# Patient Record
Sex: MALE | Race: WHITE | Employment: OTHER | URBAN - METROPOLITAN AREA
[De-identification: names, ages, dates, MRNs, and addresses within clinical notes are randomized per-mention and may not be internally consistent; named-entity substitution may affect disease eponyms.]

---

## 2017-01-11 ENCOUNTER — TRANSFERRED RECORDS (OUTPATIENT)
Dept: HEALTH INFORMATION MANAGEMENT | Facility: CLINIC | Age: 67
End: 2017-01-11

## 2017-01-11 LAB — EJECTION FRACTION: 73

## 2017-01-20 DIAGNOSIS — J01.01 ACUTE RECURRENT MAXILLARY SINUSITIS: Primary | ICD-10-CM

## 2017-01-30 ENCOUNTER — TRANSFERRED RECORDS (OUTPATIENT)
Dept: HEALTH INFORMATION MANAGEMENT | Facility: CLINIC | Age: 67
End: 2017-01-30

## 2017-02-14 ENCOUNTER — TRANSFERRED RECORDS (OUTPATIENT)
Dept: HEALTH INFORMATION MANAGEMENT | Facility: CLINIC | Age: 67
End: 2017-02-14

## 2017-02-17 DIAGNOSIS — I25.10 CAD (CORONARY ARTERY DISEASE): ICD-10-CM

## 2017-02-20 RX ORDER — ATORVASTATIN CALCIUM 40 MG/1
TABLET, FILM COATED ORAL
Qty: 90 TABLET | Refills: 0 | OUTPATIENT
Start: 2017-02-20

## 2017-02-20 NOTE — TELEPHONE ENCOUNTER
Duplicate  atorvastatin (LIPITOR) 40 MG tablet 90 tablet 4 6/2/2016  No      Sig: Take 1 tablet (40 mg) by mouth daily     Class: E-Prescribe     Route: Oral     Order: [id number]     E-Prescribing Status: Receipt confirmed by pharmacy (6/2/2016  9:22 AM CDT)     Belle TAM CMA ([facility])

## 2017-06-06 ENCOUNTER — MYC MEDICAL ADVICE (OUTPATIENT)
Dept: FAMILY MEDICINE | Facility: CLINIC | Age: 67
End: 2017-06-06

## 2017-06-06 DIAGNOSIS — I10 ESSENTIAL HYPERTENSION, BENIGN: ICD-10-CM

## 2017-06-06 DIAGNOSIS — E78.5 HYPERLIPIDEMIA LDL GOAL <100: Primary | ICD-10-CM

## 2017-06-06 DIAGNOSIS — N18.30 CKD (CHRONIC KIDNEY DISEASE) STAGE 3, GFR 30-59 ML/MIN (H): ICD-10-CM

## 2017-06-06 DIAGNOSIS — E11.59 TYPE 2 DIABETES MELLITUS WITH OTHER CIRCULATORY COMPLICATIONS (H): ICD-10-CM

## 2017-06-06 DIAGNOSIS — Z11.59 NEED FOR HEPATITIS C SCREENING TEST: ICD-10-CM

## 2017-06-06 NOTE — TELEPHONE ENCOUNTER
Patient has lab only appointment on 6/13/17 and would like lab orders placed.  Orders teed up from [facility].   Nathaly Rose RN

## 2017-06-07 DIAGNOSIS — E78.5 HYPERLIPIDEMIA LDL GOAL <100: ICD-10-CM

## 2017-06-07 RX ORDER — CARVEDILOL 12.5 MG/1
12.5 TABLET ORAL
COMMUNITY
Start: 2017-03-31 | End: 2017-06-13

## 2017-06-07 RX ORDER — CLOPIDOGREL BISULFATE 75 MG/1
75 TABLET ORAL DAILY
COMMUNITY
Start: 2017-05-20 | End: 2021-04-29

## 2017-06-07 RX ORDER — METOPROLOL SUCCINATE 50 MG/1
50 TABLET, EXTENDED RELEASE ORAL DAILY
COMMUNITY
Start: 2017-06-05 | End: 2017-11-29

## 2017-06-07 NOTE — TELEPHONE ENCOUNTER
atorvastatin (LIPITOR) 40 MG tablet     Last Written Prescription Date: 6/2/16  Last Fill Quantity: 90, # refills: 4  Last Office Visit with G, P or [facility] prescribing provider: 11/29/16  Next 5 appointments (look out 90 days)     Jun 13, 2017  9:45 AM CDT   Prakash Samaniego with Isa Valverde MD   [facility] ([facility])    [address] [address]   [phone]                   Lab Results   Component Value Date    CHOL 105 06/01/2016     Lab Results   Component Value Date    HDL 44 06/01/2016     Lab Results   Component Value Date    LDL 35 06/01/2016     Lab Results   Component Value Date    TRIG 130 06/01/2016     Lab Results   Component Value Date    CHOLHDLRATIO 2.0 04/08/2015

## 2017-06-07 NOTE — PROGRESS NOTES
SUBJECTIVE:                                                    Edwardo Dumont is a 66 year old male who presents to clinic today for the following health issues:      Diabetes Follow-up      Patient is checking blood sugars: rarely in the mornings.  Results 135    Diabetic concerns: None     Symptoms of hypoglycemia (low blood sugar): none     Paresthesias (numbness or burning in feet) or sores: No, but feet hurt at night.     Date of last diabetic eye exam: a year ago     Hyperlipidemia Follow-Up      Rate your low fat/cholesterol diet?: not monitoring fat    Taking statin?  No    Other lipid medications/supplements?:  none     Hypertension Follow-up      Outpatient blood pressures are not being checked.    Low Salt Diet: not monitoring salt         Amount of exercise or physical activity: 4-6days/week for an average of 30-40 minutes    Problems taking medications regularly: No    Medication side effects: none    Diet: regular (no restrictions)           Problem list and histories reviewed & adjusted, as indicated.  Additional history: as documented    Patient Active Problem List   Diagnosis     Cough     Sleep apnea     Gout     Low back pain     Radiculopathy of leg     Hyperlipidemia LDL goal <100     OA (osteoarthritis) of knee     Degenerative arthritis of hip - right     Urinary retention     Advanced directives, counseling/discussion     S/P hip replacement     Closed dislocation of acromioclavicular joint     Impingement syndrome of right shoulder     Ischemic vascular disease   one stent coronary artery 5/12     Essential hypertension, benign     Visual disturbance, transient, right eye     Dermatochalasis     Insomnia     S/P coronary angiogram     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)     CKD (chronic kidney disease) stage 3, GFR 30-59 ml/min     Past Surgical History:   Procedure Laterality Date     C ABLATION SUPRAVENT ARRHYTHMOGENIC FOCUS  12/21/15    at [facility]     C TOTAL  HIP ARTHROPLASTY  1/11/11    Rt [facility]I     ENDOSCOPIC SINUS SURGERY  12/14/15     ENDOSCOPIC SINUS SURGERY Bilateral 12/14/2015    Procedure: ENDOSCOPIC SINUS SURGERY;  Surgeon: Kei Cevallos MD;  Location: [facility] OR     GASTRIC BYPASS  1/03    lap band     SINUS SURGERY Right 12-14-15    Dr. Cevallos     STENT  5/8/12    OM2 cornary artery stent     STENT  01/2017    3 stents placed in coronary arteries while in [place]       Social History   Substance Use Topics     Smoking status: Former Smoker     Quit date: 9/8/1989     Smokeless tobacco: Never Used      Comment: non-smoking household     Alcohol use Yes      Comment: couple drinks 3 x a week     Family History   Problem Relation Age of Onset     Allergies Son      Allergies Son      CANCER Mother      kidney     Neurologic Disorder Father      parkinsons     Glaucoma No family hx of      Macular Degeneration No family hx of          Current Outpatient Prescriptions   Medication Sig Dispense Refill     atorvastatin (LIPITOR) 40 MG tablet Take 1 tablet (40 mg) by mouth daily 90 tablet 4     zolpidem (AMBIEN) 10 MG tablet Take 1 tablet (10 mg) by mouth nightly as needed 30 tablet 0     losartan (COZAAR) 25 MG tablet Take 1 tablet (25 mg) by mouth daily 90 tablet 4     metoprolol (TOPROL-XL) 50 MG 24 hr tablet Take 50 mg by mouth daily       clopidogrel (PLAVIX) 75 MG tablet Take 75 mg by mouth       JARDIANCE 10 MG TABS tablet TAKE ONE TABLET BY MOUTH EVERY DAY 30 tablet 11     metFORMIN (GLUCOPHAGE) 500 MG tablet TAKE 2 TABLETS BY MOUTH TWICE DAILY WITH MEALS 360 tablet 4     blood glucose monitoring (NO BRAND SPECIFIED) test strip Test blood sugar once per day and as needed. 1 Box 4     IBUPROFEN PO Take by mouth every 6 hours as needed for moderate pain       aspirin 81 MG tablet Take 1 tablet (81 mg) by mouth daily       blood glucose monitoring (Mayvenn CONTOUR MONITOR) meter device kit Use to test blood sugar  times daily or as directed. 1 kit 0     nitroglycerin  (NITROSTAT) 0.4 MG SL tablet Place 1 tablet (0.4 mg) under the tongue every 5 minutes as needed for chest pain 90 tablet 4     ACE NOT PRESCRIBED, INTENTIONAL, ACE Inhibitor not prescribed due to Other: cough 0 each 0     ONE TOUCH/ONE TOUCH II STARTER KIT use as directed 1 0     [DISCONTINUED] atorvastatin (LIPITOR) 40 MG tablet Take 1 tablet (40 mg) by mouth daily 90 tablet 0     [DISCONTINUED] losartan (COZAAR) 25 MG tablet Take 1 tablet (25 mg) by mouth daily 90 tablet 4     Recent Labs   Lab Test  06/12/17   0846  11/28/16   0901  08/24/16   0847  06/01/16   0929   04/08/15   0831   01/15/14   0850   01/03/12   0903  05/25/11   0838   A1C  7.1*  7.3*  6.7*  8.5*   < >  6.9*   < >  6.8*   < >  7.2*  7.0*   LDL  36   --    --   35   --   31   < >   --    < >  89   --    HDL  44   --    --   44   --   50   < >   --    < >  43   --    TRIG  96   --    --   130   --   93   < >   --    < >  136   --    ALT  29   --    --    --    --    --    --    --    --   22  20   CR  1.20  1.24   --   1.03   < >  1.28*   < >  1.24   < >  1.34*   --    GFRESTIMATED  61  58*   --   72   < >  57*   < >  59*   < >  54*   --    GFRESTBLACK  73  71   --   88   < >  69   < >  71   < >  66   --    POTASSIUM  4.4  4.2   --   4.2   < >  4.1   < >  4.5   < >  4.6   --    TSH   --    --    --   1.34   --    --    --   0.99   --   1.16  1.16    < > = values in this interval not displayed.      BP Readings from Last 3 Encounters:   06/13/17 122/68   11/29/16 128/66   08/24/16 110/70    Wt Readings from Last 3 Encounters:   06/13/17 234 lb (106.1 kg)   12/08/16 237 lb 9.6 oz (107.8 kg)   11/29/16 238 lb (108 kg)                  Labs reviewed in EPIC    Reviewed and updated as needed this visit by clinical staff  Tobacco  Allergies  Meds       Reviewed and updated as needed this visit by Provider         ROS:  This 66 year old male is here today for diabetes check. He spent the winter with his wife in [place]. While he was there in January, he  "felt some weakness and chest discomfort. He had angiogram and 3 stents were placed. One was placed inside of an old stent that had become occluded. He still isn't feeling back to his normal energy level. He often feels that his is skipping beats. He has an appointment to see one of our cardiologists soon.   When he reclines in his recliner, he often feels a pressure into his chest that seems to take his breath away. He wonders what that could be. We discussed the possibility of a hiatal hernia.   His sugars are under good control  He and wife are selling their house with outside pool and large yard as it is getting too hard for him to take care of everything.   He still needs PRN Ambien for when he travels. Just can't sleep in strange beds.   C: NEGATIVE for fever, chills, change in weight  E/M: NEGATIVE for ear, mouth and throat problems  R: NEGATIVE for significant cough or SOB  CV: NEGATIVE for chest pain, palpitations or peripheral edema    OBJECTIVE:                                                    /68  Pulse (!) 40  Temp 98.1  F (36.7  C) (Oral)  Ht 6' 0.8\" (1.849 m)  Wt 234 lb (106.1 kg)  SpO2 97%  BMI 31.05 kg/m2  Body mass index is 31.05 kg/(m^2).  GENERAL: healthy, alert and no distress  NECK: no adenopathy, no asymmetry, masses, or scars and thyroid normal to palpation  RESP: lungs clear to auscultation - no rales, rhonchi or wheezes  CV: regular rate and rhythm, normal S1 S2, no S3 or S4, no murmur, click or rub, no peripheral edema and peripheral pulses strong, but his pulse is very slow and sounded briefly irregular to me.   ABDOMEN: soft, nontender, no hepatosplenomegaly, no masses and bowel sounds normal  MS: no gross musculoskeletal defects noted, no edema  Diabetic foot exam: normal DP and PT pulses, no trophic changes or ulcerative lesions, normal sensory exam and normal monofilament exam    Diagnostic Test Results:  Results for orders placed or performed in visit on 06/12/17   Lipid " Profile with reflex to direct LDL   Result Value Ref Range    Cholesterol 99 <200 mg/dL    Triglycerides 96 <150 mg/dL    HDL Cholesterol 44 >39 mg/dL    LDL Cholesterol Calculated 36 <100 mg/dL    Non HDL Cholesterol 55 <130 mg/dL   Hepatitis C Screen Reflex to HCV RNA Quant and Genotype   Result Value Ref Range    Hepatitis C Antibody (A) NR     Reactive   A reactive result indicates one of the following 1) current HCV infection 2)   past HCV infection that has resolved or 3) false positivity. The CDC recommends   that a reactive result should be followed by Nucleic acid testing for HCV RNA.  If HCV RNA is detected, that indicates current HCV infection. If HCV RNA is not   detected, that indicates either past, resolved HCV infection, or false HCV   antibody positivity.   Assay performance characteristics have not been established for newborns,   infants, and children     Hemoglobin A1c   Result Value Ref Range    Hemoglobin A1C 7.1 (H) 4.3 - 6.0 %   Comprehensive metabolic panel   Result Value Ref Range    Sodium 139 133 - 144 mmol/L    Potassium 4.4 3.4 - 5.3 mmol/L    Chloride 105 94 - 109 mmol/L    Carbon Dioxide 24 20 - 32 mmol/L    Anion Gap 10 3 - 14 mmol/L    Glucose 162 (H) 70 - 99 mg/dL    Urea Nitrogen 29 7 - 30 mg/dL    Creatinine 1.20 0.66 - 1.25 mg/dL    GFR Estimate 61 >60 mL/min/1.7m2    GFR Estimate If Black 73 >60 mL/min/1.7m2    Calcium 9.0 8.5 - 10.1 mg/dL    Bilirubin Total 1.2 0.2 - 1.3 mg/dL    Albumin 3.9 3.4 - 5.0 g/dL    Protein Total 7.2 6.8 - 8.8 g/dL    Alkaline Phosphatase 103 40 - 150 U/L    ALT 29 0 - 70 U/L    AST 13 0 - 45 U/L         ASSESSMENT/PLAN:                                                             1. 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)  Good control   - OPHTHALMOLOGY ADULT REFERRAL    2. Hyperlipidemia LDL goal <100  Good control   - atorvastatin (LIPITOR) 40 MG tablet; Take 1 tablet (40 mg) by mouth daily  Dispense: 90 tablet; Refill:  4    3. SOB (shortness of breath)  Chest xray does confirm a hiatal hernia  - XR Chest 2 Views    4. Hiatal hernia  As above, encouraged weight loss and more exercise   Eat very small meals in the evenings.       5. Irregular heartbeat  EKG is normal today with pulse of 60   - EKG 12-lead complete w/read - Clinics    6. Essential hypertension, benign  Good control   - losartan (COZAAR) 25 MG tablet; Take 1 tablet (25 mg) by mouth daily  Dispense: 90 tablet; Refill: 4    7. Need for prophylactic vaccination against Streptococcus pneumoniae (pneumococcus)  due  - PNEUMOCOCCAL VACCINE,ADULT,SQ OR IM    8. Primary insomnia  As above   - zolpidem (AMBIEN) 10 MG tablet; Take 1 tablet (10 mg) by mouth nightly as needed  Dispense: 30 tablet; Refill: 0    Return to clinic 3 months     AIDEN PASCUAL MD  [facility]

## 2017-06-08 ENCOUNTER — TELEPHONE (OUTPATIENT)
Dept: FAMILY MEDICINE | Facility: CLINIC | Age: 67
End: 2017-06-08

## 2017-06-08 RX ORDER — ATORVASTATIN CALCIUM 40 MG/1
40 TABLET, FILM COATED ORAL DAILY
Qty: 90 TABLET | Refills: 0 | Status: SHIPPED | OUTPATIENT
Start: 2017-06-08 | End: 2017-06-13

## 2017-06-08 NOTE — TELEPHONE ENCOUNTER
Prescription approved per [facility] Refill Protocol.  Patient due for labs for further refills.  Starr Urbina RN - BC

## 2017-06-08 NOTE — TELEPHONE ENCOUNTER
Reason for Call: Request for an order or referral:    Order or referral being requested: Patient would like to request an lab order ( diabetes), added to his chart.     Date needed: as soon as possible    Has the patient been seen by the PCP for this problem? YES    Additional comments: please contact patient if any questions    Phone number Patient can be reached at:  Home number on file [phone] (home)    Best Time:  today    Can we leave a detailed message on this number?  YES    Call taken on 6/8/2017 at 9:03 AM by Michael Azar

## 2017-06-12 DIAGNOSIS — E78.5 HYPERLIPIDEMIA LDL GOAL <100: ICD-10-CM

## 2017-06-12 DIAGNOSIS — Z11.59 NEED FOR HEPATITIS C SCREENING TEST: ICD-10-CM

## 2017-06-12 DIAGNOSIS — I10 ESSENTIAL HYPERTENSION, BENIGN: ICD-10-CM

## 2017-06-12 DIAGNOSIS — N18.30 CKD (CHRONIC KIDNEY DISEASE) STAGE 3, GFR 30-59 ML/MIN (H): ICD-10-CM

## 2017-06-12 DIAGNOSIS — E11.59 TYPE 2 DIABETES MELLITUS WITH OTHER CIRCULATORY COMPLICATIONS (H): ICD-10-CM

## 2017-06-12 LAB
ALBUMIN SERPL-MCNC: 3.9 G/DL (ref 3.4–5)
ALP SERPL-CCNC: 103 U/L (ref 40–150)
ALT SERPL W P-5'-P-CCNC: 29 U/L (ref 0–70)
ANION GAP SERPL CALCULATED.3IONS-SCNC: 10 MMOL/L (ref 3–14)
AST SERPL W P-5'-P-CCNC: 13 U/L (ref 0–45)
BILIRUB SERPL-MCNC: 1.2 MG/DL (ref 0.2–1.3)
BUN SERPL-MCNC: 29 MG/DL (ref 7–30)
CALCIUM SERPL-MCNC: 9 MG/DL (ref 8.5–10.1)
CHLORIDE SERPL-SCNC: 105 MMOL/L (ref 94–109)
CHOLEST SERPL-MCNC: 99 MG/DL
CO2 SERPL-SCNC: 24 MMOL/L (ref 20–32)
CREAT SERPL-MCNC: 1.2 MG/DL (ref 0.66–1.25)
GFR SERPL CREATININE-BSD FRML MDRD: 61 ML/MIN/1.7M2
GLUCOSE SERPL-MCNC: 162 MG/DL (ref 70–99)
HBA1C MFR BLD: 7.1 % (ref 4.3–6)
HCV AB SERPL QL IA: ABNORMAL
HDLC SERPL-MCNC: 44 MG/DL
LDLC SERPL CALC-MCNC: 36 MG/DL
NONHDLC SERPL-MCNC: 55 MG/DL
POTASSIUM SERPL-SCNC: 4.4 MMOL/L (ref 3.4–5.3)
PROT SERPL-MCNC: 7.2 G/DL (ref 6.8–8.8)
SODIUM SERPL-SCNC: 139 MMOL/L (ref 133–144)
TRIGL SERPL-MCNC: 96 MG/DL

## 2017-06-12 PROCEDURE — 86803 HEPATITIS C AB TEST: CPT | Performed by: FAMILY MEDICINE

## 2017-06-12 PROCEDURE — 80053 COMPREHEN METABOLIC PANEL: CPT | Performed by: FAMILY MEDICINE

## 2017-06-12 PROCEDURE — 36415 COLL VENOUS BLD VENIPUNCTURE: CPT | Performed by: FAMILY MEDICINE

## 2017-06-12 PROCEDURE — 87522 HEPATITIS C REVRS TRNSCRPJ: CPT | Performed by: FAMILY MEDICINE

## 2017-06-12 PROCEDURE — 80061 LIPID PANEL: CPT | Performed by: FAMILY MEDICINE

## 2017-06-12 PROCEDURE — 83036 HEMOGLOBIN GLYCOSYLATED A1C: CPT | Performed by: FAMILY MEDICINE

## 2017-06-12 NOTE — LETTER
[facility]  [address] [place] [address]. [place] [address]    Lor 15, 2017    Edwardo Dumont  [address] [place] [address]          Dear Edwardo,    Your final hepatitis C result is negative. You just had a false positive screen. Continue your healthy lifestyle    Enclosed is a copy of your results.     Results for orders placed or performed in visit on 06/12/17   Lipid Profile with reflex to direct LDL   Result Value Ref Range    Cholesterol 99 <200 mg/dL    Triglycerides 96 <150 mg/dL    HDL Cholesterol 44 >39 mg/dL    LDL Cholesterol Calculated 36 <100 mg/dL    Non HDL Cholesterol 55 <130 mg/dL   Hepatitis C Screen Reflex to HCV RNA Quant and Genotype   Result Value Ref Range    Hepatitis C Antibody (A) NR     Reactive   A reactive result indicates one of the following 1) current HCV infection 2)   past HCV infection that has resolved or 3) false positivity. The CDC recommends   that a reactive result should be followed by Nucleic acid testing for HCV RNA.  If HCV RNA is detected, that indicates current HCV infection. If HCV RNA is not   detected, that indicates either past, resolved HCV infection, or false HCV   antibody positivity.   Assay performance characteristics have not been established for newborns,   infants, and children     Hemoglobin A1c   Result Value Ref Range    Hemoglobin A1C 7.1 (H) 4.3 - 6.0 %   Comprehensive metabolic panel   Result Value Ref Range    Sodium 139 133 - 144 mmol/L    Potassium 4.4 3.4 - 5.3 mmol/L    Chloride 105 94 - 109 mmol/L    Carbon Dioxide 24 20 - 32 mmol/L    Anion Gap 10 3 - 14 mmol/L    Glucose 162 (H) 70 - 99 mg/dL    Urea Nitrogen 29 7 - 30 mg/dL    Creatinine 1.20 0.66 - 1.25 mg/dL    GFR Estimate 61 >60 mL/min/1.7m2    GFR Estimate If Black 73 >60 mL/min/1.7m2    Calcium 9.0 8.5 - 10.1 mg/dL    Bilirubin Total 1.2 0.2 - 1.3 mg/dL    Albumin 3.9 3.4 - 5.0 g/dL    Protein Total 7.2 6.8 - 8.8 g/dL     Alkaline Phosphatase 103 40 - 150 U/L    ALT 29 0 - 70 U/L    AST 13 0 - 45 U/L   Hepatitis C RNA Quantitative   Result Value Ref Range    HCV RNA Quant IU/ml  HCVND [IU]/mL     HCV RNA Not Detected   The CADEN AmpliPrep/CADEN TaqMan HCV Test is an FDA-approved in vitro nucleic   acid amplification test for the quantitation of HCV RNA in human plasma (ETDA   plasma) or serum using the [employer] AmpliPrep Instrument for automated viral   nucleic acid extraction and the [employer] TaqMan Analyzer or [employer] TaqMan for   automated Real Time PCR amplification and detection of the viral nucleic acid   target.   Titer results are reported in International Units/mL (IU/mL) using the 1st WHO   International standard for HCV for Nucleic Acid Amplification based assays.      Log of HCV RNA Qt Not Calculated <1.2 Log IU/mL       If you have any questions or concerns, please call myself or my nurse at [phone].      Sincerely,        Isa Valverde MD /[email or web address]

## 2017-06-12 NOTE — PROGRESS NOTES
Dear Edwardo,    Your recent test results are attached.      Pre-visit labs to be discussed in clinic.    If you have any questions please feel free to contact [phone] or myself via [employer].    Sincerely,  Delmis Segovia, CNP

## 2017-06-13 ENCOUNTER — OFFICE VISIT (OUTPATIENT)
Dept: FAMILY MEDICINE | Facility: CLINIC | Age: 67
End: 2017-06-13
Payer: COMMERCIAL

## 2017-06-13 ENCOUNTER — RADIANT APPOINTMENT (OUTPATIENT)
Dept: GENERAL RADIOLOGY | Facility: CLINIC | Age: 67
End: 2017-06-13
Attending: FAMILY MEDICINE
Payer: COMMERCIAL

## 2017-06-13 ENCOUNTER — PRE VISIT (OUTPATIENT)
Dept: CARDIOLOGY | Facility: CLINIC | Age: 67
End: 2017-06-13

## 2017-06-13 VITALS
TEMPERATURE: 98.1 F | WEIGHT: 234 LBS | HEART RATE: 40 BPM | HEIGHT: 73 IN | DIASTOLIC BLOOD PRESSURE: 68 MMHG | BODY MASS INDEX: 31.01 KG/M2 | SYSTOLIC BLOOD PRESSURE: 122 MMHG | OXYGEN SATURATION: 97 %

## 2017-06-13 DIAGNOSIS — F51.01 PRIMARY INSOMNIA: ICD-10-CM

## 2017-06-13 DIAGNOSIS — E78.5 HYPERLIPIDEMIA LDL GOAL <100: ICD-10-CM

## 2017-06-13 DIAGNOSIS — E11.59 TYPE 2 DIABETES MELLITUS WITH OTHER CIRCULATORY COMPLICATIONS (H): Primary | ICD-10-CM

## 2017-06-13 DIAGNOSIS — R06.02 SOB (SHORTNESS OF BREATH): ICD-10-CM

## 2017-06-13 DIAGNOSIS — Z23 NEED FOR PROPHYLACTIC VACCINATION AGAINST STREPTOCOCCUS PNEUMONIAE (PNEUMOCOCCUS): ICD-10-CM

## 2017-06-13 DIAGNOSIS — I10 ESSENTIAL HYPERTENSION, BENIGN: ICD-10-CM

## 2017-06-13 DIAGNOSIS — K44.9 HIATAL HERNIA: ICD-10-CM

## 2017-06-13 DIAGNOSIS — I49.9 IRREGULAR HEARTBEAT: ICD-10-CM

## 2017-06-13 PROCEDURE — G0009 ADMIN PNEUMOCOCCAL VACCINE: HCPCS | Performed by: FAMILY MEDICINE

## 2017-06-13 PROCEDURE — 93000 ELECTROCARDIOGRAM COMPLETE: CPT | Performed by: FAMILY MEDICINE

## 2017-06-13 PROCEDURE — 71020 XR CHEST 2 VW: CPT

## 2017-06-13 PROCEDURE — 99214 OFFICE O/P EST MOD 30 MIN: CPT | Mod: 25 | Performed by: FAMILY MEDICINE

## 2017-06-13 PROCEDURE — 90732 PPSV23 VACC 2 YRS+ SUBQ/IM: CPT | Performed by: FAMILY MEDICINE

## 2017-06-13 RX ORDER — ATORVASTATIN CALCIUM 40 MG/1
40 TABLET, FILM COATED ORAL DAILY
Qty: 90 TABLET | Refills: 4 | Status: SHIPPED | OUTPATIENT
Start: 2017-06-13 | End: 2018-06-28

## 2017-06-13 RX ORDER — ZOLPIDEM TARTRATE 10 MG/1
10 TABLET ORAL
Qty: 30 TABLET | Refills: 0 | Status: SHIPPED | OUTPATIENT
Start: 2017-06-13 | End: 2017-11-29

## 2017-06-13 RX ORDER — LOSARTAN POTASSIUM 25 MG/1
25 TABLET ORAL DAILY
Qty: 90 TABLET | Refills: 4 | Status: SHIPPED | OUTPATIENT
Start: 2017-06-13 | End: 2018-06-28

## 2017-06-13 ASSESSMENT — PAIN SCALES - GENERAL: PAINLEVEL: NO PAIN (0)

## 2017-06-13 NOTE — LETTER
[address] [place] [address]    June 13, 2017    Edwardo Dumont  [address] [place] [address]      Dear Edwardo,    Your chest xray was basically read as normal. Didn't even mention the hiatal hernia, but you now know it is there.     Enclosed is a copy of your results.   Results for orders placed or performed in visit on 06/13/17   XR Chest 2 Views    Narrative    CHEST TWO VIEWS  6/13/2017 11:02 AM     HISTORY: Shortness of breath.    COMPARISON: None.      Impression    IMPRESSION: The cardiac silhouette and pulmonary vasculature are  normal. Mild fibrosis or atelectasis at the left lung base. There are  no acute infiltrates.    WHITNEY ORNELAS MD       If you have any questions or concerns, please call myself or my nurse at [phone].    Sincerely,    Isa Valverde MD/lam

## 2017-06-13 NOTE — PATIENT INSTRUCTIONS
[facility]    If you have any questions regarding to your visit please contact your care team:       Team Purple:   Clinic Hours Telephone Number   Dr. Isa Verma     7am-7pm  Monday - Thursday   7am-5pm  Fridays  [phone]  (Appointment scheduling available 24/7)    Questions about your Visit?   Team Line:  [phone]   Urgent Care - [place] [facility] - 11am-9pm Monday-Friday Saturday-Sunday- 9am-5pm   [place] - 5pm-9pm Monday-Friday Saturday-Sunday- 9am-5pm  [phone] - [facility]  [phone] - [place]       What options do I have for visits at the clinic other than the traditional office visit?  To expand how we care for you, many of our providers are utilizing electronic visits (e-visits) and telephone visits, when medically appropriate, for interactions with their patients rather than a visit in the clinic.   We also offer nurse visits for many medical concerns. Just like any other service, we will bill your insurance company for this type of visit based on time spent on the phone with your provider. Not all insurance companies cover these visits. Please check with your medical insurance if this type of visit is covered. You will be responsible for any charges that are not paid by your insurance.      E-visits via [employer]:  generally incur a $35.00 fee.  Telephone visits:  Time spent on the phone: *charged based on time that is spent on the phone in increments of 10 minutes. Estimated cost:   5-10 mins $30.00   11-20 mins. $59.00   21-30 mins. $85.00     Use [employer] (secure email communication and access to your chart) to send your primary care provider a message or make an appointment. Ask someone on your Team how to sign up for [employer].  For a Price Quote for your services, please call our Consumer Price Line at [phone].  As always, Thank you for trusting us with your health care needs!

## 2017-06-13 NOTE — MR AVS SNAPSHOT
After Visit Summary   6/13/2017    Edwardo Dumont    MRN: [id number]           Patient Information     Date Of Birth          1950        Visit Information        Provider Department      6/13/2017 9:45 AM Isa Valverde MD [facility]        Today's Diagnoses     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)    -  1    Hyperlipidemia LDL goal <100        Essential hypertension, benign        SOB (shortness of breath)        Irregular heartbeat        Need for prophylactic vaccination against Streptococcus pneumoniae (pneumococcus)        Primary insomnia          Care Instructions    [facility]    If you have any questions regarding to your visit please contact your care team:       Team Purple:   Clinic Hours Telephone Number   Dr. Isa Verma     7am-7pm  Monday - Thursday   7am-5pm  Fridays  [phone]  (Appointment scheduling available 24/7)    Questions about your Visit?   Team Line:  [phone]   Urgent Care - [place] - 11am-9pm Monday-Friday Saturday-Sunday- 9am-5pm   [place] - 5pm-9pm Monday-Friday Saturday-Sunday- 9am-5pm  [phone] - [place]   [phone] - [place]       What options do I have for visits at the clinic other than the traditional office visit?  To expand how we care for you, many of our providers are utilizing electronic visits (e-visits) and telephone visits, when medically appropriate, for interactions with their patients rather than a visit in the clinic.   We also offer nurse visits for many medical concerns. Just like any other service, we will bill your insurance company for this type of visit based on time spent on the phone with your provider. Not all insurance companies cover these visits. Please check with your medical insurance if this type of visit is covered. You will be responsible for any charges that are not paid by  your insurance.      E-visits via Simplisthart:  generally incur a $35.00 fee.  Telephone visits:  Time spent on the phone: *charged based on time that is spent on the phone in increments of 10 minutes. Estimated cost:   5-10 mins $30.00   11-20 mins. $59.00   21-30 mins. $85.00     Use Simplisthart (secure email communication and access to your chart) to send your primary care provider a message or make an appointment. Ask someone on your Team how to sign up for [employer].  For a Price Quote for your services, please call our goOutMap Price Line at [phone].  As always, Thank you for trusting us with your health care needs!              Follow-ups after your visit        Additional Services     OPHTHALMOLOGY ADULT REFERRAL       Your provider has referred you to: [facility]: [facility] [place] [phone]   [email or web address]    Please be aware that coverage of these services is subject to the terms and limitations of your health insurance plan.  Call member services at your health plan with any benefit or coverage questions.      Please bring the following with you to your appointment:    (1) Any X-Rays, CTs or MRIs which have been performed.  Contact the facility where they were done to arrange for  prior to your scheduled appointment.    (2) List of current medications  (3) This referral request   (4) Any documents/labs given to you for this referral                  Your next 10 appointments already scheduled     Jun 14, 2017 11:00 AM CDT   Return Visit with ESTELA Zuñiga MD   [facility] PHYSICIANS HEART AT [facility] ([facility])    [address] [place] [address] 2nd Floor  [facility] [id number] [phone]              Who to contact     If you have questions or need follow up information about today's clinic visit or your schedule please contact [facility] directly at [phone].  Normal or non-critical lab and imaging results will be  "communicated to you by [email or web address], letter or phone within 4 business days after the clinic has received the results. If you do not hear from us within 7 days, please contact the clinic through Undertone or phone. If you have a critical or abnormal lab result, we will notify you by phone as soon as possible.  Submit refill requests through Undertone or call your pharmacy and they will forward the refill request to us. Please allow 3 business days for your refill to be completed.          Additional Information About Your Visit        Undertone Information     Undertone gives you secure access to your electronic health record. If you see a primary care provider, you can also send messages to your care team and make appointments. If you have questions, please call your primary care clinic.  If you do not have a primary care provider, please call [phone] and they will assist you.        Care EveryWhere ID     This is your Care EveryWhere ID. This could be used by other organizations to access your [place] medical records  [id number]        Your Vitals Were     Pulse Temperature Height Pulse Oximetry BMI (Body Mass Index)       40 98.1  F (36.7  C) (Oral) 6' 0.8\" (1.849 m) 97% 31.05 kg/m2        Blood Pressure from Last 3 Encounters:   06/13/17 122/68   11/29/16 128/66   08/24/16 110/70    Weight from Last 3 Encounters:   06/13/17 234 lb (106.1 kg)   12/08/16 237 lb 9.6 oz (107.8 kg)   11/29/16 238 lb (108 kg)              We Performed the Following     EKG 12-lead complete w/read - Clinics     OPHTHALMOLOGY ADULT REFERRAL     PNEUMOCOCCAL VACCINE,ADULT,SQ OR IM     XR Chest 2 Views          Where to get your medicines      These medications were sent to [facility] [id number]  [place], [place] - [address] AT [facility] [address] [place] [address]     Phone:  [phone]     atorvastatin 40 MG tablet    losartan 25 MG tablet         Some of these will need a paper prescription " and others can be bought over the counter.  Ask your nurse if you have questions.     Bring a paper prescription for each of these medications     zolpidem 10 MG tablet          Primary Care Provider Office Phone # Fax #    Isa Valverde [phone]       [address] [address]        Thank you!     Thank you for choosing [facility]  for your care. Our goal is always to provide you with excellent care. Hearing back from our patients is one way we can continue to improve our services. Please take a few minutes to complete the written survey that you may receive in the mail after your visit with us. Thank you!             Your Updated Medication List - Protect others around you: Learn how to safely use, store and throw away your medicines at www.disposemymeds.org.          This list is accurate as of: 6/13/17 11:09 AM.  Always use your most recent med list.                   Brand Name Dispense Instructions for use    ACE NOT PRESCRIBED (INTENTIONAL)     0 each    ACE Inhibitor not prescribed due to Other: cough       aspirin 81 MG tablet      Take 1 tablet (81 mg) by mouth daily       atorvastatin 40 MG tablet    LIPITOR    90 tablet    Take 1 tablet (40 mg) by mouth daily       blood glucose monitoring test strip    no brand specified    1 Box    Test blood sugar once per day and as needed.       clopidogrel 75 MG tablet    PLAVIX     Take 75 mg by mouth       IBUPROFEN PO      Take by mouth every 6 hours as needed for moderate pain       JARDIANCE 10 MG Tabs tablet   Generic drug:  empagliflozin     30 tablet    TAKE ONE TABLET BY MOUTH EVERY DAY       losartan 25 MG tablet    COZAAR    90 tablet    Take 1 tablet (25 mg) by mouth daily       metFORMIN 500 MG tablet    GLUCOPHAGE    360 tablet    TAKE 2 TABLETS BY MOUTH TWICE DAILY WITH MEALS       metoprolol 50 MG 24 hr tablet    TOPROL-XL     Take 50 mg by mouth daily        nitroglycerin 0.4 MG sublingual tablet    NITROSTAT    90 tablet    Place 1 tablet (0.4 mg) under the tongue every 5 minutes as needed for chest pain       * ONE TOUCH/ONE TOUCH II STARTER KIT     1    use as directed       * blood glucose monitoring meter device kit     1 kit    Use to test blood sugar  times daily or as directed.       zolpidem 10 MG tablet    AMBIEN    30 tablet    Take 1 tablet (10 mg) by mouth nightly as needed       * Notice:  This list has 2 medication(s) that are the same as other medications prescribed for you. Read the directions carefully, and ask your doctor or other care provider to review them with you.

## 2017-06-13 NOTE — TELEPHONE ENCOUNTER
Follow up for CAD. Former Dr. Cunningham pt. S/p angiogram 9/2015. Hx of Stent  in [place] this past winter. Heart skipping beats, chest pressure when reclining.    Chart prep complete. All requested testing/labs completed.  Yue Chairez CMA.

## 2017-06-14 ENCOUNTER — OFFICE VISIT (OUTPATIENT)
Dept: CARDIOLOGY | Facility: CLINIC | Age: 67
End: 2017-06-14
Payer: COMMERCIAL

## 2017-06-14 VITALS
WEIGHT: 234 LBS | OXYGEN SATURATION: 97 % | HEART RATE: 57 BPM | DIASTOLIC BLOOD PRESSURE: 84 MMHG | SYSTOLIC BLOOD PRESSURE: 135 MMHG | BODY MASS INDEX: 31.05 KG/M2

## 2017-06-14 DIAGNOSIS — Z98.61 POSTSURGICAL PERCUTANEOUS TRANSLUMINAL CORONARY ANGIOPLASTY STATUS: Primary | ICD-10-CM

## 2017-06-14 PROCEDURE — 99214 OFFICE O/P EST MOD 30 MIN: CPT | Performed by: INTERNAL MEDICINE

## 2017-06-14 ASSESSMENT — PAIN SCALES - GENERAL: PAINLEVEL: NO PAIN (0)

## 2017-06-14 NOTE — NURSING NOTE
Med Reconcile: Reviewed and verified all current medications with the patient. The updated medication list was printed and given to the patient.  Return Appointment: Patient given instructions regarding scheduling next clinic visit. Patient demonstrated understanding of this information and agreed to call with further questions or concerns.  6 mo fu   Patient stated he understood all health information given and agreed to call with further questions or concerns.  Anat Ny RN

## 2017-06-14 NOTE — LETTER
6/14/2017      RE: Edwardo Dumont  [address]  [place] [address]       Dear Colleague,    Thank you for the opportunity to participate in the care of your patient, Edwardo Dumont, at the [facility] HEART AT [facility]. Please see a copy of my visit note below.       SUBJECTIVE:  Edwardo Dumont is a 66 year old male who presents for follow up.  Had stent to OM2 in 2012. Had normal DSE and coronary angiogram in 2015 showing no flow limiting lesions. This [place] had an abnormal stress MPI at [place] and had stent to RCA and OM2(prior stent-ISR).  Doing well. No complaints.    Patient Active Problem List    Diagnosis Date Noted     CKD (chronic kidney disease) stage 3, GFR 30-59 ml/min      Priority: Medium     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) 10/06/2015     Priority: Medium     S/P coronary angiogram 09/23/2015     Priority: Medium     Insomnia 08/03/2015     Priority: Medium     Dermatochalasis 04/10/2015     Priority: Medium     Visual disturbance, transient, right eye 02/09/2014     Priority: Medium     Essential hypertension, benign 05/14/2013     Priority: Medium     Ischemic vascular disease   one stent coronary artery 5/12 12/19/2012     Priority: Medium     Closed dislocation of acromioclavicular joint 12/17/2012     Priority: Medium     Problem list name updated by automated process. Provider to review       Impingement syndrome of right shoulder 12/17/2012     Priority: Medium     S/P hip replacement 12/13/2012     Priority: Medium     Advanced directives, counseling/discussion 05/25/2011     Priority: Medium     Discussed Advance Directive planning with patient; information given to patient to review.         Urinary retention 01/21/2011     Priority: Medium     Degenerative arthritis of hip - right 12/27/2010     Priority: Medium     OA (osteoarthritis) of knee 10/25/2010     Priority: Medium      Hyperlipidemia LDL goal <100 10/14/2010     Priority: Medium     Low back pain 04/23/2010     Priority: Medium     Radiculopathy of leg 04/23/2010     Priority: Medium     Gout 11/27/2009     Priority: Medium     Sleep apnea      Priority: Medium     Cough 12/05/2007     Priority: Medium    .  Current Outpatient Prescriptions   Medication Sig     atorvastatin (LIPITOR) 40 MG tablet Take 1 tablet (40 mg) by mouth daily     zolpidem (AMBIEN) 10 MG tablet Take 1 tablet (10 mg) by mouth nightly as needed     losartan (COZAAR) 25 MG tablet Take 1 tablet (25 mg) by mouth daily     metoprolol (TOPROL-XL) 50 MG 24 hr tablet Take 50 mg by mouth daily     clopidogrel (PLAVIX) 75 MG tablet Take 75 mg by mouth     JARDIANCE 10 MG TABS tablet TAKE ONE TABLET BY MOUTH EVERY DAY     metFORMIN (GLUCOPHAGE) 500 MG tablet TAKE 2 TABLETS BY MOUTH TWICE DAILY WITH MEALS     blood glucose monitoring (NO BRAND SPECIFIED) test strip Test blood sugar once per day and as needed.     IBUPROFEN PO Take by mouth every 6 hours as needed for moderate pain     aspirin 81 MG tablet Take 1 tablet (81 mg) by mouth daily     blood glucose monitoring ([employer] CONTOUR MONITOR) meter device kit Use to test blood sugar  times daily or as directed.     ONE TOUCH/ONE TOUCH II STARTER KIT use as directed     nitroglycerin (NITROSTAT) 0.4 MG SL tablet Place 1 tablet (0.4 mg) under the tongue every 5 minutes as needed for chest pain (Patient not taking: Reported on 6/14/2017)     ACE NOT PRESCRIBED, INTENTIONAL, ACE Inhibitor not prescribed due to Other: cough     No current facility-administered medications for this visit.      Past Medical History:   Diagnosis Date     Acromioclavicular joint separation, type 1 9/12    right     Allergic rhinitis     causes chronic  cough     Arthritis      CKD (chronic kidney disease) stage 3, GFR 30-59 ml/min      Degenerative arthritis of hip - right 12/27/2010     Gout      Hip arthrosis - right 10/25/2010      Hyperlipidemia      Ischaemic vascular disease      Ischemic vascular disease 5/12    one stent     OA (osteoarthritis) of knee 10/25/2010     Renal insufficiency      Sleep apnea      Type II or unspecified type diabetes mellitus without mention of complication, not stated as uncontrolled      Unspecified essential hypertension      Past Surgical History:   Procedure Laterality Date     C ABLATION SUPRAVENT ARRHYTHMOGENIC FOCUS  12/21/15    at [facility]     C TOTAL HIP ARTHROPLASTY  1/11/11    Rt YOGI     ENDOSCOPIC SINUS SURGERY  12/14/15     ENDOSCOPIC SINUS SURGERY Bilateral 12/14/2015    Procedure: ENDOSCOPIC SINUS SURGERY;  Surgeon: Kei Cevallos MD;  Location: [facility] OR     GASTRIC BYPASS  1/03    lap band     SINUS SURGERY Right 12-14-15    Dr. Cevallos     STENT  5/8/12    OM2 cornary artery stent     STENT  01/2017    3 stents placed in coronary arteries while in [place]     STENT, CORONARY, HANG  01/2017    2 stents in [place].     Allergies   Allergen Reactions     Benzonatate Anaphylaxis     Lisinopril Cough     Zocor [Simvastatin - High Dose]      Poor memory     Social History     Social History     Marital status:      Spouse name: N/A     Number of children: N/A     Years of education: N/A     Occupational History     Not on file.     Social History Main Topics     Smoking status: Former Smoker     Quit date: 9/8/1989     Smokeless tobacco: Never Used      Comment: non-smoking household     Alcohol use Yes      Comment: couple drinks 3 x a week     Drug use: No     Sexual activity: Yes     Partners: Female     Other Topics Concern     Not on file     Social History Narrative     Family History   Problem Relation Age of Onset     Allergies Son      Allergies Son      CANCER Mother      kidney     Neurologic Disorder Father      parkinsons     Glaucoma No family hx of      Macular Degeneration No family hx of           REVIEW OF SYSTEMS:  General: negative, fever, chills, night sweats  Skin: negative, acne,  rash and scaling  Eyes: negative, double vision, eye pain and photophobia  Ears/Nose/Throat: negative, nasal congestion and purulent rhinorrhea  Respiratory: No dyspnea on exertion, No cough, No hemoptysis and negative  Cardiovascular: negative, palpitations, tachycardia, irregular heart beat, chest pain, exertional chest pain or pressure, paroxysmal nocturnal dyspnea, dyspnea on exertion and orthopnea       OBJECTIVE:  Blood pressure 135/84, pulse 57, weight 106.1 kg (234 lb), SpO2 97 %.  General Appearance: healthy, alert, active and no distress  Head: Normocephalic. No masses, lesions, tenderness or abnormalities  Eyes: conjuctiva clear, PERRL, EOM intact  Ears: External ears normal. Canals clear. TM's normal.  Nose: Nares normal  Mouth: normal  Neck: Supple, no cervical adenopathy, no thyromegaly  Lungs: clear to auscultation  Cardiac: regular rate and rhythm, normal S1 and S2, no murmur       ASSESSMENT/PLAN:  S/P PCI to OM2 in 2012 and now stent to RCA and OM2(ISR) in jan 2017.  Remain asymptomatic.  Will continue plavix for 1 yr.  Continue other meds.  Per orders.   Return to Clinic 6months.    Please do not hesitate to contact me if you have any questions/concerns.     Sincerely,     ESTELA Zuñiga MD

## 2017-06-14 NOTE — PROGRESS NOTES
SUBJECTIVE:  Edwarod Dumont is a 66 year old male who presents for follow up.  Had stent to OM2 in 2012. Had normal DSE and coronary angiogram in 2015 showing no flow limiting lesions. This [place] had an [place] stress [place] and had stent to RCA and OM2(prior stent-ISR).  Doing well. No complaints.    Patient Active Problem List    Diagnosis Date Noted     CKD (chronic kidney disease) stage 3, GFR 30-59 ml/min      Priority: Medium     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) 10/06/2015     Priority: Medium     S/P coronary angiogram 09/23/2015     Priority: Medium     Insomnia 08/03/2015     Priority: Medium     Dermatochalasis 04/10/2015     Priority: Medium     Visual disturbance, transient, right eye 02/09/2014     Priority: Medium     Essential hypertension, benign 05/14/2013     Priority: Medium     Ischemic vascular disease   one stent coronary artery 5/12 12/19/2012     Priority: Medium     Closed dislocation of acromioclavicular joint 12/17/2012     Priority: Medium     Problem list name updated by automated process. Provider to review       Impingement syndrome of right shoulder 12/17/2012     Priority: Medium     S/P hip replacement 12/13/2012     Priority: Medium     Advanced directives, counseling/discussion 05/25/2011     Priority: Medium     Discussed Advance Directive planning with patient; information given to patient to review.         Urinary retention 01/21/2011     Priority: Medium     Degenerative arthritis of hip - right 12/27/2010     Priority: Medium     OA (osteoarthritis) of knee 10/25/2010     Priority: Medium     Hyperlipidemia LDL goal <100 10/14/2010     Priority: Medium     Low back pain 04/23/2010     Priority: Medium     Radiculopathy of leg 04/23/2010     Priority: Medium     Gout 11/27/2009     Priority: Medium     Sleep apnea      Priority: Medium     Cough 12/05/2007     Priority: Medium    .  Current Outpatient Prescriptions   Medication Sig      atorvastatin (LIPITOR) 40 MG tablet Take 1 tablet (40 mg) by mouth daily     zolpidem (AMBIEN) 10 MG tablet Take 1 tablet (10 mg) by mouth nightly as needed     losartan (COZAAR) 25 MG tablet Take 1 tablet (25 mg) by mouth daily     metoprolol (TOPROL-XL) 50 MG 24 hr tablet Take 50 mg by mouth daily     clopidogrel (PLAVIX) 75 MG tablet Take 75 mg by mouth     JARDIANCE 10 MG TABS tablet TAKE ONE TABLET BY MOUTH EVERY DAY     metFORMIN (GLUCOPHAGE) 500 MG tablet TAKE 2 TABLETS BY MOUTH TWICE DAILY WITH MEALS     blood glucose monitoring (NO BRAND SPECIFIED) test strip Test blood sugar once per day and as needed.     IBUPROFEN PO Take by mouth every 6 hours as needed for moderate pain     aspirin 81 MG tablet Take 1 tablet (81 mg) by mouth daily     blood glucose monitoring ([employer] CONTOUR MONITOR) meter device kit Use to test blood sugar  times daily or as directed.     ONE TOUCH/ONE TOUCH II STARTER KIT use as directed     nitroglycerin (NITROSTAT) 0.4 MG SL tablet Place 1 tablet (0.4 mg) under the tongue every 5 minutes as needed for chest pain (Patient not taking: Reported on 6/14/2017)     ACE NOT PRESCRIBED, INTENTIONAL, ACE Inhibitor not prescribed due to Other: cough     No current facility-administered medications for this visit.      Past Medical History:   Diagnosis Date     Acromioclavicular joint separation, type 1 9/12    right     Allergic rhinitis     causes chronic  cough     Arthritis      CKD (chronic kidney disease) stage 3, GFR 30-59 ml/min      Degenerative arthritis of hip - right 12/27/2010     Gout      Hip arthrosis - right 10/25/2010     Hyperlipidemia      Ischaemic vascular disease      Ischemic vascular disease 5/12    one stent     OA (osteoarthritis) of knee 10/25/2010     Renal insufficiency      Sleep apnea      Type II or unspecified type diabetes mellitus without mention of complication, not stated as uncontrolled      Unspecified essential hypertension      Past Surgical History:    Procedure Laterality Date     C ABLATION SUPRAVENT ARRHYTHMOGENIC FOCUS  12/21/15    at [facility]     C TOTAL HIP ARTHROPLASTY  1/11/11    Rt YOGI     ENDOSCOPIC SINUS SURGERY  12/14/15     ENDOSCOPIC SINUS SURGERY Bilateral 12/14/2015    Procedure: ENDOSCOPIC SINUS SURGERY;  Surgeon: Kei Cevallos MD;  Location: [facility] OR     GASTRIC BYPASS  1/03    lap band     SINUS SURGERY Right 12-14-15    Dr. Cevallos     STENT  5/8/12    OM2 cornary artery stent     STENT  01/2017    3 stents placed in coronary arteries while in [place]     STENT, CORONARY, HANG  01/2017    2 stents in [place].     Allergies   Allergen Reactions     Benzonatate Anaphylaxis     Lisinopril Cough     Zocor [Simvastatin - High Dose]      Poor memory     Social History     Social History     Marital status:      Spouse name: N/A     Number of children: N/A     Years of education: N/A     Occupational History     Not on file.     Social History Main Topics     Smoking status: Former Smoker     Quit date: 9/8/1989     Smokeless tobacco: Never Used      Comment: non-smoking household     Alcohol use Yes      Comment: couple drinks 3 x a week     Drug use: No     Sexual activity: Yes     Partners: Female     Other Topics Concern     Not on file     Social History Narrative     Family History   Problem Relation Age of Onset     Allergies Son      Allergies Son      CANCER Mother      kidney     Neurologic Disorder Father      parkinsons     Glaucoma No family hx of      Macular Degeneration No family hx of           REVIEW OF SYSTEMS:  General: negative, fever, chills, night sweats  Skin: negative, acne, rash and scaling  Eyes: negative, double vision, eye pain and photophobia  Ears/Nose/Throat: negative, nasal congestion and purulent rhinorrhea  Respiratory: No dyspnea on exertion, No cough, No hemoptysis and negative  Cardiovascular: negative, palpitations, tachycardia, irregular heart beat, chest pain, exertional chest pain or pressure, paroxysmal  nocturnal dyspnea, dyspnea on exertion and orthopnea       OBJECTIVE:  Blood pressure 135/84, pulse 57, weight 106.1 kg (234 lb), SpO2 97 %.  General Appearance: healthy, alert, active and no distress  Head: Normocephalic. No masses, lesions, tenderness or abnormalities  Eyes: conjuctiva clear, PERRL, EOM intact  Ears: External ears normal. Canals clear. TM's normal.  Nose: Nares normal  Mouth: normal  Neck: Supple, no cervical adenopathy, no thyromegaly  Lungs: clear to auscultation  Cardiac: regular rate and rhythm, normal S1 and S2, no murmur       ASSESSMENT/PLAN:  S/P PCI to OM2 in 2012 and now stent to RCA and OM2(ISR) in jan 2017.  Remain asymptomatic.  Will continue plavix for 1 yr.  Continue other meds.  Per orders.   Return to Clinic 6months.

## 2017-06-14 NOTE — MR AVS SNAPSHOT
After Visit Summary   6/14/2017    Edwardo Dumont    MRN: [id number]           Patient Information     Date Of Birth          1950        Visit Information        Provider Department      6/14/2017 11:00 AM ESTELA Zuñiga MD [facility] PHYSICIANS HEART AT [facility]        Care Instructions    1.  Continue same medication regimen.    2.  Follow up in 6 months.  This is scheduled for Wednesday, December 20th at 10:30 am.  Please call [phone] if rescheduling is needed.        [facility] Cardiology [facility] Location    If you have any questions regarding to your visit please contact your care team:     Cardiology  Telephone Number   Anat Galo Ny  Cardiology RN's.    Melisa Chairez CMA [phone]    After hours: [phone].  [phone]   For scheduling appts:     [phone] or  [phone]    After hours: [phone]   For the Device Clinic (Pacemakers and ICD's)  RN's :  Kendy Cummings   During business hours: [phone]  After business hours:  [phone]- select option 4.      If you need a medication refill please contact your pharmacy.  Please allow 3 business days for your refill to be completed.    As always, Thank you for trusting us with your health care needs!  _____________________________________________________________________              Follow-ups after your visit        Your next 10 appointments already scheduled     Dec 20, 2017 10:30 AM CST   Return Visit with ESTELA Zuñiga MD   [facility] PHYSICIANS HEART AT [facility] ([facility])    [address] [place] [address] [phone]              Who to contact     If you have questions or need follow up information about today's clinic visit or your schedule please contact [facility] PHYSIC[facility] HEART AT [facility] directly at [phone].  Normal or non-critical lab and imaging results will be communicated  to you by [email or web address], letter or phone within 4 business days after the clinic has received the results. If you do not hear from us within 7 days, please contact the clinic through PopJax or phone. If you have a critical or abnormal lab result, we will notify you by phone as soon as possible.  Submit refill requests through PopJax or call your pharmacy and they will forward the refill request to us. Please allow 3 business days for your refill to be completed.          Additional Information About Your Visit        [email or web address] Information     PopJax gives you secure access to your electronic health record. If you see a primary care provider, you can also send messages to your care team and make appointments. If you have questions, please call your primary care clinic.  If you do not have a primary care provider, please call [phone] and they will assist you.        Care EveryWhere ID     This is your Care EveryWhere ID. This could be used by other organizations to access your [place] medical records  [id number]        Your Vitals Were     Pulse Pulse Oximetry BMI (Body Mass Index)             57 97% 31.05 kg/m2          Blood Pressure from Last 3 Encounters:   06/14/17 135/84   06/13/17 122/68   11/29/16 128/66    Weight from Last 3 Encounters:   06/14/17 106.1 kg (234 lb)   06/13/17 106.1 kg (234 lb)   12/08/16 107.8 kg (237 lb 9.6 oz)              Today, you had the following     No orders found for display       Primary Care Provider Office Phone # Fax #    Isa Valverde [phone]       [address] [address]        Thank you!     Thank you for choosing [facility] PHYSICIANS HEART AT [facility]  for your care. Our goal is always to provide you with excellent care. Hearing back from our patients is one way we can continue to improve our services. Please take a few minutes to complete the written survey that you may  receive in the mail after your visit with us. Thank you!             Your Updated Medication List - Protect others around you: Learn how to safely use, store and throw away your medicines at www.disposemymeds.org.          This list is accurate as of: 6/14/17 11:21 AM.  Always use your most recent med list.                   Brand Name Dispense Instructions for use    ACE NOT PRESCRIBED (INTENTIONAL)     0 each    ACE Inhibitor not prescribed due to Other: cough       aspirin 81 MG tablet      Take 1 tablet (81 mg) by mouth daily       atorvastatin 40 MG tablet    LIPITOR    90 tablet    Take 1 tablet (40 mg) by mouth daily       blood glucose monitoring test strip    no brand specified    1 Box    Test blood sugar once per day and as needed.       clopidogrel 75 MG tablet    PLAVIX     Take 75 mg by mouth       IBUPROFEN PO      Take by mouth every 6 hours as needed for moderate pain       JARDIANCE 10 MG Tabs tablet   Generic drug:  empagliflozin     30 tablet    TAKE ONE TABLET BY MOUTH EVERY DAY       losartan 25 MG tablet    COZAAR    90 tablet    Take 1 tablet (25 mg) by mouth daily       metFORMIN 500 MG tablet    GLUCOPHAGE    360 tablet    TAKE 2 TABLETS BY MOUTH TWICE DAILY WITH MEALS       metoprolol 50 MG 24 hr tablet    TOPROL-XL     Take 50 mg by mouth daily       nitroglycerin 0.4 MG sublingual tablet    NITROSTAT    90 tablet    Place 1 tablet (0.4 mg) under the tongue every 5 minutes as needed for chest pain       * ONE TOUCH/ONE TOUCH II STARTER KIT     1    use as directed       * blood glucose monitoring meter device kit     1 kit    Use to test blood sugar  times daily or as directed.       zolpidem 10 MG tablet    AMBIEN    30 tablet    Take 1 tablet (10 mg) by mouth nightly as needed       * Notice:  This list has 2 medication(s) that are the same as other medications prescribed for you. Read the directions carefully, and ask your doctor or other care provider to review them with you.

## 2017-06-14 NOTE — NURSING NOTE
"Chief Complaint   Patient presents with     RECHECK     Follow up for CAD. Former Dr. Cunningham pt. S/p angiogram 9/2015. Hx of Stent  in [place] this past winter. Heart skipping beats, chest pressure when reclining. Pt  denies any chest pain, but does get more tired with and without exertion. Does fell fluttering but no dizziness.  Overall feels better but still has some concerns.       Initial /84 (BP Location: Left arm, Patient Position: Chair, Cuff Size: Adult Regular)  Pulse 57  Wt 106.1 kg (234 lb)  SpO2 97%  BMI 31.05 kg/m2 Estimated body mass index is 31.05 kg/(m^2) as calculated from the following:    Height as of 6/13/17: 1.849 m (6' 0.8\").    Weight as of this encounter: 106.1 kg (234 lb)..  BP completed using cuff size: regular    Yue Chairez CMA    "

## 2017-06-15 LAB
HCV RNA SERPL NAA+PROBE-ACNC: NORMAL [IU]/ML
HCV RNA SERPL NAA+PROBE-LOG IU: NORMAL LOG IU/ML

## 2017-07-11 ENCOUNTER — DOCUMENTATION ONLY (OUTPATIENT)
Dept: CARDIOLOGY | Facility: CLINIC | Age: 67
End: 2017-07-11

## 2017-07-11 NOTE — LETTER
July 11, 2017      TO: Edwardo Dumont  [address] [place] [address]         Dear Edwardo,    This letter is in reference to your upcoming cardiology follow up appointment with Dr. KATHERIN Braun scheduled for Wednesday, 12/20/2017 at the [facility].    Dr. MILAGRO Braun will be changing clinic days from Wednesdays to Thursdays at [facility] effective September 2017.  We have rescheduled your appointment to Thursday, 12/21/2017 at 10:30 AM  at the [facility]  ([address] [place] [address]); please see the enclosed appointment information sheet for details.      If this appointment conflicts with your schedule, please feel free to call and reschedule at your convenience; you may reach us at [phone].      We are sorry for any inconvenience this may cause.  We look forward to seeing you soon.        Best Regards,    Your Cardiology Team  [facility] Heart Care @ [facility]

## 2017-07-11 NOTE — PROGRESS NOTES
Appointment letter and information sheet mailed to patient with appointment change.  GUILLERMO Dumont.

## 2017-09-11 ENCOUNTER — OFFICE VISIT (OUTPATIENT)
Dept: ORTHOPEDICS | Facility: CLINIC | Age: 67
End: 2017-09-11

## 2017-09-11 DIAGNOSIS — E11.59 TYPE 2 DIABETES MELLITUS WITH OTHER CIRCULATORY COMPLICATIONS (H): Primary | ICD-10-CM

## 2017-09-11 DIAGNOSIS — L60.0 INGROWING NAIL: ICD-10-CM

## 2017-09-11 DIAGNOSIS — N18.30 CKD (CHRONIC KIDNEY DISEASE) STAGE 3, GFR 30-59 ML/MIN (H): ICD-10-CM

## 2017-09-11 NOTE — MR AVS SNAPSHOT
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)    -  1    CKD (chronic kidney disease) stage 3, GFR 30-59 ml/min        Ingrowing nail           Follow-ups after your visit        Your next 10 appointments already scheduled     Dec 21, 2017 10:30 AM CST   Return Visit with ESTELA Zuñiga MD   [facility] PHYSICIANS HEART AT [facility] ([facility])    [address] [place] [address] [phone]              Who to contact     Please call your clinic at [phone] to:    Ask questions about your health    Make or cancel appointments    Discuss your medicines    Learn about your test results    Speak to your doctor   If you have compliments or concerns about an experience at your clinic, or if you wish to file a complaint, please contact [facility] Patient Relations at [phone] or email us at [email or web address]         Additional Information About Your Visit        MyChart Information     [employer] gives you secure access to your electronic health record. If you see a primary care provider, you can also send messages to your care team and make appointments. If you have questions, please call your primary care clinic.  If you do not have a primary care provider, please call [phone] and they will assist you.      Ne[employer] is an electronic gateway that provides easy, online access to your medical records. With [employer]ph[employer]ex, you can request a clinic appointment, read your test results, renew a prescription or communicate with your care team.     To access your existing account, please contact your [facility]  [place] [facility] or call [phone] for assistance.        Care EveryWhere ID     This is your Care EveryWhere ID. This could be used by other organizations to access your [place] medical records  [id number]         Blood Pressure from Last 3 Encounters:   06/14/17 135/84   06/13/17 122/68   11/29/16 128/66    Weight from Last 3 Encounters:   06/14/17 106.1 kg (234 lb)   06/13/17 106.1 kg (234 lb)   12/08/16 107.8 kg (237 lb 9.6 oz)              We Performed the Following     DEBRIDEMENT OF NAIL(S), 1-5        Primary Care Provider Office Phone # Fax #    Isa Valverde [phone]       [address] [address]        Equal Access to Services     JADYN GARCIA : Hadii anthony argueta hadasho Soomaali, waaxda luqadaha, qaybta kaalmada adeegyada, flor barrios haydorinda duran . So [facility] [phone].    ATENCIÓN: Si habla español, tiene a menendez disposición servicios gratuitos de asistencia lingüística. Farshad al [phone].    We comply with applicable federal civil rights laws and [place] laws. We do not discriminate on the basis of race, color, national origin, age, disability sex, sexual orientation or gender identity.            Thank you!     Thank you for choosing [facility] ORTHOPAEDIC [facility]  for your care. Our goal is always to provide you with excellent care. Hearing back from our patients is one way we can continue to improve our services. Please take a few minutes to complete the written survey that you may receive in the mail after your visit with us. Thank you!             Your Updated Medication List - Protect others around you: Learn how to safely use, store and throw away your medicines at www.disposemymeds.org.          This list is accurate as of: 9/11/17 11:59 PM.  Always use your most recent med list.                   Brand Name Dispense Instructions for use Diagnosis    ACE NOT PRESCRIBED (INTENTIONAL)     0  each    ACE Inhibitor not prescribed due to Other: cough    Hypertension goal BP (blood pressure) < 140/90       aspirin 81 MG tablet      Take 1 tablet (81 mg) by mouth daily    CAD (coronary artery disease)       atorvastatin 40 MG tablet    LIPITOR    90 tablet    Take 1 tablet (40 mg) by mouth daily    Hyperlipidemia LDL goal <100       blood glucose monitoring test strip    no brand specified    1 Box    Test blood sugar once per day and as needed.    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)       clopidogrel 75 MG tablet    PLAVIX     Take 75 mg by mouth        IBUPROFEN PO      Take by mouth every 6 hours as needed for moderate pain        JARDIANCE 10 MG Tabs tablet   Generic drug:  empagliflozin     30 tablet    TAKE ONE TABLET BY MOUTH EVERY DAY    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)       losartan 25 MG tablet    COZAAR    90 tablet    Take 1 tablet (25 mg) by mouth daily    Essential hypertension, benign       metFORMIN 500 MG tablet    GLUCOPHAGE    360 tablet    TAKE 2 TABLETS BY MOUTH TWICE DAILY WITH MEALS    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)       metoprolol 50 MG 24 hr tablet    TOPROL-XL     Take 50 mg by mouth daily        nitroGLYcerin 0.4 MG sublingual tablet    NITROSTAT    90 tablet    Place 1 tablet (0.4 mg) under the tongue every 5 minutes as needed for chest pain    CAD (coronary artery disease)       * ONE TOUCH/ONE TOUCH II STARTER KIT     1    use as directed    Type II or unspecified type diabetes mellitus without mention of complication, not stated as uncontrolled       * blood glucose monitoring meter device kit     1 kit    Use to test blood sugar  times daily or as directed.    Type 2 diabetes, HbA1C goal < 8% (H)       zolpidem 10 MG tablet    AMBIEN    30 tablet    Take 1 tablet (10 mg) by mouth nightly as needed    Primary insomnia       * Notice:  This list has 2 medication(s) that are the  same as other medications prescribed for you. Read the directions carefully, and ask your doctor or other care provider to review them with you.

## 2017-09-11 NOTE — PROGRESS NOTES
Past Medical History:   Diagnosis Date     Acromioclavicular joint separation, type 1 9/12    right     Allergic rhinitis     causes chronic  cough     Arthritis      CKD (chronic kidney disease) stage 3, GFR 30-59 ml/min      Degenerative arthritis of hip - right 12/27/2010     Gout      Hip arthrosis - right 10/25/2010     Hyperlipidemia      Ischaemic vascular disease      Ischemic vascular disease 5/12    one stent     OA (osteoarthritis) of knee 10/25/2010     Renal insufficiency      Sleep apnea      Type II or unspecified type diabetes mellitus without mention of complication, not stated as uncontrolled      Unspecified essential hypertension      Patient Active Problem List   Diagnosis     Cough     Sleep apnea     Gout     Low back pain     Radiculopathy of leg     Hyperlipidemia LDL goal <100     OA (osteoarthritis) of knee     Degenerative arthritis of hip - right     Urinary retention     Advanced directives, counseling/discussion     S/P hip replacement     Closed dislocation of acromioclavicular joint     Impingement syndrome of right shoulder     Ischemic vascular disease   one stent coronary artery 5/12     Essential hypertension, benign     Visual disturbance, transient, right eye     Dermatochalasis     Insomnia     S/P coronary angiogram     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)     CKD (chronic kidney disease) stage 3, GFR 30-59 ml/min     Past Surgical History:   Procedure Laterality Date     C ABLATION SUPRAVENT ARRHYTHMOGENIC FOCUS  12/21/15    at [facility] TOTAL HIP ARTHROPLASTY  1/11/11    Rt YOGI     ENDOSCOPIC SINUS SURGERY  12/14/15     ENDOSCOPIC SINUS SURGERY Bilateral 12/14/2015    Procedure: ENDOSCOPIC SINUS SURGERY;  Surgeon: Kei Cevallos MD;  Location: [facility] OR     GASTRIC BYPASS  1/03    lap band     SINUS SURGERY Right 12-14-15    Dr. Cevallos     STENT  5/8/12    OM2 cornary artery stent     STENT  01/2017    3 stents placed in coronary arteries  while in [place]     STENT, CORONARY, HANG  01/2017    2 stents in [place].     Social History     Social History     Marital status:      Spouse name: N/A     Number of children: N/A     Years of education: N/A     Occupational History     Not on file.     Social History Main Topics     Smoking status: Former Smoker     Quit date: 9/8/1989     Smokeless tobacco: Never Used      Comment: non-smoking household     Alcohol use Yes      Comment: couple drinks 3 x a week     Drug use: No     Sexual activity: Yes     Partners: Female     Other Topics Concern     Not on file     Social History Narrative     Family History   Problem Relation Age of Onset     Allergies Son      Allergies Son      CANCER Mother      kidney     Neurologic Disorder Father      parkinsons     Glaucoma No family hx of      Macular Degeneration No family hx of      SUBJECTIVE FINDINGS:  67-year-old male returns to clinic for painful left hallux nail borders.  He relates it hurts on the end of the toe.  The nail came off the last time we saw him.  He is diabetic.  He relates to no neuropathy.  No ulcers, sores or injuries since we have seen him last.      OBJECTIVE FINDINGS:  DP and PT are 2/4 bilaterally.   He has dorsally contracted digits bilaterally.  He has dorsal medial first MPJ prominence with functional hallux limitus bilaterally.  He has incurvated nails bilaterally with the left hallux nail incurvated distally with some local mild edema and pressure and erythema on the nail border where the nail is pushing in. There is some thickening and discoloration of the nail.  No odor, no calor.  No gross erythema.  There is pain on palpation of the left hallux nail border distally.  Sharp/dulls intact with 5.07 [place]-Julia filament.        ASSESSMENT/PLAN:  Early ingrown toenail left hallux.  Mild onychomycosis changes.  Diagnosis and treatment options discussed with patient.  Left hallux nail was debrided upon consent.  He is diabetic.  He will  return to clinic and see me as needed.  He relates his primary physician checks his feet regularly for diabetic foot check.

## 2017-09-11 NOTE — LETTER
9/11/2017       RE: Edwardo Dumont  [address] [place] [address]     Dear Colleague,    Thank you for referring your patient, Edwardo Dumont, to the [facility] ORTHOP[facility]INIC at [facility]. Please see a copy of my visit note below.    Past Medical History:   Diagnosis Date     Acromioclavicular joint separation, type 1 9/12    right     Allergic rhinitis     causes chronic  cough     Arthritis      CKD (chronic kidney disease) stage 3, GFR 30-59 ml/min      Degenerative arthritis of hip - right 12/27/2010     Gout      Hip arthrosis - right 10/25/2010     Hyperlipidemia      Ischaemic vascular disease      Ischemic vascular disease 5/12    one stent     OA (osteoarthritis) of knee 10/25/2010     Renal insufficiency      Sleep apnea      Type II or unspecified type diabetes mellitus without mention of complication, not stated as uncontrolled      Unspecified essential hypertension      Patient Active Problem List   Diagnosis     Cough     Sleep apnea     Gout     Low back pain     Radiculopathy of leg     Hyperlipidemia LDL goal <100     OA (osteoarthritis) of knee     Degenerative arthritis of hip - right     Urinary retention     Advanced directives, counseling/discussion     S/P hip replacement     Closed dislocation of acromioclavicular joint     Impingement syndrome of right shoulder     Ischemic vascular disease   one stent coronary artery 5/12     Essential hypertension, benign     Visual disturbance, transient, right eye     Dermatochalasis     Insomnia     S/P coronary angiogram     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)     CKD (chronic kidney disease) stage 3, GFR 30-59 ml/min     Past Surgical History:   Procedure Laterality Date     C ABLATION SUPRAVENT ARRHYTHMOGENIC FOCUS  12/21/15    at [facility]     C TOTAL HIP ARTHROPLASTY  1/11/11    Rt YOGI     ENDOSCOPIC SINUS SURGERY  12/14/15     ENDOSCOPIC SINUS SURGERY Bilateral 12/14/2015     Procedure: ENDOSCOPIC SINUS SURGERY;  Surgeon: Kei Cevallos MD;  Location: [facility] OR     GASTRIC BYPASS  1/03    lap band     SINUS SURGERY Right 12-14-15    Dr. Cevallos     STENT  5/8/12    OM2 cornary artery stent     STENT  01/2017    3 stents placed in coronary arteries while in [place]     STENT, CORONARY, HANG  01/2017    2 stents in [place].     Social History     Social History     Marital status:      Spouse name: N/A     Number of children: N/A     Years of education: N/A     Occupational History     Not on file.     Social History Main Topics     Smoking status: Former Smoker     Quit date: 9/8/1989     Smokeless tobacco: Never Used      Comment: non-smoking household     Alcohol use Yes      Comment: couple drinks 3 x a week     Drug use: No     Sexual activity: Yes     Partners: Female     Other Topics Concern     Not on file     Social History Narrative     Family History   Problem Relation Age of Onset     Allergies Son      Allergies Son      CANCER Mother      kidney     Neurologic Disorder Father      parkinsons     Glaucoma No family hx of      Macular Degeneration No family hx of      SUBJECTIVE FINDINGS:  67-year-old male returns to clinic for painful left hallux nail borders.  He relates it hurts on the end of the toe.  The nail came off the last time we saw him.  He is diabetic.  He relates to no neuropathy.  No ulcers, sores or injuries since we have seen him last.      OBJECTIVE FINDINGS:  DP and PT are 2/4 bilaterally.   He has dorsally contracted digits bilaterally.  He has dorsal medial first MPJ prominence with functional hallux limitus bilaterally.  He has incurvated nails bilaterally with the left hallux nail incurvated distally with some local mild edema and pressure and erythema on the nail border where the nail is pushing in. There is some thickening and discoloration of the nail.  No odor, no calor.  No gross erythema.  There is pain on palpation of the left hallux nail border  distally.  Sharp/dulls intact with 5.07 [place]-Julia filament.        ASSESSMENT/PLAN:  Early ingrown toenail left hallux.  Mild onychomycosis changes.  Diagnosis and treatment options discussed with patient.  Left hallux nail was debrided upon consent.  He is diabetic.  He will return to clinic and see me as needed.  He relates his primary physician checks his feet regularly for diabetic foot check.         Again, thank you for allowing me to participate in the care of your patient.      Sincerely,    Sina Mills DPM

## 2017-09-11 NOTE — NURSING NOTE
Reason For Visit:   Chief Complaint   Patient presents with     RECHECK     Pain, left great toe. Pt is type 2 diabetic. Pt stated that the doctor trimmed it back a year ago and it grew back in and is still sore.        Pain Assessment  Patient Currently in Pain: Denies (Pt stated that he has pain in his left great toe if pressure is applied. )            Current Outpatient Prescriptions   Medication Sig Dispense Refill     atorvastatin (LIPITOR) 40 MG tablet Take 1 tablet (40 mg) by mouth daily 90 tablet 4     zolpidem (AMBIEN) 10 MG tablet Take 1 tablet (10 mg) by mouth nightly as needed 30 tablet 0     losartan (COZAAR) 25 MG tablet Take 1 tablet (25 mg) by mouth daily 90 tablet 4     metoprolol (TOPROL-XL) 50 MG 24 hr tablet Take 50 mg by mouth daily       clopidogrel (PLAVIX) 75 MG tablet Take 75 mg by mouth       JARDIANCE 10 MG TABS tablet TAKE ONE TABLET BY MOUTH EVERY DAY 30 tablet 11     metFORMIN (GLUCOPHAGE) 500 MG tablet TAKE 2 TABLETS BY MOUTH TWICE DAILY WITH MEALS 360 tablet 4     blood glucose monitoring (NO BRAND SPECIFIED) test strip Test blood sugar once per day and as needed. 1 Box 4     IBUPROFEN PO Take by mouth every 6 hours as needed for moderate pain       aspirin 81 MG tablet Take 1 tablet (81 mg) by mouth daily       blood glucose monitoring (E[employer] CONTOUR MONITOR) meter device kit Use to test blood sugar  times daily or as directed. 1 kit 0     nitroglycerin (NITROSTAT) 0.4 MG SL tablet Place 1 tablet (0.4 mg) under the tongue every 5 minutes as needed for chest pain (Patient not taking: Reported on 6/14/2017) 90 tablet 4     ACE NOT PRESCRIBED, INTENTIONAL, ACE Inhibitor not prescribed due to Other: cough 0 each 0     ONE TOUCH/ONE TOUCH II STARTER KIT use as directed 1 0          Allergies   Allergen Reactions     Benzonatate Anaphylaxis and Swelling     Lisinopril Cough     Zocor [Simvastatin - High Dose]      Poor memory

## 2017-09-14 DIAGNOSIS — E78.5 HYPERLIPIDEMIA LDL GOAL <100: ICD-10-CM

## 2017-09-15 RX ORDER — ATORVASTATIN CALCIUM 40 MG/1
TABLET, FILM COATED ORAL
Qty: 90 TABLET | Refills: 0
Start: 2017-09-15

## 2017-09-15 NOTE — TELEPHONE ENCOUNTER
Spoke to pharmacy and confirmed duplicate request.  Belle TAM CMA ([facility])

## 2017-09-25 ENCOUNTER — TRANSFERRED RECORDS (OUTPATIENT)
Dept: HEALTH INFORMATION MANAGEMENT | Facility: CLINIC | Age: 67
End: 2017-09-25

## 2017-09-26 ENCOUNTER — OFFICE VISIT (OUTPATIENT)
Dept: FAMILY MEDICINE | Facility: CLINIC | Age: 67
End: 2017-09-26
Payer: COMMERCIAL

## 2017-09-26 VITALS
OXYGEN SATURATION: 96 % | BODY MASS INDEX: 31.25 KG/M2 | DIASTOLIC BLOOD PRESSURE: 70 MMHG | WEIGHT: 235.5 LBS | SYSTOLIC BLOOD PRESSURE: 108 MMHG | HEART RATE: 65 BPM | TEMPERATURE: 99 F

## 2017-09-26 DIAGNOSIS — Z23 NEED FOR PROPHYLACTIC VACCINATION AND INOCULATION AGAINST INFLUENZA: ICD-10-CM

## 2017-09-26 DIAGNOSIS — M54.16 LUMBAR BACK PAIN WITH RADICULOPATHY AFFECTING LEFT LOWER EXTREMITY: Primary | ICD-10-CM

## 2017-09-26 PROCEDURE — 99213 OFFICE O/P EST LOW 20 MIN: CPT | Mod: 25 | Performed by: FAMILY MEDICINE

## 2017-09-26 PROCEDURE — G0008 ADMIN INFLUENZA VIRUS VAC: HCPCS | Performed by: FAMILY MEDICINE

## 2017-09-26 PROCEDURE — 90662 IIV NO PRSV INCREASED AG IM: CPT | Performed by: FAMILY MEDICINE

## 2017-09-26 NOTE — MR AVS SNAPSHOT
After Visit Summary   9/26/2017    Edwardo Dumont    MRN: [id number]           Patient Information     Date Of Birth          1950        Visit Information        Provider Department      9/26/2017 2:00 PM Isa Valverde MD [facility]        Today's Diagnoses     Lumbar back pain with radiculopathy affecting left lower extremity    -  1      Care Instructions    [facility]    If you have any questions regarding to your visit please contact your care team:       Team Purple:   Clinic Hours Telephone Number   Dr. Isa Verma     7am-7pm  Monday - Thursday   7am-5pm  Fridays  [phone]  (Appointment scheduling available 24/7)    Questions about your Visit?   Team Line:  [phone]   Urgent Care - [place] [facility] - 11am-9pm Monday-Friday Saturday-Sunday- 9am-5pm   [place] - 5pm-9pm Monday-Friday Saturday-Sunday- 9am-5pm  [phone] - [facility]  [phone] - [place]       What options do I have for visits at the clinic other than the traditional office visit?  To expand how we care for you, many of our providers are utilizing electronic visits (e-visits) and telephone visits, when medically appropriate, for interactions with their patients rather than a visit in the clinic.   We also offer nurse visits for many medical concerns. Just like any other service, we will bill your insurance company for this type of visit based on time spent on the phone with your provider. Not all insurance companies cover these visits. Please check with your medical insurance if this type of visit is covered. You will be responsible for any charges that are not paid by your insurance.      E-visits via Donuts:  generally incur a $35.00 fee.  Telephone visits:  Time spent on the phone: *charged based on time that is spent on the phone in increments of 10 minutes. Estimated cost:   5-10 mins $30.00   11-20 mins.  $59.00   21-30 mins. $85.00     Use AudioCaseFilest (secure email communication and access to your chart) to send your primary care provider a message or make an appointment. Ask someone on your Team how to sign up for Kupoya.  For a Price Quote for your services, please call our Consumer Price Line at [phone].  As always, Thank you for trusting us with your health care needs!              Follow-ups after your visit        Additional Services     RADIOLOGY REFERRAL       Your provider has referred you to: [facility]: [facility] [phone]   [email or web address]  For MRI of lumbar spine     Please be aware that coverage of these services is subject to the terms and limitations of your health insurance plan.  Call member services at your health plan with any benefit or coverage questions.      Please bring the following to your appointment:    >>   Any x-rays, CTs or MRIs which have been performed.  Contact the facility where they were done to arrange for  prior to your scheduled appointment.    >>   List of current medications   >>   This referral request   >>   Any documents/labs given to you for this referral    Prior authorization is required for MRI/MRA, CT, Dexa Scans and Worker's Compensation cases.            RADIOLOGY REFERRAL       Your provider has referred you to: [facility]: [facility] [phone]   [email or web address]   Epidural steroid injection for left leg radiculopathy     Please be aware that coverage of these services is subject to the terms and limitations of your health insurance plan.  Call member services at your health plan with any benefit or coverage questions.      Please bring the following to your appointment:    >>   Any x-rays, CTs or MRIs which have been performed.  Contact the facility where they were done to arrange for  prior to your scheduled appointment.    >>   List of current medications   >>   This referral request   >>   Any  documents/labs given to you for this referral    Prior authorization is required for MRI/MRA, CT, Dexa Scans and Worker's Compensation cases.                  Your next 10 appointments already scheduled     Dec 21, 2017 10:30 AM CST   Return Visit with ESTELA Zuñiga MD   [facility] AT [facility] ([facility])    [address] [place] [address] 2nd Floor  [facility] [id number] [phone]              Who to contact     If you have questions or need follow up information about today's clinic visit or your schedule please contact [facility] directly at [phone].  Normal or non-critical lab and imaging results will be communicated to you by MyChart, letter or phone within 4 business days after the clinic has received the results. If you do not hear from us within 7 days, please contact the clinic through [email or web address] or phone. If you have a critical or abnormal lab result, we will notify you by phone as soon as possible.  Submit refill requests through [employer] or call your pharmacy and they will forward the refill request to us. Please allow 3 business days for your refill to be completed.          Additional Information About Your Visit        [email or web address] Clips Information     [employer] gives you secure access to your electronic health record. If you see a primary care provider, you can also send messages to your care team and make appointments. If you have questions, please call your primary care clinic.  If you do not have a primary care provider, please call [phone] and they will assist you.        Care EveryWhere ID     This is your Care EveryWhere ID. This could be used by other organizations to access your [facility] medical records  [id number]        Your Vitals Were     Pulse Temperature Pulse Oximetry BMI (Body Mass Index)          65 99  F (37.2  C) (Oral) 96% 31.25 kg/m2         Blood Pressure from Last 3 Encounters:   09/26/17 108/70   06/14/17 135/84    06/13/17 122/68    Weight from Last 3 Encounters:   09/26/17 235 lb 8 oz (106.8 kg)   06/14/17 234 lb (106.1 kg)   06/13/17 234 lb (106.1 kg)              We Performed the Following     RADIOLOGY REFERRAL     RADIOLOGY REFERRAL        Primary Care Provider Office Phone # Fax #    Isa Valverde [phone]       [address] [address]        Equal Access to Services     JADYN GARCIA : Hadii aad ku hadasho Soomaali, waaxda luqadaha, qaybta kaalmada adeegyada, waxay idiin hayaan adeeg dionte duran . So [facility] [phone].    ATENCIÓN: Si habla español, tiene a menendez disposición servicios gratuitos de asistencia lingüística. Llame al [phone].    We comply with applicable federal civil rights laws and [place] laws. We do not discriminate on the basis of race, color, national origin, age, disability sex, sexual orientation or gender identity.            Thank you!     Thank you for choosing [facility]  for your care. Our goal is always to provide you with excellent care. Hearing back from our patients is one way we can continue to improve our services. Please take a few minutes to complete the written survey that you may receive in the mail after your visit with us. Thank you!             Your Updated Medication List - Protect others around you: Learn how to safely use, store and throw away your medicines at www.disposemymeds.org.          This list is accurate as of: 9/26/17  2:41 PM.  Always use your most recent med list.                   Brand Name Dispense Instructions for use Diagnosis    ACE NOT PRESCRIBED (INTENTIONAL)     0 each    ACE Inhibitor not prescribed due to Other: cough    Hypertension goal BP (blood pressure) < 140/90       aspirin 81 MG tablet      Take 1 tablet (81 mg) by mouth daily    CAD (coronary artery disease)       atorvastatin 40 MG tablet    LIPITOR    90 tablet    Take 1 tablet (40 mg) by mouth  daily    Hyperlipidemia LDL goal <100       blood glucose monitoring test strip    no brand specified    1 Box    Test blood sugar once per day and as needed.    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)       clopidogrel 75 MG tablet    PLAVIX     Take 75 mg by mouth        IBUPROFEN PO      Take by mouth every 6 hours as needed for moderate pain        JARDIANCE 10 MG Tabs tablet   Generic drug:  empagliflozin     30 tablet    TAKE ONE TABLET BY MOUTH EVERY DAY    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)       losartan 25 MG tablet    COZAAR    90 tablet    Take 1 tablet (25 mg) by mouth daily    Essential hypertension, benign       metFORMIN 500 MG tablet    GLUCOPHAGE    360 tablet    TAKE 2 TABLETS BY MOUTH TWICE DAILY WITH MEALS    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)       metoprolol 50 MG 24 hr tablet    TOPROL-XL     Take 50 mg by mouth daily        nitroGLYcerin 0.4 MG sublingual tablet    NITROSTAT    90 tablet    Place 1 tablet (0.4 mg) under the tongue every 5 minutes as needed for chest pain    CAD (coronary artery disease)       * ONE TOUCH/ONE TOUCH II STARTER KIT     1    use as directed    Type II or unspecified type diabetes mellitus without mention of complication, not stated as uncontrolled       * blood glucose monitoring meter device kit     1 kit    Use to test blood sugar  times daily or as directed.    Type 2 diabetes, HbA1C goal < 8% (H)       zolpidem 10 MG tablet    AMBIEN    30 tablet    Take 1 tablet (10 mg) by mouth nightly as needed    Primary insomnia       * Notice:  This list has 2 medication(s) that are the same as other medications prescribed for you. Read the directions carefully, and ask your doctor or other care provider to review them with you.

## 2017-09-26 NOTE — NURSING NOTE
"Chief Complaint   Patient presents with     Back Pain       Initial /70  Pulse 65  Temp 99  F (37.2  C) (Oral)  Wt 235 lb 8 oz (106.8 kg)  SpO2 96%  BMI 31.25 kg/m2 Estimated body mass index is 31.25 kg/(m^2) as calculated from the following:    Height as of 6/13/17: 6' 0.8\" (1.849 m).    Weight as of this encounter: 235 lb 8 oz (106.8 kg).  Medication Reconciliation: complete     Tala Pretty MA    "

## 2017-09-26 NOTE — PROGRESS NOTES
SUBJECTIVE:   Edwardo Dumont is a 67 year old male who presents to clinic today for the following health issues:      Back Pain       Duration: 6 months        Specific cause: none    Description:   Location of pain: low back middle and left  Character of pain: sharp  Pain radiation:radiates into the left leg  New numbness or weakness in legs, not attributed to pain:  no     Intensity: Currently 1/10, At its worst 7/10    History:   Pain interferes with job: No  History of back problems: Bulging disc 10 years ago  Any previous MRI or X-rays: None  Sees a specialist for back pain:  No  Therapies tried without relief: none    Alleviating factors:   Improved by: none      Precipitating factors:  Worsened by: Walking and normal movement    Functional and Psychosocial Screen (Akua STarT Back):      Not performed today          Accompanying Signs & Symptoms:  Risk of Fracture:  None  Risk of Cauda Equina:  None  Risk of Infection:  None  Risk of Cancer:  None  Risk of Ankylosing Spondylitis:  Onset at age <35, male, AND morning back stiffness. no                        Problem list and histories reviewed & adjusted, as indicated.  Additional history: as documented    Patient Active Problem List   Diagnosis     Cough     Sleep apnea     Gout     Low back pain     Radiculopathy of leg     Hyperlipidemia LDL goal <100     OA (osteoarthritis) of knee     Degenerative arthritis of hip - right     Urinary retention     Advanced directives, counseling/discussion     S/P hip replacement     Closed dislocation of acromioclavicular joint     Impingement syndrome of right shoulder     Ischemic vascular disease   one stent coronary artery 5/12     Essential hypertension, benign     Visual disturbance, transient, right eye     Dermatochalasis     Insomnia     S/P coronary angiogram     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)     CKD (chronic kidney disease) stage 3, GFR 30-59 ml/min     Past Surgical  History:   Procedure Laterality Date     C ABLATION SUPRAVENT ARRHYTHMOGENIC FOCUS  12/21/15    at [facility]     C TOTAL HIP ARTHROPLASTY  1/11/11    Rt YOGI     ENDOSCOPIC SINUS SURGERY  12/14/15     ENDOSCOPIC SINUS SURGERY Bilateral 12/14/2015    Procedure: ENDOSCOPIC SINUS SURGERY;  Surgeon: Kei Cevallos MD;  Location: [facility] OR     GASTRIC BYPASS  1/03    lap band     SINUS SURGERY Right 12-14-15    Dr. Cevallos     STENT  5/8/12    OM2 cornary artery stent     STENT  01/2017    3 stents placed in coronary arteries while in [place]     STENT, CORONARY, HANG  01/2017    2 stents in [place].       Social History   Substance Use Topics     Smoking status: Former Smoker     Quit date: 9/8/1989     Smokeless tobacco: Never Used      Comment: non-smoking household     Alcohol use Yes      Comment: couple drinks 3 x a week     Family History   Problem Relation Age of Onset     Allergies Son      Allergies Son      CANCER Mother      kidney     Neurologic Disorder Father      parkinsons     Glaucoma No family hx of      Macular Degeneration No family hx of          Current Outpatient Prescriptions   Medication Sig Dispense Refill     atorvastatin (LIPITOR) 40 MG tablet Take 1 tablet (40 mg) by mouth daily 90 tablet 4     zolpidem (AMBIEN) 10 MG tablet Take 1 tablet (10 mg) by mouth nightly as needed 30 tablet 0     losartan (COZAAR) 25 MG tablet Take 1 tablet (25 mg) by mouth daily 90 tablet 4     metoprolol (TOPROL-XL) 50 MG 24 hr tablet Take 50 mg by mouth daily       clopidogrel (PLAVIX) 75 MG tablet Take 75 mg by mouth       JARDIANCE 10 MG TABS tablet TAKE ONE TABLET BY MOUTH EVERY DAY 30 tablet 11     metFORMIN (GLUCOPHAGE) 500 MG tablet TAKE 2 TABLETS BY MOUTH TWICE DAILY WITH MEALS 360 tablet 4     blood glucose monitoring (NO BRAND SPECIFIED) test strip Test blood sugar once per day and as needed. 1 Box 4     IBUPROFEN PO Take by mouth every 6 hours as needed for moderate pain       aspirin 81 MG tablet Take 1 tablet (81 mg)  by mouth daily       blood glucose monitoring (My[employer] CONTOUR MONITOR) meter device kit Use to test blood sugar  times daily or as directed. 1 kit 0     ACE NOT PRESCRIBED, INTENTIONAL, ACE Inhibitor not prescribed due to Other: cough 0 each 0     ONE TOUCH/ONE TOUCH II STARTER KIT use as directed 1 0     nitroglycerin (NITROSTAT) 0.4 MG SL tablet Place 1 tablet (0.4 mg) under the tongue every 5 minutes as needed for chest pain (Patient not taking: Reported on 6/14/2017) 90 tablet 4     Allergies   Allergen Reactions     Benzonatate Anaphylaxis and Swelling     Lisinopril Cough     Zocor [Simvastatin - High Dose]      Poor memory     Recent Labs   Lab Test  06/12/17   0846  11/28/16   0901  08/24/16   0847  06/01/16   0929   04/08/15   0831   01/15/14   0850   01/03/12   0903  05/25/11   0838   A1C  7.1*  7.3*  6.7*  8.5*   < >  6.9*   < >  6.8*   < >  7.2*  7.0*   LDL  36   --    --   35   --   31   < >   --    < >  89   --    HDL  44   --    --   44   --   50   < >   --    < >  43   --    TRIG  96   --    --   130   --   93   < >   --    < >  136   --    ALT  29   --    --    --    --    --    --    --    --   22  20   CR  1.20  1.24   --   1.03   < >  1.28*   < >  1.24   < >  1.34*   --    GFRESTIMATED  61  58*   --   72   < >  57*   < >  59*   < >  54*   --    GFRESTBLACK  73  71   --   88   < >  69   < >  71   < >  66   --    POTASSIUM  4.4  4.2   --   4.2   < >  4.1   < >  4.5   < >  4.6   --    TSH   --    --    --   1.34   --    --    --   0.99   --   1.16  1.16    < > = values in this interval not displayed.      BP Readings from Last 3 Encounters:   09/26/17 108/70   06/14/17 135/84   06/13/17 122/68    Wt Readings from Last 3 Encounters:   09/26/17 235 lb 8 oz (106.8 kg)   06/14/17 234 lb (106.1 kg)   06/13/17 234 lb (106.1 kg)                  Labs reviewed in EPIC          Reviewed and updated as needed this visit by clinical staffTobacco  Allergies  Meds       Reviewed and updated as needed this  visit by Provider         VAL:  This 67 year old male is here today because he has developed a sudden left leg radiculopathy that almost causes him to fall down as he had a weakness in his left leg at the same time. No known trauma. He is driving his corvette down to [place] for the winter. Will leave in about 3 weeks with his wife. He remembers having a cortisone injection in his low back many years ago for right leg radiculopathy and it worked very well. His last lumbar MRI scan was in 2010 which showed a L2-3 herniated disc with nerve impingement. He has claustrophobia and has to have open sided MRI. Would like to have the injection done there as well. Denies any bowel or bladder problems. All other review of systems are negative  Personal, family, and social history reviewed with patient and revised.         OBJECTIVE:     /70  Pulse 65  Temp 99  F (37.2  C) (Oral)  Wt 235 lb 8 oz (106.8 kg)  SpO2 96%  BMI 31.25 kg/m2  Body mass index is 31.25 kg/(m^2).  Patellar and achilles tendon reflexes are normal bilaterally  Normal straight leg raising bilaterally  He has normal flexion of his knees to his chest, but it did cause pain in his right back when he pulled hard on his right knee. This was a surprise to him.     Diagnostic Test Results:  none     ASSESSMENT/PLAN:              1. Lumbar back pain with radiculopathy affecting left lower extremity  As above, he needs a new MRI of lumbar spine and he has claustrophobia so he needs open sided MRI. He will get that and epidural steroid injection at [facility] since he is leaving in a few weeks to winter in [place]  - RADIOLOGY REFERRAL  - RADIOLOGY REFERRAL    2. Need for prophylactic vaccination and inoculation against influenza  due  - FLU VACCINE, INCREASED ANTIGEN, PRESV FREE, AGE 65+ [23595]  - ADMIN INFLUENZA (For MEDICARE Patients ONLY) []    Return to clinic if no improvement     AIDEN PASCUAL MD  [facility]

## 2017-10-02 ENCOUNTER — TRANSFERRED RECORDS (OUTPATIENT)
Dept: HEALTH INFORMATION MANAGEMENT | Facility: CLINIC | Age: 67
End: 2017-10-02

## 2017-10-05 DIAGNOSIS — E11.9 TYPE 2 DIABETES, HBA1C GOAL < 8% (H): Primary | ICD-10-CM

## 2017-10-05 NOTE — TELEPHONE ENCOUNTER
blood glucose monitoring (NO BRAND SPECIFIED) test strip      Last Written Prescription Date: 7/5/16  Last Fill Quantity: 1 box,  # refills: 4   Last Office Visit with G, P or [facility] prescribing provider: 9/26/17                                         Next 5 appointments (look out 90 days)     Dec 21, 2017 10:30 AM CST   Return Visit with ESTELA Zuñiga MD   [facility] AT [facility] ([facility])    [address] [place] [address] 2nd [facility] [id number] [phone]

## 2017-10-06 RX ORDER — BLOOD SUGAR DIAGNOSTIC
STRIP MISCELLANEOUS
Qty: 100 STRIP | Refills: 4 | Status: SHIPPED | OUTPATIENT
Start: 2017-10-06 | End: 2020-12-07

## 2017-10-06 NOTE — TELEPHONE ENCOUNTER
Prescription approved per [facility] [place] [facility].    Signed Prescriptions:                        Disp   Refills    ACCU-CHEK SMARTVIEW test strip             [address]4        Sig: ONCE DAILY TESTING AND AS NEEDED  Authorizing Provider: AIDEN PASCUAL  Ordering User: JANET DOW, RN

## 2017-11-21 NOTE — PROGRESS NOTES
SUBJECTIVE:   Edwardo Dumont is a 67 year old male who presents to clinic today for the following health issues:      Diabetes Follow-up      Patient is checking blood sugars: 2 times a week    AM-140 to 150    Diabetic concerns: None     Symptoms of hypoglycemia (low blood sugar): none     Paresthesias (numbness or burning in feet) or sores: Yes burning in feet      Date of last diabetic eye exam: 10/2017    Hyperlipidemia Follow-Up      Rate your low fat/cholesterol diet?: fair    Taking statin?  Yes, no muscle aches from statin    Other lipid medications/supplements?:  none    Hypertension Follow-up      Outpatient blood pressures are not being checked.    Low Salt Diet: not monitoring salt        Amount of exercise or physical activity: None    Problems taking medications regularly: No    Medication side effects: none    Diet: Low carbohydrate              Problem list and histories reviewed & adjusted, as indicated.  Additional history: as documented    Patient Active Problem List   Diagnosis     Cough     Sleep apnea     Gout     Low back pain     Radiculopathy of leg     Hyperlipidemia LDL goal <100     OA (osteoarthritis) of knee     Degenerative arthritis of hip - right     Urinary retention     Advanced directives, counseling/discussion     S/P hip replacement     Closed dislocation of acromioclavicular joint     Impingement syndrome of right shoulder     Ischemic vascular disease   one stent coronary artery 5/12     Essential hypertension, benign     Visual disturbance, transient, right eye     Dermatochalasis     Insomnia     S/P coronary angiogram     Type 2 diabetes mellitus with other circulatory complications     CKD (chronic kidney disease) stage 3, GFR 30-59 ml/min     Past Surgical History:   Procedure Laterality Date     C ABLATION SUPRAVENT ARRHYTHMOGENIC FOCUS  12/21/15    at [facility] TOTAL HIP ARTHROPLASTY  1/11/11    Rt YOGI     ENDOSCOPIC SINUS SURGERY  12/14/15     ENDOSCOPIC SINUS  SURGERY Bilateral 12/14/2015    Procedure: ENDOSCOPIC SINUS SURGERY;  Surgeon: Kei Cevallos MD;  Location: [facility] OR     GASTRIC BYPASS  1/03    lap band     SINUS SURGERY Right 12-14-15    Dr. Cevallos     STENT  5/8/12    OM2 cornary artery stent     STENT  01/2017    3 stents placed in coronary arteries while in [place]     STENT, CORONARY, HANG  01/2017    2 stents in [place].       Social History   Substance Use Topics     Smoking status: Former Smoker     Packs/day: 1.00     Years: 5.00     Types: Cigarettes     Start date: 1/1/1970     Quit date: 9/8/1989     Smokeless tobacco: Never Used      Comment: non-smoking household     Alcohol use Yes      Comment: couple drinks 3 x a week     Family History   Problem Relation Age of Onset     Allergies Son      Allergies Son      CANCER Mother      kidney     Neurologic Disorder Father      parkinsons     Glaucoma No family hx of      Macular Degeneration No family hx of          Current Outpatient Prescriptions   Medication Sig Dispense Refill     empagliflozin (JARDIANCE) 10 MG TABS tablet Take 1 tablet (10 mg) by mouth daily 90 tablet 4     metFORMIN (GLUCOPHAGE) 500 MG tablet TAKE 2 TABLETS BY MOUTH TWICE DAILY WITH MEALS 360 tablet 4     metoprolol (TOPROL-XL) 50 MG 24 hr tablet Take 1 tablet (50 mg) by mouth daily 90 tablet 4     ACCU-CHEK SMARTVIEW test strip ONCE DAILY TESTING AND AS NEEDED 100 strip 4     atorvastatin (LIPITOR) 40 MG tablet Take 1 tablet (40 mg) by mouth daily 90 tablet 4     zolpidem (AMBIEN) 10 MG tablet Take 1 tablet (10 mg) by mouth nightly as needed 30 tablet 0     losartan (COZAAR) 25 MG tablet Take 1 tablet (25 mg) by mouth daily 90 tablet 4     clopidogrel (PLAVIX) 75 MG tablet Take 75 mg by mouth       IBUPROFEN PO Take by mouth every 6 hours as needed for moderate pain       aspirin 81 MG tablet Take 1 tablet (81 mg) by mouth daily       nitroglycerin (NITROSTAT) 0.4 MG SL tablet Place 1 tablet (0.4 mg) under the tongue every 5 minutes as  needed for chest pain 90 tablet 4     ACE NOT PRESCRIBED, INTENTIONAL, ACE Inhibitor not prescribed due to Other: cough 0 each 0     [DISCONTINUED] metoprolol (TOPROL-XL) 50 MG 24 hr tablet Take 50 mg by mouth daily       [DISCONTINUED] metFORMIN (GLUCOPHAGE) 500 MG tablet TAKE 2 TABLETS BY MOUTH TWICE DAILY WITH MEALS 360 tablet 4     blood glucose monitoring ([employer] CONTOUR MONITOR) meter device kit Use to test blood sugar  times daily or as directed. (Patient not taking: Reported on 11/29/2017) 1 kit 0     ONE TOUCH/ONE TOUCH II STARTER KIT use as directed (Patient not taking: No sig reported) 1 0     Allergies   Allergen Reactions     Benzonatate Anaphylaxis and Swelling     Lisinopril Cough     Zocor [Simvastatin - High Dose]      Poor memory     Recent Labs   Lab Test  11/28/17   0848  06/12/17   0846  11/28/16   0901   06/01/16   0929   04/08/15   0831   01/15/14   0850   01/03/12   0903   A1C  7.8*  7.1*  7.3*   < >  8.5*   < >  6.9*   < >  6.8*   < >  7.2*   LDL   --   36   --    --   35   --   31   < >   --    < >  89   HDL   --   44   --    --   44   --   50   < >   --    < >  43   TRIG   --   96   --    --   130   --   93   < >   --    < >  136   ALT  36  29   --    --    --    --    --    --    --    --   22   CR  1.25  1.20  1.24   --   1.03   < >  1.28*   < >  1.24   < >  1.34*   GFRESTIMATED  58*  61  58*   --   72   < >  57*   < >  59*   < >  54*   GFRESTBLACK  70  73  71   --   88   < >  69   < >  71   < >  66   POTASSIUM  4.3  4.4  4.2   --   4.2   < >  4.1   < >  4.5   < >  4.6   TSH   --    --    --    --   1.34   --    --    --   0.99   --   1.16    < > = values in this interval not displayed.      BP Readings from Last 3 Encounters:   11/29/17 102/60   09/26/17 108/70   06/14/17 135/84    Wt Readings from Last 3 Encounters:   11/29/17 239 lb 12.8 oz (108.8 kg)   09/26/17 235 lb 8 oz (106.8 kg)   06/14/17 234 lb (106.1 kg)                  Labs reviewed in EPIC          Reviewed and updated as  "needed this visit by clinical staff       Reviewed and updated as needed this visit by Provider        Immunization History   Administered Date(s) Administered     HEPA 01/08/2009, 09/08/2009     HepB 03/06/2013, 01/15/2014     Influenza (High Dose) 3 valent vaccine 11/01/2016, 09/26/2017     Influenza (IIV3) PF 1950, 10/01/2010, 09/26/2012, 11/04/2013, 10/01/2014     Influenza Vaccine, 3 YRS +, IM (QUADRIVALENT W/PRESERVATIVES) 10/06/2015     Pneumococcal (PCV 13) 06/02/2016     Pneumococcal 23 valent 02/27/2009, 06/13/2017     TDAP Vaccine (Adacel) 01/08/2009   This 67 year old male is here today for diabetes check. He had a recent epidural steroid injection in his low lumbar spine for spinal stenosis. It helped his pain for about 3 days. Now it is back again. MRI showed subluxation of L4 on L5 with spinal stenosis. He wonders what else he can do for his back. He finds it hard to stand any length of time. Walking is also very painful and causes radiculopathy pain into his left hip and groin. This pain is different when compared to his right hip osteoarthritis that required a hip replacement. He is able to ride his bike and golf without much pain, for which he is grateful. No bowel or bladder problems. He will winter in [place] again starting end of December. All other review of systems are negative  Personal, family, and social history reviewed with patient and revised.    ROS:  C: NEGATIVE for fever, chills, change in weight  E/M: NEGATIVE for ear, mouth and throat problems  R: NEGATIVE for significant cough or SOB  CV: NEGATIVE for chest pain, palpitations or peripheral edema  He is dealing with shoulder impingement bilaterally and so far has been able to get by with injections once a year, but they cause his glucoses to go up very high. He is hoping to delay it if he can.     OBJECTIVE:     /60  Pulse 66  Temp 97.8  F (36.6  C) (Oral)  Ht 6' 0.8\" (1.849 m)  Wt 239 lb 12.8 oz (108.8 kg)  SpO2 96%  " BMI 31.81 kg/m2  Body mass index is 31.81 kg/(m^2).  GENERAL: healthy, alert and no distress  NECK: no adenopathy, no asymmetry, masses, or scars and thyroid normal to palpation  RESP: lungs clear to auscultation - no rales, rhonchi or wheezes  CV: regular rate and rhythm, normal S1 S2, no S3 or S4, no murmur, click or rub, no peripheral edema and peripheral pulses strong  ABDOMEN: soft, nontender, no hepatosplenomegaly, no masses and bowel sounds normal  MS: no gross musculoskeletal defects noted, no edema  Diabetic foot exam: normal DP and PT pulses, no trophic changes or ulcerative lesions, normal sensory exam and normal monofilament exam  Gait is brisk with no limp  He freely stands up from a sitting position without use of armrests   Well hydrated  Well nourished  Well groomed  Alert and oriented X 3  Good spirits      Diagnostic Test Results:  Results for orders placed or performed in visit on 11/28/17   Hemoglobin A1c   Result Value Ref Range    Hemoglobin A1C 7.8 (H) 4.3 - 6.0 %   Comprehensive metabolic panel   Result Value Ref Range    Sodium 137 133 - 144 mmol/L    Potassium 4.3 3.4 - 5.3 mmol/L    Chloride 104 94 - 109 mmol/L    Carbon Dioxide 26 20 - 32 mmol/L    Anion Gap 7 3 - 14 mmol/L    Glucose 176 (H) 70 - 99 mg/dL    Urea Nitrogen 24 7 - 30 mg/dL    Creatinine 1.25 0.66 - 1.25 mg/dL    GFR Estimate 58 (L) >60 mL/min/1.7m2    GFR Estimate If Black 70 >60 mL/min/1.7m2    Calcium 8.8 8.5 - 10.1 mg/dL    Bilirubin Total 0.8 0.2 - 1.3 mg/dL    Albumin 4.1 3.4 - 5.0 g/dL    Protein Total 7.4 6.8 - 8.8 g/dL    Alkaline Phosphatase 109 40 - 150 U/L    ALT 36 0 - 70 U/L    AST 20 0 - 45 U/L      No results found for this or any previous visit (from the past 24 hour(s)).    ASSESSMENT/PLAN:              1. 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)  Good control   - empagliflozin (JARDIANCE) 10 MG TABS tablet; Take 1 tablet (10 mg) by mouth daily  Dispense: 90 tablet; Refill:  4  - metFORMIN (GLUCOPHAGE) 500 MG tablet; TAKE 2 TABLETS BY MOUTH TWICE DAILY WITH MEALS  Dispense: 360 tablet; Refill: 4  - C FOOT EXAM  NO CHARGE    2. Primary insomnia  He tells me he has stopped his ambien as he was getting very restless while using it. He is now using melatonin     3. Spinal stenosis of lumbar region with neurogenic claudication  As above   - NEUROSURGERY REFERRAL    4. Lumbar back pain with radiculopathy affecting left lower extremity  As above   - NEUROSURGERY REFERRAL    5. CKD (chronic kidney disease) stage 3, GFR 30-59 ml/min  Good control   - metoprolol (TOPROL-XL) 50 MG 24 hr tablet; Take 1 tablet (50 mg) by mouth daily  Dispense: 90 tablet; Refill: 4    6. Essential hypertension, benign  Good control   - metoprolol (TOPROL-XL) 50 MG 24 hr tablet; Take 1 tablet (50 mg) by mouth daily  Dispense: 90 tablet; Refill: 4    Return to clinic 3 months     AIDEN PASCUAL MD  [facility]

## 2017-11-27 ENCOUNTER — MYC MEDICAL ADVICE (OUTPATIENT)
Dept: FAMILY MEDICINE | Facility: CLINIC | Age: 67
End: 2017-11-27

## 2017-11-27 ENCOUNTER — OFFICE VISIT (OUTPATIENT)
Dept: ORTHOPEDICS | Facility: CLINIC | Age: 67
End: 2017-11-27

## 2017-11-27 DIAGNOSIS — E11.59 TYPE 2 DIABETES MELLITUS WITH OTHER CIRCULATORY COMPLICATIONS (CODE): Primary | ICD-10-CM

## 2017-11-27 DIAGNOSIS — L60.0 INGROWING NAIL: Primary | ICD-10-CM

## 2017-11-27 DIAGNOSIS — N18.30 CKD (CHRONIC KIDNEY DISEASE) STAGE 3, GFR 30-59 ML/MIN (H): ICD-10-CM

## 2017-11-27 NOTE — MR AVS SNAPSHOT
After Visit Summary   11/27/2017    Edwardo Dumont    MRN: [id number]           Patient Information     Date Of Birth          1950        Visit Information        Provider Department      11/27/2017 2:40 PM Sina Mills DPM [facility]        Today's Diagnoses     Ingrowing nail    -  1       Follow-ups after your visit        Follow-up notes from your care team     Return if symptoms worsen or fail to improve.      Your next 10 appointments already scheduled     Nov 28, 2017  9:15 AM CST   LAB with AN LAB   [facility] ([facility])    [address] [facility] [id number] [phone]           Please do not eat 10-12 hours before your appointment if you are coming in fasting for labs on lipids, cholesterol, or glucose (sugar). This does not apply to pregnant women. Water, hot tea and black coffee (with nothing added) are okay. Do not drink other fluids, diet soda or chew gum.            Nov 29, 2017  9:00 AM CST   Shot Long with Isa Valverde MD   [facility] ([facility]    [address] [place] [id number] [phone]            Dec 21, 2017 10:30 AM CST   Return Visit with ESTELA Zuñiga MD   [facility] ([facility])    [address] [place] [address] [address] [facility] [id number] [phone]              Future tests that were ordered for you today     Open Future Orders        Priority Expected Expires Ordered    Comprehensive metabolic panel Routine  12/27/2017 11/27/2017    Hemoglobin A1c Routine  12/27/2017 11/27/2017            Who to contact     Please call your clinic at [phone] to:    Ask questions about your health    Make or cancel appointments    Discuss your medicines    Learn about your test results    Speak to your doctor   If you have compliments or concerns about an experience at your clinic, or if you wish to  file a complaint, please contact [facility] Patient Relations at [phone] or email us at [email or web address]         Additional Information About Your Visit        Openbravohart Information     Netlog gives you secure access to your electronic health record. If you see a primary care provider, you can also send messages to your care team and make appointments. If you have questions, please call your primary care clinic.  If you do not have a primary care provider, please call [phone] and they will assist you.      Netlog is an electronic gateway that provides easy, online access to your medical records. With Netlog, you can request a clinic appointment, read your test results, renew a prescription or communicate with your care team.     To access your existing account, please contact your [facility] or call [phone] for assistance.        Care EveryWhere ID     This is your Care EveryWhere ID. This could be used by other organizations to access your [facility] medical records  [id number]         Blood Pressure from Last 3 Encounters:   09/26/17 108/70   06/14/17 135/84   06/13/17 122/68    Weight from Last 3 Encounters:   09/26/17 106.8 kg (235 lb 8 oz)   06/14/17 106.1 kg (234 lb)   06/13/17 106.1 kg (234 lb)              Today, you had the following     No orders found for display       Primary Care Provider Office Phone # Fax #    Isa Valverde [phone]       [address] [address]        Equal Access to Services     [facility] : Hadii aad ku hadasho Soomaali, waaxda luqadaha, qaybta kaalmada adeegyaavelino, flor duran . So [facility] [phone].    ATENCIÓN: Si habla español, tiene a menendez disposición servicios gratuitos de asistencia lingüística. Llame al [phone].    We comply with applicable federal civil rights laws and [place]  laws. We do not discriminate on the basis of race, color, national origin, age, disability, sex, sexual orientation, or gender identity.            Thank you!     Thank you for choosing [facility]  for your care. Our goal is always to provide you with excellent care. Hearing back from our patients is one way we can continue to improve our services. Please take a few minutes to complete the written survey that you may receive in the mail after your visit with us. Thank you!             Your Updated Medication List - Protect others around you: Learn how to safely use, store and throw away your medicines at www.disposemymeds.org.          This list is accurate as of: 11/27/17 11:59 PM.  Always use your most recent med list.                   Brand Name Dispense Instructions for use Diagnosis    ACCU-CHEK SMARTVIEW test strip   Generic drug:  blood glucose monitoring     100 strip    ONCE DAILY TESTING AND AS NEEDED    Type 2 diabetes, HbA1C goal < 8% (H)       ACE NOT PRESCRIBED (INTENTIONAL)     0 each    ACE Inhibitor not prescribed due to Other: cough    Hypertension goal BP (blood pressure) < 140/90       aspirin 81 MG tablet      Take 1 tablet (81 mg) by mouth daily    CAD (coronary artery disease)       atorvastatin 40 MG tablet    LIPITOR    90 tablet    Take 1 tablet (40 mg) by mouth daily    Hyperlipidemia LDL goal <100       clopidogrel 75 MG tablet    PLAVIX     Take 75 mg by mouth        IBUPROFEN PO      Take by mouth every 6 hours as needed for moderate pain        JARDIANCE 10 MG Tabs tablet   Generic drug:  empagliflozin     30 tablet    TAKE ONE TABLET BY MOUTH EVERY DAY    Type 2 diabetes mellitus with other circulatory complications       losartan 25 MG tablet    COZAAR    90 tablet    Take 1 tablet (25 mg) by mouth daily    Essential hypertension, benign       metFORMIN 500 MG tablet    GLUCOPHAGE    360 tablet    TAKE 2 TABLETS BY MOUTH TWICE DAILY WITH MEALS    Type 2 diabetes  mellitus with other circulatory complications       metoprolol 50 MG 24 hr tablet    TOPROL-XL     Take 50 mg by mouth daily        nitroGLYcerin 0.4 MG sublingual tablet    NITROSTAT    90 tablet    Place 1 tablet (0.4 mg) under the tongue every 5 minutes as needed for chest pain    CAD (coronary artery disease)       * ONE TOUCH/ONE TOUCH II STARTER KIT     1    use as directed    Type II or unspecified type diabetes mellitus without mention of complication, not stated as uncontrolled       * blood glucose monitoring meter device kit     1 kit    Use to test blood sugar  times daily or as directed.    Type 2 diabetes, HbA1C goal < 8% (H)       zolpidem 10 MG tablet    AMBIEN    30 tablet    Take 1 tablet (10 mg) by mouth nightly as needed    Primary insomnia       * Notice:  This list has 2 medication(s) that are the same as other medications prescribed for you. Read the directions carefully, and ask your doctor or other care provider to review them with you.

## 2017-11-27 NOTE — NURSING NOTE
Reason For Visit:   Chief Complaint   Patient presents with     RECHECK     Follow up. Ingrown, Left great toenail. Pt stated that he is still having issues.        Pain Assessment  Patient Currently in Pain: Yes  Primary Pain Location:  (Great toe)  Pain Orientation: Left  Aggravating Factors:  (Touch, walk)               Current Outpatient Prescriptions   Medication Sig Dispense Refill     ACCU-CHEK SMARTVIEW test strip ONCE DAILY TESTING AND AS NEEDED 100 strip 4     atorvastatin (LIPITOR) 40 MG tablet Take 1 tablet (40 mg) by mouth daily 90 tablet 4     zolpidem (AMBIEN) 10 MG tablet Take 1 tablet (10 mg) by mouth nightly as needed 30 tablet 0     losartan (COZAAR) 25 MG tablet Take 1 tablet (25 mg) by mouth daily 90 tablet 4     metoprolol (TOPROL-XL) 50 MG 24 hr tablet Take 50 mg by mouth daily       clopidogrel (PLAVIX) 75 MG tablet Take 75 mg by mouth       JARDIANCE 10 MG TABS tablet TAKE ONE TABLET BY MOUTH EVERY DAY 30 tablet 11     metFORMIN (GLUCOPHAGE) 500 MG tablet TAKE 2 TABLETS BY MOUTH TWICE DAILY WITH MEALS 360 tablet 4     IBUPROFEN PO Take by mouth every 6 hours as needed for moderate pain       aspirin 81 MG tablet Take 1 tablet (81 mg) by mouth daily       blood glucose monitoring ([employer] CONTOUR MONITOR) meter device kit Use to test blood sugar  times daily or as directed. 1 kit 0     nitroglycerin (NITROSTAT) 0.4 MG SL tablet Place 1 tablet (0.4 mg) under the tongue every 5 minutes as needed for chest pain (Patient not taking: Reported on 6/14/2017) 90 tablet 4     ACE NOT PRESCRIBED, INTENTIONAL, ACE Inhibitor not prescribed due to Other: cough 0 each 0     ONE TOUCH/ONE TOUCH II STARTER KIT use as directed 1 0          Allergies   Allergen Reactions     Benzonatate Anaphylaxis and Swelling     Lisinopril Cough     Zocor [Simvastatin - High Dose]      Poor memory

## 2017-11-27 NOTE — LETTER
11/27/2017       RE: Edwardo Dumont  [address] [facility] [address]     Dear Colleague,    Thank you for referring your patient, Edwardo Dumont, to the [facility] at [facility]. Please see a copy of my visit note below.    Past Medical History:   Diagnosis Date     Acromioclavicular joint separation, type 1 9/12    right     Allergic rhinitis     causes chronic  cough     Arthritis      CKD (chronic kidney disease) stage 3, GFR 30-59 ml/min      Degenerative arthritis of hip - right 12/27/2010     Gout      Hip arthrosis - right 10/25/2010     Hyperlipidemia      Ischaemic vascular disease      Ischemic vascular disease 5/12    one stent     OA (osteoarthritis) of knee 10/25/2010     Renal insufficiency      Sleep apnea      Type II or unspecified type diabetes mellitus without mention of complication, not stated as uncontrolled      Unspecified essential hypertension      Patient Active Problem List   Diagnosis     Cough     Sleep apnea     Gout     Low back pain     Radiculopathy of leg     Hyperlipidemia LDL goal <100     OA (osteoarthritis) of knee     Degenerative arthritis of hip - right     Urinary retention     Advanced directives, counseling/discussion     S/P hip replacement     Closed dislocation of acromioclavicular joint     Impingement syndrome of right shoulder     Ischemic vascular disease   one stent coronary artery 5/12     Essential hypertension, benign     Visual disturbance, transient, right eye     Dermatochalasis     Insomnia     S/P coronary angiogram     Type 2 diabetes mellitus with other circulatory complications     CKD (chronic kidney disease) stage 3, GFR 30-59 ml/min     Past Surgical History:   Procedure Laterality Date     C ABLATION SUPRAVENT ARRHYTHMOGENIC FOCUS  12/21/15    at [facility]     C TOTAL HIP ARTHROPLASTY  1/11/11    Rt YOGI     ENDOSCOPIC SINUS SURGERY  12/14/15     ENDOSCOPIC SINUS SURGERY Bilateral 12/14/2015     Procedure: ENDOSCOPIC SINUS SURGERY;  Surgeon: Kei Cevallos MD;  Location: [facility] OR     GASTRIC BYPASS  1/03    lap band     SINUS SURGERY Right 12-14-15    Dr. Cevallos     STENT  5/8/12    OM2 cornary artery stent     STENT  01/2017    3 stents placed in coronary arteries while in [place]     STENT, CORONARY, HANG  01/2017    2 stents in [place].     Social History     Social History     Marital status:      Spouse name: N/A     Number of children: N/A     Years of education: N/A     Occupational History     Not on file.     Social History Main Topics     Smoking status: Former Smoker     Packs/day: 1.00     Years: 5.00     Types: Cigarettes     Start date: 1/1/1970     Quit date: 9/8/1989     Smokeless tobacco: Never Used      Comment: non-smoking household     Alcohol use Yes      Comment: couple drinks 3 x a week     Drug use: No     Sexual activity: Yes     Partners: Female     Other Topics Concern     Not on file     Social History Narrative     Family History   Problem Relation Age of Onset     Allergies Son      Allergies Son      CANCER Mother      kidney     Neurologic Disorder Father      parkinsons     Glaucoma No family hx of      Macular Degeneration No family hx of      Lab Results   Component Value Date    A1C 7.1 06/12/2017    SUBJECTIVE FINDINGS:  67-year-old male returns to clinic for left lateral hallux nail that is ingrown.  He relates it felt good after we trimmed it out last time and it has just recently gradually started getting sore again.      OBJECTIVE FINDINGS:  He has a distal left lateral hallux nail border that is incurvated with some local erythema and edema, some dried serosanguineous drainage, no odor, no calor.  No cellulitis.      ASSESSMENT/PLAN:  Paronychia left lateral hallux nail border distally.  Diagnosis and treatment options discussed with patient.  He is diabetic.  Left lateral hallux nail border is debrided.  Local wound care done upon consent today.  I am going to have  him do foot soaks and triple antibiotic and Band-Aids dispensed and use discussed with him.  He will return to clinic and see me in a week if this is not resolved, otherwise as needed.  He will call if the redness does not resolve in the next day or two.       Again, thank you for allowing me to participate in the care of your patient.      Sincerely,    Sina Mills DPM

## 2017-11-27 NOTE — PROGRESS NOTES
Past Medical History:   Diagnosis Date     Acromioclavicular joint separation, type 1 9/12    right     Allergic rhinitis     causes chronic  cough     Arthritis      CKD (chronic kidney disease) stage 3, GFR 30-59 ml/min      Degenerative arthritis of hip - right 12/27/2010     Gout      Hip arthrosis - right 10/25/2010     Hyperlipidemia      Ischaemic vascular disease      Ischemic vascular disease 5/12    one stent     OA (osteoarthritis) of knee 10/25/2010     Renal insufficiency      Sleep apnea      Type II or unspecified type diabetes mellitus without mention of complication, not stated as uncontrolled      Unspecified essential hypertension      Patient Active Problem List   Diagnosis     Cough     Sleep apnea     Gout     Low back pain     Radiculopathy of leg     Hyperlipidemia LDL goal <100     OA (osteoarthritis) of knee     Degenerative arthritis of hip - right     Urinary retention     Advanced directives, counseling/discussion     S/P hip replacement     Closed dislocation of acromioclavicular joint     Impingement syndrome of right shoulder     Ischemic vascular disease   one stent coronary artery 5/12     Essential hypertension, benign     Visual disturbance, transient, right eye     Dermatochalasis     Insomnia     S/P coronary angiogram     Type 2 diabetes mellitus with other circulatory complications     CKD (chronic kidney disease) stage 3, GFR 30-59 ml/min     Past Surgical History:   Procedure Laterality Date     C ABLATION SUPRAVENT ARRHYTHMOGENIC FOCUS  12/21/15    at [facility] TOTAL HIP ARTHROPLASTY  1/11/11    Rt YOGI     ENDOSCOPIC SINUS SURGERY  12/14/15     ENDOSCOPIC SINUS SURGERY Bilateral 12/14/2015    Procedure: ENDOSCOPIC SINUS SURGERY;  Surgeon: Kei Cevallos MD;  Location: [facility] OR     GASTRIC BYPASS  1/03    lap band     SINUS SURGERY Right 12-14-15    Dr. Cevallos     STENT  5/8/12    OM2 cornary artery stent     STENT  01/2017    3 stents placed in coronary arteries while  in [place]     STENT, CORONARY, HANG  01/2017    2 stents in [place].     Social History     Social History     Marital status:      Spouse name: N/A     Number of children: N/A     Years of education: N/A     Occupational History     Not on file.     Social History Main Topics     Smoking status: Former Smoker     Packs/day: 1.00     Years: 5.00     Types: Cigarettes     Start date: 1/1/1970     Quit date: 9/8/1989     Smokeless tobacco: Never Used      Comment: non-smoking household     Alcohol use Yes      Comment: couple drinks 3 x a week     Drug use: No     Sexual activity: Yes     Partners: Female     Other Topics Concern     Not on file     Social History Narrative     Family History   Problem Relation Age of Onset     Allergies Son      Allergies Son      CANCER Mother      kidney     Neurologic Disorder Father      parkinsons     Glaucoma No family hx of      Macular Degeneration No family hx of      Lab Results   Component Value Date    A1C 7.1 06/12/2017    SUBJECTIVE FINDINGS:  67-year-old male returns to clinic for left lateral hallux nail that is ingrown.  He relates it felt good after we trimmed it out last time and it has just recently gradually started getting sore again.      OBJECTIVE FINDINGS:  He has a distal left lateral hallux nail border that is incurvated with some local erythema and edema, some dried serosanguineous drainage, no odor, no calor.  No cellulitis.      ASSESSMENT/PLAN:  Paronychia left lateral hallux nail border distally.  Diagnosis and treatment options discussed with patient.  He is diabetic.  Left lateral hallux nail border is debrided.  Local wound care done upon consent today.  I am going to have him do foot soaks and triple antibiotic and Band-Aids dispensed and use discussed with him.  He will return to clinic and see me in a week if this is not resolved, otherwise as needed.  He will call if the redness does not resolve in the next day or two.

## 2017-11-28 DIAGNOSIS — E11.59 TYPE 2 DIABETES MELLITUS WITH OTHER CIRCULATORY COMPLICATIONS (CODE): ICD-10-CM

## 2017-11-28 DIAGNOSIS — N18.30 CKD (CHRONIC KIDNEY DISEASE) STAGE 3, GFR 30-59 ML/MIN (H): ICD-10-CM

## 2017-11-28 LAB
ALBUMIN SERPL-MCNC: 4.1 G/DL (ref 3.4–5)
ALP SERPL-CCNC: 109 U/L (ref 40–150)
ALT SERPL W P-5'-P-CCNC: 36 U/L (ref 0–70)
ANION GAP SERPL CALCULATED.3IONS-SCNC: 7 MMOL/L (ref 3–14)
AST SERPL W P-5'-P-CCNC: 20 U/L (ref 0–45)
BILIRUB SERPL-MCNC: 0.8 MG/DL (ref 0.2–1.3)
BUN SERPL-MCNC: 24 MG/DL (ref 7–30)
CALCIUM SERPL-MCNC: 8.8 MG/DL (ref 8.5–10.1)
CHLORIDE SERPL-SCNC: 104 MMOL/L (ref 94–109)
CO2 SERPL-SCNC: 26 MMOL/L (ref 20–32)
CREAT SERPL-MCNC: 1.25 MG/DL (ref 0.66–1.25)
GFR SERPL CREATININE-BSD FRML MDRD: 58 ML/MIN/1.7M2
GLUCOSE SERPL-MCNC: 176 MG/DL (ref 70–99)
HBA1C MFR BLD: 7.8 % (ref 4.3–6)
POTASSIUM SERPL-SCNC: 4.3 MMOL/L (ref 3.4–5.3)
PROT SERPL-MCNC: 7.4 G/DL (ref 6.8–8.8)
SODIUM SERPL-SCNC: 137 MMOL/L (ref 133–144)

## 2017-11-28 PROCEDURE — 83036 HEMOGLOBIN GLYCOSYLATED A1C: CPT | Performed by: FAMILY MEDICINE

## 2017-11-28 PROCEDURE — 36415 COLL VENOUS BLD VENIPUNCTURE: CPT | Performed by: FAMILY MEDICINE

## 2017-11-28 PROCEDURE — 80053 COMPREHEN METABOLIC PANEL: CPT | Performed by: FAMILY MEDICINE

## 2017-11-29 ENCOUNTER — OFFICE VISIT (OUTPATIENT)
Dept: FAMILY MEDICINE | Facility: CLINIC | Age: 67
End: 2017-11-29
Payer: COMMERCIAL

## 2017-11-29 VITALS
WEIGHT: 239.8 LBS | OXYGEN SATURATION: 96 % | BODY MASS INDEX: 31.78 KG/M2 | SYSTOLIC BLOOD PRESSURE: 102 MMHG | TEMPERATURE: 97.8 F | DIASTOLIC BLOOD PRESSURE: 60 MMHG | HEIGHT: 73 IN | HEART RATE: 66 BPM

## 2017-11-29 DIAGNOSIS — M48.062 SPINAL STENOSIS OF LUMBAR REGION WITH NEUROGENIC CLAUDICATION: ICD-10-CM

## 2017-11-29 DIAGNOSIS — N18.30 CKD (CHRONIC KIDNEY DISEASE) STAGE 3, GFR 30-59 ML/MIN (H): ICD-10-CM

## 2017-11-29 DIAGNOSIS — I10 ESSENTIAL HYPERTENSION, BENIGN: ICD-10-CM

## 2017-11-29 DIAGNOSIS — F51.01 PRIMARY INSOMNIA: ICD-10-CM

## 2017-11-29 DIAGNOSIS — M54.16 LUMBAR BACK PAIN WITH RADICULOPATHY AFFECTING LEFT LOWER EXTREMITY: ICD-10-CM

## 2017-11-29 DIAGNOSIS — E11.59 TYPE 2 DIABETES MELLITUS WITH OTHER CIRCULATORY COMPLICATIONS (CODE): Primary | ICD-10-CM

## 2017-11-29 PROCEDURE — 99214 OFFICE O/P EST MOD 30 MIN: CPT | Performed by: FAMILY MEDICINE

## 2017-11-29 PROCEDURE — 99207 C FOOT EXAM  NO CHARGE: CPT | Performed by: FAMILY MEDICINE

## 2017-11-29 RX ORDER — METOPROLOL SUCCINATE 50 MG/1
50 TABLET, EXTENDED RELEASE ORAL DAILY
Qty: 90 TABLET | Refills: 4 | Status: SHIPPED | OUTPATIENT
Start: 2017-11-29 | End: 2018-06-28

## 2017-11-29 RX ORDER — NITROGLYCERIN 0.4 MG/1
0.4 TABLET SUBLINGUAL EVERY 5 MIN PRN
Qty: 90 TABLET | Refills: 4 | Status: CANCELLED | OUTPATIENT
Start: 2017-11-29

## 2017-11-29 RX ORDER — ZOLPIDEM TARTRATE 10 MG/1
10 TABLET ORAL
Qty: 30 TABLET | Refills: 0 | Status: CANCELLED | OUTPATIENT
Start: 2017-11-29

## 2017-11-29 NOTE — PATIENT INSTRUCTIONS
[facility]    If you have any questions regarding to your visit please contact your care team:       Team Purple:   Clinic Hours Telephone Number   Dr. Isa Nichols   7am-7pm  Monday - Thursday   7am-5pm  Fridays  [phone]  (Appointment scheduling available 24/7)    Questions about your Visit?   Team Line:  [phone]   Urgent Care - [place] [facility] - 11am-9pm Monday-Friday Saturday-Sunday- 9am-5pm   [place] - 5pm-9pm Monday-Friday Saturday-Sunday- 9am-5pm  [phone] - [facility]  [phone] - [place]       What options do I have for visits at the clinic other than the traditional office visit?  To expand how we care for you, many of our providers are utilizing electronic visits (e-visits) and telephone visits, when medically appropriate, for interactions with their patients rather than a visit in the clinic.   We also offer nurse visits for many medical concerns. Just like any other service, we will bill your insurance company for this type of visit based on time spent on the phone with your provider. Not all insurance companies cover these visits. Please check with your medical insurance if this type of visit is covered. You will be responsible for any charges that are not paid by your insurance.      E-visits via FAD ? IO:  generally incur a $35.00 fee.  Telephone visits:  Time spent on the phone: *charged based on time that is spent on the phone in increments of 10 minutes. Estimated cost:   5-10 mins $30.00   11-20 mins. $59.00   21-30 mins. $85.00     Use [employer] (secure email communication and access to your chart) to send your primary care provider a message or make an appointment. Ask someone on your Team how to sign up for FAD ? IO.  For a Price Quote for your services, please call our Consumer Price Line at [phone].  As always, Thank you for trusting us with your health care needs!

## 2017-11-29 NOTE — MR AVS SNAPSHOT
After Visit Summary   11/29/2017    Edwardo Dumont    MRN: [id number]           Patient Information     Date Of Birth          1950        Visit Information        Provider Department      11/29/2017 9:00 AM Isa Valverde MD [facility]        Today's Diagnoses     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)    -  1    Primary insomnia        Spinal stenosis of lumbar region with neurogenic claudication        Lumbar back pain with radiculopathy affecting left lower extremity        CKD (chronic kidney disease) stage 3, GFR 30-59 ml/min        Essential hypertension, benign          Care Instructions    [facility]    If you have any questions regarding to your visit please contact your care team:       Team Purple:   Clinic Hours Telephone Number   Dr. Isa Nichols   7am-7pm  Monday - Thursday   7am-5pm  Fridays  [phone]  (Appointment scheduling available 24/7)    Questions about your Visit?   Team Line:  [phone]   Urgent Care - [place] and [place] - 11am-9pm Monday-Friday Saturday-Sunday- 9am-5pm   [place] - 5pm-9pm Monday-Friday Saturday-Sunday- 9am-5pm  [phone] - [place]   [phone] - [place]       What options do I have for visits at the clinic other than the traditional office visit?  To expand how we care for you, many of our providers are utilizing electronic visits (e-visits) and telephone visits, when medically appropriate, for interactions with their patients rather than a visit in the clinic.   We also offer nurse visits for many medical concerns. Just like any other service, we will bill your insurance company for this type of visit based on time spent on the phone with your provider. Not all insurance companies cover these visits. Please check with your medical insurance if this type of visit is covered. You will be  responsible for any charges that are not paid by your insurance.      E-visits via [employer]art:  generally incur a $35.00 fee.  Telephone visits:  Time spent on the phone: *charged based on time that is spent on the phone in increments of 10 minutes. Estimated cost:   5-10 mins $30.00   11-20 mins. $59.00   21-30 mins. $85.00     Use Etonkidshart (secure email communication and access to your chart) to send your primary care provider a message or make an appointment. Ask someone on your Team how to sign up for [employer].  For a Price Quote for your services, please call our [employer] Line at [phone].  As always, Thank you for trusting us with your health care needs!              Follow-ups after your visit        Additional Services     NEUROSURGERY REFERRAL       Your provider has referred you to: PATIENCE: [facility] [phone]   [email or web address]    Please be aware that coverage of these services is subject to the terms and limitations of your health insurance plan.  Call member services at your health plan with any benefit or coverage questions.      Please bring the following with you to your appointment:    (1) Any X-Rays, CTs or MRIs which have been performed.  Contact the facility where they were done to arrange for  prior to your scheduled appointment.   (2) List of current medications  (3) This referral request   (4) Any documents/labs given to you for this referral                  Your next 10 appointments already scheduled     Dec 21, 2017 10:30 AM CST   Return Visit with ESTELA Zuñiga MD   [facility] ([facility])    [address] [place] [address] 2nd Floor  [facility] [address] [phone]              Who to contact     If you have questions or need follow up information about today's clinic visit or your schedule please contact [facility] directly at  "[phone].  Normal or non-critical lab and imaging results will be communicated to you by MyChart, letter or phone within 4 business days after the clinic has received the results. If you do not hear from us within 7 days, please contact the clinic through [employer] In[employer] or phone. If you have a critical or abnormal lab result, we will notify you by phone as soon as possible.  Submit refill requests through [employer] or call your pharmacy and they will forward the refill request to us. Please allow 3 business days for your refill to be completed.          Additional Information About Your Visit        [employer] Inchart Information     [employer] gives you secure access to your electronic health record. If you see a primary care provider, you can also send messages to your care team and make appointments. If you have questions, please call your primary care clinic.  If you do not have a primary care provider, please call [phone] and they will assist you.        Care EveryWhere ID     This is your Care EveryWhere ID. This could be used by other organizations to access your [facility] medical records  [id number]        Your Vitals Were     Pulse Temperature Height Pulse Oximetry BMI (Body Mass Index)       66 97.8  F (36.6  C) (Oral) 6' 0.8\" (1.849 m) 96% 31.81 kg/m2        Blood Pressure from Last 3 Encounters:   11/29/17 102/60   09/26/17 108/70   06/14/17 135/84    Weight from Last 3 Encounters:   11/29/17 239 lb 12.8 oz (108.8 kg)   09/26/17 235 lb 8 oz (106.8 kg)   06/14/17 234 lb (106.1 kg)              We Performed the Following     C FOOT EXAM  NO CHARGE     NEUROSURGERY REFERRAL          Today's Medication Changes          These changes are accurate as of: 11/29/17  9:31 AM.  If you have any questions, ask your nurse or doctor.               These medicines have changed or have updated prescriptions.        Dose/Directions    empagliflozin 10 MG Tabs tablet   Commonly known as:  JARDIANCE   This may have changed:  See the " new instructions.   Used for:  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)   Changed by:  Isa Valverde MD        Dose:  10 mg   Take 1 tablet (10 mg) by mouth daily   Quantity:  90 tablet   Refills:  4            Where to get your medicines      These medications were sent to [id number] [place] - [address] AT [facility] [address], [place] [address]     Phone:  [phone]     empagliflozin 10 MG Tabs tablet    metFORMIN 500 MG tablet    metoprolol 50 MG 24 hr tablet                Primary Care Provider Office Phone # Fax #    Isa Valverde [phone]       [facility] [address] [facility] [address]        Equal Access to Services     [facility] : Hadii aad ku hadasho Soomaali, waaxda luqadaha, qaybta kaalmada adeegyada, waxay idiin hayaan whitney duran . So [facility] [phone].    ATENCIÓN: Si habla español, tiene a menendez disposición servicios gratuitos de asistencia lingüística. Llame al [phone].    We comply with applicable federal civil rights laws and [place] laws. We do not discriminate on the basis of race, color, national origin, age, disability, sex, sexual orientation, or gender identity.            Thank you!     Thank you for choosing [facility]  for your care. Our goal is always to provide you with excellent care. Hearing back from our patients is one way we can continue to improve our services. Please take a few minutes to complete the written survey that you may receive in the mail after your visit with us. Thank you!             Your Updated Medication List - Protect others around you: Learn how to safely use, store and throw away your medicines at www.disposemymeds.org.          This list is accurate as of: 11/29/17  9:31 AM.  Always use your most recent med list.                   Brand Name Dispense Instructions for  use Diagnosis    ACCU-CHEK SMARTVIEW test strip   Generic drug:  blood glucose monitoring     100 strip    ONCE DAILY TESTING AND AS NEEDED    Type 2 diabetes, HbA1C goal < 8% (H)       ACE NOT PRESCRIBED (INTENTIONAL)     0 each    ACE Inhibitor not prescribed due to Other: cough    Hypertension goal BP (blood pressure) < 140/90       aspirin 81 MG tablet      Take 1 tablet (81 mg) by mouth daily    CAD (coronary artery disease)       atorvastatin 40 MG tablet    LIPITOR    90 tablet    Take 1 tablet (40 mg) by mouth daily    Hyperlipidemia LDL goal <100       clopidogrel 75 MG tablet    PLAVIX     Take 75 mg by mouth        empagliflozin 10 MG Tabs tablet    JARDIANCE    90 tablet    Take 1 tablet (10 mg) by mouth daily    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)       IBUPROFEN PO      Take by mouth every 6 hours as needed for moderate pain        losartan 25 MG tablet    COZAAR    90 tablet    Take 1 tablet (25 mg) by mouth daily    Essential hypertension, benign       metFORMIN 500 MG tablet    GLUCOPHAGE    360 tablet    TAKE 2 TABLETS BY MOUTH TWICE DAILY WITH MEALS    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)       metoprolol 50 MG 24 hr tablet    TOPROL-XL    90 tablet    Take 1 tablet (50 mg) by mouth daily    CKD (chronic kidney disease) stage 3, GFR 30-59 ml/min, Essential hypertension, benign       nitroGLYcerin 0.4 MG sublingual tablet    NITROSTAT    90 tablet    Place 1 tablet (0.4 mg) under the tongue every 5 minutes as needed for chest pain    CAD (coronary artery disease)       * ONE TOUCH/ONE TOUCH II STARTER KIT     1    use as directed    Type II or unspecified type diabetes mellitus without mention of complication, not stated as uncontrolled       * blood glucose monitoring meter device kit     1 kit    Use to test blood sugar  times daily or as directed.    Type 2 diabetes, HbA1C goal < 8% (H)       zolpidem 10 MG tablet    AMBIEN     30 tablet    Take 1 tablet (10 mg) by mouth nightly as needed    Primary insomnia       * Notice:  This list has 2 medication(s) that are the same as other medications prescribed for you. Read the directions carefully, and ask your doctor or other care provider to review them with you.

## 2017-11-29 NOTE — NURSING NOTE
"Chief Complaint   Patient presents with     Diabetes     Lipids     Hypertension       Initial /60  Pulse 66  Temp 97.8  F (36.6  C) (Oral)  Ht 6' 0.8\" (1.849 m)  Wt 239 lb 12.8 oz (108.8 kg)  SpO2 96%  BMI 31.81 kg/m2 Estimated body mass index is 31.81 kg/(m^2) as calculated from the following:    Height as of this encounter: 6' 0.8\" (1.849 m).    Weight as of this encounter: 239 lb 12.8 oz (108.8 kg).  Medication Reconciliation: complete     An GEORGETTE Telles    "

## 2017-12-21 ENCOUNTER — OFFICE VISIT (OUTPATIENT)
Dept: ORTHOPEDICS | Facility: CLINIC | Age: 67
End: 2017-12-21
Payer: COMMERCIAL

## 2017-12-21 VITALS — HEIGHT: 73 IN | RESPIRATION RATE: 16 BRPM | WEIGHT: 240.2 LBS | BODY MASS INDEX: 31.83 KG/M2

## 2017-12-21 DIAGNOSIS — M25.511 CHRONIC PAIN OF BOTH SHOULDERS: ICD-10-CM

## 2017-12-21 DIAGNOSIS — M75.42 IMPINGEMENT SYNDROME OF BOTH SHOULDERS: Primary | ICD-10-CM

## 2017-12-21 DIAGNOSIS — M25.512 CHRONIC PAIN OF BOTH SHOULDERS: ICD-10-CM

## 2017-12-21 DIAGNOSIS — M75.41 IMPINGEMENT SYNDROME OF BOTH SHOULDERS: Primary | ICD-10-CM

## 2017-12-21 DIAGNOSIS — G89.29 CHRONIC PAIN OF BOTH SHOULDERS: ICD-10-CM

## 2017-12-21 PROCEDURE — 99212 OFFICE O/P EST SF 10 MIN: CPT | Mod: 25 | Performed by: ORTHOPAEDIC SURGERY

## 2017-12-21 PROCEDURE — 20610 DRAIN/INJ JOINT/BURSA W/O US: CPT | Mod: 50 | Performed by: ORTHOPAEDIC SURGERY

## 2017-12-21 RX ORDER — TRIAMCINOLONE ACETONIDE 40 MG/ML
40 INJECTION, SUSPENSION INTRA-ARTICULAR; INTRAMUSCULAR ONCE
Qty: 1 ML | Refills: 0 | OUTPATIENT
Start: 2017-12-21 | End: 2017-12-21

## 2017-12-21 ASSESSMENT — PAIN SCALES - GENERAL: PAINLEVEL: MILD PAIN (3)

## 2017-12-21 NOTE — PROGRESS NOTES
CHIEF COMPLAINT:   Chief Complaint   Patient presents with     RECHECK     Bilateral shoulder pain. Left is a little worse than right. Last cortisone injection: 12/8/16. Injecitons worked good. Pain started to come back gradually starting in May. He is going to be leaving for the winter so would like more injections today.        HISTORY:  Tanvir Dumont is a 67 year old male, right  -hand dominant, who is seen for follow up evaluation of bilateral shoulder pain. He sustained an injury 9/2012, fell onto right shoulder while playing soccer with grand daughter. Had MRI 2012 negative for rotator cuff tear but showing acromio-clavicular injury and rotator cuff tendinosis. Noted to have AC separation. Treated non-opertively, healed well. Returns today with continued bilateral pain. Pain is mild today, rated a 3/10. His pain is located mostly posterior. His last injections were on 12/6/2016 and provided great relief until a month ago. Pain is aggravated at night. He is a side sleeper, and does attribute most shoulder issues to this. Below shoulder movements do not cause him any pain. He has most trouble and pain with overhead movements such as following through with his golf swings.    He is heading down to [place] for the winter next week and plays golf daily.      Symptoms have been waxing and waning right shoulder, starting now left shoulder.  Aggrevated by: overhead activities.  Relieved by: rest, cortisone injections  Present symptoms: pain with overhead activities, pain reaching behind back  Pain location: lateral shoulder, deltoid and upper arm  Pain severity: 3/10.  Pain quality: dull and sharp  Frequency of symptoms: occasionally  Associated symptoms: none  Treatment up to this point: injection 12/2014, 6/2015, 12/2015, 12/6/2016 with good relief.    Significant Orthopedic past medical history: right total hip arthroplasty 1/2011  Usual level of recreational activity: golf, pickle ball.  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 He also has no past medical history of Amblyopia; Bleeding disorder (H); Cancer (H); Cataract; Cerebral infarction (H); Congestive heart failure (H); COPD (chronic obstructive pulmonary disease) (H); Depressive disorder; Diabetic retinopathy (H); Glaucoma (increased eye pressure); Heart disease; History of blood transfusion; Inflammatory arthritis; Retinal detachment; Senile macular degeneration; Strabismus; Stroke (H); Surgical complications; Thyroid disease; Uncomplicated asthma; Unspecified asthma(493.90); or Uveitis.  Patient Active Problem List    Diagnosis Date Noted     CKD (chronic kidney disease) stage 3, GFR 30-59 ml/min      Priority: Medium     Type 2 diabetes mellitus with other circulatory complications 10/06/2015     Priority: Medium     S/P coronary angiogram 09/23/2015     Priority: Medium     Insomnia 08/03/2015     Priority: Medium     Dermatochalasis 04/10/2015     Priority: Medium     Visual disturbance, transient, right eye 02/09/2014     Priority: Medium     Essential hypertension, benign 05/14/2013     Priority: Medium     Ischemic vascular disease   one stent coronary artery 5/12 12/19/2012     Priority: Medium     Closed dislocation of acromioclavicular joint 12/17/2012     Priority: Medium     Problem list name updated by automated process. Provider to review       Impingement  syndrome of right shoulder 12/17/2012     Priority: Medium     S/P hip replacement 12/13/2012     Priority: Medium     Advanced directives, counseling/discussion 05/25/2011     Priority: Medium     Discussed Advance Directive planning with patient; information given to patient to review.         Urinary retention 01/21/2011     Priority: Medium     Degenerative arthritis of hip - right 12/27/2010     Priority: Medium     OA (osteoarthritis) of knee 10/25/2010     Priority: Medium     Hyperlipidemia LDL goal <100 10/14/2010     Priority: Medium     Low back pain 04/23/2010     Priority: Medium     Radiculopathy of leg 04/23/2010     Priority: Medium     Gout 11/27/2009     Priority: Medium     Sleep apnea      Priority: Medium     Cough 12/05/2007     Priority: Medium       Surgical Hx:  has a past surgical history that includes gastric bypass (1/03); TOTAL HIP ARTHROPLASTY (1/11/11); Stent (5/8/12); Endoscopic sinus surgery (12/14/15); Endoscopic sinus surgery (Bilateral, 12/14/2015); sinus surgery (Right, 12-14-15); Stent (01/2017); ABLATION SUPRAVENT ARRHYTHMOGENIC FOCUS (12/21/15); and stent, coronary, shelia (01/2017).    Medications:   Current Outpatient Prescriptions:      empagliflozin (JARDIANCE) 10 MG TABS tablet, Take 1 tablet (10 mg) by mouth daily, Disp: 90 tablet, Rfl: 4     metFORMIN (GLUCOPHAGE) 500 MG tablet, TAKE 2 TABLETS BY MOUTH TWICE DAILY WITH MEALS, Disp: 360 tablet, Rfl: 4     metoprolol (TOPROL-XL) 50 MG 24 hr tablet, Take 1 tablet (50 mg) by mouth daily, Disp: 90 tablet, Rfl: 4     ACCU-CHEK SMARTVIEW test strip, ONCE DAILY TESTING AND AS NEEDED, Disp: 100 strip, Rfl: 4     atorvastatin (LIPITOR) 40 MG tablet, Take 1 tablet (40 mg) by mouth daily, Disp: 90 tablet, Rfl: 4     losartan (COZAAR) 25 MG tablet, Take 1 tablet (25 mg) by mouth daily, Disp: 90 tablet, Rfl: 4     clopidogrel (PLAVIX) 75 MG tablet, Take 75 mg by mouth, Disp: , Rfl:      IBUPROFEN PO, Take by mouth every 6 hours as  "needed for moderate pain, Disp: , Rfl:      aspirin 81 MG tablet, Take 1 tablet (81 mg) by mouth daily, Disp: , Rfl:      blood glucose monitoring (VIANNEY CONTOUR MONITOR) meter device kit, Use to test blood sugar  times daily or as directed., Disp: 1 kit, Rfl: 0     nitroglycerin (NITROSTAT) 0.4 MG SL tablet, Place 1 tablet (0.4 mg) under the tongue every 5 minutes as needed for chest pain, Disp: 90 tablet, Rfl: 4     ACE NOT PRESCRIBED, INTENTIONAL,, ACE Inhibitor not prescribed due to Other: cough, Disp: 0 each, Rfl: 0     ONE TOUCH/ONE TOUCH II STARTER KIT, use as directed, Disp: 1, Rfl: 0    Allergies:   Allergies   Allergen Reactions     Benzonatate Anaphylaxis and Swelling     Lisinopril Cough     Zocor [Simvastatin - High Dose]      Poor memory       REVIEW OF SYSTEMS:   CONSTITUTIONAL:NEGATIVE for fever, chills, change in weight  INTEGUMENTARY/SKIN: NEGATIVE for worrisome rashes, moles or lesions  MUSCULOSKELETAL:See HPI above  NEURO: NEGATIVE for weakness, dizziness or paresthesias    This document serves as a record of the services and decisions personally performed and made by Skip Reyes MD. It was created on his behalf by Dulce Pacheco, a trained medical scribe. The creation of this document is based the provider's statements to the medical scribe.    Scribrby Pacheco 1:39 PM 12/21/2017       PHYSICAL EXAM:  Resp 16  Ht 1.849 m (6' 0.8\")  Wt 109 kg (240 lb 3.2 oz)  BMI 31.86 kg/m2   GENERAL APPEARANCE: healthy, alert, no distress  SKIN: no suspicious lesions or rashes  NEURO: Normal strength and tone, mentation intact and speech normal  PSYCH:  mentation appears normal and affect normal, not anxious  RESPIRATORY: No increased work of breathing.      RIGHT UPPER EXTREMITY:  Sensation intact to light touch in median, radial, ulnar and axillary nerve distributions  Palpable 2+ radial pulse, brisk capillary refill to all fingers, wwp  Intact epl fpl fdp edc wrist flexion/extension biceps triceps " deltoid    RIGHT SHOULDER:  Shoulder Inspection: no swelling, bruising, discoloration, there is mild AC prominence deformity and asymmetry  mild muscle atrophy supraspinatus and infraspinatus compared to left.    Tender:greater tuberosity, upper trapezius,  nontender to palpation: AC joint, proximal bicep tendon, supraspinatus  Range of Motion:   Active:forward flexion 170 degrees, external rotation  60 degrees, internal rotation  T12   Passive: same  Strength: forward flexion 5-/5, External rotation 5-/5  Liftoff: Able  Impingement: all grade 2 positive  Special tests: Spurling's: Negative  Belly Press: Negative  Empty Can: Positive for pain.    LEFT UPPER EXTREMITY:  Sensation intact to light touch in median, radial, ulnar and axillary nerve distributions  Palpable 2+ radial pulse, brisk capillary refill to all fingers, wwp  Intact epl fpl fdp edc wrist flexion/extension biceps triceps deltoid    LEFT SHOULDER:  Shoulder Inspection: no swelling, bruising, discoloration, or obvious deformity or asymmetry  no atrophy  Tender: AC joint, greater tuberosity, anterior capsule, proximal biceps, deltoid  Non-tender: SC joint, proximal-mid clavicle, mid-distal clavicle, acromion, proximal humerus, supraspinatus , infraspinatus, upper trapezius muscle and rhomboids  Range of Motion:   Active:forward flexion 170 degrees, external rotation  60 degrees, internal rotation  T12   Passive: same  Strength: forward flexion 5/5, External rotation 5/5  Liftoff: Able  Impingement: all grade 2 positive      X-RAY:   No new shoulder xrays today.  3 views right  Shoulder, bilateral AC joints obtained 11/19/2012 were again reviewed personally in clinic today with the patient. On my review, there is elevation and widening of distal clavicle relative to acromion, 100%. Mild acromio-clavicular degenerative changes. Sclerosis at greater tuberosity. There is no increased in AC separation with weights compared to without weights. Moderate left  acromio-clavicular degenerative changes.    3 views left shoulder 3/17/2014 reviewed, No obvious fracture or dislocation. No obvious bony abnormality or lesion. Moderate acromio-clavicular and mild gleno-humeral degenerative changes.       MRI right shoulder CDI 6/2/2015: moderate sized area of poorly defined interstitial and deep fiber tearing of the distal supraspinatus tendon involves up to 50% of thickness. Moderate infraspinatus and subscapularis tendinosis. Chronic acromio-clavicular injury with CC sprain, mild narrowing of subacromial space with minimal bursitis. Long head biceps grossly intact. No significant gleno-humeral degenerative changes.    MRI left shoulder CDI 6/2/2015: mild supraspinatus tendinopathy with fraying involving bursal surface distally. Mild subscapularis tendinopathy. Moderate to marked acromio-clavicular degenerative changes with mild to  Moderate narrowing of the subacromial space. Mild bursitis. No significant gleno-humeral degenerative changes. Proximal biceps grossly intact.    MRI right shoulder 11/26/2012 reviewed:  IMPRESSION:  1. Extensive separation of the acromioclavicular joint including  disruption of the acromioclavicular joint ligaments. The  coracoclavicular ligaments are intact.  2. Mild tendinosis of the distal supraspinatous tendon. No actual  rotator cuff tear is seen.      ASSESSMENT: Edwardo Dumont is a 67 year old male, right  -hand dominant with chronic right shoulder AC separation, pain, rotator cuff tendinosis and impingement with right partial thickness tear; left shoulder impingement and tendinosis.      PLAN:   * again discussed nonsurgical and surgical options. He states he will continue with injections as along as they help.  * again, could consider arthroscopic versus open surgery (for example: subacromial decompression, distal clavicle excision, rotator cuff repair versus debridement, biceps tenodesis versus tenotomy, etc.). Perioperative course  discussed, length of recovery. Right side likely rotator cuff repair and decompression, left side likely decompression only. Risks and perceived benefits discussed.    * at this time, he'd like to proceed with bilateral cortisone injections. See procedure notes below.    In the meantime:    * Rest  * Activity modification - avoid activities that aggravate symptoms or started symptoms at onset.  * NSAIDS - regular use for inflammation, with food, as long as no contra-indications. Please discuss with pcp if needed.  * Ice twice daily to three times daily, 15-20 minutes at a time  * heat may be beneficial prior to exercising  * home exercise program for strengthening, stretching and range of motion exercises of rotator cuff and periscapular stabilization.  * Tylenol as needed for pain  * Injections: cortisone injections may be beneficial to help decrease swelling and inflammation within the shoulder or bursa, and decrease pain. With decreased pain, Physical Therapy and exercises will be more effective and efficient. Patient elects to proceed with bilateral cortisone injections.  * Return to clinic as needed.    PROCEDURE NOTE:  The risks, perceived benefits and potential complications (including but not limited to: bleeding, infection, pain, scar, damage to adjacent structures, atrophy or necrosis of soft tissue, skin blanching, failure to relieve symptoms, worsening of symptoms, allergic reaction) of injection were discussed with the patient. Questions were addressed and answered.The patient elected to proceed. Written informed consent was obtained. The correct procedural site was identified and confirmed. A right shoulder subacromial injection was performed using 2mL Kenalog-40 40mg per mL and 7mL (4mL 1% lidocaine, 3mL 0.25% marcaine)  of local anesthetic after sterile prep, to the correct procedural site. Sterile bandaid applied. This was tolerated well by the patient. No apparent complications. Did also discuss  that if diabetic, recommend close monitoring of blood sugars over the next week as cortisone injections can temporarily elevate blood sugars.    This document serves The risks, perceived benefits and potential complications (including but not limited to: bleeding, infection, pain, scar, damage to adjacent structures, atrophy or necrosis of soft tissue, skin blanching, failure to relieve symptoms, worsening of symptoms, allergic reaction) of injection were discussed with the patient. Questions were addressed and answered.The patient elected to proceed. Written informed consent was obtained. The correct procedural site was identified and confirmed. A left shoulder subacromial injection was performed using 2mL Kenalog-40 40mg per mL and 7mL (4mL 1% lidocaine, 3mL 0.25% marcaine)  of local anesthetic after sterile prep, to the correct procedural site. Sterile bandaid applied. This was tolerated well by the patient. No apparent complications. Did also discuss that if diabetic, recommend close monitoring of blood sugars over the next week as cortisone injections can temporarily elevate blood sugars.    The information in this document, created by a scribe for me, accurately reflects the services I personally performed and the decisions made by me. I have reviewed and approved this document for accuracy.      Skip Reyes M.D., M.S.  Dept. of Orthopaedic Surgery  [facility]

## 2017-12-21 NOTE — PATIENT INSTRUCTIONS
Please remember to call and schedule a follow up appointment if one was recommended at your earliest convenience.  Orthopedics CLINIC HOURS TELEPHONE NUMBER   Dr. Eric Shea  Certified Medical Assistant   Monday & Wednesday   8am - 5pm  Thursday 1pm - 5pm  Friday 8am -11:30am Specialty schedulers:   [phone] to schedule your surgery.  Main Clinic:   [phone] to make an appointment with any provider.    Urgent Care locations:    [facility] Monday-Friday Closed  Saturday-Sunday 9am-5pm      Monday-Friday 12pm - 8pm  Saturday-Sunday 9am-5pm [phone]     If SURGERY has been recommended, please call our Specialty Schedulers at [phone] to schedule your procedure.    If you need a medication refill, please contact your pharmacy. Please allow 3 business days for your refill to be completed.    If an MRI or CT scan has been recommended, please call [facility] Schedulers at [phone] to schedule your appointment.  Use [employer] (secure e-mail communication and access to your chart) to send a message or to make an appointment. Please ask how you can sign up for [employer].  Your care team's suggested websites for health information:   [email or web address] : Up to date and easily searchable information on multiple topics.   [email or web address] : [place] dept of heat, public health issues in [place], N1N1

## 2017-12-21 NOTE — PROGRESS NOTES
The patient's Bilateral shoulders were prepped with betadine solution after verification of allergies. Area approximately 10 cm x 10 cm prepped in a sterile fashion. After injection, betadine removed with soap and water and band-aids applied.    4cc Lidocaine 1% NDC 3496-9022-87, LOT -dk,  [id number]  3cc Bupivacaine 0.25% NDC 3458-5279-70, LOT -dk,  [id number]  2cc Kenalog 40 NDC 9190-7586-12, LOT [id number],  [id number] injected into patient's Bilateral shoulder subacromial spaces without resistance using posterolateral approach by:   Igor Rose PA-C, CAQ (Ortho)  Supervising Physician: Skip Reyes M.D., M.S.  Dept. of Orthopaedic Surgery  [facility]

## 2017-12-21 NOTE — NURSING NOTE
"Chief Complaint   Patient presents with     RECHECK     Bilateral shoulder pain. Left is a little worse than right. Last cortisone injection: 12/8/16. Injecitons worked good. Pain started to come back gradually starting in May. He is going to be leaving for the winter so would like more injections today.        Initial Resp 16  Ht 1.849 m (6' 0.8\")  Wt 109 kg (240 lb 3.2 oz)  BMI 31.86 kg/m2 Estimated body mass index is 31.86 kg/(m^2) as calculated from the following:    Height as of this encounter: 1.849 m (6' 0.8\").    Weight as of this encounter: 109 kg (240 lb 3.2 oz).  Medication Reconciliation: paramjit Shelton Certified Medical Assistant    "

## 2017-12-21 NOTE — MR AVS SNAPSHOT
After Visit Summary   12/21/2017    Edwardo Dumont    MRN: [id number]           Patient Information     Date Of Birth          1950        Visit Information        Provider Department      12/21/2017 1:15 PM Skip Reyes MD [facility]        Today's Diagnoses     Impingement syndrome of both shoulders    -  1    Chronic pain of both shoulders          Care Instructions    Please remember to call and schedule a follow up appointment if one was recommended at your earliest convenience.  Orthopedics CLINIC HOURS TELEPHONE NUMBER   Dr. Eric Shea  Certified Medical Assistant   Monday & Wednesday   8am - 5pm  Thursday 1pm - 5pm  Friday 8am -11:30am Specialty schedulers:   [phone] to schedule your surgery.  Main Clinic:   [phone] to make an appointment with any provider.    Urgent Care locations:    [facility] Monday-Friday Closed  Saturday-Sunday 9am-5pm      Monday-Friday 12pm - 8pm  Saturday-Sunday 9am-5pm [phone]     If SURGERY has been recommended, please call our Specialty Schedulers at [phone] to schedule your procedure.    If you need a medication refill, please contact your pharmacy. Please allow 3 business days for your refill to be completed.    If an MRI or CT scan has been recommended, please call [facility] Schedulers at [phone] to schedule your appointment.  Use [employer] (secure e-mail communication and access to your chart) to send a message or to make an appointment. Please ask how you can sign up for [employer].  Your care team's suggested websites for health information:   [email or web address] : Up to date and easily searchable information on multiple topics.   [email or web address] : [place] dept of heat, public health issues in [place], N1N1              Follow-ups after your visit        Follow-up notes from your care team     Return if symptoms worsen or fail  "to improve.      Who to contact     If you have questions or need follow up information about today's clinic visit or your schedule please contact [facility] directly at [phone].  Normal or non-critical lab and imaging results will be communicated to you by [employer], letter or phone within 4 business days after the clinic has received the results. If you do not hear from us within 7 days, please contact the clinic through [employer] or phone. If you have a critical or abnormal lab result, we will notify you by phone as soon as possible.  Submit refill requests through [employer] or call your pharmacy and they will forward the refill request to us. Please allow 3 business days for your refill to be completed.          Additional Information About Your Visit        [employer] Information     [employer] gives you secure access to your electronic health record. If you see a primary care provider, you can also send messages to your care team and make appointments. If you have questions, please call your primary care clinic.  If you do not have a primary care provider, please call [phone] and they will assist you.        Care EveryWhere ID     This is your Care EveryWhere ID. This could be used by other organizations to access your [facility] medical records  [id number]        Your Vitals Were     Respirations Height BMI (Body Mass Index)             16 6' 0.8\" (1.849 m) 31.86 kg/m2          Blood Pressure from Last 3 Encounters:   11/29/17 102/60   09/26/17 108/70   06/14/17 135/84    Weight from Last 3 Encounters:   12/21/17 240 lb 3.2 oz (109 kg)   11/29/17 239 lb 12.8 oz (108.8 kg)   09/26/17 235 lb 8 oz (106.8 kg)              We Performed the Following     DRAIN/INJECT LARGE JOINT/BURSA     TRIAMCINOLONE ACET INJ NOS          Today's Medication Changes          These changes are accurate as of: 12/21/17  2:00 PM.  If you have any questions, ask your nurse or doctor.               Start " taking these medicines.        Dose/Directions    triamcinolone acetonide 40 MG/ML injection   Commonly known as:  KENALOG-40   Used for:  Chronic pain of both shoulders, Impingement syndrome of both shoulders   Started by:  Skip Reyes MD        Dose:  40 mg   Inject 1 mL (40 mg) into the muscle once for 1 dose   Quantity:  1 mL   Refills:  0            Where to get your medicines      Some of these will need a paper prescription and others can be bought over the counter.  Ask your nurse if you have questions.     You don't need a prescription for these medications     triamcinolone acetonide 40 MG/ML injection                Primary Care Provider Office Phone # Fax #    Isa Valverde [phone]       [address] [address]        Equal Access to Services     [facility] RADHA : Hamida portilloo Sogemma, waaxda luqadaha, qaybta kaalmada meredith, flor duran . So [facility] [phone].    ATENCIÓN: Si habla español, tiene a menendez disposición servicios gratuitos de asistencia lingüística. [facility] [phone].    We comply with applicable federal civil rights laws and [place] laws. We do not discriminate on the basis of race, color, national origin, age, disability, sex, sexual orientation, or gender identity.            Thank you!     Thank you for choosing [facility]  for your care. Our goal is always to provide you with excellent care. Hearing back from our patients is one way we can continue to improve our services. Please take a few minutes to complete the written survey that you may receive in the mail after your visit with us. Thank you!             Your Updated Medication List - Protect others around you: Learn how to safely use, store and throw away your medicines at www.disposemymeds.org.          This list is accurate as of: 12/21/17  2:00 PM.  Always use your most  recent med list.                   Brand Name Dispense Instructions for use Diagnosis    ACCU-CHEK SMARTVIEW test strip   Generic drug:  blood glucose monitoring     100 strip    ONCE DAILY TESTING AND AS NEEDED    Type 2 diabetes, HbA1C goal < 8% (H)       ACE NOT PRESCRIBED (INTENTIONAL)     0 each    ACE Inhibitor not prescribed due to Other: cough    Hypertension goal BP (blood pressure) < 140/90       aspirin 81 MG tablet      Take 1 tablet (81 mg) by mouth daily    CAD (coronary artery disease)       atorvastatin 40 MG tablet    LIPITOR    90 tablet    Take 1 tablet (40 mg) by mouth daily    Hyperlipidemia LDL goal <100       clopidogrel 75 MG tablet    PLAVIX     Take 75 mg by mouth        empagliflozin 10 MG Tabs tablet    JARDIANCE    90 tablet    Take 1 tablet (10 mg) by mouth daily    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)       IBUPROFEN PO      Take by mouth every 6 hours as needed for moderate pain        losartan 25 MG tablet    COZAAR    90 tablet    Take 1 tablet (25 mg) by mouth daily    Essential hypertension, benign       metFORMIN 500 MG tablet    GLUCOPHAGE    360 tablet    TAKE 2 TABLETS BY MOUTH TWICE DAILY WITH MEALS    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)       metoprolol 50 MG 24 hr tablet    TOPROL-XL    90 tablet    Take 1 tablet (50 mg) by mouth daily    CKD (chronic kidney disease) stage 3, GFR 30-59 ml/min, Essential hypertension, benign       nitroGLYcerin 0.4 MG sublingual tablet    NITROSTAT    90 tablet    Place 1 tablet (0.4 mg) under the tongue every 5 minutes as needed for chest pain    CAD (coronary artery disease)       * ONE TOUCH/ONE TOUCH II STARTER KIT     1    use as directed    Type II or unspecified type diabetes mellitus without mention of complication, not stated as uncontrolled       * blood glucose monitoring meter device kit     1 kit    Use to test blood sugar  times daily or as directed.     Type 2 diabetes, HbA1C goal < 8% (H)       triamcinolone acetonide 40 MG/ML injection    KENALOG-40    1 mL    Inject 1 mL (40 mg) into the muscle once for 1 dose    Chronic pain of both shoulders, Impingement syndrome of both shoulders       * Notice:  This list has 2 medication(s) that are the same as other medications prescribed for you. Read the directions carefully, and ask your doctor or other care provider to review them with you.

## 2017-12-21 NOTE — LETTER
12/21/2017         RE: Edwardo Dumont  [address] [facility]  [place] [address]        Dear Colleague,    Thank you for referring your patient, Edwardo Dumont, to the [facility] [place]. Please see a copy of my visit note below.    CHIEF COMPLAINT:   Chief Complaint   Patient presents with     RECHECK     Bilateral shoulder pain. Left is a little worse than right. Last cortisone injection: 12/8/16. Injecitons worked good. Pain started to come back gradually starting in May. He is going to be leaving for the winter so would like more injections today.        HISTORY:  Tanvir Dumont is a 67 year old male, right  -hand dominant, who is seen for follow up evaluation of bilateral shoulder pain. He sustained an injury 9/2012, fell onto right shoulder while playing soccer with grand daughter. Had MRI 2012 negative for rotator cuff tear but showing acromio-clavicular injury and rotator cuff tendinosis. Noted to have AC separation. Treated non-opertively, healed well. Returns today with continued bilateral pain. Pain is mild today, rated a 3/10. His pain is located mostly posterior. His last injections were on 12/6/2016 and provided great relief until a month ago. Pain is aggravated at night. He is a side sleeper, and does attribute most shoulder issues to this. Below shoulder movements do not cause him any pain. He has most trouble and pain with overhead movements such as following through with his golf swings.    He is heading down to [place] for the winter next week and plays golf daily.      Symptoms have been waxing and waning right shoulder, starting now left shoulder.  Aggrevated by: overhead activities.  Relieved by: rest, cortisone injections  Present symptoms: pain with overhead activities, pain reaching behind back  Pain location: lateral shoulder, deltoid and upper arm  Pain severity: 3/10.  Pain quality: dull and sharp  Frequency of symptoms: occasionally  Associated symptoms: none  Treatment  up to this point: injection 12/2014, 6/2015, 12/2015, 12/6/2016 with good relief.    Significant Orthopedic past medical history: right total hip arthroplasty 1/2011  Usual level of recreational activity: golf, pickle ball.  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 He also has no past medical history of Amblyopia; Bleeding disorder (H); Cancer (H); Cataract; Cerebral infarction (H); Congestive heart failure (H); COPD (chronic obstructive pulmonary disease) (H); Depressive disorder; Diabetic retinopathy (H); Glaucoma (increased eye pressure); Heart disease; History of blood transfusion; Inflammatory arthritis; Retinal detachment; Senile macular degeneration; Strabismus; Stroke (H); Surgical complications; Thyroid disease; Uncomplicated asthma; Unspecified asthma(493.90); or Uveitis.  Patient Active Problem List    Diagnosis Date Noted     CKD (chronic kidney disease) stage 3, GFR 30-59 ml/min      Priority: Medium     Type 2 diabetes mellitus with other circulatory complications 10/06/2015     Priority: Medium     S/P coronary angiogram 09/23/2015     Priority: Medium     Insomnia 08/03/2015     Priority: Medium     Dermatochalasis 04/10/2015     Priority: Medium     Visual disturbance, transient, right eye 02/09/2014     Priority: Medium     Essential hypertension, benign 05/14/2013     Priority: Medium     Ischemic vascular  disease   one stent coronary artery 5/12 12/19/2012     Priority: Medium     Closed dislocation of acromioclavicular joint 12/17/2012     Priority: Medium     Problem list name updated by automated process. Provider to review       Impingement syndrome of right shoulder 12/17/2012     Priority: Medium     S/P hip replacement 12/13/2012     Priority: Medium     Advanced directives, counseling/discussion 05/25/2011     Priority: Medium     Discussed Advance Directive planning with patient; information given to patient to review.         Urinary retention 01/21/2011     Priority: Medium     Degenerative arthritis of hip - right 12/27/2010     Priority: Medium     OA (osteoarthritis) of knee 10/25/2010     Priority: Medium     Hyperlipidemia LDL goal <100 10/14/2010     Priority: Medium     Low back pain 04/23/2010     Priority: Medium     Radiculopathy of leg 04/23/2010     Priority: Medium     Gout 11/27/2009     Priority: Medium     Sleep apnea      Priority: Medium     Cough 12/05/2007     Priority: Medium       Surgical Hx:  has a past surgical history that includes gastric bypass (1/03); TOTAL HIP ARTHROPLASTY (1/11/11); Stent (5/8/12); Endoscopic sinus surgery (12/14/15); Endoscopic sinus surgery (Bilateral, 12/14/2015); sinus surgery (Right, 12-14-15); Stent (01/2017); ABLATION SUPRAVENT ARRHYTHMOGENIC FOCUS (12/21/15); and stent, coronary, shelia (01/2017).    Medications:   Current Outpatient Prescriptions:      empagliflozin (JARDIANCE) 10 MG TABS tablet, Take 1 tablet (10 mg) by mouth daily, Disp: 90 tablet, Rfl: 4     metFORMIN (GLUCOPHAGE) 500 MG tablet, TAKE 2 TABLETS BY MOUTH TWICE DAILY WITH MEALS, Disp: 360 tablet, Rfl: 4     metoprolol (TOPROL-XL) 50 MG 24 hr tablet, Take 1 tablet (50 mg) by mouth daily, Disp: 90 tablet, Rfl: 4     ACCU-CHEK SMARTVIEW test strip, ONCE DAILY TESTING AND AS NEEDED, Disp: 100 strip, Rfl: 4     atorvastatin (LIPITOR) 40 MG tablet, Take 1 tablet (40 mg) by mouth daily, Disp:  "90 tablet, Rfl: 4     losartan (COZAAR) 25 MG tablet, Take 1 tablet (25 mg) by mouth daily, Disp: 90 tablet, Rfl: 4     clopidogrel (PLAVIX) 75 MG tablet, Take 75 mg by mouth, Disp: , Rfl:      IBUPROFEN PO, Take by mouth every 6 hours as needed for moderate pain, Disp: , Rfl:      aspirin 81 MG tablet, Take 1 tablet (81 mg) by mouth daily, Disp: , Rfl:      blood glucose monitoring (VIANNEY CONTOUR MONITOR) meter device kit, Use to test blood sugar  times daily or as directed., Disp: 1 kit, Rfl: 0     nitroglycerin (NITROSTAT) 0.4 MG SL tablet, Place 1 tablet (0.4 mg) under the tongue every 5 minutes as needed for chest pain, Disp: 90 tablet, Rfl: 4     ACE NOT PRESCRIBED, INTENTIONAL,, ACE Inhibitor not prescribed due to Other: cough, Disp: 0 each, Rfl: 0     ONE TOUCH/ONE TOUCH II STARTER KIT, use as directed, Disp: 1, Rfl: 0    Allergies:   Allergies   Allergen Reactions     Benzonatate Anaphylaxis and Swelling     Lisinopril Cough     Zocor [Simvastatin - High Dose]      Poor memory       REVIEW OF SYSTEMS:   CONSTITUTIONAL:NEGATIVE for fever, chills, change in weight  INTEGUMENTARY/SKIN: NEGATIVE for worrisome rashes, moles or lesions  MUSCULOSKELETAL:See HPI above  NEURO: NEGATIVE for weakness, dizziness or paresthesias    This document serves as a record of the services and decisions personally performed and made by Skip Reyes MD. It was created on his behalf by Dulce Pacheco, a trained medical scribe. The creation of this document is based the provider's statements to the medical scribe.    Scribe Dulce Pacheco 1:39 PM 12/21/2017       PHYSICAL EXAM:  Resp 16  Ht 1.849 m (6' 0.8\")  Wt 109 kg (240 lb 3.2 oz)  BMI 31.86 kg/m2   GENERAL APPEARANCE: healthy, alert, no distress  SKIN: no suspicious lesions or rashes  NEURO: Normal strength and tone, mentation intact and speech normal  PSYCH:  mentation appears normal and affect normal, not anxious  RESPIRATORY: No increased work of breathing.      RIGHT UPPER " EXTREMITY:  Sensation intact to light touch in median, radial, ulnar and axillary nerve distributions  Palpable 2+ radial pulse, brisk capillary refill to all fingers, wwp  Intact epl fpl fdp edc wrist flexion/extension biceps triceps deltoid    RIGHT SHOULDER:  Shoulder Inspection: no swelling, bruising, discoloration, there is mild AC prominence deformity and asymmetry  mild muscle atrophy supraspinatus and infraspinatus compared to left.    Tender:greater tuberosity, upper trapezius,  nontender to palpation: AC joint, proximal bicep tendon, supraspinatus  Range of Motion:   Active:forward flexion 170 degrees, external rotation  60 degrees, internal rotation  T12   Passive: same  Strength: forward flexion 5-/5, External rotation 5-/5  Liftoff: Able  Impingement: all grade 2 positive  Special tests: Spurling's: Negative  Belly Press: Negative  Empty Can: Positive for pain.    LEFT UPPER EXTREMITY:  Sensation intact to light touch in median, radial, ulnar and axillary nerve distributions  Palpable 2+ radial pulse, brisk capillary refill to all fingers, wwp  Intact epl fpl fdp edc wrist flexion/extension biceps triceps deltoid    LEFT SHOULDER:  Shoulder Inspection: no swelling, bruising, discoloration, or obvious deformity or asymmetry  no atrophy  Tender: AC joint, greater tuberosity, anterior capsule, proximal biceps, deltoid  Non-tender: SC joint, proximal-mid clavicle, mid-distal clavicle, acromion, proximal humerus, supraspinatus , infraspinatus, upper trapezius muscle and rhomboids  Range of Motion:   Active:forward flexion 170 degrees, external rotation  60 degrees, internal rotation  T12   Passive: same  Strength: forward flexion 5/5, External rotation 5/5  Liftoff: Able  Impingement: all grade 2 positive      X-RAY:   No new shoulder xrays today.  3 views right  Shoulder, bilateral AC joints obtained 11/19/2012 were again reviewed personally in clinic today with the patient. On my review, there is elevation  and widening of distal clavicle relative to acromion, 100%. Mild acromio-clavicular degenerative changes. Sclerosis at greater tuberosity. There is no increased in AC separation with weights compared to without weights. Moderate left acromio-clavicular degenerative changes.    3 views left shoulder 3/17/2014 reviewed, No obvious fracture or dislocation. No obvious bony abnormality or lesion. Moderate acromio-clavicular and mild gleno-humeral degenerative changes.       MRI right shoulder CDI 6/2/2015: moderate sized area of poorly defined interstitial and deep fiber tearing of the distal supraspinatus tendon involves up to 50% of thickness. Moderate infraspinatus and subscapularis tendinosis. Chronic acromio-clavicular injury with CC sprain, mild narrowing of subacromial space with minimal bursitis. Long head biceps grossly intact. No significant gleno-humeral degenerative changes.    MRI left shoulder CDI 6/2/2015: mild supraspinatus tendinopathy with fraying involving bursal surface distally. Mild subscapularis tendinopathy. Moderate to marked acromio-clavicular degenerative changes with mild to  Moderate narrowing of the subacromial space. Mild bursitis. No significant gleno-humeral degenerative changes. Proximal biceps grossly intact.    MRI right shoulder 11/26/2012 reviewed:  IMPRESSION:  1. Extensive separation of the acromioclavicular joint including  disruption of the acromioclavicular joint ligaments. The  coracoclavicular ligaments are intact.  2. Mild tendinosis of the distal supraspinatous tendon. No actual  rotator cuff tear is seen.      ASSESSMENT: Edwardo Dumont is a 67 year old male, right  -hand dominant with chronic right shoulder AC separation, pain, rotator cuff tendinosis and impingement with right partial thickness tear; left shoulder impingement and tendinosis.      PLAN:   * again discussed nonsurgical and surgical options. He states he will continue with injections as along as they help.  *  again, could consider arthroscopic versus open surgery (for example: subacromial decompression, distal clavicle excision, rotator cuff repair versus debridement, biceps tenodesis versus tenotomy, etc.). Perioperative course discussed, length of recovery. Right side likely rotator cuff repair and decompression, left side likely decompression only. Risks and perceived benefits discussed.    * at this time, he'd like to proceed with bilateral cortisone injections. See procedure notes below.    In the meantime:    * Rest  * Activity modification - avoid activities that aggravate symptoms or started symptoms at onset.  * NSAIDS - regular use for inflammation, with food, as long as no contra-indications. Please discuss with pcp if needed.  * Ice twice daily to three times daily, 15-20 minutes at a time  * heat may be beneficial prior to exercising  * home exercise program for strengthening, stretching and range of motion exercises of rotator cuff and periscapular stabilization.  * Tylenol as needed for pain  * Injections: cortisone injections may be beneficial to help decrease swelling and inflammation within the shoulder or bursa, and decrease pain. With decreased pain, Physical Therapy and exercises will be more effective and efficient. Patient elects to proceed with bilateral cortisone injections.  * Return to clinic as needed.    PROCEDURE NOTE:  The risks, perceived benefits and potential complications (including but not limited to: bleeding, infection, pain, scar, damage to adjacent structures, atrophy or necrosis of soft tissue, skin blanching, failure to relieve symptoms, worsening of symptoms, allergic reaction) of injection were discussed with the patient. Questions were addressed and answered.The patient elected to proceed. Written informed consent was obtained. The correct procedural site was identified and confirmed. A right shoulder subacromial injection was performed using 2mL Kenalog-40 40mg per mL and 7mL  (4mL 1% lidocaine, 3mL 0.25% marcaine)  of local anesthetic after sterile prep, to the correct procedural site. Sterile bandaid applied. This was tolerated well by the patient. No apparent complications. Did also discuss that if diabetic, recommend close monitoring of blood sugars over the next week as cortisone injections can temporarily elevate blood sugars.    This document serves The risks, perceived benefits and potential complications (including but not limited to: bleeding, infection, pain, scar, damage to adjacent structures, atrophy or necrosis of soft tissue, skin blanching, failure to relieve symptoms, worsening of symptoms, allergic reaction) of injection were discussed with the patient. Questions were addressed and answered.The patient elected to proceed. Written informed consent was obtained. The correct procedural site was identified and confirmed. A left shoulder subacromial injection was performed using 2mL Kenalog-40 40mg per mL and 7mL (4mL 1% lidocaine, 3mL 0.25% marcaine)  of local anesthetic after sterile prep, to the correct procedural site. Sterile bandaid applied. This was tolerated well by the patient. No apparent complications. Did also discuss that if diabetic, recommend close monitoring of blood sugars over the next week as cortisone injections can temporarily elevate blood sugars.    The information in this document, created by a scribe for me, accurately reflects the services I personally performed and the decisions made by me. I have reviewed and approved this document for accuracy.      Skip Reyes M.D., M.S.  Dept. of Orthopaedic Surgery  [facility]    The patient's Bilateral shoulders were prepped with betadine solution after verification of allergies. Area approximately 10 cm x 10 cm prepped in a sterile fashion. After injection, betadine removed with soap and water and band-aids applied.    4cc Lidocaine 1% NDC 8343-4615-81, LOT -dk,  [id number]  3cc  Bupivacaine 0.25% NDC 8148-8137-60, LOT -dk,  [id number]  2cc Kenalog 40 NDC 6884-7692-20, LOT [id number],  [id number] injected into patient's Bilateral shoulder subacromial spaces without resistance using posterolateral approach by:   Igor Rose PA-C, CAQ (Ortho)  Supervising Physician: Skip Reyes M.D., M.S.  Dept. of Orthopaedic Surgery  [facility]        Again, thank you for allowing me to participate in the care of your patient.        Sincerely,        Skip Reyes MD

## 2018-01-05 DIAGNOSIS — E11.59 TYPE 2 DIABETES MELLITUS WITH OTHER CIRCULATORY COMPLICATIONS (CODE): ICD-10-CM

## 2018-01-10 RX ORDER — EMPAGLIFLOZIN 10 MG/1
TABLET, FILM COATED ORAL
Qty: 30 TABLET | Refills: 2 | Status: SHIPPED | OUTPATIENT
Start: 2018-01-10 | End: 2018-01-11

## 2018-01-10 NOTE — TELEPHONE ENCOUNTER
"Due for urine microalbumin.  Had labs completed on 11/28/17 but did not include urine microalbumin.  Had OV on 11/29/17 for diabetes  Please advise if ok to refill and/or will need to come in for labs at this time      Requested Prescriptions   Pending Prescriptions Disp Refills     JARDIANCE 10 MG TABS tablet [Pharmacy Med Name: JARDIANCE 10MG TABS] 30 tablet 2     Sig: TAKE ONE TABLET BY MOUTH EVERY DAY    Sodium Glucose Co-Transport Inhibitor Agents Failed    1/5/2018 12:38 PM       Failed - Patient has had a Microalbumin in the past 12 mos.    Recent Labs   Lab Test  11/28/16   1556   MICROL  8   UMALCR  8.02            Passed - Patient's BP is less than 140/90    BP Readings from Last 3 Encounters:   11/29/17 102/60   09/26/17 108/70   06/14/17 135/84                Passed - Patient has documented LDL within the past 12 mos.    Recent Labs   Lab Test  06/12/17   0846   LDL  36            Passed - Patient has documented A1c within the specified period of time.    Recent Labs   Lab Test  11/28/17   0848   A1C  7.8*            Passed - No creatinine >1.4 or GFR <45 within the past 12 mos    Recent Labs   Lab Test  11/28/17   0848   GFRESTIMATED  58*   GFRESTBLACK  70       Recent Labs   Lab Test  11/28/17   0848   CR  1.25            Passed - Patient is age 18 or older       Passed - Patient has documented normal Potassium within the last 12 mos.    Recent Labs   Lab Test  11/28/17   0848   POTASSIUM  4.3            Passed - Patient has had an appointment within the past 6 mos.or has a future appt within the next 30 days    Patient had office visit in the last 6 months or has a visit in the next 30 days with authorizing provider.  See \"Patient Info\" tab in inbasket, or \"Choose Columns\" in Meds & Orders section of the refill encounter.             "

## 2018-01-30 DIAGNOSIS — E11.51 TYPE II DIABETES MELLITUS WITH PERIPHERAL CIRCULATORY DISORDER (H): Primary | ICD-10-CM

## 2018-01-31 PROBLEM — E11.51 TYPE II DIABETES MELLITUS WITH PERIPHERAL CIRCULATORY DISORDER (H): Status: ACTIVE | Noted: 2018-01-31

## 2018-01-31 NOTE — TELEPHONE ENCOUNTER
Spoke to pharmacy and confirmed duplicate request.    metFORMIN (GLUCOPHAGE) 500 MG tablet 360 tablet 4 11/29/2017  No      Sig: TAKE 2 TABLETS BY MOUTH TWICE DAILY WITH MEALS     Class: E-Prescribe     Order: [id number]     E-Prescribing Status: Receipt confirmed by pharmacy (11/29/2017  9:31 AM CST)       Belle TAM CMA ([facility])

## 2018-01-31 NOTE — TELEPHONE ENCOUNTER
"Failed [facility] refill protocol, see below:  Routing refill request to provider for review/approval because:  Labs not current:  microalbumin     Requested Prescriptions   Pending Prescriptions Disp Refills     metFORMIN (GLUCOPHAGE) 500 MG tablet [Pharmacy Med Name: METFORMIN 500MG TABLETS] 360 tablet 0     Sig: TAKE 2 TABLETS BY MOUTH TWICE DAILY WITH MEALS    Biguanide Agents Failed    1/31/2018 12:14 PM       Failed - Patient has had a Microalbumin in the past 12 mos.    Recent Labs   Lab Test  11/28/16   1556   MICROL  8   UMALCR  8.02            Passed - Patient's BP is less than 140/90    BP Readings from Last 3 Encounters:   11/29/17 102/60   09/26/17 108/70   06/14/17 135/84                Passed - Patient has documented LDL within the past 12 mos.    Recent Labs   Lab Test  06/12/17   0846   LDL  36            Passed - Patient is age 10 or older       Passed - Patient has documented A1c within the specified period of time.    Recent Labs   Lab Test  11/28/17   0848   A1C  7.8*            Passed - Patient's CR is NOT>1.4 OR Patient's EGFR is NOT<45 within past 12 mos.    Recent Labs   Lab Test  11/28/17   0848   GFRESTIMATED  58*   GFRESTBLACK  70       Recent Labs   Lab Test  11/28/17   0848   CR  1.25            Passed - Patient does NOT have a diagnosis of CHF.       Passed - Recent (6 mos) or future visit with authorizing provider's specialty    Patient had office visit in the last 6 months or has a visit in the next 30 days with authorizing provider.  See \"Patient Info\" tab in inbasket, or \"Choose Columns\" in Meds & Orders section of the refill encounter.            Cammy Freeman RN            "

## 2018-06-12 ENCOUNTER — DOCUMENTATION ONLY (OUTPATIENT)
Dept: LAB | Facility: CLINIC | Age: 68
End: 2018-06-12

## 2018-06-12 DIAGNOSIS — N18.30 CKD (CHRONIC KIDNEY DISEASE) STAGE 3, GFR 30-59 ML/MIN (H): ICD-10-CM

## 2018-06-12 DIAGNOSIS — E11.51 TYPE II DIABETES MELLITUS WITH PERIPHERAL CIRCULATORY DISORDER (H): Primary | ICD-10-CM

## 2018-06-12 NOTE — PROGRESS NOTES
Patient has an upcoming previsit appointment on 06/26/2018. Please review pended orders and add additional orders if needed.     Thank you,   Babs Fulton

## 2018-06-26 DIAGNOSIS — E11.51 TYPE II DIABETES MELLITUS WITH PERIPHERAL CIRCULATORY DISORDER (H): ICD-10-CM

## 2018-06-26 DIAGNOSIS — N18.30 CKD (CHRONIC KIDNEY DISEASE) STAGE 3, GFR 30-59 ML/MIN (H): ICD-10-CM

## 2018-06-26 LAB
CHOLEST SERPL-MCNC: 108 MG/DL
CREAT UR-MCNC: 96 MG/DL
HBA1C MFR BLD: 8.3 % (ref 0–5.6)
HDLC SERPL-MCNC: 43 MG/DL
HGB BLD-MCNC: 16.1 G/DL (ref 13.3–17.7)
LDLC SERPL CALC-MCNC: 41 MG/DL
MICROALBUMIN UR-MCNC: 10 MG/L
MICROALBUMIN/CREAT UR: 10.88 MG/G CR (ref 0–17)
NONHDLC SERPL-MCNC: 65 MG/DL
TRIGL SERPL-MCNC: 120 MG/DL
TSH SERPL DL<=0.005 MIU/L-ACNC: 1.08 MU/L (ref 0.4–4)

## 2018-06-26 PROCEDURE — 83036 HEMOGLOBIN GLYCOSYLATED A1C: CPT | Performed by: FAMILY MEDICINE

## 2018-06-26 PROCEDURE — 82043 UR ALBUMIN QUANTITATIVE: CPT | Performed by: FAMILY MEDICINE

## 2018-06-26 PROCEDURE — 80061 LIPID PANEL: CPT | Performed by: FAMILY MEDICINE

## 2018-06-26 PROCEDURE — 36415 COLL VENOUS BLD VENIPUNCTURE: CPT | Performed by: FAMILY MEDICINE

## 2018-06-26 PROCEDURE — 85018 HEMOGLOBIN: CPT | Performed by: FAMILY MEDICINE

## 2018-06-26 PROCEDURE — 84443 ASSAY THYROID STIM HORMONE: CPT | Performed by: FAMILY MEDICINE

## 2018-06-28 ENCOUNTER — OFFICE VISIT (OUTPATIENT)
Dept: FAMILY MEDICINE | Facility: CLINIC | Age: 68
End: 2018-06-28
Payer: COMMERCIAL

## 2018-06-28 VITALS
WEIGHT: 237.2 LBS | TEMPERATURE: 96.6 F | OXYGEN SATURATION: 97 % | SYSTOLIC BLOOD PRESSURE: 92 MMHG | RESPIRATION RATE: 20 BRPM | HEIGHT: 72 IN | HEART RATE: 78 BPM | DIASTOLIC BLOOD PRESSURE: 60 MMHG | BODY MASS INDEX: 32.13 KG/M2

## 2018-06-28 DIAGNOSIS — M48.062 SPINAL STENOSIS OF LUMBAR REGION WITH NEUROGENIC CLAUDICATION: ICD-10-CM

## 2018-06-28 DIAGNOSIS — E11.59 TYPE 2 DIABETES MELLITUS WITH OTHER CIRCULATORY COMPLICATIONS (CODE): Primary | ICD-10-CM

## 2018-06-28 DIAGNOSIS — N18.30 CKD (CHRONIC KIDNEY DISEASE) STAGE 3, GFR 30-59 ML/MIN (H): ICD-10-CM

## 2018-06-28 DIAGNOSIS — I10 ESSENTIAL HYPERTENSION, BENIGN: ICD-10-CM

## 2018-06-28 DIAGNOSIS — E78.5 HYPERLIPIDEMIA LDL GOAL <100: ICD-10-CM

## 2018-06-28 PROCEDURE — 99214 OFFICE O/P EST MOD 30 MIN: CPT | Performed by: FAMILY MEDICINE

## 2018-06-28 RX ORDER — LOSARTAN POTASSIUM 25 MG/1
25 TABLET ORAL DAILY
Qty: 90 TABLET | Refills: 4 | Status: SHIPPED | OUTPATIENT
Start: 2018-06-28 | End: 2019-12-12

## 2018-06-28 RX ORDER — ATORVASTATIN CALCIUM 40 MG/1
40 TABLET, FILM COATED ORAL DAILY
Qty: 90 TABLET | Refills: 4 | Status: SHIPPED | OUTPATIENT
Start: 2018-06-28 | End: 2019-12-12

## 2018-06-28 NOTE — MR AVS SNAPSHOT
After Visit Summary   6/28/2018    Edwardo Dumont    MRN: [id number]           Patient Information     Date Of Birth          1950        Visit Information        Provider Department      6/28/2018 9:45 AM Isa Valverde MD [facility]        Today's Diagnoses     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)    -  1    Hyperlipidemia LDL goal <100        Essential hypertension, benign        CKD (chronic kidney disease) stage 3, GFR 30-59 ml/min        Spinal stenosis of lumbar region with neurogenic claudication          Care Instructions    [facility]    If you have any questions regarding to your visit please contact your care team:       Team Purple:   Clinic Hours Telephone Number   Dr. Isa Nichols   7am-7pm  Monday - Thursday   7am-5pm  Fridays  [phone]  (Appointment scheduling available 24/7)    Questions about your recent visit?   Team Line:  [phone]   Urgent Care - [place] and [facility] - 11am-9pm Monday-Friday Saturday-Sunday- 9am-5pm   [place] - 5pm-9pm Monday-Friday Saturday-Sunday- 9am-5pm  [phone] - [place]  [phone] [facility] [place]       What options do I have for a visit other than an office visit? We offer electronic visits (e-visits) and telephone visits, when medically appropriate.  Please check with your medical insurance to see if these types of visits are covered, as you will be responsible for any charges that are not paid by your insurance.      You can use MR Presta (secure electronic communication) to access to your chart, send your primary care provider a message, or make an appointment. Ask a team member how to get started.     For a price quote for your services, please call our Consumer Price Line at [phone] or our Imaging Cost estimation line at [phone] (for imaging tests).              Follow-ups after your  visit        Additional Services     NEUROSURGERY REFERRAL       Your provider has referred you to: [facility]: [facility] [phone]   [email or web address]    Please be aware that coverage of these services is subject to the terms and limitations of your health insurance plan.  Call member services at your health plan with any benefit or coverage questions.      Please bring the following with you to your appointment:    (1) Any X-Rays, CTs or MRIs which have been performed.  Contact the facility where they were done to arrange for  prior to your scheduled appointment.   (2) List of current medications  (3) This referral request   (4) Any documents/labs given to you for this referral                  Who to contact     If you have questions or need follow up information about today's clinic visit or your schedule please contact [facility] directly at [phone].  Normal or non-critical lab and imaging results will be communicated to you by MyChart, letter or phone within 4 business days after the clinic has received the results. If you do not hear from us within 7 days, please contact the clinic through [employer] or phone. If you have a critical or abnormal lab result, we will notify you by phone as soon as possible.  Submit refill requests through [employer] or call your pharmacy and they will forward the refill request to us. Please allow 3 business days for your refill to be completed.          Additional Information About Your Visit        MyChart Information     [employer] gives you secure access to your electronic health record. If you see a primary care provider, you can also send messages to your care team and make appointments. If you have questions, please call your primary care clinic.  If you do not have a primary care provider, please call [phone] and they will assist you.        Care EveryWhere ID     This is your Care EveryWhere  "ID. This could be used by other organizations to access your [facility] medical records  [id number]        Your Vitals Were     Pulse Temperature Respirations Height Pulse Oximetry BMI (Body Mass Index)    78 96.6  F (35.9  C) (Oral) 20 6' 0.28\" (1.836 m) 97% 31.92 kg/m2       Blood Pressure from Last 3 Encounters:   06/28/18 92/60   11/29/17 102/60   09/26/17 108/70    Weight from Last 3 Encounters:   06/28/18 237 lb 3.2 oz (107.6 kg)   12/21/17 240 lb 3.2 oz (109 kg)   11/29/17 239 lb 12.8 oz (108.8 kg)              We Performed the Following     NEUROSURGERY REFERRAL          Where to get your medicines      These medications were sent to [facility] [address] [place] [address] [facility] [address] [facility] [address], [facility] [address]     Phone:  [phone]     atorvastatin 40 MG tablet    empagliflozin 10 MG Tabs tablet    losartan 25 MG tablet    metFORMIN 500 MG tablet          Primary Care Provider Office Phone # Fax #    Isa Valverde [phone] [address] [facility] [address]        Equal Access to Services     JOHN GARCIA AH: Hadii anthony ku hadasho Soomaali, waaxda luqadaha, qaybta kaalmada adeegyada, flor pedro. So [facility] [phone].    ATENCIÓN: Si habla español, tiene a menendez disposición servicios gratuitos de asistencia lingüística. Thomasame al [phone].    We comply with applicable federal civil rights laws and [place] laws. We do not discriminate on the basis of race, color, national origin, age, disability, sex, sexual orientation, or gender identity.            Thank you!     Thank you for choosing [facility]  for your care. Our goal is always to provide you with excellent care. Hearing back from our patients is one way we can continue to improve our services. Please take a few minutes to complete the written survey that you may receive in the mail after your visit " with us. Thank you!             Your Updated Medication List - Protect others around you: Learn how to safely use, store and throw away your medicines at www.disposemymeds.org.          This list is accurate as of 6/28/18 10:21 AM.  Always use your most recent med list.                   Brand Name Dispense Instructions for use Diagnosis    ACCU-CHEK SMARTVIEW test strip   Generic drug:  blood glucose monitoring     100 strip    ONCE DAILY TESTING AND AS NEEDED    Type 2 diabetes, HbA1C goal < 8% (H)       ACE NOT PRESCRIBED (INTENTIONAL)     0 each    ACE Inhibitor not prescribed due to Other: cough    Hypertension goal BP (blood pressure) < 140/90       aspirin 81 MG tablet      Take 1 tablet (81 mg) by mouth daily    CAD (coronary artery disease)       atorvastatin 40 MG tablet    LIPITOR    90 tablet    Take 1 tablet (40 mg) by mouth daily    Hyperlipidemia LDL goal <100       blood glucose monitoring meter device kit     1 kit    Use to test blood sugar  times daily or as directed.    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)       IBUPROFEN PO      Take by mouth every 6 hours as needed for moderate pain        losartan 25 MG tablet    COZAAR    90 tablet    Take 1 tablet (25 mg) by mouth daily    Essential hypertension, benign       * metFORMIN 500 MG tablet    GLUCOPHAGE    360 tablet    TAKE 2 TABLETS BY MOUTH TWICE DAILY WITH MEALS    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)       * metFORMIN 500 MG tablet    GLUCOPHAGE    360 tablet    TAKE 2 TABLETS BY MOUTH TWICE DAILY WITH MEALS    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)       nitroGLYcerin 0.4 MG sublingual tablet    NITROSTAT    90 tablet    Place 1  tablet (0.4 mg) under the tongue every 5 minutes as needed for chest pain    CAD (coronary artery disease)       * Notice:  This list has 2 medication(s) that are the same as other medications prescribed for you. Read the directions carefully, and ask your doctor or other care provider to review them with you.

## 2018-06-28 NOTE — PATIENT INSTRUCTIONS
[facility]    If you have any questions regarding to your visit please contact your care team:       Team Purple:   Clinic Hours Telephone Number   Dr. Isa Nichols   7am-7pm  Monday - Thursday   7am-5pm  Fridays  [phone]  (Appointment scheduling available 24/7)    Questions about your recent visit?   Team Line:  [phone]   Urgent Care - [place] and [facility] - 11am-9pm Monday-Friday Saturday-Sunday- 9am-5pm   [place] - 5pm-9pm Monday-Friday Saturday-Sunday- 9am-5pm  [phone] - [place]  [phone] [facility]       What options do I have for a visit other than an office visit? We offer electronic visits (e-visits) and telephone visits, when medically appropriate.  Please check with your medical insurance to see if these types of visits are covered, as you will be responsible for any charges that are not paid by your insurance.      You can use [employer] (secure electronic communication) to access to your chart, send your primary care provider a message, or make an appointment. Ask a team member how to get started.     For a price quote for your services, please call our Consumer Price Line at [phone] or our Imaging Cost estimation line at [phone] (for imaging tests).

## 2018-06-28 NOTE — PROGRESS NOTES
SUBJECTIVE:   Edwardo Dumont is a 68 year old male who presents to clinic today for the following health issues:      Diabetes Follow-up      Patient is checking blood sugars: 1 times a week    Diabetic concerns: None     Symptoms of hypoglycemia (low blood sugar): none     Paresthesias (numbness or burning in feet) or sores: No     Date of last diabetic eye exam: 10/2017    Diabetes Management Resources    Hyperlipidemia Follow-Up      Rate your low fat/cholesterol diet?: poor but getting better     Taking statin?  Yes, no muscle aches from statin    Other lipid medications/supplements?:  none    Hypertension Follow-up      Outpatient blood pressures are not being checked.    Low Salt Diet: no added salt    BP Readings from Last 2 Encounters:   06/28/18 92/60   11/29/17 102/60     Hemoglobin A1C (%)   Date Value   06/26/2018 8.3 (H)   11/28/2017 7.8 (H)     LDL Cholesterol Calculated (mg/dL)   Date Value   06/26/2018 41   06/12/2017 36       Amount of exercise or physical activity: 4-5 days/week for an average of 30-45 minutes    Problems taking medications regularly: No    Medication side effects: none    Diet: regular (no restrictions)             Problem list and histories reviewed & adjusted, as indicated.  Additional history: as documented    Patient Active Problem List   Diagnosis     Cough     Sleep apnea     Gout     Low back pain     Radiculopathy of leg     Hyperlipidemia LDL goal <100     OA (osteoarthritis) of knee     Degenerative arthritis of hip - right     Urinary retention     Advanced directives, counseling/discussion     S/P hip replacement     Closed dislocation of acromioclavicular joint     Impingement syndrome of right shoulder     Ischemic vascular disease   one stent coronary artery 5/12     Essential hypertension, benign     Visual disturbance, transient, right eye     Dermatochalasis     Insomnia     S/P coronary angiogram     CKD (chronic kidney disease) stage 3, GFR 30-59 ml/min      Type II diabetes mellitus with peripheral circulatory disorder (H)     Past Surgical History:   Procedure Laterality Date     C ABLATION SUPRAVENT ARRHYTHMOGENIC FOCUS  12/21/15    at [facility] TOTAL HIP ARTHROPLASTY  1/11/11    Rt YOGI     ENDOSCOPIC SINUS SURGERY  12/14/15     ENDOSCOPIC SINUS SURGERY Bilateral 12/14/2015    Procedure: ENDOSCOPIC SINUS SURGERY;  Surgeon: Kei Cevallos MD;  Location: [facility] OR     GASTRIC BYPASS  1/03    lap band     SINUS SURGERY Right 12-14-15    Dr. Cevallos     STENT  5/8/12    OM2 cornary artery stent     STENT  01/2017    3 stents placed in coronary arteries while in [place]     STENT, CORONARY, HANG  01/2017    2 stents in [place].       Social History   Substance Use Topics     Smoking status: Former Smoker     Packs/day: 1.00     Years: 5.00     Types: Cigarettes     Start date: 1/1/1970     Quit date: 9/8/1989     Smokeless tobacco: Never Used      Comment: non-smoking household     Alcohol use Yes      Comment: couple drinks 3 x a week     Family History   Problem Relation Age of Onset     Allergies Son      Allergies Son      Cancer Mother      kidney     Neurologic Disorder Father      parkinsons     Glaucoma No family hx of      Macular Degeneration No family hx of          Current Outpatient Prescriptions   Medication Sig Dispense Refill     ACCU-CHEK SMARTVIEW test strip ONCE DAILY TESTING AND AS NEEDED 100 strip 4     ACE NOT PRESCRIBED, INTENTIONAL, ACE Inhibitor not prescribed due to Other: cough 0 each 0     aspirin 81 MG tablet Take 1 tablet (81 mg) by mouth daily       atorvastatin (LIPITOR) 40 MG tablet Take 1 tablet (40 mg) by mouth daily 90 tablet 4     blood glucose monitoring (Semantify CONTOUR MONITOR) meter device kit Use to test blood sugar  times daily or as directed. 1 kit 0     clopidogrel (PLAVIX) 75 MG tablet Take 75 mg by mouth       empagliflozin (JARDIANCE) 10 MG TABS tablet Take 1 tablet (10 mg) by mouth daily 90 tablet 4     IBUPROFEN PO Take by mouth  every 6 hours as needed for moderate pain       losartan (COZAAR) 25 MG tablet Take 1 tablet (25 mg) by mouth daily 90 tablet 4     metFORMIN (GLUCOPHAGE) 500 MG tablet TAKE 2 TABLETS BY MOUTH TWICE DAILY WITH MEALS 360 tablet 3     metFORMIN (GLUCOPHAGE) 500 MG tablet TAKE 2 TABLETS BY MOUTH TWICE DAILY WITH MEALS 360 tablet 4     nitroglycerin (NITROSTAT) 0.4 MG SL tablet Place 1 tablet (0.4 mg) under the tongue every 5 minutes as needed for chest pain 90 tablet 4     [DISCONTINUED] atorvastatin (LIPITOR) 40 MG tablet Take 1 tablet (40 mg) by mouth daily 90 tablet 4     [DISCONTINUED] losartan (COZAAR) 25 MG tablet Take 1 tablet (25 mg) by mouth daily 90 tablet 4     [DISCONTINUED] metFORMIN (GLUCOPHAGE) 500 MG tablet TAKE 2 TABLETS BY MOUTH TWICE DAILY WITH MEALS 360 tablet 0     Allergies   Allergen Reactions     Benzonatate Anaphylaxis and Swelling     Lisinopril Cough     Zocor [Simvastatin - High Dose]      Poor memory     Recent Labs   Lab Test  06/26/18   0842  11/28/17   0848  06/12/17   0846   06/01/16   0929   01/03/12   0903   A1C  8.3*  7.8*  7.1*   < >  8.5*   < >  7.2*   LDL  41   --   36   --   35   < >  89   HDL  43   --   44   --   44   < >  43   TRIG  120   --   96   --   130   < >  136   ALT   --   36  29   --    --    --   22   CR   --   1.25  1.20   < >  1.03   < >  1.34*   GFRESTIMATED   --   58*  61   < >  72   < >  54*   GFRESTBLACK   --   70  73   < >  88   < >  66   POTASSIUM   --   4.3  4.4   < >  4.2   < >  4.6   TSH  1.08   --    --    --   1.34   < >  1.16    < > = values in this interval not displayed.      BP Readings from Last 3 Encounters:   06/28/18 92/60   11/29/17 102/60   09/26/17 108/70    Wt Readings from Last 3 Encounters:   06/28/18 237 lb 3.2 oz (107.6 kg)   12/21/17 240 lb 3.2 oz (109 kg)   11/29/17 239 lb 12.8 oz (108.8 kg)                  Labs reviewed in EPIC    Reviewed and updated as needed this visit by clinical staff  Tobacco  Allergies  Meds  Med Hx  Surg Hx   Fam Hx  Soc Hx      Reviewed and updated as needed this visit by Provider       Immunization History   Administered Date(s) Administered     HEPA 01/08/2009, 09/08/2009     HepB 03/06/2013, 01/15/2014     Influenza (High Dose) 3 valent vaccine 11/01/2016, 09/26/2017     Influenza (IIV3) PF 1950, 10/01/2010, 09/26/2012, 11/04/2013, 10/01/2014     Influenza Vaccine, 3 YRS +, IM (QUADRIVALENT W/PRESERVATIVES) 10/06/2015     Pneumo Conj 13-V (2010&after) 06/02/2016     Pneumococcal 23 valent 02/27/2009, 06/13/2017     TDAP Vaccine (Adacel) 01/08/2009      ROS:  This 68 year old male is here today for diabetes check. He knows his diabetes is out of control as he admits to poor diet since coming back from wintering in [place] where he played golf a lot. Doesn't play golf much here as he has a lot of family activities going on all the time.   However, he has terrible back pain when he stands any length of time. He can sit without pain. He had MRI of his lumbar spine 10/2017 which revealed lumbar spine stenosis and L3-4 and L4-5 spondylolisthesis. He has tried steroid injection which didn't help. His pain is radiating into both buttocks and down the back of his legs. 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 Has no pain when sitting. Enjoys the job. Works only when he wants to. He has been on plavix for over a year since he had a coronary artery stent placed in [place] about 15 months ago. He hasn't been back to see that cardiologist but he is sure he was told that he probably only needed to be on plavix for a year.  All other review of systems are negative  Personal, family, and social history reviewed with patient and revised.    CONSTITUTIONAL: NEGATIVE for fever, chills, change in weight  ENT/MOUTH: NEGATIVE for ear, mouth and throat problems  RESP: NEGATIVE for significant cough or SOB  CV: NEGATIVE for chest pain, palpitations or peripheral edema    OBJECTIVE:     BP 92/60  Pulse 78   "Temp 96.6  F (35.9  C) (Oral)  Resp 20  Ht 6' 0.28\" (1.836 m)  Wt 237 lb 3.2 oz (107.6 kg)  SpO2 97%  BMI 31.92 kg/m2  Body mass index is 31.92 kg/(m^2).  GENERAL: healthy, alert and no distress  NECK: no adenopathy, no asymmetry, masses, or scars and thyroid normal to palpation  RESP: lungs clear to auscultation - no rales, rhonchi or wheezes  CV: regular rate and rhythm, normal S1 S2, no S3 or S4, no murmur, click or rub, no peripheral edema and peripheral pulses strong  ABDOMEN: soft, truncal obesity   MS: no gross musculoskeletal defects noted, no edema  Diabetic foot exam: normal DP and PT pulses, no trophic changes or ulcerative lesions, normal sensory exam and normal monofilament exam  Well hydrated  Well nourished  Well groomed  Alert and oriented X 3  Good spirits  Hard for patient to stand up straight. Tends to walk a little bent over.     Diagnostic Test Results:  Results for orders placed or performed in visit on 06/26/18   Albumin Random Urine Quantitative with Creat Ratio   Result Value Ref Range    Creatinine Urine 96 mg/dL    Albumin Urine mg/L 10 mg/L    Albumin Urine mg/g Cr 10.88 0 - 17 mg/g Cr   **A1C FUTURE anytime   Result Value Ref Range    Hemoglobin A1C 8.3 (H) 0 - 5.6 %   Lipid panel reflex to direct LDL Fasting   Result Value Ref Range    Cholesterol 108 <200 mg/dL    Triglycerides 120 <150 mg/dL    HDL Cholesterol 43 >39 mg/dL    LDL Cholesterol Calculated 41 <100 mg/dL    Non HDL Cholesterol 65 <130 mg/dL   **TSH with free T4 reflex FUTURE anytime   Result Value Ref Range    TSH 1.08 0.40 - 4.00 mU/L   Hemoglobin   Result Value Ref Range    Hemoglobin 16.1 13.3 - 17.7 g/dL       ASSESSMENT/PLAN:              1. 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)  Poor control. He is motivated to improve his diet   - empagliflozin (JARDIANCE) 10 MG TABS tablet; Take 1 tablet (10 mg) by mouth daily  Dispense: 90 tablet; Refill: 4  - metFORMIN (GLUCOPHAGE) 500 MG " tablet; TAKE 2 TABLETS BY MOUTH TWICE DAILY WITH MEALS  Dispense: 360 tablet; Refill: 3    2. Hyperlipidemia LDL goal <100  Good control   - atorvastatin (LIPITOR) 40 MG tablet; Take 1 tablet (40 mg) by mouth daily  Dispense: 90 tablet; Refill: 4    3. Essential hypertension, benign  Good control   - losartan (COZAAR) 25 MG tablet; Take 1 tablet (25 mg) by mouth daily  Dispense: 90 tablet; Refill: 4    4. CKD (chronic kidney disease) stage 3, GFR 30-59 ml/min  Good control     5. Spinal stenosis of lumbar region with neurogenic claudication  As above   - NEUROSURGERY REFERRAL    Return to clinic 3 months     AIDEN PASCUAL MD  [facility]

## 2018-07-09 ENCOUNTER — MYC MEDICAL ADVICE (OUTPATIENT)
Dept: FAMILY MEDICINE | Facility: CLINIC | Age: 68
End: 2018-07-09

## 2018-07-09 DIAGNOSIS — M48.062 SPINAL STENOSIS OF LUMBAR REGION WITH NEUROGENIC CLAUDICATION: Primary | ICD-10-CM

## 2018-07-09 NOTE — TELEPHONE ENCOUNTER
Patient requesting referral for 2nd opinion. Order teed up.    Please advise    Starr Urbina RN - [facility]

## 2018-07-10 PROBLEM — M48.062 SPINAL STENOSIS OF LUMBAR REGION WITH NEUROGENIC CLAUDICATION: Status: ACTIVE | Noted: 2018-07-10

## 2018-07-12 ENCOUNTER — OFFICE VISIT (OUTPATIENT)
Dept: NEUROSURGERY | Facility: CLINIC | Age: 68
End: 2018-07-12
Payer: COMMERCIAL

## 2018-07-12 ENCOUNTER — RADIANT APPOINTMENT (OUTPATIENT)
Dept: GENERAL RADIOLOGY | Facility: CLINIC | Age: 68
End: 2018-07-12
Attending: NURSE PRACTITIONER
Payer: COMMERCIAL

## 2018-07-12 VITALS
TEMPERATURE: 97.8 F | DIASTOLIC BLOOD PRESSURE: 75 MMHG | BODY MASS INDEX: 31.44 KG/M2 | WEIGHT: 237.2 LBS | SYSTOLIC BLOOD PRESSURE: 116 MMHG | HEART RATE: 68 BPM | OXYGEN SATURATION: 94 % | HEIGHT: 73 IN

## 2018-07-12 DIAGNOSIS — M51.369 LUMBAR DEGENERATIVE DISC DISEASE: ICD-10-CM

## 2018-07-12 DIAGNOSIS — M51.369 LUMBAR DEGENERATIVE DISC DISEASE: Primary | ICD-10-CM

## 2018-07-12 PROCEDURE — 72100 X-RAY EXAM L-S SPINE 2/3 VWS: CPT

## 2018-07-12 PROCEDURE — 99203 OFFICE O/P NEW LOW 30 MIN: CPT | Performed by: NURSE PRACTITIONER

## 2018-07-12 ASSESSMENT — PAIN SCALES - GENERAL: PAINLEVEL: SEVERE PAIN (7)

## 2018-07-12 NOTE — MR AVS SNAPSHOT
After Visit Summary   7/12/2018    Edwardo Dumont    MRN: [id number]           Patient Information     Date Of Birth          1950        Visit Information        Provider Department      7/12/2018 8:20 AM Mita Cardenas NP [facility]        Today's Diagnoses     Lumbar degenerative disc disease    -  1      Care Instructions    Schedule lumbar MRI  Xrays today  Schedule with Dr. Magaña to discuss surgery  Please contact the clinic if pain persists at [phone].            Follow-ups after your visit        Future tests that were ordered for you today     Open Future Orders        Priority Expected Expires Ordered    MR Lumbar Spine w/o Contrast Routine  7/12/2019 7/12/2018    XR Lumbar Spine 2/3 Views Routine 7/12/2018 7/12/2019 7/12/2018            Who to contact     If you have questions or need follow up information about today's clinic visit or your schedule please contact [facility] directly at [phone].  Normal or non-critical lab and imaging results will be communicated to you by VALOREMhart, letter or phone within 4 business days after the clinic has received the results. If you do not hear from us within 7 days, please contact the clinic through [email or web address] or phone. If you have a critical or abnormal lab result, we will notify you by phone as soon as possible.  Submit refill requests through [employer] or call your pharmacy and they will forward the refill request to us. Please allow 3 business days for your refill to be completed.          Additional Information About Your Visit        MyChart Information     [employer] gives you secure access to your electronic health record. If you see a primary care provider, you can also send messages to your care team and make appointments. If you have questions, please call your primary care clinic.  If you do not have a primary care provider, please call [phone] and they will assist you.        Care EveryWhere ID   "   This is your Care EveryWhere ID. This could be used by other organizations to access your [place] medical records  [id number]        Your Vitals Were     Pulse Temperature Height Pulse Oximetry BMI (Body Mass Index)       68 97.8  F (36.6  C) (Oral) 6' 1\" (1.854 m) 94% 31.29 kg/m2        Blood Pressure from Last 3 Encounters:   07/12/18 116/75   06/28/18 92/60   11/29/17 102/60    Weight from Last 3 Encounters:   07/12/18 237 lb 3.2 oz (107.6 kg)   06/28/18 237 lb 3.2 oz (107.6 kg)   12/21/17 240 lb 3.2 oz (109 kg)               Primary Care Provider Office Phone # Fax #    Isa Valverde [phone] [address] [facility] [phone]        Equal Access to Services     JOHN GARCIA : Hadii anthony portilloo Sogemma, waaxda luqadaha, qaybta kaalmada adeegyada, flor duran . So [facility] [phone].    ATENCIÓN: Si roxie garcia, tiene a menendez disposición servicios gratuitos de asistencia lingüística. Llame al [phone].    We comply with applicable federal civil rights laws and [place] laws. We do not discriminate on the basis of race, color, national origin, age, disability, sex, sexual orientation, or gender identity.            Thank you!     Thank you for choosing [facility]  for your care. Our goal is always to provide you with excellent care. Hearing back from our patients is one way we can continue to improve our services. Please take a few minutes to complete the written survey that you may receive in the mail after your visit with us. Thank you!             Your Updated Medication List - Protect others around you: Learn how to safely use, store and throw away your medicines at www.disposemymeds.org.          This list is accurate as of 7/12/18  8:52 AM.  Always use your most recent med list.                   Brand Name Dispense Instructions for use Diagnosis    ACCU-CHEK SMARTVIEW test strip   Generic drug:  blood " glucose monitoring     100 strip    ONCE DAILY TESTING AND AS NEEDED    Type 2 diabetes, HbA1C goal < 8% (H)       ACE NOT PRESCRIBED (INTENTIONAL)     0 each    ACE Inhibitor not prescribed due to Other: cough    Hypertension goal BP (blood pressure) < 140/90       aspirin 81 MG tablet      Take 1 tablet (81 mg) by mouth daily    CAD (coronary artery disease)       atorvastatin 40 MG tablet    LIPITOR    90 tablet    Take 1 tablet (40 mg) by mouth daily    Hyperlipidemia LDL goal <100       blood glucose monitoring meter device kit     1 kit    Use to test blood sugar  times daily or as directed.    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)       IBUPROFEN PO      Take by mouth every 6 hours as needed for moderate pain        losartan 25 MG tablet    COZAAR    90 tablet    Take 1 tablet (25 mg) by mouth daily    Essential hypertension, benign       * metFORMIN 500 MG tablet    GLUCOPHAGE    360 tablet    TAKE 2 TABLETS BY MOUTH TWICE DAILY WITH MEALS    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 Read the directions carefully, and ask your doctor or other care provider to review them with you.

## 2018-07-12 NOTE — PATIENT INSTRUCTIONS
Schedule lumbar MRI  Xrays today  Schedule with Dr. Magaña to discuss surgery  Please contact the clinic if pain persists at [phone].

## 2018-07-12 NOTE — NURSING NOTE
"Edwardo Dumont is a 68 year old male who presents for:  Chief Complaint   Patient presents with     Neurologic Problem     lumbar stenosis        Vitals:    Vitals:    07/12/18 0826   BP: 116/75   BP Location: Right arm   Patient Position: Chair   Cuff Size: Adult Large   Pulse: 68   Temp: 97.8  F (36.6  C)   TempSrc: Oral   SpO2: 94%   Weight: 237 lb 3.2 oz (107.6 kg)   Height: 6' 1\" (1.854 m)       BMI:  Estimated body mass index is 31.29 kg/(m^2) as calculated from the following:    Height as of this encounter: 6' 1\" (1.854 m).    Weight as of this encounter: 237 lb 3.2 oz (107.6 kg).    Pain Score:  Severe Pain (7)        Donya Benavides      "

## 2018-07-12 NOTE — PROGRESS NOTES
"Dr. Shoaib Magaña  [facility]  Neurosurgery Clinic Visit      CC: Low back pain    Primary care Provider: Isa Valverde      Reason For Visit:   I was asked by Dr. Valverde to consult on the patient for lumbar spinal stenosis.      HPI: Edwardo Dumont is a 68 year old male with a history of low back pain for more than a year, gradually worsening.  He states the pain is present with standing, \"I can't  one place, I have to sit down right away.\"  The pain is a stabbing, sharp, pressure pain when it occurs.  He describes pain that radiates into the buttocks and hips bilaterally and the left thigh.  He denies weakness to BLE or falls.  He denies foot drop.  He denies changes to bowel or bladder.  He takes OTC pain medications with some benefit.  He has had previous PT at [facility] and also lumbar ESIs at [facility].  He states that he had this around October of last year and no benefit with either.    Pain at its worst 9 Pain right now:  1    Past Medical History:   Diagnosis Date     Acromioclavicular joint separation, type 1 9/12    right     Allergic rhinitis     causes chronic  cough     Arthritis      CKD (chronic kidney disease) stage 3, GFR 30-59 ml/min      Degenerative arthritis of hip - right 12/27/2010     Gout      Hip arthrosis - right 10/25/2010     Hyperlipidemia      Ischaemic vascular disease      Ischemic vascular disease 5/12    one stent     OA (osteoarthritis) of knee 10/25/2010     Renal insufficiency      Sleep apnea      Type II or unspecified type diabetes mellitus without mention of complication, not stated as uncontrolled      Unspecified essential hypertension        Past Medical History reviewed with patient during visit.    Past Surgical History:   Procedure Laterality Date     C ABLATION SUPRAVENT ARRHYTHMOGENIC FOCUS  12/21/15    at [facility]     C TOTAL HIP ARTHROPLASTY  1/11/11    Rt YOGI     ENDOSCOPIC SINUS SURGERY  12/14/15     ENDOSCOPIC SINUS SURGERY Bilateral " 12/14/2015    Procedure: ENDOSCOPIC SINUS SURGERY;  Surgeon: Kei Cevallos MD;  Location: [facility] OR     GASTRIC BYPASS  1/03    lap band     SINUS SURGERY Right 12-14-15    Dr. Cevallos     STENT  5/8/12    OM2 cornary artery stent     STENT  01/2017    3 stents placed in coronary arteries while in [place]     STENT, CORONARY, HANG  01/2017    2 stents in [place].     Past Surgical History reviewed with patient during visit.    Current Outpatient Prescriptions   Medication     ACCU-CHEK SMARTVIEW test strip     ACE NOT PRESCRIBED, INTENTIONAL,     aspirin 81 MG tablet     atorvastatin (LIPITOR) 40 MG tablet     blood glucose monitoring (CONSTRVCT CONTOUR MONITOR) meter device kit     clopidogrel (PLAVIX) 75 MG tablet     empagliflozin (JARDIANCE) 10 MG TABS tablet     IBUPROFEN PO     losartan (COZAAR) 25 MG tablet     metFORMIN (GLUCOPHAGE) 500 MG tablet     metFORMIN (GLUCOPHAGE) 500 MG tablet     nitroglycerin (NITROSTAT) 0.4 MG SL tablet     No current facility-administered medications for this visit.        Allergies   Allergen Reactions     Benzonatate Anaphylaxis and Swelling     Lisinopril Cough     Zocor [Simvastatin - High Dose]      Poor memory       Social History     Social History     Marital status:      Spouse name: N/A     Number of children: N/A     Years of education: N/A     Social History Main Topics     Smoking status: Former Smoker     Packs/day: 1.00     Years: 5.00     Types: Cigarettes     Start date: 1/1/1970     Quit date: 9/8/1989     Smokeless tobacco: Never Used      Comment: non-smoking household     Alcohol use Yes      Comment: couple drinks 3 x a week     Drug use: No     Sexual activity: Yes     Partners: Female     Other Topics Concern     None     Social History Narrative       Family History   Problem Relation Age of Onset     Allergies Son      Allergies Son      Cancer Mother      kidney     Neurologic Disorder Father      parkinsons     Glaucoma No family hx of      Macular  "Degeneration No family hx of          ROS: 10 point ROS neg other than the symptoms noted above in the HPI.    Vital Signs: /75 (BP Location: Right arm, Patient Position: Chair, Cuff Size: Adult Large)  Pulse 68  Temp 97.8  F (36.6  C) (Oral)  Ht 6' 1\" (1.854 m)  Wt 237 lb 3.2 oz (107.6 kg)  SpO2 94%  BMI 31.29 kg/m2    Examination:  Constitutional:  Alert, well nourished, NAD.  HEENT: Normocephalic, atraumatic.   Pulm:  Without shortness of breath   CV:  No pitting edema of BLE.    Neurological:  Awake  Alert  Oriented x 3  Speech clear  Cranial nerves II - XII intact  PERRL    Motor exam   Shoulder Abduction:  Right:  5/5   Left:  5/5  Biceps:                      Right:  5/5   Left:  5/5  Triceps:                     Right:  5/5   Left:  5/5  Wrist Extensors:       Right:  5/5   Left:  5/5  Wrist Flexors:           Right:  5/5   Left:  5/5  Intrinsics:                   Right:  5/5   Left:  5/5   Hip Flexor:                Right: 5/5  Left:  5/5  Hip Adductor:             Right:  5/5  Left:  5/5  Hip Abductor:             Right:  5/5  Left:  5/5  Gastroc Soleus:        Right:  5/5  Left:  5/5  Tib/Ant:                      Right:  5/5  Left:  5/5  EHL:                          Right:  5/5  Left:  5/5   Sensation normal to bilateral upper and lower extremities  Clonus negative  DTRs 2+ symmetric  Gait: Able to stand from a seated position. Normal non-antalgic, non-myelopathic gait.  Able to heel/toe walk without loss of balance  Cervical examination reveals good range of motion.  No tenderness to palpation of the cervical spine or paraspinous muscles bilaterally.    Lumbar examination reveals no tenderness of the spine or paraspinous muscles.  Hip height is symmetrical. Negative SI joint, sciatic notch or greater trochanteric tenderness to palpation bilaterally.  Straight leg raise is negative bilaterally. FROM.      Imaging:   -Subluxation of L4 on L5  -Subluxaion of L3 on L4  -Severe canal stenosis " "at L4-5    Assessment/Plan:   Lumbar DDD  Lumbar Radiculopathy  Lumbar Canal Stenosis     Edwardo Dumont is a 68 year old male with a history of low back pain for more than a year, gradually worsening.  He states the pain is present with standing, \"I can't  one place, I have to sit down right away.\"  The pain is a stabbing, sharp, pressure pain when it occurs.  He describes pain that radiates into the buttocks and hips bilaterally and the left thigh.  He denies weakness to BLE or falls.  He denies foot drop.  He denies changes to bowel or bladder.  He takes OTC pain medications with some benefit.  He has had previous PT at [facility] and also lumbar ESIs at [facility].  He states that he had this around October of last year and no benefit with either.  We reviewed MRI results from imaging from October 2017, specifically findings at L3-4 and L4-5 levels.  The patient is agreeable to getting flexion and extension Xrays today, an updated MRI and returning to discuss surgery with Dr. Magaña.  If he experiences increased pain, weakness or other symptoms he will contact us sooner.    Patient Instructions   Schedule lumbar MRI  Xrays today  Schedule with Dr. Magaña to discuss surgery  Please contact the clinic if pain persists at [phone].      Mita Cardenas [facility]  [facility]  [address] [address]    Tel [phone]  Pager [phone]    "

## 2018-07-12 NOTE — LETTER
"    7/12/2018         RE: Edwardo Dumont  [id number] [address] [facility] [address]        Dear Colleague,    Thank you for referring your patient, Edwardo Dumont, to the [facility]. Please see a copy of my visit note below.    Dr. Shoaib Magaña  [facility]  Neurosurgery Clinic Visit      CC: Low back pain    Primary care Provider: Isa Valverde      Reason For Visit:   I was asked by Dr. Valverde to consult on the patient for lumbar spinal stenosis.      HPI: Edwardo Dumont is a 68 year old male with a history of low back pain for more than a year, gradually worsening.  He states the pain is present with standing, \"I can't  one place, I have to sit down right away.\"  The pain is a stabbing, sharp, pressure pain when it occurs.  He describes pain that radiates into the buttocks and hips bilaterally and the left thigh.  He denies weakness to BLE or falls.  He denies foot drop.  He denies changes to bowel or bladder.  He takes OTC pain medications with some benefit.  He has had previous PT at [facility] and also lumbar ESIs at [facility].  He states that he had this around October of last year and no benefit with either.    Pain at its worst 9 Pain right now:  1    Past Medical History:   Diagnosis Date     Acromioclavicular joint separation, type 1 9/12    right     Allergic rhinitis     causes chronic  cough     Arthritis      CKD (chronic kidney disease) stage 3, GFR 30-59 ml/min      Degenerative arthritis of hip - right 12/27/2010     Gout      Hip arthrosis - right 10/25/2010     Hyperlipidemia      Ischaemic vascular disease      Ischemic vascular disease 5/12    one stent     OA (osteoarthritis) of knee 10/25/2010     Renal insufficiency      Sleep apnea      Type II or unspecified type diabetes mellitus without mention of complication, not stated as uncontrolled      Unspecified essential hypertension        Past Medical History reviewed with patient during visit.    Past " Surgical History:   Procedure Laterality Date     C ABLATION SUPRAVENT ARRHYTHMOGENIC FOCUS  12/21/15    at [place]     C TOTAL HIP ARTHROPLASTY  1/11/11    Rt YOGI     ENDOSCOPIC SINUS SURGERY  12/14/15     ENDOSCOPIC SINUS SURGERY Bilateral 12/14/2015    Procedure: ENDOSCOPIC SINUS SURGERY;  Surgeon: Kei Cevallos MD;  Location: [facility] OR     GASTRIC BYPASS  1/03    lap band     SINUS SURGERY Right 12-14-15    Dr. Cevallos     STENT  5/8/12    OM2 cornary artery stent     STENT  01/2017    3 stents placed in coronary arteries while in [place]     STENT, CORONARY, HANG  01/2017    2 stents in [place].     Past Surgical History reviewed with patient during visit.    Current Outpatient Prescriptions   Medication     ACCU-CHEK SMARTVIEW test strip     ACE NOT PRESCRIBED, INTENTIONAL,     aspirin 81 MG tablet     atorvastatin (LIPITOR) 40 MG tablet     blood glucose monitoring ([employer] CONTOUR MONITOR) meter device kit     clopidogrel (PLAVIX) 75 MG tablet     empagliflozin (JARDIANCE) 10 MG TABS tablet     IBUPROFEN PO     losartan (COZAAR) 25 MG tablet     metFORMIN (GLUCOPHAGE) 500 MG tablet     metFORMIN (GLUCOPHAGE) 500 MG tablet     nitroglycerin (NITROSTAT) 0.4 MG SL tablet     No current facility-administered medications for this visit.        Allergies   Allergen Reactions     Benzonatate Anaphylaxis and Swelling     Lisinopril Cough     Zocor [Simvastatin - High Dose]      Poor memory       Social History     Social History     Marital status:      Spouse name: N/A     Number of children: N/A     Years of education: N/A     Social History Main Topics     Smoking status: Former Smoker     Packs/day: 1.00     Years: 5.00     Types: Cigarettes     Start date: 1/1/1970     Quit date: 9/8/1989     Smokeless tobacco: Never Used      Comment: non-smoking household     Alcohol use Yes      Comment: couple drinks 3 x a week     Drug use: No     Sexual activity: Yes     Partners: Female     Other Topics Concern     None  "    Social History Narrative       Family History   Problem Relation Age of Onset     Allergies Son      Allergies Son      Cancer Mother      kidney     Neurologic Disorder Father      parkinsons     Glaucoma No family hx of      Macular Degeneration No family hx of          ROS: 10 point ROS neg other than the symptoms noted above in the HPI.    Vital Signs: /75 (BP Location: Right arm, Patient Position: Chair, Cuff Size: Adult Large)  Pulse 68  Temp 97.8  F (36.6  C) (Oral)  Ht 6' 1\" (1.854 m)  Wt 237 lb 3.2 oz (107.6 kg)  SpO2 94%  BMI 31.29 kg/m2    Examination:  Constitutional:  Alert, well nourished, NAD.  HEENT: Normocephalic, atraumatic.   Pulm:  Without shortness of breath   CV:  No pitting edema of BLE.    Neurological:  Awake  Alert  Oriented x 3  Speech clear  Cranial nerves II - XII intact  PERRL    Motor exam   Shoulder Abduction:  Right:  5/5   Left:  5/5  Biceps:                      Right:  5/5   Left:  5/5  Triceps:                     Right:  5/5   Left:  5/5  Wrist Extensors:       Right:  5/5   Left:  5/5  Wrist Flexors:           Right:  5/5   Left:  5/5  Intrinsics:                   Right:  5/5   Left:  5/5   Hip Flexor:                Right: 5/5  Left:  5/5  Hip Adductor:             Right:  5/5  Left:  5/5  Hip Abductor:             Right:  5/5  Left:  5/5  Gastroc Soleus:        Right:  5/5  Left:  5/5  Tib/Ant:                      Right:  5/5  Left:  5/5  EHL:                          Right:  5/5  Left:  5/5   Sensation normal to bilateral upper and lower extremities  Clonus negative  DTRs 2+ symmetric  Gait: Able to stand from a seated position. Normal non-antalgic, non-myelopathic gait.  Able to heel/toe walk without loss of balance  Cervical examination reveals good range of motion.  No tenderness to palpation of the cervical spine or paraspinous muscles bilaterally.    Lumbar examination reveals no tenderness of the spine or paraspinous muscles.  Hip height is " "symmetrical. Negative SI joint, sciatic notch or greater trochanteric tenderness to palpation bilaterally.  Straight leg raise is negative bilaterally. FROM.      Imaging:   -Subluxation of L4 on L5  -Subluxaion of L3 on L4  -Severe canal stenosis at L4-5    Assessment/Plan:   Lumbar DDD  Lumbar Radiculopathy  Lumbar Canal Stenosis     Edwardo Dumont is a 68 year old male with a history of low back pain for more than a year, gradually worsening.  He states the pain is present with standing, \"I can't  one place, I have to sit down right away.\"  The pain is a stabbing, sharp, pressure pain when it occurs.  He describes pain that radiates into the buttocks and hips bilaterally and the left thigh.  He denies weakness to BLE or falls.  He denies foot drop.  He denies changes to bowel or bladder.  He takes OTC pain medications with some benefit.  He has had previous PT at [facility] and also lumbar ESIs at [facility]  He states that he had this around October of last year and no benefit with either.  We reviewed MRI results from imaging from October 2017, specifically findings at L3-4 and L4-5 levels.  The patient is agreeable to getting flexion and extension Xrays today, an updated MRI and returning to discuss surgery with Dr. Magaña.  If he experiences increased pain, weakness or other symptoms he will contact us sooner.    Patient Instructions   Schedule lumbar MRI  Xrays today  Schedule with Dr. Magaña to discuss surgery  Please contact the clinic if pain persists at [phone].      Mita Cardenas [facility]  [facility]  [address] [address]    Tel [phone]  Pager [phone]      Again, thank you for allowing me to participate in the care of your patient.        Sincerely,        Mita Cardenas, NP    "

## 2018-07-13 ENCOUNTER — HOSPITAL ENCOUNTER (OUTPATIENT)
Dept: MRI IMAGING | Facility: CLINIC | Age: 68
Discharge: HOME | End: 2018-07-13
Attending: NURSE PRACTITIONER | Admitting: NURSE PRACTITIONER
Payer: MEDICARE

## 2018-07-13 DIAGNOSIS — M51.369 LUMBAR DEGENERATIVE DISC DISEASE: ICD-10-CM

## 2018-07-13 PROCEDURE — 72148 MRI LUMBAR SPINE W/O DYE: CPT

## 2018-07-17 ENCOUNTER — OFFICE VISIT (OUTPATIENT)
Dept: NEUROSURGERY | Facility: CLINIC | Age: 68
End: 2018-07-17
Payer: COMMERCIAL

## 2018-07-17 ENCOUNTER — OFFICE VISIT (OUTPATIENT)
Dept: FAMILY MEDICINE | Facility: CLINIC | Age: 68
End: 2018-07-17
Payer: COMMERCIAL

## 2018-07-17 VITALS
HEIGHT: 73 IN | HEART RATE: 75 BPM | SYSTOLIC BLOOD PRESSURE: 119 MMHG | DIASTOLIC BLOOD PRESSURE: 71 MMHG | TEMPERATURE: 97 F | OXYGEN SATURATION: 96 % | BODY MASS INDEX: 31.17 KG/M2 | WEIGHT: 235.2 LBS

## 2018-07-17 VITALS
TEMPERATURE: 97.1 F | WEIGHT: 235 LBS | OXYGEN SATURATION: 95 % | BODY MASS INDEX: 31.83 KG/M2 | RESPIRATION RATE: 18 BRPM | HEART RATE: 91 BPM | HEIGHT: 72 IN

## 2018-07-17 DIAGNOSIS — Z01.818 PREOP GENERAL PHYSICAL EXAM: Primary | ICD-10-CM

## 2018-07-17 DIAGNOSIS — M48.061 SPINAL STENOSIS, LUMBAR REGION, WITHOUT NEUROGENIC CLAUDICATION: ICD-10-CM

## 2018-07-17 DIAGNOSIS — M43.10 ACQUIRED SPONDYLOLISTHESIS: ICD-10-CM

## 2018-07-17 DIAGNOSIS — M51.369 LUMBAR DEGENERATIVE DISC DISEASE: Primary | ICD-10-CM

## 2018-07-17 PROCEDURE — 93000 ELECTROCARDIOGRAM COMPLETE: CPT | Performed by: INTERNAL MEDICINE

## 2018-07-17 PROCEDURE — 80048 BASIC METABOLIC PNL TOTAL CA: CPT | Performed by: INTERNAL MEDICINE

## 2018-07-17 PROCEDURE — 99214 OFFICE O/P EST MOD 30 MIN: CPT | Performed by: NEUROLOGICAL SURGERY

## 2018-07-17 PROCEDURE — 36415 COLL VENOUS BLD VENIPUNCTURE: CPT | Performed by: INTERNAL MEDICINE

## 2018-07-17 PROCEDURE — 99215 OFFICE O/P EST HI 40 MIN: CPT | Performed by: INTERNAL MEDICINE

## 2018-07-17 ASSESSMENT — PAIN SCALES - GENERAL: PAINLEVEL: EXTREME PAIN (9)

## 2018-07-17 NOTE — MR AVS SNAPSHOT
After Visit Summary   7/17/2018    Edwardo Dumont    MRN: [id number]           Patient Information     Date Of Birth          1950        Visit Information        Provider Department      7/17/2018 3:10 PM Obi Altamirano MD [facility]        Today's Diagnoses     Preop general physical exam    -  1      Care Instructions      Before Your Surgery      Call your surgeon if there is any change in your health. This includes signs of a cold or flu (such as a sore throat, runny nose, cough, rash or fever).    Do not smoke, drink alcohol or take over the counter medicine (unless your surgeon or primary care doctor tells you to) for the 24 hours before and after surgery.    If you take prescribed drugs: Follow your doctor s orders about which medicines to take and which to stop until after surgery.    Eating and drinking prior to surgery: follow the instructions from your surgeon    Take a shower or bath the night before surgery. Use the soap your surgeon gave you to gently clean your skin. If you do not have soap from your surgeon, use your regular soap. Do not shave or scrub the surgery site.  Wear clean pajamas and have clean sheets on your bed.     For the planned procedure     1. Morning of the surgery skip Jardiance  (empagliflozin), losartan ( Cozaar ) and metformin ( Glucophage)   2. Skip the aspirin and Clopidogrel [ Plavix ] for 5-7 days prior to the planned procedure.        [facility] (formerly [facility])    If you have any questions regarding to your visit please contact your care team:     Team Pink:   Clinic Hours Telephone Number   Internal Medicine:  Dr. Ana Segovia NP       7am-7pm  Monday - Thursday   7am-5pm  Fridays  [phone]  (Appointment scheduling available 24/7)    Questions about your recent visit?  Team Line  [phone]   Urgent Care - [place] - 11am-9pm Monday-Friday Saturday-Sunday- 9am-5pm    [place] - 5pm-9pm Monday-Friday Saturday-Sunday- 9am-5pm  [phone] - [place]  [phone] - [place]       What options do I have for a visit other than an office visit? We offer electronic visits (e-visits) and telephone visits, when medically appropriate.  Please check with your medical insurance to see if these types of visits are covered, as you will be responsible for any charges that are not paid by your insurance.      You can use [facility] (secure electronic communication) to access to your chart, send your primary care provider a message, or make an appointment. Ask a team member how to get started.     For a price quote for your services, please call our Consumer Price Line at [phone] or our Imaging Cost estimation line at [phone] (for imaging tests).  Mayra SHETH CMA ([facility])            Follow-ups after your visit        Your next 10 appointments already scheduled     Jul 20, 2018   Procedure with Shoaib Magaña MD   [facility] (--)    [address] [place] [id number] [phone]              Who to contact     If you have questions or need follow up information about today's clinic visit or your schedule please contact [facility] directly at [phone].  Normal or non-critical lab and imaging results will be communicated to you by [employer], letter or phone within 4 business days after the clinic has received the results. If you do not hear from us within 7 days, please contact the clinic through [employer] or phone. If you have a critical or abnormal lab result, we will notify you by phone as soon as possible.  Submit refill requests through [facility] or call your pharmacy and they will forward the refill request to us. Please allow 3 business days for your refill to be completed.          Additional Information About Your Visit        [facility] Information     [facility] gives you secure access to your electronic health record. If you  "see a primary care provider, you can also send messages to your care team and make appointments. If you have questions, please call your primary care clinic.  If you do not have a primary care provider, please call [phone] and they will assist you.        Care EveryWhere ID     This is your Care EveryWhere ID. This could be used by other organizations to access your [place] medical records  [id number]        Your Vitals Were     Pulse Temperature Respirations Height Pulse Oximetry BMI (Body Mass Index)    91 97.1  F (36.2  C) (Oral) 18 6' 0.25\" (1.835 m) 95% 31.65 kg/m2       Blood Pressure from Last 3 Encounters:   07/17/18 119/71   07/12/18 116/75   06/28/18 92/60    Weight from Last 3 Encounters:   07/17/18 235 lb (106.6 kg)   07/17/18 235 lb 3.2 oz (106.7 kg)   07/12/18 237 lb 3.2 oz (107.6 kg)              We Performed the Following     Basic metabolic panel     EKG 12-lead complete w/read - Clinics          Today's Medication Changes          These changes are accurate as of 7/17/18  3:49 PM.  If you have any questions, ask your nurse or doctor.               These medicines have changed or have updated prescriptions.        Dose/Directions    metFORMIN 500 MG tablet   Commonly known as:  GLUCOPHAGE   This may have changed:  Another medication with the same name was removed. Continue taking this medication, and follow the directions you see here.   Used for:  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)   Changed by:  Obi Altamirano MD        TAKE 2 TABLETS BY MOUTH TWICE DAILY WITH MEALS   Quantity:  360 tablet   Refills:  3                Primary Care Provider Office Phone # Fax #    Isa Valverde [phone]       [address] [facility] [address]        Equal Access to Services     JADYN GARCIA AH: Hamida Dalton, waaxda luqadaha, qaybta kaaljeny harper, flor pedro. So wac " [phone].    ATENCIÓN: Si roxie garcia, tiene a menendez disposición servicios gratuitos de asistencia lingüística. Farshad azevedo [phone].    We comply with applicable federal civil rights laws and [place] laws. We do not discriminate on the basis of race, color, national origin, age, disability, sex, sexual orientation, or gender identity.            Thank you!     Thank you for choosing [facility]  for your care. Our goal is always to provide you with excellent care. Hearing back from our patients is one way we can continue to improve our services. Please take a few minutes to complete the written survey that you may receive in the mail after your visit with us. Thank you!             Your Updated Medication List - Protect others around you: Learn how to safely use, store and throw away your medicines at www.disposemymeds.org.          This list is accurate as of 7/17/18  3:49 PM.  Always use your most recent med list.                   Brand Name Dispense Instructions for use Diagnosis    ACCU-CHEK SMARTVIEW test strip   Generic drug:  blood glucose monitoring     100 strip    ONCE DAILY TESTING AND AS NEEDED    Type 2 diabetes, HbA1C goal < 8% (H)       ACE NOT PRESCRIBED (INTENTIONAL)     0 each    ACE Inhibitor not prescribed due to Other: cough    Hypertension goal BP (blood pressure) < 140/90       aspirin 81 MG tablet      Take 1 tablet (81 mg) by mouth daily    CAD (coronary artery disease)       atorvastatin 40 MG tablet    LIPITOR    90 tablet    Take 1 tablet (40 mg) by mouth daily    Hyperlipidemia LDL goal <100       blood glucose monitoring meter device kit     1 kit    Use to test blood sugar  times daily or as directed.    Type 2 diabetes, HbA1C goal < 8% (H)       clopidogrel 75 MG tablet    PLAVIX     Take 75 mg by mouth        empagliflozin 10 MG Tabs tablet    JARDIANCE    90 tablet    Take 1 tablet (10 mg) by mouth daily    Type 2 diabetes mellitus with other circulatory  complications (CODE) (H)       IBUPROFEN PO      Take by mouth every 6 hours as needed for moderate pain        losartan 25 MG tablet    COZAAR    90 tablet    Take 1 tablet (25 mg) by mouth daily    Essential hypertension, benign       metFORMIN 500 MG tablet    GLUCOPHAGE    360 tablet    TAKE 2 TABLETS BY MOUTH TWICE DAILY WITH MEALS    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

## 2018-07-17 NOTE — PROGRESS NOTES
Neurosurgery Follow Up Clinic Visit      Edwardo Dumont returns for follow up visit regarding his low back and bilateral lower extremity pain    Currently the patient describes having intractable low back pain and bilateral lower extremity pain radiating down both legs left greater than right.  He is tried physical therapy and epidural steroid injections without resolution.  The patient is also taking Plavix for cardiac stents.  He recently discontinued his Plavix on last Saturday in anticipation for surgical intervention.    Exam is nonfocal  No focal motor strength loss  Sensation decreased  Ambulation stable with kyphotic    We reviewed the MRI of his lumbar spine and he is found to have L3-4 stenosis with clumping of nerve roots also has L4-5 grade 1 spondylolisthesis with fairly severe stenosis and foraminal stenosis    Assessment: The patient is a 68-year-old male with chronic low back pain bilateral lower extremity pain left greater than right he is noted to have neurogenic claudication and radiculopathy secondary to L3-4 stenosis and L4-5 grade 1 spondylolisthesis      Plan:   I recommended a L3-5 decompressive laminectomies with instrumented fusion.  The patient will follow up with his primary care physician to make sure it is okay to be off Plavix.  He would need to be off Plavix total of 5-7 days pre-and postop.  I discussed with the patient the risk of surgery to include, but, not be limited to; nerve injury, pseudoarthrosis, failure of hardware, failure of improvement of symptoms, CSF leak,  infection, post op hematoma, the need for recurrent surgery, paralysis, coma and death

## 2018-07-17 NOTE — PROGRESS NOTES
[address] [address]  [phone]  Dept: [phone]    PRE-OP EVALUATION:  Today's date: 2018    Edwardo Dumont (: 1950) presents for pre-operative evaluation assessment as requested by Dr. Magaña.  He requires evaluation and anesthesia risk assessment prior to undergoing surgery/procedure for treatment of Lower back . Laminectomy     Fax number for surgical facility:   Primary Physician: Isa Valverde  Type of Anesthesia Anticipated: General    Patient has a Health Care Directive or Living Will:  YES on file     Preop Questions 2018   Who is doing your surgery? maurice   What are you having done? back surgery   Date of Surgery/Procedure: 2018   Facility or Hospital where procedure/surgery will be performed: [facility]   1.  Do you have a history of Heart attack, stroke, stent, coronary bypass surgery, or other heart surgery? YES  - history of intraluminal coronary stent placement 2017 and 3-4 years before . About 4 stents in total. No angina pectoralis and no use of nitroglycerin use ever since   2.  Do you ever have any pain or discomfort in your chest? YES - none since stent placement. Able to exercise without difficultly, remains free of anginal symptoms at this time    3.  Do you have a history of  Heart Failure? No   4.   Are you troubled by shortness of breath when:  walking on a level surface, or up a slight hill, or at night? No   5.  Do you currently have a cold, bronchitis or other respiratory infection? No   6.  Do you have a cough, shortness of breath, or wheezing? No   7.  Do you sometimes get pains in the calves of your legs when you walk? No   8. Do you or anyone in your family have previous history of blood clots? No   9.  Do you or does anyone in your family have a serious bleeding problem such as prolonged bleeding following surgeries or cuts? No   10. Have you ever had problems with anemia or  been told to take iron pills? No   11. Have you had any abnormal blood loss such as black, tarry or bloody stools? No   12. Have you ever had a blood transfusion? No   13. Have you or any of your relatives ever had problems with anesthesia? No   14. Do you have sleep apnea, excessive snoring or daytime drowsiness? No   15. Do you have any prosthetic heart valves? No   16. Do you have prosthetic joints? No         HPI:     HPI related to upcoming procedure: patient has had persistent low back pain and left lower extremity  Sciatica , no success with physical therapy or with medication or with steroid injections. Persistent symptoms of sciatica       See problem list for active medical problems.  Problems all longstanding and stable, except as noted/documented.  See ROS for pertinent symptoms related to these conditions.                                                                                                                                                          .    MEDICAL HISTORY:     Patient Active Problem List    Diagnosis Date Noted     Spinal stenosis of lumbar region with neurogenic claudication 07/10/2018     Priority: Medium     Type II diabetes mellitus with peripheral circulatory disorder (H) 01/31/2018     Priority: Medium     CKD (chronic kidney disease) stage 3, GFR 30-59 ml/min      Priority: Medium     S/P coronary angiogram 09/23/2015     Priority: Medium     Insomnia 08/03/2015     Priority: Medium     Dermatochalasis 04/10/2015     Priority: Medium     Visual disturbance, transient, right eye 02/09/2014     Priority: Medium     Essential hypertension, benign 05/14/2013     Priority: Medium     Ischemic vascular disease   one stent coronary artery 5/12 12/19/2012     Priority: Medium     Closed dislocation of acromioclavicular joint 12/17/2012     Priority: Medium     Problem list name updated by automated process. Provider to review       Impingement syndrome of right shoulder 12/17/2012      Priority: Medium     S/P hip replacement 12/13/2012     Priority: Medium     Advanced directives, counseling/discussion 05/25/2011     Priority: Medium     Discussed Advance Directive planning with patient; information given to patient to review.         Urinary retention 01/21/2011     Priority: Medium     Degenerative arthritis of hip - right 12/27/2010     Priority: Medium     OA (osteoarthritis) of knee 10/25/2010     Priority: Medium     Hyperlipidemia LDL goal <100 10/14/2010     Priority: Medium     Low back pain 04/23/2010     Priority: Medium     Radiculopathy of leg 04/23/2010     Priority: Medium     Gout 11/27/2009     Priority: Medium     Sleep apnea      Priority: Medium     Cough 12/05/2007     Priority: Medium      Past Medical History:   Diagnosis Date     Acromioclavicular joint separation, type 1 9/12    right     Allergic rhinitis     causes chronic  cough     Arthritis      CKD (chronic kidney disease) stage 3, GFR 30-59 ml/min      Degenerative arthritis of hip - right 12/27/2010     Gout      Hip arthrosis - right 10/25/2010     Hyperlipidemia      Ischaemic vascular disease      Ischemic vascular disease 5/12    one stent     OA (osteoarthritis) of knee 10/25/2010     Renal insufficiency      Sleep apnea      Type II or unspecified type diabetes mellitus without mention of complication, not stated as uncontrolled      Unspecified essential hypertension      Past Surgical History:   Procedure Laterality Date     C ABLATION SUPRAVENT ARRHYTHMOGENIC FOCUS  12/21/15    at [facility]     C TOTAL HIP ARTHROPLASTY  1/11/11    Rt YOGI     ENDOSCOPIC SINUS SURGERY  12/14/15     ENDOSCOPIC SINUS SURGERY Bilateral 12/14/2015    Procedure: ENDOSCOPIC SINUS SURGERY;  Surgeon: Kei Cevallos MD;  Location: [facility] OR     GASTRIC BYPASS  1/03    lap band     SINUS SURGERY Right 12-14-15    Dr. Cevallos     STENT  5/8/12    OM2 cornary artery stent     STENT  01/2017    3 stents placed in coronary arteries while  in [place]     STENT, CORONARY, HANG  01/2017    2 stents in [place].     Current Outpatient Prescriptions   Medication Sig Dispense Refill     ACCU-CHEK SMARTVIEW test strip ONCE DAILY TESTING AND AS NEEDED 100 strip 4     ACE NOT PRESCRIBED, INTENTIONAL, ACE Inhibitor not prescribed due to Other: cough 0 each 0     aspirin 81 MG tablet Take 1 tablet (81 mg) by mouth daily       atorvastatin (LIPITOR) 40 MG tablet Take 1 tablet (40 mg) by mouth daily 90 tablet 4     blood glucose monitoring ([employer] CONTOUR MONITOR) meter device kit Use to test blood sugar  times daily or as directed. 1 kit 0     clopidogrel (PLAVIX) 75 MG tablet Take 75 mg by mouth       empagliflozin (JARDIANCE) 10 MG TABS tablet Take 1 tablet (10 mg) by mouth daily 90 tablet 4     IBUPROFEN PO Take by mouth every 6 hours as needed for moderate pain       losartan (COZAAR) 25 MG tablet Take 1 tablet (25 mg) by mouth daily 90 tablet 4     metFORMIN (GLUCOPHAGE) 500 MG tablet TAKE 2 TABLETS BY MOUTH TWICE DAILY WITH MEALS 360 tablet 3     metFORMIN (GLUCOPHAGE) 500 MG tablet TAKE 2 TABLETS BY MOUTH TWICE DAILY WITH MEALS 360 tablet 4     nitroglycerin (NITROSTAT) 0.4 MG SL tablet Place 1 tablet (0.4 mg) under the tongue every 5 minutes as needed for chest pain 90 tablet 4     OTC products: None, except as noted above    Allergies   Allergen Reactions     Benzonatate Anaphylaxis and Swelling     Lisinopril Cough     Zocor [Simvastatin - High Dose]      Poor memory      Latex Allergy: NO    Social History   Substance Use Topics     Smoking status: Former Smoker     Packs/day: 1.00     Years: 5.00     Types: Cigarettes     Start date: 1/1/1970     Quit date: 9/8/1989     Smokeless tobacco: Never Used      Comment: non-smoking household     Alcohol use Yes      Comment: couple drinks 3 x a week     History   Drug Use No       REVIEW OF SYSTEMS:   CONSTITUTIONAL: NEGATIVE for fever, chills, change in weight  ENT/MOUTH: NEGATIVE for ear, mouth and throat  "problems  RESP: NEGATIVE for significant cough or SOB  CV: NEGATIVE for chest pain, palpitations or peripheral edema  NEURO: NEGATIVE for weakness, dizziness or paresthesias  ENDOCRINE: NEGATIVE for temperature intolerance, skin/hair changes  HEME/ALLERGY/IMMUNE: NEGATIVE for bleeding problems  PSYCHIATRIC: NEGATIVE for changes in mood or affect  ROS otherwise negative    EXAM:   Pulse 91  Temp 97.1  F (36.2  C) (Oral)  Resp 18  Ht 6' 0.25\" (1.835 m)  Wt 235 lb (106.6 kg)  SpO2 95%  BMI 31.65 kg/m2 119 / 60  GENERAL APPEARANCE: healthy, alert and no distress, talkative and appropriate, answers all questions appropriately , normal grooming and affect   HENT: ear canals and TM's normal and nose and mouth without ulcers or lesions  NECK: no adenopathy, no asymmetry, masses, or scars and thyroid normal to palpation  RESP: lungs clear to auscultation - no rales, rhonchi or wheezes  CV: regular rate and rhythm, normal S1 S2, no S3 or S4 and no murmur, click or rub   ABDOMEN: soft, nontender, no HSM or masses and bowel sounds normal, small palpable structure in abdomen, left upper quadrant , consistent with valve associated with an adjustable lap band   NEURO: Normal strength and tone, sensory exam grossly normal, mentation intact and speech normal    DIAGNOSTICS:       Recent Labs   Lab Test  06/26/18   0842  11/28/17   0848  06/12/17   0846   09/23/15   0730   05/09/12   0651  01/27/11 01/21/11   HGB  16.1   --    --    --   15.6   --   12.5*   < >   --    --    PLT   --    --    --    --   141*   --   139*   < >   --    --    INR   --    --    --    --    --    --    --    --   1.8  1.5   NA   --   137  139   < >  138   < >  138   < >   --    --    POTASSIUM   --   4.3  4.4   < >  3.9   < >  4.4   < >   --    --    CR   --   1.25  1.20   < >  1.13   < >  1.27*   < >   --    --    A1C  8.3*  7.8*  7.1*   < >   --    < >   --    < >   --    --     < > = values in this interval not displayed.        IMPRESSION: "   Reason for surgery/procedure: symptomatic sciatica   Diagnosis/reason for consult: assess and minimize preoperative risk per consulting surgeon     The proposed surgical procedure is considered INTERMEDIATE risk.    REVISED CARDIAC RISK INDEX  The patient has the following serious cardiovascular risks for perioperative complications such as (MI, PE, VFib and 3  AV Block):  Coronary Artery Disease (MI, positive stress test, angina, Qs on EKG)  INTERPRETATION: 1 risks: Class II (low risk - 0.9% complication rate)    The patient has the following additional risks for perioperative complications:  No identified additional risks      ICD-10-CM    1. Preop general physical exam Z01.818        RECOMMENDATIONS:       --Patient is to take all scheduled medications on the day of surgery EXCEPT for modifications listed below.    Diabetes Medication Use  -----Hold usual oral and non-insulin diabetic meds (e.g. Metformin, Actos, Glipizide) while NPO.       Anticoagulant or Antiplatelet Medication Use  ASPIRIN: Discontinue ASA 7-10 days prior to procedure to reduce bleeding risk.  It should be resumed post-operatively.  PLAVIX: No contraindication to stopping plavix.  Stop 7-10 days prior to surgery        ACE Inhibitor or Angiotensin Receptor Blocker (ARB) Use  Ace inhibitor or Angiotensin Receptor Blocker (ARB) and should HOLD this medication for the 24 hours prior to surgery.      APPROVAL GIVEN to proceed with proposed procedure, without further diagnostic evaluation       Signed Electronically by: Obi Altamirano MD    Copy of this evaluation report is provided to requesting physician.    [facility] Preop Guidelines    Revised Cardiac Risk Index

## 2018-07-17 NOTE — NURSING NOTE
Pre-operative Education    Education included but not limited to:  - Pre-operative physical with primary care physician within 30 days of surgical date.   - Pre-operative clearance (cardiology, hematology, etc).   - Discontinue Aspirin, NSAIDs, Diclofenac x 7 days prior to surgical date.   - Discontinue Plavix x 7-10 days prior to surgical date, stay off Plavix 3-4 days post operatively or (Plavix 5-7 days before surgery and 5-7 days after).   -May try tylenol for pain 1000 mg three times per day for pain  -Do not begin taking Non-Steroidal Anti-Inflammatory Drugs or NSAIDs (Advil,Motrin, Ibuprofen,Nuprin, Diclofenac,Meloxicam, Aleve, Celebrex, Aspirin, etc.) until 12 weeks after surgery if you had a fusion. May cause bleeding and interfere with bone healing.    -Patient is not a smoker  -Forms to be completed  -Surgical risks: blood clots in the leg or lung, problems urinating, nerve damage, drainage from the incision, infection,stiffness  -Preparation timeline  - When to start NPO           -Special bathing procedure.Patient received Hibiclens and showering instruction sheet.   Hospital stay:  Checking in  The surgery itself   Recovery room  - Transition to the hospital room where you will begin your recovery  - Managing pain   -  2-4 night hospitalization.   - Post operative incisional pain x 1-2 weeks which will require pain medications and muscle relaxants.   -Do NOT drive or drink alcohol while taking narcotic pain medication.  -Post operative incision care-Keep your incision clean and dry at all times. OK to remove dressing on postop day 2. OK to shower on postop day 3 and allow water to run over incision, pat dry after shower. No bathing, swimming or submerging in water. Call immediately or come to ED if any drainage occurs, or you develop new pain. Watch for signs of infection: redness, swelling, warmth, drainage, and fever of 101 degrees or higher. Notify clinic.   - Post operative activity limitations:  6-8 weeks, no lifting > 10 pounds, no bending, twisting, or overhead reaching.  -Orthotics will fit you for a brace in the hospital.Lumbar Fusion: wear for 6-8 weeks  - Post op follow up appointments: 2 weeks for suture/staple removal and 6 weeks and 3 months with  Nurse Practitionerwith X-ray prior. Please call to schedule follow up appointment at [phone].   - Spine Education book was also given to the patient for further review.      Patient verbalized understanding of above instructions. All questions were answered to the best of my ability and the patient's satisfaction. Patient advised to call with any additional questions or concerns.  Sarah Triana RN

## 2018-07-17 NOTE — MR AVS SNAPSHOT
After Visit Summary   7/17/2018    Edwardo Dumont    MRN: [id number]           Patient Information     Date Of Birth          1950        Visit Information        Provider Department      7/17/2018 2:10 PM Shoaib Magaña MD [facility] Instructions    Patient Instructions  Pre-Operative:  -Surgery scheduled at [facility] for L 3-5 decompression and fusion  -Surgical risks: blood clots in the leg or lung, problems urinating, nerve damage, drainage from the incision, infection,stiffness  - Pre-operative physical with primary care physician within 30 days of surgical date.   -2-3 night hospitalization.    -Shower procedure: please shower with antimicrobial soap the night before surgery and morning of surgery. Please refer to showering instruction sheet in folder.  -Stop all solid foods 8 hours before surgery.  -Keep drinking clear liquids until 4 hours before surgery. Clear liquids include water, clear juice, black coffee, or clear tea without milk, Gatorade, clear soda.   - Discontinue Aspirin, NSAIDs (Advil/Ibuprofen, Naproxen,Nuprin,Relafen/Nabumetone, Diclofenac,Meloxicam, Aleve, Celebrex) x 7 days prior to surgical date.    - May try tylenol for pain 1000 mg three times per day for pain    Post-Operative:  -Do not begin taking Non-Steroidal Anti-Inflammatory Drugs or NSAIDs (Advil/ibuprofen, Naproxen,Nuprin, Relafen/Nabumetone, Diclofenac,Meloxicam, Aleve, Celebrex, Aspirin, etc.) until 12 weeks after surgery. May cause bleeding and interfere with bone healing.   - Post operative incisional pain x 1-2 weeks which will require pain medications and muscle relaxants. You will receive medication upon discharge.  -Do NOT drive while taking narcotic pain medication.  -Do not drink alcohol while using any pain medication  -Post operative incision care- Watch for signs of infection: redness, swelling, warmth, drainage, and fever of 101 degrees or higher. Notify  clinic [phone].  -No submerging incision in water such as pools, hot tubs,baths for at least 8 weeks or until incision is healed. Showers are fine.   - Post operative activity limitations: 6-8 weeks, no lifting > 10 pounds, no bending, twisting, or overhead reaching.  -Orthotics will fit you for a brace in the hospital.Lumbar Fusion: wear for 6-8 weeks   - Post op follow up appointments: 2 weeks suture/staple removal and 6 week post op follow up visit with Nurse practitioner and x-ray prior to appointment.Please call to schedule follow up appointment at [phone].  -If you are currently employed, you will need to be off work for 4-6 weeks for post op recovery and healing.                                          Follow-ups after your visit        Who to contact     If you have questions or need follow up information about today's clinic visit or your schedule please contact [facility] directly at [phone].  Normal or non-critical lab and imaging results will be communicated to you by V[employer], letter or phone within 4 business days after the clinic has received the results. If you do not hear from us within 7 days, please contact the clinic through [employer] or phone. If you have a critical or abnormal lab result, we will notify you by phone as soon as possible.  Submit refill requests through [employer] or call your pharmacy and they will forward the refill request to us. Please allow 3 business days for your refill to be completed.          Additional Information About Your Visit        [employer] Information     [employer] gives you secure access to your electronic health record. If you see a primary care provider, you can also send messages to your care team and make appointments. If you have questions, please call your primary care clinic.  If you do not have a primary care provider, please call [phone] and they will assist you.        Care EveryWhere ID     This is your Care EveryWhere ID.  "This could be used by other organizations to access your [facility] medical records  [id number]        Your Vitals Were     Pulse Temperature Height Pulse Oximetry BMI (Body Mass Index)       75 97  F (36.1  C) (Oral) 6' 1\" (1.854 m) 96% 31.03 kg/m2        Blood Pressure from Last 3 Encounters:   07/17/18 119/71   07/12/18 116/75   06/28/18 92/60    Weight from Last 3 Encounters:   07/17/18 235 lb 3.2 oz (106.7 kg)   07/12/18 237 lb 3.2 oz (107.6 kg)   06/28/18 237 lb 3.2 oz (107.6 kg)              Today, you had the following     No orders found for display       Primary Care Provider Office Phone # Fax #    Isa Valverde [phone] [address] [facility] [address]        Equal Access to Services     [facility] : Hadii anthony ku hadasho Soomaali, waaxda luqadaha, qaybta kaalmada adeegyada, flor duran . So [facility] [phone].    ATENCIÓN: Si roxie garcia, tiene a menendez disposición servicios gratuitos de asistencia lingüística. Llame al [phone].    We comply with applicable federal civil rights laws and [place] laws. We do not discriminate on the basis of race, color, national origin, age, disability, sex, sexual orientation, or gender identity.            Thank you!     Thank you for choosing [facility]  for your care. Our goal is always to provide you with excellent care. Hearing back from our patients is one way we can continue to improve our services. Please take a few minutes to complete the written survey that you may receive in the mail after your visit with us. Thank you!             Your Updated Medication List - Protect others around you: Learn how to safely use, store and throw away your medicines at www.disposemymeds.org.          This list is accurate as of 7/17/18  2:41 PM.  Always use your most recent med list.                   Brand Name Dispense Instructions for use Diagnosis    ACCU-CHEK SMARTVIEW test " strip   Generic drug:  blood glucose monitoring     100 strip    ONCE DAILY TESTING AND AS NEEDED    Type 2 diabetes, HbA1C goal < 8% (H)       ACE NOT PRESCRIBED (INTENTIONAL)     0 each    ACE Inhibitor not prescribed due to Other: cough    Hypertension goal BP (blood pressure) < 140/90       aspirin 81 MG tablet      Take 1 tablet (81 mg) by mouth daily    CAD (coronary artery disease)       atorvastatin 40 MG tablet    LIPITOR    90 tablet    Take 1 tablet (40 mg) by mouth daily    Hyperlipidemia LDL goal <100       blood glucose monitoring meter device kit     1 kit    Use to test blood sugar  times daily or as directed.    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)       IBUPROFEN PO      Take by mouth every 6 hours as needed for moderate pain        losartan 25 MG tablet    COZAAR    90 tablet    Take 1 tablet (25 mg) by mouth daily    Essential hypertension, benign       * metFORMIN 500 MG tablet    GLUCOPHAGE    360 tablet    TAKE 2 TABLETS BY MOUTH TWICE DAILY WITH MEALS    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 Read the directions carefully, and ask your doctor or other care provider to review them with you.

## 2018-07-17 NOTE — PATIENT INSTRUCTIONS
Patient Instructions  Pre-Operative:  -Surgery scheduled at [facility] for L 3-5 decompression and fusion  -Surgical risks: blood clots in the leg or lung, problems urinating, nerve damage, drainage from the incision, infection,stiffness  - Pre-operative physical with primary care physician within 30 days of surgical date.   -2-3 night hospitalization.    -Shower procedure: please shower with antimicrobial soap the night before surgery and morning of surgery. Please refer to showering instruction sheet in folder.  -Stop all solid foods 8 hours before surgery.  -Keep drinking clear liquids until 4 hours before surgery. Clear liquids include water, clear juice, black coffee, or clear tea without milk, Gatorade, clear soda.   - Discontinue Aspirin, NSAIDs (Advil/Ibuprofen, Naproxen,Nuprin,Relafen/Nabumetone, Diclofenac,Meloxicam, Aleve, Celebrex) x 7 days prior to surgical date.    - May try tylenol for pain 1000 mg three times per day for pain    Post-Operative:  -Do not begin taking Non-Steroidal Anti-Inflammatory Drugs or NSAIDs (Advil/ibuprofen, Naproxen,Nuprin, Relafen/Nabumetone, Diclofenac,Meloxicam, Aleve, Celebrex, Aspirin, etc.) until 12 weeks after surgery. May cause bleeding and interfere with bone healing.   - Post operative incisional pain x 1-2 weeks which will require pain medications and muscle relaxants. You will receive medication upon discharge.  -Do NOT drive while taking narcotic pain medication.  -Do not drink alcohol while using any pain medication  -Post operative incision care- Watch for signs of infection: redness, swelling, warmth, drainage, and fever of 101 degrees or higher. Notify clinic [phone].  -No submerging incision in water such as pools, hot tubs,baths for at least 8 weeks or until incision is healed. Showers are fine.   - Post operative activity limitations: 6-8 weeks, no lifting > 10 pounds, no bending, twisting, or overhead reaching.  -Orthotics will fit you for a  brace in the hospital.Lumbar Fusion: wear for 6-8 weeks   - Post op follow up appointments: 2 weeks suture/staple removal and 6 week post op follow up visit with Nurse practitioner and x-ray prior to appointment.Please call to schedule follow up appointment at [phone].  -If you are currently employed, you will need to be off work for 4-6 weeks for post op recovery and healing.

## 2018-07-17 NOTE — LETTER
7/17/2018         RE: Edwardo Dumont  [address] [facility] [address] [address]        Dear Colleague,    Thank you for referring your patient, Edwardo Dumont, to the [facility]. Please see a copy of my visit note below.    Neurosurgery Follow Up Clinic Visit      Edwardo Dumont returns for follow up visit regarding his low back and bilateral lower extremity pain    Currently the patient describes having intractable low back pain and bilateral lower extremity pain radiating down both legs left greater than right.  He is tried physical therapy and epidural steroid injections without resolution.  The patient is also taking Plavix for cardiac stents.  He recently discontinued his Plavix on last Saturday in anticipation for surgical intervention.    Exam is nonfocal  No focal motor strength loss  Sensation decreased  Ambulation stable with kyphotic    We reviewed the MRI of his lumbar spine and he is found to have L3-4 stenosis with clumping of nerve roots also has L4-5 grade 1 spondylolisthesis with fairly severe stenosis and foraminal stenosis    Assessment: The patient is a 68-year-old male with chronic low back pain bilateral lower extremity pain left greater than right he is noted to have neurogenic claudication and radiculopathy secondary to L3-4 stenosis and L4-5 grade 1 spondylolisthesis      Plan:   I recommended a L3-5 decompressive laminectomies with instrumented fusion.  The patient will follow up with his primary care physician to make sure it is okay to be off Plavix.  He would need to be off Plavix total of 5-7 days pre-and postop.  I discussed with the patient the risk of surgery to include, but, not be limited to; nerve injury, pseudoarthrosis, failure of hardware, failure of improvement of symptoms, CSF leak,  infection, post op hematoma, the need for recurrent surgery, paralysis, coma and death               Again, thank you for allowing me to participate in the care of your patient.         Sincerely,        Shoaib Magaña MD

## 2018-07-17 NOTE — PATIENT INSTRUCTIONS
Before Your Surgery      Call your surgeon if there is any change in your health. This includes signs of a cold or flu (such as a sore throat, runny nose, cough, rash or fever).    Do not smoke, drink alcohol or take over the counter medicine (unless your surgeon or primary care doctor tells you to) for the 24 hours before and after surgery.    If you take prescribed drugs: Follow your doctor s orders about which medicines to take and which to stop until after surgery.    Eating and drinking prior to surgery: follow the instructions from your surgeon    Take a shower or bath the night before surgery. Use the soap your surgeon gave you to gently clean your skin. If you do not have soap from your surgeon, use your regular soap. Do not shave or scrub the surgery site.  Wear clean pajamas and have clean sheets on your bed.     For the planned procedure     1. Morning of the surgery skip Jardiance  (empagliflozin), losartan ( Cozaar ) and metformin ( Glucophage)   2. Skip the aspirin and Clopidogrel [ Plavix ] for 5-7 days prior to the planned procedure.        [facility]    If you have any questions regarding to your visit please contact your care team:     Team Pink:   Clinic Hours Telephone Number   Internal Medicine:  Dr. Ana Segovia, NP       7am-7pm  Monday - Thursday   7am-5pm  Fridays  [phone]  (Appointment scheduling available 24/7)    Questions about your recent visit?  Team Line  [phone]   Urgent Care - [place] - 11am-9pm Monday-Friday Saturday-Sunday- 9am-5pm   [place] - 5pm-9pm Monday-Friday Saturday-Sunday- 9am-5pm  [phone] - [place]  [phone] - [place]       What options do I have for a visit other than an office visit? We offer electronic visits (e-visits) and telephone visits, when medically appropriate.  Please check with your medical insurance to see if these types of visits are covered,  as you will be responsible for any charges that are not paid by your insurance.      You can use [employer] (secure electronic communication) to access to your chart, send your primary care provider a message, or make an appointment. Ask a team member how to get started.     For a price quote for your services, please call our Consumer Price Line at [phone] or our Imaging Cost estimation line at [phone] (for imaging tests).  Mayra SHETH CMA ([facility])

## 2018-07-18 ENCOUNTER — TELEPHONE (OUTPATIENT)
Dept: INTERNAL MEDICINE | Facility: CLINIC | Age: 68
End: 2018-07-18

## 2018-07-18 ENCOUNTER — TELEPHONE (OUTPATIENT)
Dept: NEUROSURGERY | Facility: CLINIC | Age: 68
End: 2018-07-18

## 2018-07-18 ENCOUNTER — ANESTHESIA EVENT (OUTPATIENT)
Dept: SURGERY | Facility: CLINIC | Age: 68
DRG: 455 | End: 2018-07-18
Payer: MEDICARE

## 2018-07-18 LAB
ANION GAP SERPL CALCULATED.3IONS-SCNC: 12 MMOL/L (ref 3–14)
BUN SERPL-MCNC: 27 MG/DL (ref 7–30)
CALCIUM SERPL-MCNC: 9 MG/DL (ref 8.5–10.1)
CHLORIDE SERPL-SCNC: 109 MMOL/L (ref 94–109)
CO2 SERPL-SCNC: 21 MMOL/L (ref 20–32)
CREAT SERPL-MCNC: 1.38 MG/DL (ref 0.66–1.25)
GFR SERPL CREATININE-BSD FRML MDRD: 51 ML/MIN/1.7M2
GLUCOSE SERPL-MCNC: 155 MG/DL (ref 70–99)
POTASSIUM SERPL-SCNC: 4.2 MMOL/L (ref 3.4–5.3)
SODIUM SERPL-SCNC: 142 MMOL/L (ref 133–144)

## 2018-07-18 NOTE — TELEPHONE ENCOUNTER
Type of surgery:  L3-5 Decompression and Fusion  Location of surgery: [facility]  Date and time of surgery: 07/20/2018, first case  Surgeon: Dr. Magaña  Pre-Op Appt Date: 7/17/2018  Post-Op Appt Date: 8/30/2018, Chris   Packet sent out: Yes  Pre-cert/Authorization completed:  Yes  Date: BRETT Gonzalez on 7/18/2018 at 9:43 AM

## 2018-07-18 NOTE — PHARMACY-ADMISSION MEDICATION HISTORY
Medication reconciliation interview completed by pre-admitting nurse Anne Helms, reviewed by pharmacy. No further clarifications needed.       Prior to Admission medications    Medication Sig Last Dose Taking? Auth Provider   aspirin 81 MG tablet Take 1 tablet (81 mg) by mouth daily  Yes Eder Yanes MD   atorvastatin (LIPITOR) 40 MG tablet Take 1 tablet (40 mg) by mouth daily  Yes Isa Valverde MD   clopidogrel (PLAVIX) 75 MG tablet Take 75 mg by mouth daily  7/13/2018 Yes Reported, Patient   empagliflozin (JARDIANCE) 10 MG TABS tablet Take 1 tablet (10 mg) by mouth daily  Yes Isa Valverde MD   IBUPROFEN PO Take 200 mg by mouth every 6 hours as needed for moderate pain   Yes Reported, Patient   losartan (COZAAR) 25 MG tablet Take 1 tablet (25 mg) by mouth daily  Yes Isa Valverde MD   metFORMIN (GLUCOPHAGE) 500 MG tablet TAKE 2 TABLETS BY MOUTH TWICE DAILY WITH MEALS  Yes Isa Valverde MD   nitroglycerin (NITROSTAT) 0.4 MG SL tablet Place 1 tablet (0.4 mg) under the tongue every 5 minutes as needed for chest pain  Yes Isa Valverde MD   ACCU-CHEK SMARTVIEW test strip ONCE DAILY TESTING AND AS NEEDED   Isa Valverde MD   ACE NOT PRESCRIBED, INTENTIONAL, ACE Inhibitor not prescribed due to Other: cough   Isa Valverde MD   blood glucose monitoring (Shuame CONTOUR MONITOR) meter device kit Use to test blood sugar  times daily or as directed.   Isa Valverde MD

## 2018-07-18 NOTE — TELEPHONE ENCOUNTER
Message left for patient to call the RN hotline # at [phone]      bOi Altamirano MD  [facility] Rn Triage Pool; [facility] [place] Team Pink                   This is ok for surgery . Your creatinine is slightly higher then before and it is recommended to     A] avoid all non-steroidal anti-inflammatory drugs like ADVIL (ibuprofen) and Naproxen [Aleve]   B] make an effort to push fluids     Obi Urbina RN - BC

## 2018-07-18 NOTE — H&P (VIEW-ONLY)
[address] [address]  [phone]  Dept: [phone]    PRE-OP EVALUATION:  Today's date: 2018    Edwardo Dumont (: 1950) presents for pre-operative evaluation assessment as requested by Dr. Magaña.  He requires evaluation and anesthesia risk assessment prior to undergoing surgery/procedure for treatment of Lower back . Laminectomy     Fax number for surgical facility:   Primary Physician: Isa Valverde  Type of Anesthesia Anticipated: General    Patient has a Health Care Directive or Living Will:  YES on file     Preop Questions 2018   Who is doing your surgery? maurice   What are you having done? back surgery   Date of Surgery/Procedure: 2018   Facility or Hospital where procedure/surgery will be performed: [facility]   1.  Do you have a history of Heart attack, stroke, stent, coronary bypass surgery, or other heart surgery? YES  - history of intraluminal coronary stent placement 2017 and 3-4 years before . About 4 stents in total. No angina pectoralis and no use of nitroglycerin use ever since   2.  Do you ever have any pain or discomfort in your chest? YES - none since stent placement. Able to exercise without difficultly, remains free of anginal symptoms at this time    3.  Do you have a history of  Heart Failure? No   4.   Are you troubled by shortness of breath when:  walking on a level surface, or up a slight hill, or at night? No   5.  Do you currently have a cold, bronchitis or other respiratory infection? No   6.  Do you have a cough, shortness of breath, or wheezing? No   7.  Do you sometimes get pains in the calves of your legs when you walk? No   8. Do you or anyone in your family have previous history of blood clots? No   9.  Do you or does anyone in your family have a serious bleeding problem such as prolonged bleeding following surgeries or cuts? No   10. Have you ever had problems with anemia or  been told to take iron pills? No   11. Have you had any abnormal blood loss such as black, tarry or bloody stools? No   12. Have you ever had a blood transfusion? No   13. Have you or any of your relatives ever had problems with anesthesia? No   14. Do you have sleep apnea, excessive snoring or daytime drowsiness? No   15. Do you have any prosthetic heart valves? No   16. Do you have prosthetic joints? No         HPI:     HPI related to upcoming procedure: patient has had persistent low back pain and left lower extremity  Sciatica , no success with physical therapy or with medication or with steroid injections. Persistent symptoms of sciatica       See problem list for active medical problems.  Problems all longstanding and stable, except as noted/documented.  See ROS for pertinent symptoms related to these conditions.                                                                                                                                                          .    MEDICAL HISTORY:     Patient Active Problem List    Diagnosis Date Noted     Spinal stenosis of lumbar region with neurogenic claudication 07/10/2018     Priority: Medium     Type II diabetes mellitus with peripheral circulatory disorder (H) 01/31/2018     Priority: Medium     CKD (chronic kidney disease) stage 3, GFR 30-59 ml/min      Priority: Medium     S/P coronary angiogram 09/23/2015     Priority: Medium     Insomnia 08/03/2015     Priority: Medium     Dermatochalasis 04/10/2015     Priority: Medium     Visual disturbance, transient, right eye 02/09/2014     Priority: Medium     Essential hypertension, benign 05/14/2013     Priority: Medium     Ischemic vascular disease   one stent coronary artery 5/12 12/19/2012     Priority: Medium     Closed dislocation of acromioclavicular joint 12/17/2012     Priority: Medium     Problem list name updated by automated process. Provider to review       Impingement syndrome of right shoulder 12/17/2012      Priority: Medium     S/P hip replacement 12/13/2012     Priority: Medium     Advanced directives, counseling/discussion 05/25/2011     Priority: Medium     Discussed Advance Directive planning with patient; information given to patient to review.         Urinary retention 01/21/2011     Priority: Medium     Degenerative arthritis of hip - right 12/27/2010     Priority: Medium     OA (osteoarthritis) of knee 10/25/2010     Priority: Medium     Hyperlipidemia LDL goal <100 10/14/2010     Priority: Medium     Low back pain 04/23/2010     Priority: Medium     Radiculopathy of leg 04/23/2010     Priority: Medium     Gout 11/27/2009     Priority: Medium     Sleep apnea      Priority: Medium     Cough 12/05/2007     Priority: Medium      Past Medical History:   Diagnosis Date     Acromioclavicular joint separation, type 1 9/12    right     Allergic rhinitis     causes chronic  cough     Arthritis      CKD (chronic kidney disease) stage 3, GFR 30-59 ml/min      Degenerative arthritis of hip - right 12/27/2010     Gout      Hip arthrosis - right 10/25/2010     Hyperlipidemia      Ischaemic vascular disease      Ischemic vascular disease 5/12    one stent     OA (osteoarthritis) of knee 10/25/2010     Renal insufficiency      Sleep apnea      Type II or unspecified type diabetes mellitus without mention of complication, not stated as uncontrolled      Unspecified essential hypertension      Past Surgical History:   Procedure Laterality Date     C ABLATION SUPRAVENT ARRHYTHMOGENIC FOCUS  12/21/15    at [facility]     C TOTAL HIP ARTHROPLASTY  1/11/11    Rt YOGI     ENDOSCOPIC SINUS SURGERY  12/14/15     ENDOSCOPIC SINUS SURGERY Bilateral 12/14/2015    Procedure: ENDOSCOPIC SINUS SURGERY;  Surgeon: Kei Cevallos MD;  Location: [facility] OR     GASTRIC BYPASS  1/03    lap band     SINUS SURGERY Right 12-14-15    Dr. Cevallos     STENT  5/8/12    OM2 cornary artery stent     STENT  01/2017    3 stents placed in coronary arteries while  in [place]     STENT, CORONARY, HANG  01/2017    2 stents in [place].     Current Outpatient Prescriptions   Medication Sig Dispense Refill     ACCU-CHEK SMARTVIEW test strip ONCE DAILY TESTING AND AS NEEDED 100 strip 4     ACE NOT PRESCRIBED, INTENTIONAL, ACE Inhibitor not prescribed due to Other: cough 0 each 0     aspirin 81 MG tablet Take 1 tablet (81 mg) by mouth daily       atorvastatin (LIPITOR) 40 MG tablet Take 1 tablet (40 mg) by mouth daily 90 tablet 4     blood glucose monitoring (WalkMe CONTOUR MONITOR) meter device kit Use to test blood sugar  times daily or as directed. 1 kit 0     clopidogrel (PLAVIX) 75 MG tablet Take 75 mg by mouth       empagliflozin (JARDIANCE) 10 MG TABS tablet Take 1 tablet (10 mg) by mouth daily 90 tablet 4     IBUPROFEN PO Take by mouth every 6 hours as needed for moderate pain       losartan (COZAAR) 25 MG tablet Take 1 tablet (25 mg) by mouth daily 90 tablet 4     metFORMIN (GLUCOPHAGE) 500 MG tablet TAKE 2 TABLETS BY MOUTH TWICE DAILY WITH MEALS 360 tablet 3     metFORMIN (GLUCOPHAGE) 500 MG tablet TAKE 2 TABLETS BY MOUTH TWICE DAILY WITH MEALS 360 tablet 4     nitroglycerin (NITROSTAT) 0.4 MG SL tablet Place 1 tablet (0.4 mg) under the tongue every 5 minutes as needed for chest pain 90 tablet 4     OTC products: None, except as noted above    Allergies   Allergen Reactions     Benzonatate Anaphylaxis and Swelling     Lisinopril Cough     Zocor [Simvastatin - High Dose]      Poor memory      Latex Allergy: NO    Social History   Substance Use Topics     Smoking status: Former Smoker     Packs/day: 1.00     Years: 5.00     Types: Cigarettes     Start date: 1/1/1970     Quit date: 9/8/1989     Smokeless tobacco: Never Used      Comment: non-smoking household     Alcohol use Yes      Comment: couple drinks 3 x a week     History   Drug Use No       REVIEW OF SYSTEMS:   CONSTITUTIONAL: NEGATIVE for fever, chills, change in weight  ENT/MOUTH: NEGATIVE for ear, mouth and throat  "problems  RESP: NEGATIVE for significant cough or SOB  CV: NEGATIVE for chest pain, palpitations or peripheral edema  NEURO: NEGATIVE for weakness, dizziness or paresthesias  ENDOCRINE: NEGATIVE for temperature intolerance, skin/hair changes  HEME/ALLERGY/IMMUNE: NEGATIVE for bleeding problems  PSYCHIATRIC: NEGATIVE for changes in mood or affect  ROS otherwise negative    EXAM:   Pulse 91  Temp 97.1  F (36.2  C) (Oral)  Resp 18  Ht 6' 0.25\" (1.835 m)  Wt 235 lb (106.6 kg)  SpO2 95%  BMI 31.65 kg/m2 119 / 60  GENERAL APPEARANCE: healthy, alert and no distress, talkative and appropriate, answers all questions appropriately , normal grooming and affect   HENT: ear canals and TM's normal and nose and mouth without ulcers or lesions  NECK: no adenopathy, no asymmetry, masses, or scars and thyroid normal to palpation  RESP: lungs clear to auscultation - no rales, rhonchi or wheezes  CV: regular rate and rhythm, normal S1 S2, no S3 or S4 and no murmur, click or rub   ABDOMEN: soft, nontender, no HSM or masses and bowel sounds normal, small palpable structure in abdomen, left upper quadrant , consistent with valve associated with an adjustable lap band   NEURO: Normal strength and tone, sensory exam grossly normal, mentation intact and speech normal    DIAGNOSTICS:       Recent Labs   Lab Test  06/26/18   0842  11/28/17   0848  06/12/17   0846   09/23/15   0730   05/09/12   0651  01/27/11 01/21/11   HGB  16.1   --    --    --   15.6   --   12.5*   < >   --    --    PLT   --    --    --    --   141*   --   139*   < >   --    --    INR   --    --    --    --    --    --    --    --   1.8  1.5   NA   --   137  139   < >  138   < >  138   < >   --    --    POTASSIUM   --   4.3  4.4   < >  3.9   < >  4.4   < >   --    --    CR   --   1.25  1.20   < >  1.13   < >  1.27*   < >   --    --    A1C  8.3*  7.8*  7.1*   < >   --    < >   --    < >   --    --     < > = values in this interval not displayed.        IMPRESSION: "   Reason for surgery/procedure: symptomatic sciatica   Diagnosis/reason for consult: assess and minimize preoperative risk per consulting surgeon     The proposed surgical procedure is considered INTERMEDIATE risk.    REVISED CARDIAC RISK INDEX  The patient has the following serious cardiovascular risks for perioperative complications such as (MI, PE, VFib and 3  AV Block):  Coronary Artery Disease (MI, positive stress test, angina, Qs on EKG)  INTERPRETATION: 1 risks: Class II (low risk - 0.9% complication rate)    The patient has the following additional risks for perioperative complications:  No identified additional risks      ICD-10-CM    1. Preop general physical exam Z01.818        RECOMMENDATIONS:       --Patient is to take all scheduled medications on the day of surgery EXCEPT for modifications listed below.    Diabetes Medication Use  -----Hold usual oral and non-insulin diabetic meds (e.g. Metformin, Actos, Glipizide) while NPO.       Anticoagulant or Antiplatelet Medication Use  ASPIRIN: Discontinue ASA 7-10 days prior to procedure to reduce bleeding risk.  It should be resumed post-operatively.  PLAVIX: No contraindication to stopping plavix.  Stop 7-10 days prior to surgery        ACE Inhibitor or Angiotensin Receptor Blocker (ARB) Use  Ace inhibitor or Angiotensin Receptor Blocker (ARB) and should HOLD this medication for the 24 hours prior to surgery.      APPROVAL GIVEN to proceed with proposed procedure, without further diagnostic evaluation       Signed Electronically by: Obi Altamirano MD    Copy of this evaluation report is provided to requesting physician.    [facility] Preop Guidelines    Revised Cardiac Risk Index

## 2018-07-20 ENCOUNTER — APPOINTMENT (OUTPATIENT)
Dept: GENERAL RADIOLOGY | Facility: CLINIC | Age: 68
DRG: 455 | End: 2018-07-20
Attending: NEUROLOGICAL SURGERY
Payer: MEDICARE

## 2018-07-20 ENCOUNTER — ANESTHESIA (OUTPATIENT)
Dept: SURGERY | Facility: CLINIC | Age: 68
DRG: 455 | End: 2018-07-20
Payer: MEDICARE

## 2018-07-20 ENCOUNTER — HOSPITAL ENCOUNTER (INPATIENT)
Facility: CLINIC | Age: 68
LOS: 3 days | Discharge: HOME | DRG: 455 | End: 2018-07-23
Attending: NEUROLOGICAL SURGERY | Admitting: NEUROLOGICAL SURGERY
Payer: MEDICARE

## 2018-07-20 DIAGNOSIS — Z98.1 S/P LUMBAR FUSION: Primary | ICD-10-CM

## 2018-07-20 LAB
ABO + RH BLD: NORMAL
ABO + RH BLD: NORMAL
BLD GP AB SCN SERPL QL: NORMAL
BLOOD BANK CMNT PATIENT-IMP: NORMAL
GLUCOSE BLDC GLUCOMTR-MCNC: 146 MG/DL (ref 70–99)
GLUCOSE BLDC GLUCOMTR-MCNC: 151 MG/DL (ref 70–99)
GLUCOSE BLDC GLUCOMTR-MCNC: 164 MG/DL (ref 70–99)
GLUCOSE BLDC GLUCOMTR-MCNC: 170 MG/DL (ref 70–99)
GLUCOSE SERPL-MCNC: 180 MG/DL (ref 70–99)
HGB BLD-MCNC: 15.6 G/DL (ref 13.3–17.7)
SPECIMEN EXP DATE BLD: NORMAL

## 2018-07-20 PROCEDURE — 40000985 XR LUMBAR SPINE PORT 1 VW: Mod: TC

## 2018-07-20 PROCEDURE — 40000277 XR SURGERY CARM FLUORO LESS THAN 5 MIN W STILLS: Mod: TC

## 2018-07-20 PROCEDURE — 25000128 H RX IP 250 OP 636

## 2018-07-20 PROCEDURE — 25800025 ZZH RX 258: Performed by: NEUROLOGICAL SURGERY

## 2018-07-20 PROCEDURE — 22842 INSERT SPINE FIXATION DEVICE: CPT | Performed by: NEUROLOGICAL SURGERY

## 2018-07-20 PROCEDURE — 0SG1071 FUSION OF 2 OR MORE LUMBAR VERTEBRAL JOINTS WITH AUTOLOGOUS TISSUE SUBSTITUTE, POSTERIOR APPROACH, POSTERIOR COLUMN, OPEN APPROACH: ICD-10-PCS | Performed by: NEUROLOGICAL SURGERY

## 2018-07-20 PROCEDURE — 00000146 ZZHCL STATISTIC GLUCOSE BY METER IP

## 2018-07-20 PROCEDURE — C1762 CONN TISS, HUMAN(INC FASCIA): HCPCS | Performed by: NEUROLOGICAL SURGERY

## 2018-07-20 PROCEDURE — 82947 ASSAY GLUCOSE BLOOD QUANT: CPT | Performed by: ANESTHESIOLOGY

## 2018-07-20 PROCEDURE — 40000306 ZZH STATISTIC PRE PROC ASSESS II: Performed by: NEUROLOGICAL SURGERY

## 2018-07-20 PROCEDURE — 25000566 ZZH SEVOFLURANE, EA 15 MIN: Performed by: NEUROLOGICAL SURGERY

## 2018-07-20 PROCEDURE — 36000069 ZZH SURGERY LEVEL 5 EA 15 ADDTL MIN: Performed by: NEUROLOGICAL SURGERY

## 2018-07-20 PROCEDURE — 0SG00AJ FUSION OF LUMBAR VERTEBRAL JOINT WITH INTERBODY FUSION DEVICE, POSTERIOR APPROACH, ANTERIOR COLUMN, OPEN APPROACH: ICD-10-PCS | Performed by: NEUROLOGICAL SURGERY

## 2018-07-20 PROCEDURE — 25000128 H RX IP 250 OP 636: Performed by: NEUROLOGICAL SURGERY

## 2018-07-20 PROCEDURE — 8E0WXBZ COMPUTER ASSISTED PROCEDURE OF TRUNK REGION: ICD-10-PCS | Performed by: NEUROLOGICAL SURGERY

## 2018-07-20 PROCEDURE — A9270 NON-COVERED ITEM OR SERVICE: HCPCS | Mod: GY | Performed by: INTERNAL MEDICINE

## 2018-07-20 PROCEDURE — 37000009 ZZH ANESTHESIA TECHNICAL FEE, EACH ADDTL 15 MIN: Performed by: NEUROLOGICAL SURGERY

## 2018-07-20 PROCEDURE — 36415 COLL VENOUS BLD VENIPUNCTURE: CPT | Performed by: ANESTHESIOLOGY

## 2018-07-20 PROCEDURE — 71000012 ZZH RECOVERY PHASE 1 LEVEL 1 FIRST HR: Performed by: NEUROLOGICAL SURGERY

## 2018-07-20 PROCEDURE — 22614 ARTHRD PST TQ 1NTRSPC EA ADD: CPT | Performed by: NEUROLOGICAL SURGERY

## 2018-07-20 PROCEDURE — 25000132 ZZH RX MED GY IP 250 OP 250 PS 637: Mod: GY | Performed by: INTERNAL MEDICINE

## 2018-07-20 PROCEDURE — 25000128 H RX IP 250 OP 636: Performed by: ANESTHESIOLOGY

## 2018-07-20 PROCEDURE — 12000007 ZZH R&B INTERMEDIATE

## 2018-07-20 PROCEDURE — 0SB20ZZ EXCISION OF LUMBAR VERTEBRAL DISC, OPEN APPROACH: ICD-10-PCS | Performed by: NEUROLOGICAL SURGERY

## 2018-07-20 PROCEDURE — 25000300 ZZH OR RX SURGIFLO HEMOSTATIC MATRIX 10ML 199102S OPNP: Performed by: NEUROLOGICAL SURGERY

## 2018-07-20 PROCEDURE — 63047 LAM FACETEC & FORAMOT LUMBAR: CPT | Mod: 51 | Performed by: NEUROLOGICAL SURGERY

## 2018-07-20 PROCEDURE — 71000013 ZZH RECOVERY PHASE 1 LEVEL 1 EA ADDTL HR: Performed by: NEUROLOGICAL SURGERY

## 2018-07-20 PROCEDURE — 85018 HEMOGLOBIN: CPT | Performed by: ANESTHESIOLOGY

## 2018-07-20 PROCEDURE — 22853 INSJ BIOMECHANICAL DEVICE: CPT | Performed by: NEUROLOGICAL SURGERY

## 2018-07-20 PROCEDURE — 25000125 ZZHC RX 250: Performed by: NEUROLOGICAL SURGERY

## 2018-07-20 PROCEDURE — A9270 NON-COVERED ITEM OR SERVICE: HCPCS | Mod: GY | Performed by: NEUROLOGICAL SURGERY

## 2018-07-20 PROCEDURE — C1713 ANCHOR/SCREW BN/BN,TIS/BN: HCPCS | Performed by: NEUROLOGICAL SURGERY

## 2018-07-20 PROCEDURE — 01NB0ZZ RELEASE LUMBAR NERVE, OPEN APPROACH: ICD-10-PCS | Performed by: NEUROLOGICAL SURGERY

## 2018-07-20 PROCEDURE — 63048 LAM FACETEC &FORAMOT EA ADDL: CPT | Performed by: NEUROLOGICAL SURGERY

## 2018-07-20 PROCEDURE — 86901 BLOOD TYPING SEROLOGIC RH(D): CPT | Performed by: ANESTHESIOLOGY

## 2018-07-20 PROCEDURE — 86850 RBC ANTIBODY SCREEN: CPT | Performed by: ANESTHESIOLOGY

## 2018-07-20 PROCEDURE — 27210794 ZZH OR GENERAL SUPPLY STERILE: Performed by: NEUROLOGICAL SURGERY

## 2018-07-20 PROCEDURE — 36000071 ZZH SURGERY LEVEL 5 W FLUORO 1ST 30 MIN: Performed by: NEUROLOGICAL SURGERY

## 2018-07-20 PROCEDURE — 25000132 ZZH RX MED GY IP 250 OP 250 PS 637: Mod: GY | Performed by: NEUROLOGICAL SURGERY

## 2018-07-20 PROCEDURE — 86900 BLOOD TYPING SEROLOGIC ABO: CPT | Performed by: ANESTHESIOLOGY

## 2018-07-20 PROCEDURE — 37000008 ZZH ANESTHESIA TECHNICAL FEE, 1ST 30 MIN: Performed by: NEUROLOGICAL SURGERY

## 2018-07-20 PROCEDURE — L0625 LO FLEX L1-BELOW L5 PRE OTS: HCPCS

## 2018-07-20 PROCEDURE — 22633 ARTHRD CMBN 1NTRSPC LUMBAR: CPT | Performed by: NEUROLOGICAL SURGERY

## 2018-07-20 PROCEDURE — 27211024 ZZHC OR SUPPLY OTHER OPNP: Performed by: NEUROLOGICAL SURGERY

## 2018-07-20 PROCEDURE — 27210995 ZZH RX 272: Performed by: NEUROLOGICAL SURGERY

## 2018-07-20 DEVICE — IMPLANTABLE DEVICE: Type: IMPLANTABLE DEVICE | Site: SPINE LUMBAR | Status: FUNCTIONAL

## 2018-07-20 DEVICE — GRAFT BONE MAGNIFUSE 1CMX5CM 7509215: Type: IMPLANTABLE DEVICE | Site: SPINE LUMBAR | Status: FUNCTIONAL

## 2018-07-20 RX ORDER — SODIUM CHLORIDE, SODIUM LACTATE, POTASSIUM CHLORIDE, CALCIUM CHLORIDE 600; 310; 30; 20 MG/100ML; MG/100ML; MG/100ML; MG/100ML
INJECTION, SOLUTION INTRAVENOUS CONTINUOUS
Status: DISCONTINUED | OUTPATIENT
Start: 2018-07-20 | End: 2018-07-20 | Stop reason: HOSPADM

## 2018-07-20 RX ORDER — METOCLOPRAMIDE 5 MG/1
5 TABLET ORAL EVERY 6 HOURS PRN
Status: DISCONTINUED | OUTPATIENT
Start: 2018-07-20 | End: 2018-07-23 | Stop reason: HOSPADM

## 2018-07-20 RX ORDER — HYDROMORPHONE HYDROCHLORIDE 2 MG/1
2-4 TABLET ORAL
Status: DISCONTINUED | OUTPATIENT
Start: 2018-07-20 | End: 2018-07-21

## 2018-07-20 RX ORDER — OXYCODONE HYDROCHLORIDE 5 MG/1
5-10 TABLET ORAL EVERY 4 HOURS PRN
Status: DISCONTINUED | OUTPATIENT
Start: 2018-07-20 | End: 2018-07-21

## 2018-07-20 RX ORDER — LIDOCAINE HYDROCHLORIDE 10 MG/ML
INJECTION, SOLUTION INFILTRATION; PERINEURAL PRN
Status: DISCONTINUED | OUTPATIENT
Start: 2018-07-20 | End: 2018-07-20

## 2018-07-20 RX ORDER — CEFAZOLIN SODIUM 2 G/100ML
2 INJECTION, SOLUTION INTRAVENOUS EVERY 8 HOURS
Status: DISCONTINUED | OUTPATIENT
Start: 2018-07-20 | End: 2018-07-20

## 2018-07-20 RX ORDER — NITROGLYCERIN 0.4 MG/1
0.4 TABLET SUBLINGUAL EVERY 5 MIN PRN
Status: DISCONTINUED | OUTPATIENT
Start: 2018-07-20 | End: 2018-07-23 | Stop reason: HOSPADM

## 2018-07-20 RX ORDER — NALOXONE HYDROCHLORIDE 0.4 MG/ML
.1-.4 INJECTION, SOLUTION INTRAMUSCULAR; INTRAVENOUS; SUBCUTANEOUS
Status: DISCONTINUED | OUTPATIENT
Start: 2018-07-20 | End: 2018-07-23 | Stop reason: HOSPADM

## 2018-07-20 RX ORDER — PROCHLORPERAZINE MALEATE 5 MG
5 TABLET ORAL EVERY 6 HOURS PRN
Status: DISCONTINUED | OUTPATIENT
Start: 2018-07-20 | End: 2018-07-23 | Stop reason: HOSPADM

## 2018-07-20 RX ORDER — ONDANSETRON 2 MG/ML
4 INJECTION INTRAMUSCULAR; INTRAVENOUS EVERY 6 HOURS PRN
Status: DISCONTINUED | OUTPATIENT
Start: 2018-07-20 | End: 2018-07-23 | Stop reason: HOSPADM

## 2018-07-20 RX ORDER — VANCOMYCIN HCL 900 MCG/MG
POWDER (GRAM) MISCELLANEOUS PRN
Status: DISCONTINUED | OUTPATIENT
Start: 2018-07-20 | End: 2018-07-20 | Stop reason: HOSPADM

## 2018-07-20 RX ORDER — FENTANYL CITRATE 50 UG/ML
25-50 INJECTION, SOLUTION INTRAMUSCULAR; INTRAVENOUS
Status: DISCONTINUED | OUTPATIENT
Start: 2018-07-20 | End: 2018-07-20 | Stop reason: HOSPADM

## 2018-07-20 RX ORDER — METOCLOPRAMIDE HYDROCHLORIDE 5 MG/ML
5 INJECTION INTRAMUSCULAR; INTRAVENOUS EVERY 6 HOURS PRN
Status: DISCONTINUED | OUTPATIENT
Start: 2018-07-20 | End: 2018-07-23 | Stop reason: HOSPADM

## 2018-07-20 RX ORDER — CEFAZOLIN SODIUM 1 G/3ML
1 INJECTION, POWDER, FOR SOLUTION INTRAMUSCULAR; INTRAVENOUS SEE ADMIN INSTRUCTIONS
Status: DISCONTINUED | OUTPATIENT
Start: 2018-07-20 | End: 2018-07-20 | Stop reason: HOSPADM

## 2018-07-20 RX ORDER — ONDANSETRON 4 MG/1
4 TABLET, ORALLY DISINTEGRATING ORAL EVERY 6 HOURS PRN
Status: DISCONTINUED | OUTPATIENT
Start: 2018-07-20 | End: 2018-07-23 | Stop reason: HOSPADM

## 2018-07-20 RX ORDER — PROPOFOL 10 MG/ML
INJECTION, EMULSION INTRAVENOUS PRN
Status: DISCONTINUED | OUTPATIENT
Start: 2018-07-20 | End: 2018-07-20

## 2018-07-20 RX ORDER — BUPIVACAINE HYDROCHLORIDE AND EPINEPHRINE 5; 5 MG/ML; UG/ML
INJECTION, SOLUTION EPIDURAL; INTRACAUDAL; PERINEURAL PRN
Status: DISCONTINUED | OUTPATIENT
Start: 2018-07-20 | End: 2018-07-20 | Stop reason: HOSPADM

## 2018-07-20 RX ORDER — SODIUM CHLORIDE AND POTASSIUM CHLORIDE 150; 900 MG/100ML; MG/100ML
INJECTION, SOLUTION INTRAVENOUS CONTINUOUS
Status: DISCONTINUED | OUTPATIENT
Start: 2018-07-20 | End: 2018-07-21 | Stop reason: CLARIF

## 2018-07-20 RX ORDER — DOCUSATE SODIUM 100 MG/1
100 CAPSULE, LIQUID FILLED ORAL 2 TIMES DAILY
Status: DISCONTINUED | OUTPATIENT
Start: 2018-07-20 | End: 2018-07-23 | Stop reason: HOSPADM

## 2018-07-20 RX ORDER — ATORVASTATIN CALCIUM 40 MG/1
40 TABLET, FILM COATED ORAL DAILY
Status: DISCONTINUED | OUTPATIENT
Start: 2018-07-20 | End: 2018-07-23 | Stop reason: HOSPADM

## 2018-07-20 RX ORDER — NALOXONE HYDROCHLORIDE 0.4 MG/ML
.1-.4 INJECTION, SOLUTION INTRAMUSCULAR; INTRAVENOUS; SUBCUTANEOUS
Status: ACTIVE | OUTPATIENT
Start: 2018-07-20 | End: 2018-07-21

## 2018-07-20 RX ORDER — LOSARTAN POTASSIUM 25 MG/1
25 TABLET ORAL DAILY
Status: DISCONTINUED | OUTPATIENT
Start: 2018-07-20 | End: 2018-07-23 | Stop reason: HOSPADM

## 2018-07-20 RX ORDER — ACETAMINOPHEN 325 MG/1
975 TABLET ORAL EVERY 8 HOURS
Status: DISCONTINUED | OUTPATIENT
Start: 2018-07-20 | End: 2018-07-20

## 2018-07-20 RX ORDER — HYDROMORPHONE HYDROCHLORIDE 1 MG/ML
.3-.5 INJECTION, SOLUTION INTRAMUSCULAR; INTRAVENOUS; SUBCUTANEOUS EVERY 5 MIN PRN
Status: DISCONTINUED | OUTPATIENT
Start: 2018-07-20 | End: 2018-07-20 | Stop reason: HOSPADM

## 2018-07-20 RX ORDER — ONDANSETRON 4 MG/1
4 TABLET, ORALLY DISINTEGRATING ORAL EVERY 30 MIN PRN
Status: DISCONTINUED | OUTPATIENT
Start: 2018-07-20 | End: 2018-07-20 | Stop reason: HOSPADM

## 2018-07-20 RX ORDER — DEXTROSE MONOHYDRATE 25 G/50ML
25-50 INJECTION, SOLUTION INTRAVENOUS
Status: DISCONTINUED | OUTPATIENT
Start: 2018-07-20 | End: 2018-07-23 | Stop reason: HOSPADM

## 2018-07-20 RX ORDER — ONDANSETRON 2 MG/ML
4 INJECTION INTRAMUSCULAR; INTRAVENOUS EVERY 30 MIN PRN
Status: DISCONTINUED | OUTPATIENT
Start: 2018-07-20 | End: 2018-07-20 | Stop reason: HOSPADM

## 2018-07-20 RX ORDER — DIPHENHYDRAMINE HCL 12.5MG/5ML
12.5 LIQUID (ML) ORAL EVERY 6 HOURS PRN
Status: DISCONTINUED | OUTPATIENT
Start: 2018-07-20 | End: 2018-07-23 | Stop reason: HOSPADM

## 2018-07-20 RX ORDER — ACETAMINOPHEN 325 MG/1
975 TABLET ORAL EVERY 8 HOURS
Status: DISCONTINUED | OUTPATIENT
Start: 2018-07-20 | End: 2018-07-23 | Stop reason: HOSPADM

## 2018-07-20 RX ORDER — FENTANYL CITRATE 50 UG/ML
INJECTION, SOLUTION INTRAMUSCULAR; INTRAVENOUS PRN
Status: DISCONTINUED | OUTPATIENT
Start: 2018-07-20 | End: 2018-07-20

## 2018-07-20 RX ORDER — HYDROMORPHONE HYDROCHLORIDE 1 MG/ML
.3-.5 INJECTION, SOLUTION INTRAMUSCULAR; INTRAVENOUS; SUBCUTANEOUS
Status: DISCONTINUED | OUTPATIENT
Start: 2018-07-20 | End: 2018-07-23 | Stop reason: HOSPADM

## 2018-07-20 RX ORDER — DIPHENHYDRAMINE HYDROCHLORIDE 50 MG/ML
12.5 INJECTION INTRAMUSCULAR; INTRAVENOUS EVERY 6 HOURS PRN
Status: DISCONTINUED | OUTPATIENT
Start: 2018-07-20 | End: 2018-07-23 | Stop reason: HOSPADM

## 2018-07-20 RX ORDER — ACETAMINOPHEN 325 MG/1
650 TABLET ORAL EVERY 4 HOURS PRN
Status: DISCONTINUED | OUTPATIENT
Start: 2018-07-23 | End: 2018-07-23 | Stop reason: HOSPADM

## 2018-07-20 RX ORDER — METOPROLOL TARTRATE 1 MG/ML
1-2 INJECTION, SOLUTION INTRAVENOUS EVERY 5 MIN PRN
Status: DISCONTINUED | OUTPATIENT
Start: 2018-07-20 | End: 2018-07-20 | Stop reason: HOSPADM

## 2018-07-20 RX ORDER — LIDOCAINE 40 MG/G
CREAM TOPICAL
Status: DISCONTINUED | OUTPATIENT
Start: 2018-07-20 | End: 2018-07-23 | Stop reason: HOSPADM

## 2018-07-20 RX ORDER — CEFAZOLIN SODIUM 2 G/100ML
2 INJECTION, SOLUTION INTRAVENOUS EVERY 8 HOURS
Status: DISCONTINUED | OUTPATIENT
Start: 2018-07-20 | End: 2018-07-23 | Stop reason: CLARIF

## 2018-07-20 RX ORDER — CEFAZOLIN SODIUM 2 G/100ML
2 INJECTION, SOLUTION INTRAVENOUS
Status: COMPLETED | OUTPATIENT
Start: 2018-07-20 | End: 2018-07-20

## 2018-07-20 RX ORDER — DIAZEPAM 5 MG
5 TABLET ORAL EVERY 6 HOURS PRN
Status: DISCONTINUED | OUTPATIENT
Start: 2018-07-20 | End: 2018-07-23 | Stop reason: HOSPADM

## 2018-07-20 RX ORDER — ACETAMINOPHEN 325 MG/1
650 TABLET ORAL EVERY 4 HOURS PRN
Status: DISCONTINUED | OUTPATIENT
Start: 2018-07-23 | End: 2018-07-20

## 2018-07-20 RX ORDER — LIDOCAINE 40 MG/G
CREAM TOPICAL
Status: DISCONTINUED | OUTPATIENT
Start: 2018-07-20 | End: 2018-07-20 | Stop reason: HOSPADM

## 2018-07-20 RX ORDER — EPHEDRINE SULFATE 50 MG/ML
INJECTION, SOLUTION INTRAMUSCULAR; INTRAVENOUS; SUBCUTANEOUS PRN
Status: DISCONTINUED | OUTPATIENT
Start: 2018-07-20 | End: 2018-07-20

## 2018-07-20 RX ORDER — NICOTINE POLACRILEX 4 MG
15-30 LOZENGE BUCCAL
Status: DISCONTINUED | OUTPATIENT
Start: 2018-07-20 | End: 2018-07-23 | Stop reason: HOSPADM

## 2018-07-20 RX ADMIN — EPHEDRINE SULFATE 5 MG: 50 INJECTION, SOLUTION INTRAMUSCULAR; INTRAVENOUS; SUBCUTANEOUS at 09:01

## 2018-07-20 RX ADMIN — Medication 10 MG: at 09:00

## 2018-07-20 RX ADMIN — Medication 1 MG: at 08:23

## 2018-07-20 RX ADMIN — PROPOFOL 200 MG: 10 INJECTION, EMULSION INTRAVENOUS at 07:39

## 2018-07-20 RX ADMIN — SODIUM CHLORIDE, POTASSIUM CHLORIDE, SODIUM LACTATE AND CALCIUM CHLORIDE: 600; 310; 30; 20 INJECTION, SOLUTION INTRAVENOUS at 06:45

## 2018-07-20 RX ADMIN — METFORMIN HYDROCHLORIDE 1000 MG: 500 TABLET ORAL at 17:29

## 2018-07-20 RX ADMIN — DOCUSATE SODIUM 100 MG: 100 CAPSULE, LIQUID FILLED ORAL at 21:32

## 2018-07-20 RX ADMIN — SODIUM CHLORIDE, POTASSIUM CHLORIDE, SODIUM LACTATE AND CALCIUM CHLORIDE: 600; 310; 30; 20 INJECTION, SOLUTION INTRAVENOUS at 12:12

## 2018-07-20 RX ADMIN — Medication 10 MG: at 08:12

## 2018-07-20 RX ADMIN — Medication 100 MCG: at 09:01

## 2018-07-20 RX ADMIN — Medication 40 MG: at 07:39

## 2018-07-20 RX ADMIN — Medication 10 MG: at 09:58

## 2018-07-20 RX ADMIN — Medication 0.5 MG: at 10:01

## 2018-07-20 RX ADMIN — EPHEDRINE SULFATE 5 MG: 50 INJECTION, SOLUTION INTRAMUSCULAR; INTRAVENOUS; SUBCUTANEOUS at 10:06

## 2018-07-20 RX ADMIN — POTASSIUM CHLORIDE AND SODIUM CHLORIDE: 900; 150 INJECTION, SOLUTION INTRAVENOUS at 14:23

## 2018-07-20 RX ADMIN — LOSARTAN POTASSIUM 25 MG: 25 TABLET, FILM COATED ORAL at 16:53

## 2018-07-20 RX ADMIN — CEFAZOLIN 1 G: 1 INJECTION, POWDER, FOR SOLUTION INTRAMUSCULAR; INTRAVENOUS at 10:23

## 2018-07-20 RX ADMIN — CEFAZOLIN SODIUM 2 G: 2 INJECTION, SOLUTION INTRAVENOUS at 07:48

## 2018-07-20 RX ADMIN — EPHEDRINE SULFATE 10 MG: 50 INJECTION, SOLUTION INTRAMUSCULAR; INTRAVENOUS; SUBCUTANEOUS at 08:36

## 2018-07-20 RX ADMIN — HYDROMORPHONE HYDROCHLORIDE: 10 INJECTION, SOLUTION INTRAMUSCULAR; INTRAVENOUS; SUBCUTANEOUS at 11:50

## 2018-07-20 RX ADMIN — Medication 10 MG: at 10:15

## 2018-07-20 RX ADMIN — SODIUM CHLORIDE, POTASSIUM CHLORIDE, SODIUM LACTATE AND CALCIUM CHLORIDE: 600; 310; 30; 20 INJECTION, SOLUTION INTRAVENOUS at 08:19

## 2018-07-20 RX ADMIN — Medication 0.4 MCG/KG/HR: at 08:00

## 2018-07-20 RX ADMIN — ACETAMINOPHEN 975 MG: 325 TABLET, FILM COATED ORAL at 21:32

## 2018-07-20 RX ADMIN — ACETAMINOPHEN 975 MG: 325 TABLET, FILM COATED ORAL at 16:53

## 2018-07-20 RX ADMIN — CEFAZOLIN SODIUM 2 G: 2 INJECTION, SOLUTION INTRAVENOUS at 17:31

## 2018-07-20 RX ADMIN — Medication 10 MG: at 09:35

## 2018-07-20 RX ADMIN — HYDROMORPHONE HYDROCHLORIDE 0.5 MG: 1 INJECTION, SOLUTION INTRAMUSCULAR; INTRAVENOUS; SUBCUTANEOUS at 12:56

## 2018-07-20 RX ADMIN — FENTANYL CITRATE 100 MCG: 50 INJECTION, SOLUTION INTRAMUSCULAR; INTRAVENOUS at 07:39

## 2018-07-20 RX ADMIN — Medication 100 MCG: at 09:16

## 2018-07-20 RX ADMIN — HYDROMORPHONE HYDROCHLORIDE 0.5 MG: 1 INJECTION, SOLUTION INTRAMUSCULAR; INTRAVENOUS; SUBCUTANEOUS at 12:22

## 2018-07-20 RX ADMIN — ATORVASTATIN CALCIUM 40 MG: 40 TABLET, FILM COATED ORAL at 16:53

## 2018-07-20 RX ADMIN — Medication 100 MCG: at 09:31

## 2018-07-20 RX ADMIN — SODIUM CHLORIDE, POTASSIUM CHLORIDE, SODIUM LACTATE AND CALCIUM CHLORIDE: 600; 310; 30; 20 INJECTION, SOLUTION INTRAVENOUS at 10:40

## 2018-07-20 RX ADMIN — FENTANYL CITRATE 50 MCG: 50 INJECTION, SOLUTION INTRAMUSCULAR; INTRAVENOUS at 12:40

## 2018-07-20 RX ADMIN — Medication 100 MCG: at 08:38

## 2018-07-20 RX ADMIN — FENTANYL CITRATE 50 MCG: 50 INJECTION, SOLUTION INTRAMUSCULAR; INTRAVENOUS at 12:07

## 2018-07-20 RX ADMIN — LIDOCAINE HYDROCHLORIDE 50 MG: 10 INJECTION, SOLUTION INFILTRATION; PERINEURAL at 07:39

## 2018-07-20 RX ADMIN — EPHEDRINE SULFATE 5 MG: 50 INJECTION, SOLUTION INTRAMUSCULAR; INTRAVENOUS; SUBCUTANEOUS at 09:31

## 2018-07-20 RX ADMIN — Medication 0.5 MG: at 10:39

## 2018-07-20 ASSESSMENT — ENCOUNTER SYMPTOMS: DYSRHYTHMIAS: 0

## 2018-07-20 ASSESSMENT — ACTIVITIES OF DAILY LIVING (ADL)
ADLS_ACUITY_SCORE: 9
ADLS_ACUITY_SCORE: 9

## 2018-07-20 ASSESSMENT — LIFESTYLE VARIABLES: TOBACCO_USE: 0

## 2018-07-20 NOTE — IP AVS SNAPSHOT
[facility]    [address]    [facility] [address]    Phone:  [phone]    Fax:  [phone]                                       After Visit Summary   7/20/2018    Edwardo Dumont    MRN: [id number]           After Visit Summary Signature Page     I have received my discharge instructions, and my questions have been answered. I have discussed any challenges I see with this plan with the nurse or doctor.    ..........................................................................................................................................  Patient/Patient Representative Signature      ..........................................................................................................................................  Patient Representative Print Name and Relationship to Patient    ..................................................               ................................................  Date                                            Time    ..........................................................................................................................................  Reviewed by Signature/Title    ...................................................              ..............................................  Date                                                            Time

## 2018-07-20 NOTE — PLAN OF CARE
Problem: Patient Care Overview  Goal: Plan of Care/Patient Progress Review  Outcome: Improving  Vital signs stable.  ;Lungs clear, encouraged inspirometer use when awake, on O2  With capnography.  Bowel sounds hypoactive, tolerated clear liquids,  Dressing dry intact.Log rolled to turn , reposition.Sat up and dangled at bedside wearing brace with assist of 2.  CMS intact.  Pain controlled with ice pack application, use of pca dilaudid.  Plan of care reviewed with pt and family.

## 2018-07-20 NOTE — ANESTHESIA PREPROCEDURE EVALUATION
Anesthesia Evaluation     .             ROS/MED HX    ENT/Pulmonary:     (+)sleep apnea, , . .   (-) tobacco use, asthma and Other pulmonary disease   Neurologic:      (-) CVA, Other neuro hx and Dementia   Cardiovascular:     (+) Dyslipidemia, hypertension--CAD, --stent,2013 and 2017  4 . Taking blood thinners : . . . :. . No previous cardiac testing      (-) angina, past MI, CHF, arrhythmias, pulmonary hypertension, angina, past MI and CABG   METS/Exercise Tolerance:     Hematologic:        (-) anemia   Musculoskeletal:   (+) arthritis, , , other musculoskeletal- Spinal Stenosis      GI/Hepatic:         Renal/Genitourinary:     (+) chronic renal disease, type: CRI, Pt does not require dialysis, Pt has no history of transplant,       Endo:     (+) type II DM Last HgA1c: 8.3 Not using insulin Obesity, .   (-) Type I DM, thyroid disease, chronic steroid usage and other endocrine disorder   Psychiatric:        (-) psychiatric history   Infectious Disease:  - neg infectious disease ROS       Malignancy:         Other:    (+) H/O Chronic Pain,                   Physical Exam      Airway   Mallampati: II  TM distance: >3 FB  Neck ROM: full    Dental     Cardiovascular   Rhythm and rate: regular and normal  (-) no murmur    Pulmonary    breath sounds clear to auscultation    Other findings: Lab Test        07/20/18     06/26/18     09/23/15     05/09/12      --          05/08/12      --          01/27/11 01/21/11 01/17/11                       0621          0842          0730          0651           --           1054           --                                                                 WBC           --           --          8.2          3.6*          --          5.3            < >         --           --           --           HGB          15.6         16.1         15.6         12.5*          < >        13.7           < >         --           --           --           MCV           --           --           94           90            --          93             < >         --           --           --           PLT           --           --          141*         139*          --          212            < >         --           --           --           INR           --           --           --           --           --           --           --          1.8          1.5          2.4            < > = values in this interval not displayed.                  Lab Test        07/17/18 11/28/17 06/12/17                       1559          0848          0846          NA           142          137          139           POTASSIUM    4.2          4.3          4.4           CHLORIDE     109          104          105           CO2          21           26           24            BUN          27           24           29            CR           1.38*        1.25         1.20          ANIONGAP     12           7            10            ANCA          9.0          8.8          9.0           GLC          155*         176*         162*                        Anesthesia Plan      History & Physical Review  History and physical reviewed and following examination; no interval change.    ASA Status:  3 .    NPO Status:  > 8 hours    Plan for General and ETT with Propofol induction. Maintenance will be Balanced.    PONV prophylaxis:  Ondansetron (or other 5HT-3)  Precedex gtt      Postoperative Care  Postoperative pain management:  IV analgesics and Oral pain medications.      Consents  Anesthetic plan, risks, benefits and alternatives discussed with:  Patient.  Use of blood products discussed: Yes.   Use of blood products discussed with Patient.  .                          .

## 2018-07-20 NOTE — PLAN OF CARE
"Problem: Patient Care Overview  Goal: Plan of Care/Patient Progress Review  Outcome: No Change  /78  Pulse 72  Temp 95.2  F (35.1  C) (Oral)  Resp 12  Ht 1.854 m (6' 1\")  Wt 106.1 kg (234 lb)  SpO2 93%  BMI 30.87 kg/m2  Lungs: clear, encouraged CDB and IS, on 3L O2 via NC, Capnography ISP 9-10  BS: faint and hypoactive, tolerating water and ice chips  Urine: Zapata patent  CMS: intact, dressing CDI, GLEN right and Left  Pain: controlled with PCA pump, Dilaudid.  Activity: bedrest until more awake    Pt arrived to floor at 2pm, air mat to bed, tolerated rolling from side to side.          "

## 2018-07-20 NOTE — PROGRESS NOTES
Hospitalist consult acknowledged.  Will restart the patient's metformin and place him on a medium tensing sliding scale.  Full consult to follow tomorrow.  Otherwise, agree with chronic medication that has already been ordered by neurosurgery.

## 2018-07-20 NOTE — ANESTHESIA POSTPROCEDURE EVALUATION
Patient: Edwardo Dumont    Procedure(s):  L3-5 decompression and fusion - Wound Class: I-Clean    Diagnosis:L3-5 severe stenosis, L4-5 grade I spondololithesis with instabily, severe radicular pain and claudication  Diagnosis Additional Information: PREOPERATIVE DIAGNOSIS:   1. L3-4 severe stenosis with bilateral synovial cyst compressing the dura and exiting and traversing roots  2. L4-5 grade 1 spondylolisthesis with severe stenosis and bilateral synovial cyst and compression of dura and exiti, ng and traversing roots  3. Low back pain  4. Lumbar radiculopathy  5. Neurogenic claudication      POSTOPERATIVE DIAGNOSIS: Same.      PROCEDURE:   1. L3-4 bilateral laminectomy with bilateral facetectomies and resection of bilateral synovial cyst  , 2. 4-5 Transforaminal lumbar interbody fusion (bilateral laminectomy with bilateral facetectomies with discectomy, arthrodesis and transforaminal interbody fusion with placement of a 8-15 x 30 mm Globus Rise expandable intrebody graft with autologous,  bone placed centrally and anteriorly)  3. Placement of Globus Creo pedicle screws from L3-5 with open reduction and internal fixation of the L4-5 spondylolisthesis   4. Posterior lateral fusion from C3-5 with L3-4 bilateral laminectomy with bilatera, l facetectomies and resection of bilateral synovial cyst  5. Use of microscope and C-arm  6. Use of Stealth and O-arm intraoperative neuronavigation    Anesthesia Type:  General, ETT    Note:  Anesthesia Post Evaluation    Patient location during evaluation: PACU  Patient participation: Able to fully participate in evaluation  Level of consciousness: awake  Pain management: adequate  Airway patency: patent  Cardiovascular status: acceptable  Respiratory status: acceptable  Hydration status: acceptable  PONV: controlled     Anesthetic complications: None          Last vitals:  Vitals:    07/20/18 1215 07/20/18 1220 07/20/18 1230   BP: 133/76 121/74    Pulse:      Resp: 9 12 10   Temp:       SpO2: 97% 96%          Electronically Signed By: Krunal Purcell MD  July 20, 2018  12:38 PM

## 2018-07-20 NOTE — IP AVS SNAPSHOT
MRN:[id number]                      After Visit Summary   7/20/2018    Edwardo Dumont    MRN: [id number]           Thank you!     Thank you for choosing [facility] for your care. Our goal is always to provide you with excellent care. Hearing back from our patients is one way we can continue to improve our services. Please take a few minutes to complete the written survey that you may receive in the mail after you visit. If you would like to speak to someone directly about your visit please contact Patient Relations at [phone]. Thank you!          Patient Information     Date Of Birth          1950        Designated Caregiver       Most Recent Value    Caregiver    Will someone help with your care after discharge? yes    Name of designated caregiver Kristie Dumont (spouse)    Phone number of caregiver [phone]    Caregiver address saame as pt      About your hospital stay     You were admitted on:  July 20, 2018 You last received care in the:  [facility]    You were discharged on:  July 23, 2018        Reason for your hospital stay       You were in the hospital for lumbar fusion surgery.                  Who to Call     For medical emergencies, please call 911.  For non-urgent questions about your medical care, please call your primary care provider or clinic, [phone]  For questions related to your surgery, please call your surgery clinic        Attending Provider     Provider Specialty    Shoaib Magaña MD Neurosurgery       Primary Care Provider Office Phone # Fax #    Isa Valverde [phone]      After Care Instructions     Activity       Your activity upon discharge: Discharge instructions:  No lifting of more than 10 pounds, no bending, no twisting until follow up visit.  Wear brace when out of bed.    Ok to shampoo hair, shower or bathe, but, do not scrub or submerge incision under water until evaluated  post-operatively in clinic.    Ok to walk as tolerated, avoid bed rest and prolonged sitting.    No contact sports until after follow up visit  No high impact activities such as; running/jogging, snowmobile or 4 arias riding or any other recreational vehicles.    Do not take NSAIDS (ibuprofen, advil, aleve, naproxen, etc) for pain control.    Do NOT drive while taking narcotic pain medication.    Call the [facility] at [phone] for increasing redness, swelling or pus draining from the incision, increased pain or any other questions and concerns.  \            Diet       Follow this diet upon discharge: Orders Placed This Encounter      Moderate Consistent CHO Diet            Wound care and dressings       Instructions to care for your wound at home: Keep your incision clean and dry at all times.  OK to remove dressing on postop day 2.  OK to shower on postop day 3 and allow water to run over incision, pat dry after shower.  No bathing swimming or submerging in water.  Call immediately or come to ED if any drainage occurs, or you develop new pain, redness, or swelling.                  Follow-up Appointments     Follow-up and recommended labs and tests        Please follow up at the [facility] in 10-12 days for staple removal.  Please call the clinic at [phone] to schedule your appointment with Fay Baker CNP or Mita Cardenas CNP.                  Your next 10 appointments already scheduled     Aug 30, 2018 10:00 AM CDT   [facility] Mita [facility]as NP   [facility]    [address] [place] [id number] [phone]              Further instructions from your care team       While on narcotic pain medication, to prevent constipation:  1. Drink plenty of water to keep well hydrated   2. May take over the counter Colace or Senna (follow instructions on label)    Call your physician if you experience:  1. Fever greater  "than 100 degrees with body chills or excessive sweating.  2. Increased redness, localized warmth, tenderness, drainage or swelling at incision site.  Opening or pulling apart of incision site.   3. Pain not controlled with oral pain medications, ice and rest.   4. No bowel movement in 3 days (may use Milk of Magnesia or other over the counter remedy).  5. Chest pain, shortness of breath, and/or calf pain with excessive swelling.  6. Generalized feeling of illness.  7. Any other questions or concerns related to your surgery/recovery.      Thank you for allowing [facility] to participate in your cares!!    Pending Results     No orders found from 7/18/2018 to 7/21/2018.            Statement of Approval     Ordered          07/23/18 0915  I have reviewed and agree with all the recommendations and orders detailed in this document.  EFFECTIVE NOW     Approved and electronically signed by:  Fay Baker APRN CNP             Admission Information     Date & Time Provider Department Dept. Phone    7/20/2018 Shoaib Magaña MD [facility]ine [phone]      Your Vitals Were     Blood Pressure Pulse Temperature Respirations Height Weight    120/63 (BP Location: Left arm) 72 98.6  F (37  C) (Oral) 16 1.854 m (6' 1\") 106.1 kg (234 lb)    Pulse Oximetry BMI (Body Mass Index)                96% 30.87 kg/m2          MyChart Information     [employer] gives you secure access to your electronic health record. If you see a primary care provider, you can also send messages to your care team and make appointments. If you have questions, please call your primary care clinic.  If you do not have a primary care provider, please call [phone] and they will assist you.        Care EveryWhere ID     This is your Care EveryWhere ID. This could be used by other organizations to access your [facility] medical records  [id number]        Equal Access to Services     JADYN REGALADO: Hamida argueta " maurisio Dalton, waaxda luqadaha, qaybta kaalmada meredith, flor belljoseph dhruv So [facility] [place] [phone].    ATENCIÓN: Si habla jose, tiene a menendez disposición servicios gratuitos de asistencia lingüística. Llame al [phone].    We comply with applicable federal civil rights laws and [place] laws. We do not discriminate on the basis of race, color, national origin, age, disability, sex, sexual orientation, or gender identity.               Review of your medicines      START taking        Dose / Directions    HYDROcodone-acetaminophen 5-325 MG per tablet   Commonly known as:  NORCO        Dose:  1-2 tablet   Take 1-2 tablets by mouth every 4 hours as needed for moderate to severe pain   Quantity:  45 tablet   Refills:  0         CONTINUE these medicines which have NOT CHANGED        Dose / Directions    ACCU-CHEK SMARTVIEW test strip   Used for:  Type 2 diabetes, HbA1C goal < 8% (H)   Generic drug:  blood glucose monitoring        ONCE DAILY TESTING AND AS NEEDED   Quantity:  100 strip   Refills:  4       ACE NOT PRESCRIBED (INTENTIONAL)   Used for:  Hypertension goal BP (blood pressure) < 140/90        ACE Inhibitor not prescribed due to Other: cough   Quantity:  0 each   Refills:  0       aspirin 81 MG tablet   Used for:  CAD (coronary artery disease)        Dose:  81 mg   Take 1 tablet (81 mg) by mouth daily   Refills:  0       atorvastatin 40 MG tablet   Commonly known as:  LIPITOR   Used for:  Hyperlipidemia LDL goal <100        Dose:  40 mg   Take 1 tablet (40 mg) by mouth daily   Quantity:  90 tablet   Refills:  4       blood glucose monitoring meter device kit   Used for:  Type 2 diabetes, HbA1C goal < 8% (H)        Use to test blood sugar  times daily or as directed.   Quantity:  1 kit   Refills:  0       clopidogrel 75 MG tablet   Commonly known as:  PLAVIX        Dose:  75 mg   Take 75 mg by mouth daily   Refills:  0       JARDIANCE 10 MG Tabs tablet   Used for:  Type 2 diabetes  mellitus with other circulatory complications (CODE) (H)   Generic drug:  empagliflozin        TAKE ONE TABLET BY MOUTH EVERY DAY   Quantity:  90 tablet   Refills:  0       losartan 25 MG tablet   Commonly known as:  COZAAR   Used for:  Essential hypertension, benign        Dose:  25 mg   Take 1 tablet (25 mg) by mouth daily   Quantity:  90 tablet   Refills:  4       metFORMIN 500 MG tablet   Commonly known as:  GLUCOPHAGE   Used for:  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)        TAKE 2 TABLETS BY MOUTH TWICE DAILY WITH MEALS   Quantity:  360 tablet   Refills:  3       nitroGLYcerin 0.4 MG sublingual tablet   Commonly known as:  NITROSTAT   Used for:  CAD (coronary artery disease)        Dose:  0.4 mg   Place 1 tablet (0.4 mg) under the tongue every 5 minutes as needed for chest pain   Quantity:  90 tablet   Refills:  4         STOP taking     IBUPROFEN PO                Where to get your medicines      Some of these will need a paper prescription and others can be bought over the counter. Ask your nurse if you have questions.     Bring a paper prescription for each of these medications     HYDROcodone-acetaminophen 5-325 MG per tablet                Protect others around you: Learn how to safely use, store and throw away your medicines at www.disposemymeds.org.        Information about OPIOIDS     PRESCRIPTION OPIOIDS: WHAT YOU NEED TO KNOW   We gave you an opioid (narcotic) pain medicine. It is important to manage your pain, but opioids are not always the best choice. You should first try all the other options your care team gave you. Take this medicine for as short a time (and as few doses) as possible.     These medicines have risks:    DO NOT drive when on new or higher doses of pain medicine. These medicines can affect your alertness and reaction times, and you could be arrested for driving under the influence (DUI). If you need to use opioids long-term, talk to your care  team about driving.    DO NOT operate heave machinery    DO NOT do any other dangerous activities while taking these medicines.     DO NOT drink any alcohol while taking these medicines.      If the opioid prescribed includes acetaminophen, DO NOT take with any other medicines that contain acetaminophen. Read all labels carefully. Look for the word  acetaminophen  or  Tylenol.  Ask your pharmacist if you have questions or are unsure.    You can get addicted to pain medicines, especially if you have a history of addiction (chemical, alcohol or substance dependence). Talk to your care team about ways to reduce this risk.    Store your pills in a secure place, locked if possible. We will not replace any lost or stolen medicine. If you don t finish your medicine, please throw away (dispose) as directed by your pharmacist. The [place] [employer]ution [employer] has more information about safe disposal: https://www.pca.[facility].[place].us/living-green/managing-unwanted-medications.     All opioids tend to cause constipation. Drink plenty of water and eat foods that have a lot of fiber, such as fruits, vegetables, prune juice, apple juice and high-fiber cereal. Take a laxative (Miralax, milk of magnesia, Colace, Senna) if you don t move your bowels at least every other day.              Medication List: This is a list of all your medications and when to take them. Check marks below indicate your daily home schedule. Keep this list as a reference.      Medications           Morning Afternoon Evening Bedtime As Needed    ACCU-CHEK SMARTVIEW test strip   ONCE DAILY TESTING AND AS NEEDED   Generic drug:  blood glucose monitoring                                ACE NOT PRESCRIBED (INTENTIONAL)   ACE Inhibitor not prescribed due to Other: cough                                aspirin 81 MG tablet   Take 1 tablet (81 mg) by mouth daily                                atorvastatin 40 MG tablet   Commonly known as:  LIPITOR   Take 1 tablet  (40 mg) by mouth daily   Last time this was given:  40 mg on 7/23/2018  8:05 AM                                   blood glucose monitoring meter device kit   Use to test blood sugar  times daily or as directed.                                clopidogrel 75 MG tablet   Commonly known as:  PLAVIX   Take 75 mg by mouth daily                                HYDROcodone-acetaminophen 5-325 MG per tablet   Commonly known as:  NORCO   Take 1-2 tablets by mouth every 4 hours as needed for moderate to severe pain   Last time this was given:  2 tablets on 7/22/2018  6:27 AM                                   JARDIANCE 10 MG Tabs tablet   TAKE ONE TABLET BY MOUTH EVERY DAY   Generic drug:  empagliflozin                                   losartan 25 MG tablet   Commonly known as:  COZAAR   Take 1 tablet (25 mg) by mouth daily   Last time this was given:  25 mg on 7/23/2018  8:06 AM                                   metFORMIN 500 MG tablet   Commonly known as:  GLUCOPHAGE   TAKE 2 TABLETS BY MOUTH TWICE DAILY WITH MEALS   Last time this was given:  1,000 mg on 7/23/2018  8:06 AM                                      nitroGLYcerin 0.4 MG sublingual tablet   Commonly known as:  NITROSTAT   Place 1 tablet (0.4 mg) under the tongue every 5 minutes as needed for chest pain                                             More Information        Using an Incentive Spirometer    An incentive spirometer is a device that helps you do deep breathing exercises. These exercises expand your lungs, aid in circulation, and help prevent pneumonia. Deep breathing exercises also help you breathe better and improve the function of your lungs by:    Keeping your lungs clear    Strengthening your breathing muscles    Helping prevent respiratory complications or problems  The incentive spirometer gives you a way to take an active part in your recovery. A nurse or therapist will teach you breathing exercises. To do these exercises, you will breathe in through  your mouth and not your nose. The incentive spirometer only works correctly if you breathe in through your mouth.  Steps to clear lungs  Step 1. Exhale normally. Then, inhale normally.    Relax and breathe out.  Step 2. Place your lips tightly around the mouthpiece.    Make sure the device is upright and not tilted.  Step 3. Inhale as much air as you can through the mouthpiece (don't breathe through your nose).    Inhale slowly and deeply.    Hold your breath long enough to keep the balls or disk raised for at least 3 to 5 seconds, or as instructed by your healthcare provider.    Some spirometers have an indicator to let you know that you are breathing in too fast. If the indicator goes off, breathe in more slowly.  Step 4. Repeat the exercise regularly.    Do this exercise every hour while you're awake, or as instructed by your healthcare provider.    If you were taught deep breathing and coughing exercises, do them regularly as instructed by your healthcare provider.   Follow-up care  Make a follow up appointment, or as directed. Also, follow up with your healthcare provider as advised or if your symptoms don't improve or continue to get worse.  When to call your healthcare provider  Call your healthcare provider right away if you have any of the following:    Fever 100.4  (38 C) or higher, or as directed by your healthcare provider    Brownish, bloody, or smelly sputum (phlegm that you cough up)  Call 911  Call 911 if any of these occur:     Shortness of breath that doesn't get better after taking your medicine    Cool, moist, pale or blue skin    Trouble breathing or swallowing, wheezing    Fainting or loss of consciousness    Feeling of dizziness or weakness, or a sudden drop in blood pressure    Feeling very ill    Lightheadedness    Chest pain or rapid heart rate  Date Last Reviewed: 1/1/2017 2000-2017 [employer]. [address] [facility], [place] [address]. All rights reserved. This  information is not intended as a substitute for professional medical care. Always follow your healthcare professional's instructions.                Treating Constipation    Constipation is a common and often uncomfortable problem. Constipation means you have bowel movements fewer than 3 times per week, or strain to pass hard, dry stool. It can last a short time. Or it can be a problem that never seems to go away. The good news is that it can often be treated and controlled.  Eat more fiber  One of the best ways to help treat constipation is to increase your fiber intake. You can do this either through diet or by using fiber supplements. Fiber (in whole grains, fruits, and vegetables) adds bulk and absorbs water to soften the stool. This helps the stool pass through the colon more easily. When you increase your fiber intake, do it slowly to avoid side effects such as bloating. Also increase the amount of water that you drink. Eating more of the following foods can add fiber to your diet.    High-fiber cereals    Whole grains, bran, and brown rice    Vegetables such as carrots, broccoli, and greens    Fresh fruits (especially apples, pears, and dried fruits like raisins and apricots)    Nuts and legumes (especially beans such as lentils, kidney beans, and lima beans)  Get physically active  Exercise helps improve the working of your colon which helps ease constipation. Try to get some physical activity every day. If you haven t been active for a while, talk to your healthcare provider before starting again.  Laxatives  Your healthcare provider may suggest an over-the-counter product to help ease your constipation. He or she may suggest the use of bulk-forming agents or laxatives. The use of laxatives, if used as directed, is common and safe. Follow directions carefully when using them. See your healthcare provider for new-onset constipation, or long-term constipation, to rule out other causes such as medicines or  thyroid disease.  Date Last Reviewed: 7/1/2016 2000-2017 [employer]. [address] [place] [address], [place] [address]. All rights reserved. This information is not intended as a substitute for professional medical care. Always follow your healthcare professional's instructions.                Discharge Instructions for Laminectomy  A surgeon removed a piece of bone from your spine called the lamina. This procedure is called laminectomy. Its purpose is to relieve the pressure caused by a bulging disk that painfully pushes on a nerve. Below are some care tips you can follow at home to help you feel better.  Activity    Don't push, pull, bend, or twist for 2 week(s) after your surgery.    Don t sit for more than 20 to 30 minutes at a time. And when you aren t sitting, lie down or walk.    Walk as much as you can. You can walk outside or inside. If you use a treadmill, walk at a slow speed, with no incline.    Going up and down stairs is also good for you, so do it as much as possible. Don t lift anything heavier than 10 pounds until your doctor says otherwise.    Don t drive for 2 to 3 weeks after your surgery. And never drive if you are taking opioid pain medication. Let others drive you instead. And limit car trips to 20 to 30 minutes at a time.    Have someone remove electrical cords, throw rugs, and anything else in your home that may cause you to fall.    Arrange your household to keep the items you need handy.  Home care    Take your medicine exactly as directed by your doctor.    Check your incision daily for redness, tenderness, or drainage.    Don t soak in a bathtub, hot tub, or pool until your doctor says it s OK.    Wait 3 day(s) after your surgery to start showering. Then shower as needed. Carefully wash your incision with soap and water. Gently pat the incision dry. Don t rub it, or apply creams or lotions.  Follow-up    Make a follow-up appointment as directed by your doctor.    Make an  appointment to have sutures or staples removed about 2 weeks after surgery.  Call 911  Call 911 right away if you have any of the following:    Chest pain    Shortness of breath    A severe headache    Trouble controlling your bowels or bladder  When to call your healthcare provider  Call your healthcare provider right away if you have any of the following:    Increased pain, redness, or drainage from the incision    Fever of 100.4 F (38 C) or higher, or as directed by your healthcare provider    Shaking chills    New pain, weakness, warmth, or numbness in your legs    Foot, ankle, or calf swelling that is not relieved by elevating your feet   Date Last Reviewed: 11/16/2015 2000-2017 [employer]. [address] [place] [address], [place] [address]. All rights reserved. This information is not intended as a substitute for professional medical care. Always follow your healthcare professional's instructions.

## 2018-07-20 NOTE — ANESTHESIA CARE TRANSFER NOTE
Patient: Edwardo Dumont    Procedure(s):  L3-5 decompression and fusion - Wound Class: I-Clean    Diagnosis: L3-5 severe stenosis, L4-5 grade I spondololithesis with instabily, severe radicular pain and claudication  Diagnosis Additional Information: No value filed.    Anesthesia Type:   General, ETT     Note:  Airway :Face Mask  Patient transferred to:PACU  Handoff Report: Identifed the Patient, Identified the Reponsible Provider, Reviewed the pertinent medical history, Discussed the surgical course, Reviewed Intra-OP anesthesia mangement and issues during anesthesia, Set expectations for post-procedure period and Allowed opportunity for questions and acknowledgement of understanding      Vitals: (Last set prior to Anesthesia Care Transfer)    CRNA VITALS  7/20/2018 1046 - 7/20/2018 1121      7/20/2018             Pulse: 85    SpO2: 100 %    Resp Rate (observed): 14                Electronically Signed By: TARUN Vazquez CRNA  July 20, 2018  11:21 AM

## 2018-07-20 NOTE — OP NOTE
OPERATIVE REPORT       PREOPERATIVE DIAGNOSIS:   1. L3-4 severe stenosis with bilateral synovial cyst compressing the dura and exiting and traversing roots  2. L4-5 grade 1 spondylolisthesis with severe stenosis and bilateral synovial cyst and compression of dura and exiting and traversing roots  3. Low back pain  4. Lumbar radiculopathy  5. Neurogenic claudication     POSTOPERATIVE DIAGNOSIS: Same.     PROCEDURE:   1. L3-4 bilateral laminectomy with bilateral facetectomies and resection of bilateral synovial cyst  2. 4-5 Transforaminal lumbar interbody fusion (bilateral laminectomy with bilateral facetectomies with discectomy, arthrodesis and transforaminal interbody fusion with placement of a 8-15 x 30 mm Globus Rise expandable intrebody graft with autologous bone placed centrally and anteriorly)  3. Placement of Globus Creo pedicle screws from L3-5 with open reduction and internal fixation of the L4-5 spondylolisthesis   4. Posterior lateral fusion from C3-5 with L3-4 bilateral laminectomy with bilateral facetectomies and resection of bilateral synovial cyst  5. Use of microscope and C-arm  6. Use of Stealth and O-arm intraoperative neuronavigation    Surgeon: Shoaib Magaña MD    ASSISTANT: Cole Crump    INDICATION FOR PROCEDURE: The patient is a 68 year old male that presented with above symptoms. After reviewing the examination and imaging studies, the decision was made to proceed with the above procedure. The patient understood the risks of surgery to be infection, CSF leak, nerve root injury, failure of hardware, the need for recurrent surgery, epidural fibrosis, weakness, coma and death. The patient voiced understanding and wanted to proceed.     DESCRIPTION OF PROCEDURE: The patient was seen in the pre op area and the procedure was discussed with the patient and family once again and all questions were answered. The consent was then signed and the lumbar spine was marked with a marker. The patient  was transported to the operating room on a stretcher and received general endotracheal anesthesia and a Zapata catheter was placed. The patient was placed on the operating table in the prone position on the William frame with all pressure points padded. The back was then prepped and draped in a normal sterile fashion. C-arm used to identify the appropriate level. Local anesthesia was injected along the planned incision with 10 blade scalpel used to make a midline incision with dissection down through the subcutaneous tissue to the fascia. The fascia was opened bilaterally with the Bovie cautery. Subperiosteal dissection was used to expose the lamina facet joint and transverse processes from L3-5. The Versa trac retractor was then placed to retract the paraspinous muscles. The operating microscope was then brought into the field and attention then turned to the decompression where L3-4 bilateral laminectomies with bilateral partial facetectomies and resection of bilateral synovial cyst was performed and L4-5 bilateral laminectomies with complete facetectomies with resection of synovial cysts was performed with the Midas Jan drill, the Kerrison rongeur and the pituitary rongeur which were used to remove the spinous process, the lamina and facet joints. This completely decompressed and exposed the exiting roots bilaterally and the traversing roots bilaterally in Kambin's triangle. Following that, the disk spaces were incised with the 11 blade knife after retracting the thecal sac and nerve root medially. The L4-5 disk was removed with the pituitary rongeur and the endplate dyana from size 7-10 mm. The nerves were thoroughly decompressed. Next, a 8-15 x 30 mm Globus Rise expandable interbody graft were placed in the L4-5 with autologous bone placed both anteriorly and centrally inside the graft. The expandable interbody graft was then expanded to the appropriate sizes. The Stealth and O-arm were brought in for  intraoperative pedicle screw placement and the pedicle screws were placed using the normal technique of a  hole with the Midas Jan drill, the gear shift probe to penetrate the pedicle and the 5.5 mm tap to tap the pedicle. The pedicle screws were then navigated down the pedicles from at L3, L4 and L5 bilaterally. The connecting rods were secured from L3-5 and the locking caps were secured in place with the counter torque system. Copious irrigation was performed with saline and 5 ml of Evicel was placed over the dura. The wound was irrigated once again and the retractors were removed after decortication of the lateral facet and transverse process was performed from L3-5.  Locally harvested autologous bone and Medtronic Magnifuse were then placed out laterally for the posterolateral fusion. Two William-Wall drains were left subfascially. The fascia was closed with 0 Vicryl, the subcutaneous tissue closed with 2 - 0 and 3-0 Vicryl, and the skin closed with staples. The patient was then transported back to the stretcher, extubated, and sent to recovery.     At the end of the case, all counts were correct    Complications: None    Estimated blood loss 200 ml     IV fluids: See Anesthesia    The patient received Ancef preoperatively and Vancomycin powder in the wound      Shoaib Magaña MD, MS, FAANS

## 2018-07-20 NOTE — PROVIDER NOTIFICATION
"Pt refused sliding scale insulin , stated he has\"a 's license and cannot take this med\". Dr Ronquillo was web paged with this information.  "

## 2018-07-20 NOTE — PROGRESS NOTES
S: Patient with wife were seen today at the [facility] with an order from Dr. Magaña to fit the patient with a lumbosacral corset. Patient recently had a lumbar fusion performed.     A: Patient was fit with a [employer] lumbosacral DP 36''-48'' 4 panel (#[id number]). Patient was log rolled in bed to don the corset. An extension panel was used. Patient will up later on to asses. Patient is comfortable in the corset, there are no significant areas of pressure visible, there is adequate motion at the shoulder, pain has been reduced, corset provides intra cavity pressure, device has been checked for defects per 's guidelines,  and controls the lumbar spine in the sagittal plane. Patient is satisfied with the fit and function of the corset.     Patient was given verbal/written instructions on donning/doffing, care instructions, warranty issues, fitting issues, and who to contact if there is an issue.    G: Lumbosacral corset will treat patient's current condition by restricting flexion/extensions of the lumbar spine and provide abdominal compression to offload the surgical site to facilitate healing.     P: Patient will follow Dr. Magaña' wearing schedule and will follow up with the [facility] in [place] as needed.    This note has been electronically signed by Delonte GAXIOLA , ABC #[id number], License #[id number]

## 2018-07-21 ENCOUNTER — APPOINTMENT (OUTPATIENT)
Dept: PHYSICAL THERAPY | Facility: CLINIC | Age: 68
DRG: 455 | End: 2018-07-21
Attending: NEUROLOGICAL SURGERY
Payer: MEDICARE

## 2018-07-21 LAB
ANION GAP SERPL CALCULATED.3IONS-SCNC: 10 MMOL/L (ref 3–14)
BUN SERPL-MCNC: 13 MG/DL (ref 7–30)
CALCIUM SERPL-MCNC: 8.2 MG/DL (ref 8.5–10.1)
CHLORIDE SERPL-SCNC: 105 MMOL/L (ref 94–109)
CO2 SERPL-SCNC: 23 MMOL/L (ref 20–32)
CREAT SERPL-MCNC: 1.04 MG/DL (ref 0.66–1.25)
ERYTHROCYTE [DISTWIDTH] IN BLOOD BY AUTOMATED COUNT: 13.9 % (ref 10–15)
GFR SERPL CREATININE-BSD FRML MDRD: 71 ML/MIN/1.7M2
GLUCOSE BLDC GLUCOMTR-MCNC: 144 MG/DL (ref 70–99)
GLUCOSE BLDC GLUCOMTR-MCNC: 190 MG/DL (ref 70–99)
GLUCOSE BLDC GLUCOMTR-MCNC: 202 MG/DL (ref 70–99)
GLUCOSE BLDC GLUCOMTR-MCNC: 221 MG/DL (ref 70–99)
GLUCOSE SERPL-MCNC: 142 MG/DL (ref 70–99)
HCT VFR BLD AUTO: 40.5 % (ref 40–53)
HGB BLD-MCNC: 13.3 G/DL (ref 13.3–17.7)
MCH RBC QN AUTO: 32.8 PG (ref 26.5–33)
MCHC RBC AUTO-ENTMCNC: 32.8 G/DL (ref 31.5–36.5)
MCV RBC AUTO: 100 FL (ref 78–100)
PLATELET # BLD AUTO: 105 10E9/L (ref 150–450)
POTASSIUM SERPL-SCNC: 4.1 MMOL/L (ref 3.4–5.3)
RBC # BLD AUTO: 4.06 10E12/L (ref 4.4–5.9)
SODIUM SERPL-SCNC: 138 MMOL/L (ref 133–144)
WBC # BLD AUTO: 8.9 10E9/L (ref 4–11)

## 2018-07-21 PROCEDURE — 25000132 ZZH RX MED GY IP 250 OP 250 PS 637: Mod: GY | Performed by: INTERNAL MEDICINE

## 2018-07-21 PROCEDURE — 25000125 ZZHC RX 250: Performed by: NEUROLOGICAL SURGERY

## 2018-07-21 PROCEDURE — 99207 ZZC CONSULT E&M CHANGED TO INITIAL LEVEL: CPT | Performed by: INTERNAL MEDICINE

## 2018-07-21 PROCEDURE — A9270 NON-COVERED ITEM OR SERVICE: HCPCS | Mod: GY | Performed by: NEUROLOGICAL SURGERY

## 2018-07-21 PROCEDURE — 40000193 ZZH STATISTIC PT WARD VISIT: Performed by: PHYSICAL THERAPIST

## 2018-07-21 PROCEDURE — 80048 BASIC METABOLIC PNL TOTAL CA: CPT | Performed by: NEUROLOGICAL SURGERY

## 2018-07-21 PROCEDURE — 12000000 ZZH R&B MED SURG/OB

## 2018-07-21 PROCEDURE — A9270 NON-COVERED ITEM OR SERVICE: HCPCS | Mod: GY | Performed by: NURSE PRACTITIONER

## 2018-07-21 PROCEDURE — A9270 NON-COVERED ITEM OR SERVICE: HCPCS | Mod: GY | Performed by: INTERNAL MEDICINE

## 2018-07-21 PROCEDURE — 25000132 ZZH RX MED GY IP 250 OP 250 PS 637: Mod: GY | Performed by: NEUROLOGICAL SURGERY

## 2018-07-21 PROCEDURE — 97116 GAIT TRAINING THERAPY: CPT | Mod: GP | Performed by: PHYSICAL THERAPIST

## 2018-07-21 PROCEDURE — 25000132 ZZH RX MED GY IP 250 OP 250 PS 637: Mod: GY | Performed by: NURSE PRACTITIONER

## 2018-07-21 PROCEDURE — 00000146 ZZHCL STATISTIC GLUCOSE BY METER IP

## 2018-07-21 PROCEDURE — 99222 1ST HOSP IP/OBS MODERATE 55: CPT | Performed by: INTERNAL MEDICINE

## 2018-07-21 PROCEDURE — 97161 PT EVAL LOW COMPLEX 20 MIN: CPT | Mod: GP | Performed by: PHYSICAL THERAPIST

## 2018-07-21 PROCEDURE — 97530 THERAPEUTIC ACTIVITIES: CPT | Mod: GP | Performed by: PHYSICAL THERAPIST

## 2018-07-21 PROCEDURE — 85027 COMPLETE CBC AUTOMATED: CPT | Performed by: NEUROLOGICAL SURGERY

## 2018-07-21 PROCEDURE — 36415 COLL VENOUS BLD VENIPUNCTURE: CPT | Performed by: NEUROLOGICAL SURGERY

## 2018-07-21 PROCEDURE — 25000128 H RX IP 250 OP 636

## 2018-07-21 RX ORDER — HYDROCODONE BITARTRATE AND ACETAMINOPHEN 5; 325 MG/1; MG/1
1-2 TABLET ORAL EVERY 4 HOURS PRN
Status: DISCONTINUED | OUTPATIENT
Start: 2018-07-21 | End: 2018-07-23 | Stop reason: HOSPADM

## 2018-07-21 RX ADMIN — LOSARTAN POTASSIUM 25 MG: 25 TABLET, FILM COATED ORAL at 08:48

## 2018-07-21 RX ADMIN — ACETAMINOPHEN 975 MG: 325 TABLET, FILM COATED ORAL at 05:58

## 2018-07-21 RX ADMIN — HYDROCODONE BITARTRATE AND ACETAMINOPHEN 2 TABLET: 5; 325 TABLET ORAL at 21:54

## 2018-07-21 RX ADMIN — METFORMIN HYDROCHLORIDE 1000 MG: 500 TABLET ORAL at 17:35

## 2018-07-21 RX ADMIN — HYDROCODONE BITARTRATE AND ACETAMINOPHEN 2 TABLET: 5; 325 TABLET ORAL at 18:20

## 2018-07-21 RX ADMIN — CEFAZOLIN SODIUM 2 G: 2 INJECTION, SOLUTION INTRAVENOUS at 10:36

## 2018-07-21 RX ADMIN — ONDANSETRON 4 MG: 4 TABLET, ORALLY DISINTEGRATING ORAL at 13:53

## 2018-07-21 RX ADMIN — ATORVASTATIN CALCIUM 40 MG: 40 TABLET, FILM COATED ORAL at 08:48

## 2018-07-21 RX ADMIN — HYDROCODONE BITARTRATE AND ACETAMINOPHEN 2 TABLET: 5; 325 TABLET ORAL at 13:36

## 2018-07-21 RX ADMIN — CEFAZOLIN SODIUM 2 G: 2 INJECTION, SOLUTION INTRAVENOUS at 17:38

## 2018-07-21 RX ADMIN — CEFAZOLIN SODIUM 2 G: 2 INJECTION, SOLUTION INTRAVENOUS at 01:11

## 2018-07-21 RX ADMIN — DOCUSATE SODIUM 100 MG: 100 CAPSULE, LIQUID FILLED ORAL at 08:48

## 2018-07-21 RX ADMIN — METFORMIN HYDROCHLORIDE 1000 MG: 500 TABLET ORAL at 08:48

## 2018-07-21 RX ADMIN — HYDROMORPHONE HYDROCHLORIDE: 10 INJECTION, SOLUTION INTRAMUSCULAR; INTRAVENOUS; SUBCUTANEOUS at 05:59

## 2018-07-21 ASSESSMENT — ACTIVITIES OF DAILY LIVING (ADL)
ADLS_ACUITY_SCORE: 12
ADLS_ACUITY_SCORE: 9
ADLS_ACUITY_SCORE: 11

## 2018-07-21 NOTE — PROGRESS NOTES
[facility]    Neurosurgery Progress Note    Date of Service (when I saw the patient): 07/21/2018     Assessment & Plan   Edwardo Dumont is a 68 year old male who was admitted on 7/20/2018 with low back pain and lumbar radiculopathy.  He underwent L3-4 bilateral laminectomy with bilateral facetectomies with resection of bilateral synovial cyst and L4-5 TLIF with Dr. Shoaib Magaña on 7/20/18.  This morning he is sitting up in the chair with minimal pain, rating his low back pain 2/10. Patient is asking to switch from Dilaudid to [place] as he finds this better for pain control. He states he is tolerating diet and denies N/V.  He worked with PT this am, walked hallways without difficulty.  We discussed plan for pain control and mobilize.  One GLEN still has high output so we discussed depending how he does today and if GLEN output slows down likely DC tomorrow or the next day.     Active Problems:    S/P lumbar fusion    Assessment: S/p L3-4 laminectomy and L4-5 TLIF  Plan: PT/OT and ambulation  Brace when out of bed   Wean off PCA and start oral analgesics   Encourage use of IS and deep breathing   Keep drains in until less than 30 cc's   Suture/Staple removal in 10-14 days      I have discussed the following assessment and plan with Dr. Magaña who is in agreement with initial plan and will follow up with further consultation recommendations.    Mita Cardenas [facility]  [facility]  [address] [address]    Tel [phone]  Pager [phone]      Interval History   Stable, day 1 post-op    Physical Exam   Temp: 98.7  F (37.1  C) Temp src: Oral BP: 143/75   Heart Rate: 77 Resp: 18 SpO2: 93 % O2 Device: None (Room air) Oxygen Delivery: 2 LPM  Vitals:    07/20/18 0551   Weight: 234 lb (106.1 kg)     Vital Signs with Ranges  Temp:  [95.2  F (35.1  C)-98.7  F (37.1  C)] 98.7  F (37.1  C)  Heart Rate:  [46-79] 77  Resp:  [7-18] 18  BP:  "(121-155)/(68-88) 143/75  FiO2 (%):  [100 %] 100 %  SpO2:  [91 %-100 %] 93 %  I/O last 3 completed shifts:  In: 5509 [P.O.:1855; I.V.:3654]  Out: 4355 [Urine:3775; Drains:380; Blood:200]    Heart Rate: 77, Blood pressure 143/75, pulse 72, temperature 98.7  F (37.1  C), temperature source Oral, resp. rate 18, height 6' 1\" (1.854 m), weight 234 lb (106.1 kg), SpO2 93 %.  234 lbs 0 oz  HEENT:  Normocephalic, atraumatic.  PERRLA.  EOM s intact.    Neck:  Supple, non-tender, without lymphadenopathy.  Heart:  No peripheral edema  Lungs:  No SOB  Skin:  Warm and dry, good capillary refill.  Dressing intact, dry. GLEN x2 intact,  Extremities:  Good radial and dorsalis pedis pulses bilaterally, no edema, cyanosis or clubbing.    NEUROLOGICAL EXAMINATION:     Mental status:  Alert and Oriented x 3, speech is fluent.  Cranial nerves:  II-XII intact.   Motor:  Strength is 5/5 throughout the upper and lower extremities  Shoulder Abduction:  Right:  5/5   Left:  5/5  Biceps:                      Right:  5/5   Left:  5/5  Triceps:                     Right:  5/5   Left:  5/5  Wrist Extensors:       Right:  5/5   Left:  5/5  Wrist Flexors:           Right:  5/5   Left:  5/5  interosseus :            Right:  5/5   Left:  5/5   Hip Flexor:                Right: 5/5  Left:  5/5  Hip Adductor:             Right:  5/5  Left:  5/5  Hip Abductor:             Right:  5/5  Left:  5/5  Gastroc Soleus:        Right:  5/5  Left:  5/5  Tib/Ant:                      Right:  5/5  Left:  5/5  EHL:                     Right:  5/5  Left:  5/5  Sensation:  Intact  Gait:  Deferred; Sitting up in chair.      Medications     0.9% sodium chloride + KCl 20 mEq/L 100 mL/hr at 07/20/18 1423     HYDROmorphone         acetaminophen  975 mg Oral Q8H     atorvastatin  40 mg Oral Daily     ceFAZolin  2 g Intravenous Q8H     docusate sodium  100 mg Oral BID     losartan  25 mg Oral Daily     metFORMIN  1,000 mg Oral BID w/meals     sodium chloride (PF)  3 mL " Intracatheter Q8H       Data     CBC RESULTS:   Recent Labs   Lab Test  07/21/18   0629   WBC  8.9   RBC  4.06*   HGB  13.3   HCT  40.5   MCV  100   MCH  32.8   MCHC  32.8   RDW  13.9   PLT  105*     Basic Metabolic Panel:  Lab Results   Component Value Date     07/21/2018      Lab Results   Component Value Date    POTASSIUM 4.1 07/21/2018     Lab Results   Component Value Date    CHLORIDE 105 07/21/2018     Lab Results   Component Value Date    ANCA 8.2 07/21/2018     Lab Results   Component Value Date    CO2 23 07/21/2018     Lab Results   Component Value Date    BUN 13 07/21/2018     Lab Results   Component Value Date    CR 1.04 07/21/2018     Lab Results   Component Value Date     07/21/2018     INR:  Lab Results   Component Value Date    INR 1.8 01/27/2011    INR 1.5 01/21/2011    INR 2.4 01/17/2011

## 2018-07-21 NOTE — PLAN OF CARE
Problem: Laminectomy/Foraminotomy/Discectomy (Adult)  Goal: Signs and Symptoms of Listed Potential Problems Will be Absent, Minimized or Managed (Laminectomy/Foraminotomy/Discectomy)  Signs and symptoms of listed potential problems will be absent, minimized or managed by discharge/transition of care (reference Laminectomy/Foraminotomy/Discectomy (Adult) CPG).   Outcome: No Change  A&Ox4, up Ax1 w/corset brace  LDA: PIV infusing, IV ancef  Vitals stable, RA  Pain: minimal, PCA 0.2/10 min, sched tylenol     CMS intact  Dressing CDI  GLEN x2 patent  Zapata DC'd, DTV  Clear liquid diet, tolerating  Followed by Neurosurg  Plan: PT & OT consults, DTV  Will continue to monitor

## 2018-07-21 NOTE — PLAN OF CARE
Problem: Patient Care Overview  Goal: Plan of Care/Patient Progress Review  PT: PT eval completed. Pt is status post lumbar surgery. Pt lives with wife in 2 story home with basement and able to stay on main floor.   Discharge Planner PT   Patient plan for discharge: home with wife  Current status: Pt able to perform ambulation in hallway without FWW. Pt was mildly dizzy but BP stable. Up in chair at end of session. Min A for bed mob, SBA for other mobility items  Barriers to return to prior living situation: none  Recommendations for discharge: Home with spouse, may benefit from OP PT as would like to return to golfing  Rationale for recommendations: Pt is close to meeting PT goals, likely able to meet them in 1 -2 more session. Tolerating mobility well and has support at home. Pt may benefit from continued PT in OP setting for improved recovery to return to sports.        Entered by: Dyan Cheek 07/21/2018 9:05 AM         PM session: Goals met, wife present for education.    Physical Therapy Discharge Summary     Reason for therapy discharge:    All goals and outcomes met, no further needs identified.     Progress towards therapy goal(s). See goals on Care Plan in [facility] electronic health record for goal details.  Goals met     Therapy recommendation(s):    Continue home exercise program.

## 2018-07-21 NOTE — CONSULTS
[facility]  Hospitalist consult note  July 21, 2018  Name: Edwardo uDmont    MRN: [id number]  YOB: 1950    Age: 68 year old  Date of admission: 7/20/2018  Primary care provider: Isa Valverde      Summary:  Patient is a 68-year-old gentleman with a history of coronary artery disease status post stenting 3-4 years ago, hypertension, hyperlipidemia, sleep apnea, gout, and type 2 diabetes only on metformin who presents here for lumbar spine surgery.    Problem list/Plan:  1. Lumbar spine surgery: Doing well from a pain perspective.  Actually was able to ambulate last evening.  Will defer pain control and DVT prophylaxis to neurosurgery.  2. Type 2 diabetes: Patient has a 's license and cannot be placed on insulin even if it short-term.  Will continue metformin alone for now.  Blood glucose is otherwise reasonable.  3. Hyperlipidemia: Continue Lipitor  4. Hypertension: Reasonably controlled and mildly exacerbated by pain.  Okay to continue patient's chronic Cozaar      All lab work and imaging data independently reviewed by myself    Prophylaxis;  DVT prophylaxis per neurosurgery/Ambulation.     Code status: Full code    Discharge: Per neurosurgery  Thank you for the consultation.  Will follow with you while inpatient.  Requesting physician: Dr. Shoaib Magaña   Reason for consultation: Postoperative medical management   HPI  Patient is a 68-year-old gentleman with a history of coronary artery disease status post stenting 3-4 years ago, hypertension, hyperlipidemia, sleep apnea, gout, and type 2 diabetes only on metformin who presents here for lumbar spine surgery.  We are asked by Dr. Magaña to consult for postoperative medical management.  Please see details of his lumbar spine surgery as outlined in epic.  He is seen postop day #1.  Only reports having a poor sleep but is only percussing melatonin.  Currently no chest pain or shortness of breath.   Feels hungry and just once diet advanced.  Otherwise, no chest pain, shortness of breath, nausea, or vomiting.     Past Medical History:     Past Medical History:   Diagnosis Date     Acromioclavicular joint separation, type 1 9/12    right     Allergic rhinitis     causes chronic  cough     Arthritis      CKD (chronic kidney disease) stage 3, GFR 30-59 ml/min      Degenerative arthritis of hip - right 12/27/2010     Gout      Hip arthrosis - right 10/25/2010     Hyperlipidemia      Ischaemic vascular disease      Ischemic vascular disease 5/12    one stent     OA (osteoarthritis) of knee 10/25/2010     Renal insufficiency      Sleep apnea     Does Not use a CPAP-  Lost weight     Type II or unspecified type diabetes mellitus without mention of complication, not stated as uncontrolled      Unspecified essential hypertension      Past Surgical History:     Past Surgical History:   Procedure Laterality Date     C ABLATION SUPRAVENT ARRHYTHMOGENIC FOCUS  12/21/15    at [facility]     C TOTAL HIP ARTHROPLASTY  1/11/11    Rt YOGI     ENDOSCOPIC SINUS SURGERY  12/14/15     ENDOSCOPIC SINUS SURGERY Bilateral 12/14/2015    Procedure: ENDOSCOPIC SINUS SURGERY;  Surgeon: Kei Cevallos MD;  Location: [facility] OR     GASTRIC BYPASS  1/03    lap band     SINUS SURGERY Right 12-14-15    Dr. Cevallos     STENT  5/8/12    OM2 cornary artery stent     STENT  01/2017    3 stents placed in coronary arteries while in [place]     STENT, CORONARY, HANG  01/2017    2 stents in [place].     Social History:     Social History   Substance Use Topics     Smoking status: Former Smoker     Packs/day: 1.00     Years: 5.00     Types: Cigarettes     Start date: 1/1/1970     Quit date: 9/8/1989     Smokeless tobacco: Never Used      Comment: non-smoking household     Alcohol use Yes      Comment: couple drinks 3 x a week     Family History:  Family history reviewed. NO pertinent family history     Allergies:     Allergies   Allergen Reactions     Benzonatate  "Anaphylaxis and Swelling     Lisinopril Cough     Zocor [Simvastatin - High Dose]      Poor memory     Medications:     Prescriptions Prior to Admission   Medication Sig Dispense Refill Last Dose     aspirin 81 MG tablet Take 1 tablet (81 mg) by mouth daily   7/17/2018     atorvastatin (LIPITOR) 40 MG tablet Take 1 tablet (40 mg) by mouth daily 90 tablet 4 7/19/2018 at Unknown time     clopidogrel (PLAVIX) 75 MG tablet Take 75 mg by mouth daily    7/14/2018     JARDIANCE 10 MG TABS tablet TAKE ONE TABLET BY MOUTH EVERY DAY 90 tablet 0 7/19/2018 at Unknown time     losartan (COZAAR) 25 MG tablet Take 1 tablet (25 mg) by mouth daily 90 tablet 4 7/19/2018 at Unknown time     metFORMIN (GLUCOPHAGE) 500 MG tablet TAKE 2 TABLETS BY MOUTH TWICE DAILY WITH MEALS 360 tablet 3 7/19/2018 at Unknown time     ACCU-CHEK SMARTVIEW test strip ONCE DAILY TESTING AND AS NEEDED 100 strip 4 Taking     ACE NOT PRESCRIBED, INTENTIONAL, ACE Inhibitor not prescribed due to Other: cough 0 each 0 Taking     blood glucose monitoring ([employer] CONTOUR MONITOR) meter device kit Use to test blood sugar  times daily or as directed. 1 kit 0 Taking     IBUPROFEN PO Take 200 mg by mouth every 6 hours as needed for moderate pain    7/6/2018 at Unknown time     nitroglycerin (NITROSTAT) 0.4 MG SL tablet Place 1 tablet (0.4 mg) under the tongue every 5 minutes as needed for chest pain 90 tablet 4 More than a month at Unknown time       Review of Systems:   A Comprehensive greater than 10 system review of systems was carried out.  Pertinent positives and negatives are noted above.  Otherwise negative for contributory information.        Physical Exam:  Blood pressure 143/75, pulse 72, temperature 98.7  F (37.1  C), temperature source Oral, resp. rate 18, height 1.854 m (6' 1\"), weight 106.1 kg (234 lb), SpO2 93 %.  Gen: Pleasant in no acute distress.  HEENT: NCAT. EOMI. PERRL.  Neck: Normal inspection. No bruit, JVD or thyromegaly.  Lungs: Normal " respiratory effort. Clear to auscultation bilaterally with no crackles or wheezes.  Card: N s1s2. RRR. No M/R/G.  Peripheral pulses present and symmetric.   Skin: No rash. Warm to the touch  Extr: No edema. CMS intact  Psychiatric: Patient alert oriented ×3.  Normal affect  Neurologic: Cranial nerves II-XII are intact.  Sensation normal.  Motor strength 5/5    Data:       Recent Labs  Lab 07/21/18  0629 07/20/18 0621   WBC 8.9  --    HGB 13.3 15.6   HCT 40.5  --      --    *  --        Recent Labs  Lab 07/21/18  0629 07/20/18  0621 07/17/18  1559     --  142   POTASSIUM 4.1  --  4.2   CHLORIDE 105  --  109   CO2 23  --  21   ANIONGAP 10  --  12   * 180* 155*   BUN 13  --  27   CR 1.04  --  1.38*   GFRESTIMATED 71  --  51*   GFRESTBLACK 86  --  62   ANCA 8.2*  --  9.0       Imaging:   Recent Results (from the past 24 hour(s))   XR Lumbar Spine Port 1 View    Narrative    This exam was marked as non-reportable because it will not be read by a   radiologist or a [place] non-radiologist provider.             XR Surgery STEFANIE Fluoro L/T 5 Min w Stills    Narrative    This exam was marked as non-reportable because it will not be read by a   radiologist or a [place] non-radiologist provider.             XR Lumbar Spine Port 1 View    Narrative    This exam was marked as non-reportable because it will not be read by a   radiologist or a [place] non-radiologist provider.                   Venancio Conley MD Pager [phone]      Venancio Conley [phone]

## 2018-07-21 NOTE — PROGRESS NOTES
07/21/18 0806   Quick Adds   Type of Visit Initial PT Evaluation   Living Environment   Lives With spouse   Living Arrangements house   Number of Stairs to Enter Home 2   Number of Stairs Within Home 12  (basement and upstairs but no need to acess)   Stair Railings at Home inside, present on right side   Transportation Available car;family or friend will provide   Living Environment Comment walk in shower   Self-Care   Regular Exercise yes   Activity/Exercise Type biking   Exercise Amount/Frequency 3-5 times/wk  (45 mins)   Equipment Currently Used at Home shower chair   Functional Level Prior   Ambulation 0-->independent   Transferring 0-->independent   Toileting 0-->independent   Bathing 0-->independent   Dressing 0-->independent   Eating 0-->independent   Communication 0-->understands/communicates without difficulty   Swallowing 0-->swallows foods/liquids without difficulty   Cognition 0 - no cognition issues reported   Fall history within last six months no   Prior Functional Level Comment Pt independent with all cares and activities   General Information   Onset of Illness/Injury or Date of Surgery - Date 07/20/18   Referring Physician Dr. Magaña   Patient/Family Goals Statement Pt is hoping to D/C home   Pertinent History of Current Problem (include personal factors and/or comorbidities that impact the POC) Patient is a 68-year-old gentleman with a history of coronary artery disease status post stenting 3-4 years ago, hypertension, hyperlipidemia, sleep apnea, gout, and type 2 diabetes only on metformin who presents here for lumbar spine surgery.   Precautions/Limitations spinal precautions   Cognitive Status Examination   Orientation orientation to person, place and time   Level of Consciousness alert   Follows Commands and Answers Questions 100% of the time;able to follow multistep instructions   Personal Safety and Judgment intact   Memory intact   Pain Assessment   Patient Currently in Pain Yes, see Vital  "Sign flowsheet   Integumentary/Edema   Integumentary/Edema Comments as expected following spine surgery   Posture    Posture Forward head position;Protracted shoulders   Range of Motion (ROM)   ROM Comment able to perform LEs WFL   Strength   Strength Comments able to perform SAQ, mild strength deficits with ambulation   Bed Mobility   Bed Mobility Comments min A   Transfer Skills   Transfer Comments SBA   Gait   Gait Comments SBA   Balance   Balance Comments No LOB, but mild dizziness   Modality Interventions   Planned Modality Interventions Cryotherapy   General Therapy Interventions   Planned Therapy Interventions balance training;bed mobility training;gait training;transfer training;strengthening;home program guidelines;risk factor education;progressive activity/exercise   Clinical Impression   Criteria for Skilled Therapeutic Intervention yes, treatment indicated   PT Diagnosis decreased functional mobility status post spinal surgery   Influenced by the following impairments decreased strength, spinal prec, need for brace   Functional limitations due to impairments decreased bed mob, transfers, ambulation   Clinical Presentation Stable/Uncomplicated   Clinical Presentation Rationale improving status post Lumbar fusion   Clinical Decision Making (Complexity) Low complexity   Therapy Frequency` 2 times/day   Predicted Duration of Therapy Intervention (days/wks) 2 days   Anticipated Discharge Disposition Home with Assist   Risk & Benefits of therapy have been explained Yes   Patient, Family & other staff in agreement with plan of care Yes   [facility] [employer] [employer] \"6 Clicks\"   2016, Trustees of [facility], under license to [employer].  All rights reserved.   6 Clicks Short Forms Basic Mobility Inpatient Short Form   [facility]  \"6 Clicks\" V.2 Basic Mobility Inpatient Short Form   1. Turning from your back to your side while in a flat bed without using bedrails? 3 - A Little   2. Moving " from lying on your back to sitting on the side of a flat bed without using bedrails? 3 - A Little   3. Moving to and from a bed to a chair (including a wheelchair)? 3 - A Little   4. Standing up from a chair using your arms (e.g., wheelchair, or bedside chair)? 3 - A Little   5. To walk in hospital room? 3 - A Little   6. Climbing 3-5 steps with a railing? 3 - A Little   Basic Mobility Raw Score (Score out of 24.Lower scores equate to lower levels of function) 18   Total Evaluation Time   Total Evaluation Time (Minutes) 10

## 2018-07-21 NOTE — PLAN OF CARE
Problem: Laminectomy/Foraminotomy/Discectomy (Adult)  Goal: Signs and Symptoms of Listed Potential Problems Will be Absent, Minimized or Managed (Laminectomy/Foraminotomy/Discectomy)  Signs and symptoms of listed potential problems will be absent, minimized or managed by discharge/transition of care (reference Laminectomy/Foraminotomy/Discectomy (Adult) CPG).   Outcome: Improving  Pt alert and oriented X 4. Apical pulse regular. Lung sounds clear throughout all lobes. Pt denies pain, PCA discontinued, hydrocodone available for pain management. Pt ambulating in hallways with standby assist. Pt tolerating Mod Cho diet and reports passing flatus, Zapata due to void at 1200. Pt able to void post Zapata removal. Pt ambulating hallways with SBA. Therapy following.   Pt blood sugar 202 post meal.  Pt reports pain 5/10 and nausea. 2 tabs Norco administered and 4mg Zofran. Pt sleeping.

## 2018-07-22 ENCOUNTER — APPOINTMENT (OUTPATIENT)
Dept: OCCUPATIONAL THERAPY | Facility: CLINIC | Age: 68
DRG: 455 | End: 2018-07-22
Attending: NEUROLOGICAL SURGERY
Payer: MEDICARE

## 2018-07-22 LAB
GLUCOSE BLDC GLUCOMTR-MCNC: 148 MG/DL (ref 70–99)
GLUCOSE BLDC GLUCOMTR-MCNC: 151 MG/DL (ref 70–99)
GLUCOSE BLDC GLUCOMTR-MCNC: 166 MG/DL (ref 70–99)

## 2018-07-22 PROCEDURE — A9270 NON-COVERED ITEM OR SERVICE: HCPCS | Mod: GY | Performed by: NEUROLOGICAL SURGERY

## 2018-07-22 PROCEDURE — 25000128 H RX IP 250 OP 636

## 2018-07-22 PROCEDURE — 00000146 ZZHCL STATISTIC GLUCOSE BY METER IP

## 2018-07-22 PROCEDURE — 25000132 ZZH RX MED GY IP 250 OP 250 PS 637: Mod: GY | Performed by: INTERNAL MEDICINE

## 2018-07-22 PROCEDURE — 25000132 ZZH RX MED GY IP 250 OP 250 PS 637: Mod: GY | Performed by: NEUROLOGICAL SURGERY

## 2018-07-22 PROCEDURE — A9270 NON-COVERED ITEM OR SERVICE: HCPCS | Mod: GY | Performed by: NURSE PRACTITIONER

## 2018-07-22 PROCEDURE — 97530 THERAPEUTIC ACTIVITIES: CPT | Mod: GO | Performed by: REHABILITATION PRACTITIONER

## 2018-07-22 PROCEDURE — 97165 OT EVAL LOW COMPLEX 30 MIN: CPT | Mod: GO | Performed by: REHABILITATION PRACTITIONER

## 2018-07-22 PROCEDURE — 25000132 ZZH RX MED GY IP 250 OP 250 PS 637: Mod: GY | Performed by: NURSE PRACTITIONER

## 2018-07-22 PROCEDURE — 40000133 ZZH STATISTIC OT WARD VISIT: Performed by: REHABILITATION PRACTITIONER

## 2018-07-22 PROCEDURE — A9270 NON-COVERED ITEM OR SERVICE: HCPCS | Mod: GY | Performed by: INTERNAL MEDICINE

## 2018-07-22 PROCEDURE — 12000000 ZZH R&B MED SURG/OB

## 2018-07-22 PROCEDURE — 99232 SBSQ HOSP IP/OBS MODERATE 35: CPT | Performed by: INTERNAL MEDICINE

## 2018-07-22 PROCEDURE — 97535 SELF CARE MNGMENT TRAINING: CPT | Mod: GO | Performed by: REHABILITATION PRACTITIONER

## 2018-07-22 RX ADMIN — CEFAZOLIN SODIUM 2 G: 2 INJECTION, SOLUTION INTRAVENOUS at 02:05

## 2018-07-22 RX ADMIN — ATORVASTATIN CALCIUM 40 MG: 40 TABLET, FILM COATED ORAL at 08:25

## 2018-07-22 RX ADMIN — DOCUSATE SODIUM 100 MG: 100 CAPSULE, LIQUID FILLED ORAL at 21:08

## 2018-07-22 RX ADMIN — LOSARTAN POTASSIUM 25 MG: 25 TABLET, FILM COATED ORAL at 08:25

## 2018-07-22 RX ADMIN — ACETAMINOPHEN 975 MG: 325 TABLET, FILM COATED ORAL at 21:08

## 2018-07-22 RX ADMIN — CEFAZOLIN SODIUM 2 G: 2 INJECTION, SOLUTION INTRAVENOUS at 09:41

## 2018-07-22 RX ADMIN — DOCUSATE SODIUM 100 MG: 100 CAPSULE, LIQUID FILLED ORAL at 08:25

## 2018-07-22 RX ADMIN — HYDROCODONE BITARTRATE AND ACETAMINOPHEN 2 TABLET: 5; 325 TABLET ORAL at 06:27

## 2018-07-22 RX ADMIN — CEFAZOLIN SODIUM 2 G: 2 INJECTION, SOLUTION INTRAVENOUS at 17:41

## 2018-07-22 RX ADMIN — ACETAMINOPHEN 975 MG: 325 TABLET, FILM COATED ORAL at 14:09

## 2018-07-22 RX ADMIN — METFORMIN HYDROCHLORIDE 1000 MG: 500 TABLET ORAL at 08:25

## 2018-07-22 RX ADMIN — METFORMIN HYDROCHLORIDE 1000 MG: 500 TABLET ORAL at 17:39

## 2018-07-22 ASSESSMENT — ACTIVITIES OF DAILY LIVING (ADL)
ADLS_ACUITY_SCORE: 11
PREVIOUS_RESPONSIBILITIES: MEAL PREP;HOUSEKEEPING;LAUNDRY;MEDICATION MANAGEMENT;FINANCES;DRIVING
ADLS_ACUITY_SCORE: 11
ADLS_ACUITY_SCORE: 11
ADLS_ACUITY_SCORE: 10
ADLS_ACUITY_SCORE: 10
ADLS_ACUITY_SCORE: 11

## 2018-07-22 NOTE — DISCHARGE INSTRUCTIONS
While on narcotic pain medication, to prevent constipation:  1. Drink plenty of water to keep well hydrated   2. May take over the counter Colace or Senna (follow instructions on label)    Call your physician if you experience:  1. Fever greater than 100 degrees with body chills or excessive sweating.  2. Increased redness, localized warmth, tenderness, drainage or swelling at incision site.  Opening or pulling apart of incision site.   3. Pain not controlled with oral pain medications, ice and rest.   4. No bowel movement in 3 days (may use Milk of Magnesia or other over the counter remedy).  5. Chest pain, shortness of breath, and/or calf pain with excessive swelling.  6. Generalized feeling of illness.  7. Any other questions or concerns related to your surgery/recovery.      Thank you for allowing [facility] to participate in your cares!!

## 2018-07-22 NOTE — PLAN OF CARE
Problem: Patient Care Overview  Goal: Plan of Care/Patient Progress Review  Outcome: Improving  Pt. A&O x4. CMS in tact. Scheduled Tylenol given for pain. Dressing had a small amount of dried drainage. New dressing applied this shift, clean, dry, and intact. Per neuro NP switched GLEN to gravity drainage. GLEN drained 65ml this shift. On IV Ancef. Saline locked. Voiding adequate amounts. Pt. Requesting less frequent blood sugar checks. Notified hospitalist. We will do am and pm blood sugars, no blood glucose check orders noted. Ambulated independently with brace in room. DC plans to home after drain is removed.

## 2018-07-22 NOTE — PLAN OF CARE
Problem: Patient Care Overview  Goal: Plan of Care/Patient Progress Review  Orders received for OT; evaluation and treatment initiated. Pt has a history of coronary artery disease status post stenting 3-4 years ago, hypertension, hyperlipidemia, sleep apnea, gout, and type 2 diabetes only on metformin who presents here for lumbar spine surgery on 7/20/2018. Pt reports living with spouse in a multi-story home where needs are met within first floor and reports having a walk-in shower and standard toilet seat.  Discharge Planner OT   Patient plan for discharge: Home with Assist   Current status:   Therapist provided education and handouts on energy conservation methods and spinal precautions. Pt demonstrated I donning/doffing socks; without using AE. Pt refused completing UB dressing and reported confidence donning/doffing brace; therapist provided UB dressing techniques.  Pt demonstrated SBA transferring sit<>stand and the same level of assist walking to the bathroom and transferring into the walk-in shower. Pt walked to the satellite room with SBA; ~190 . Pt completed standard toilet transfer with SBA. Pt completed tub/shower transfer with SBA; to simulate car transfer. Therapist provided education on environmental modifications to make within the shower and recommended DME/AE.Therapist provided education on toileting strategies available, safe car transfer techniques, and hygiene strategies to use at the sink while aligning with precautions.   Barriers to return to prior living situation: Reduced endurance for I/ADLs and spinal precautions   Recommendations for discharge: Home with assist as needed for I/ADLs and extended shower sponge   Rationale for recommendations: Pt demonstrating SBA through transfers and reports confidence returning home with spouse to support.        Entered by: Patricia Vale 07/22/2018 12:58 PM    Occupational Therapy Discharge Summary    Reason for therapy discharge:    All goals and  outcomes met, no further needs identified.    Progress towards therapy goal(s). See goals on Care Plan in [facility] electronic health record for goal details.  Goals met    Therapy recommendation(s):    No further therapy is recommended.

## 2018-07-22 NOTE — PLAN OF CARE
Problem: Patient Care Overview  Goal: Plan of Care/Patient Progress Review  A/O, VSS soft blood pressure.  Impulsive set off bed alarm.  Very agitated, uncooperative just want to be left alone so he can sleep.   Refused to wear brace refused to have alarm on. Plan is to  discharge home today.  Norco given for pain. GLEN drain on left was removed

## 2018-07-22 NOTE — PLAN OF CARE
Problem: Patient Care Overview  Goal: Plan of Care/Patient Progress Review  Outcome: Improving  Vital signs stable.  Lungs clear, encouraged inspirometer use,Pt ambulated wearing corset brace.  CMS intact.  Bowel sounds hypoactive, voiding.  Dressing intact, drains patent.  Hgb 13.3.  Pain controlled with ice pack application, norco tabs.  Post op plan of care reviewed with pt.

## 2018-07-22 NOTE — PROGRESS NOTES
"               [facility]  Hospitalist Progress Note  Name: Edwardo Dumont    MRN: [id number]  YOB: 1950    Age: 68 year old  Date of admission: 7/20/2018  Primary care provider: Isa Valverde      Reason for Stay (Diagnosis): L spine surgery         Assessment and Plan:      Summary of Stay:  Patient is a 68-year-old gentleman with a history of coronary artery disease status post stenting 3-4 years ago, hypertension, hyperlipidemia, sleep apnea, gout, and type 2 diabetes only on metformin who presents here for lumbar spine surgery.    Problem List/Plan:  1. Lumbar spine surgery: Pain control.  Defer pain management and DVT prophylaxis per neurosurgery.  2. Type 2 diabetes: Continue metformin.  Blood glucose a little bit elevated last evening but reasonable this morning.  May resume his chronic jardience at discharge is not available in supply.  3. Hyperlipidemia: Continue Lipitor  4. Hypertension: Controlled.  Continue Cozaar      DVT Prophylaxis: Defer to primary service  Code Status: Full Code  Discharge Dispo: Home  Estimated Disch Date / # of Days until Disch: Okay to discharge from medicine perspective        Interval History (Subjective):      Pain controlled.  Was hyperglycemic last evening but blood glucose this morning is reasonable.         Physical Exam:      Vital signs:  Temp: 95.8  F (35.4  C) Temp src: Oral BP: 122/75   Heart Rate: 82 Resp: 16 SpO2: 95 % O2 Device: None (Room air) Oxygen Delivery: 2 LPM Height: 185.4 cm (6' 1\") Weight: 106.1 kg (234 lb)  Estimated body mass index is 30.87 kg/(m^2) as calculated from the following:    Height as of this encounter: 1.854 m (6' 1\").    Weight as of this encounter: 106.1 kg (234 lb).    I/O last 3 completed shifts:  In: 1885 [P.O.:1885]  Out: 2655 [Urine:2350; Drains:305]  Vitals:    07/20/18 0551   Weight: 106.1 kg (234 lb)       Constitutional: Awake, alert, cooperative, no apparent distress               Skin: No " rashes, no cyanosis, dry to touch   Neuro: CN 2-12 intact, no localizing weakness   Extremities: No edema, normal range of motion   HEENT Normocephalic, atraumatic, normal nasal turbinates; oropharynx clear   Neck Supple; nl inspection; trachea midline; no thryomegaly   Psychiatric: A+O x3. Normal affect          Medications:      All current medications were reviewed with changes reflected in problem list.         Data:      All new lab and imaging data was reviewed.   Labs:    Recent Labs  Lab 07/21/18 0629 07/20/18 0621   WBC 8.9  --    HGB 13.3 15.6   HCT 40.5  --      --    *  --        Recent Labs  Lab 07/21/18 0629 07/20/18 0621 07/17/18  1559     --  142   POTASSIUM 4.1  --  4.2   CHLORIDE 105  --  109   CO2 23  --  21   ANIONGAP 10  --  12   * 180* 155*   BUN 13  --  27   CR 1.04  --  1.38*   GFRESTIMATED 71  --  51*   GFRESTBLACK 86  --  62   ANCA 8.2*  --  9.0      Imaging:   No results found for this or any previous visit (from the past 24 hour(s)).    Venancio Conley -[phone]

## 2018-07-22 NOTE — PROGRESS NOTES
"[facility]    Neurosurgery Progress Note    Date of Service (when I saw the patient): 07/22/2018     Assessment & Plan   Edwardo Dumont is a 68 year old male who was admitted on 7/20/2018 with low back pain and lumbar radiculopathy.  He underwent L3-4 bilateral laminectomy with bilateral facetectomies with resection of bilateral synovial cyst and L4-5 TLIF with Dr. Shoaib Magaña on 7/20/18.  This morning he is lying supine in bed, rating his low back pain 3-4/10.  He states he is ready to go home today.  We discussed that he would need to stay another night due to drain output, volume was 50cc in the past 3.5 hrs this am.  He expresses frustration with being \"woken up\" during the night.  He has been working with PT/OT, doing well.  Tolerating diet. Per Dr. Magaña ok for drain to gravity and neuro checks q 12 hours.    Active Problems:    S/P lumbar fusion    Assessment: Day 2 post fusion    Plan: Pain Control  Likely DC tomorrow if drain output <30cc per 8 hr shift      I have discussed the following assessment and plan with Dr. Magaña who is in agreement with initial plan and will follow up with further consultation recommendations.    Mita Cardenas [facility]  [facility]  [place] [address]    Tel [phone]  Pager [phone]      Interval History   Stable, day 2 postop    Physical Exam   Temp: 95.8  F (35.4  C) Temp src: Oral BP: 122/75   Heart Rate: 82 Resp: 16 SpO2: 95 % O2 Device: None (Room air)    Vitals:    07/20/18 0551   Weight: 234 lb (106.1 kg)     Vital Signs with Ranges  Temp:  [95.8  F (35.4  C)-99.2  F (37.3  C)] 95.8  F (35.4  C)  Heart Rate:  [76-88] 82  Resp:  [16-18] 16  BP: ()/(62-79) 122/75  SpO2:  [91 %-95 %] 95 %  I/O last 3 completed shifts:  In: 1885 [P.O.:1885]  Out: 2655 [Urine:2350; Drains:305]    Heart Rate: 82, Blood pressure 122/75, pulse 72, temperature 95.8  F (35.4  C), temperature " "source Oral, resp. rate 16, height 6' 1\" (1.854 m), weight 234 lb (106.1 kg), SpO2 95 %.  234 lbs 0 oz  HEENT:  Normocephalic, atraumatic.  PERRLA.  EOM s intact. Heart:  No peripheral edema  Lungs:  No SOB  Skin:  Warm and dry, good capillary refill.  GLEN x1 intact.  Dressing over incision, clean and dry.   Extremities:  Good radial and dorsalis pedis pulses bilaterally, no edema, cyanosis or clubbing.    NEUROLOGICAL EXAMINATION:     Mental status:  Alert and Oriented x 3, speech is fluent.  Cranial nerves:  II-XII intact.   Motor:  Strength is 5/5 throughout the upper and lower extremities  Shoulder Abduction:  Right:  5/5   Left:  5/5  Biceps:                      Right:  5/5   Left:  5/5  Triceps:                     Right:  5/5   Left:  5/5  Wrist Extensors:       Right:  5/5   Left:  5/5  Wrist Flexors:           Right:  5/5   Left:  5/5  interosseus :            Right:  5/5   Left:  5/5   Hip Flexor:                Right: 5/5  Left:  5/5  Hip Adductor:             Right:  5/5  Left:  5/5  Hip Abductor:             Right:  5/5  Left:  5/5  Gastroc Soleus:        Right:  5/5  Left:  5/5  Tib/Ant:                      Right:  5/5  Left:  5/5  EHL:                     Right:  5/5  Left:  5/5  Sensation:  Intact  Gait:  Deferred; lying supine.    Medications       acetaminophen  975 mg Oral Q8H     atorvastatin  40 mg Oral Daily     ceFAZolin  2 g Intravenous Q8H     docusate sodium  100 mg Oral BID     losartan  25 mg Oral Daily     metFORMIN  1,000 mg Oral BID w/meals     sodium chloride (PF)  3 mL Intracatheter Q8H       Data     CBC RESULTS:   Recent Labs   Lab Test  07/21/18   0629   WBC  8.9   RBC  4.06*   HGB  13.3   HCT  40.5   MCV  100   MCH  32.8   MCHC  32.8   RDW  13.9   PLT  105*     Basic Metabolic Panel:  Lab Results   Component Value Date     07/21/2018      Lab Results   Component Value Date    POTASSIUM 4.1 07/21/2018     Lab Results   Component Value Date    CHLORIDE 105 07/21/2018     Lab " Results   Component Value Date    ANCA 8.2 07/21/2018     Lab Results   Component Value Date    CO2 23 07/21/2018     Lab Results   Component Value Date    BUN 13 07/21/2018     Lab Results   Component Value Date    CR 1.04 07/21/2018     Lab Results   Component Value Date     07/21/2018     INR:  Lab Results   Component Value Date    INR 1.8 01/27/2011    INR 1.5 01/21/2011    INR 2.4 01/17/2011

## 2018-07-23 VITALS
DIASTOLIC BLOOD PRESSURE: 63 MMHG | RESPIRATION RATE: 16 BRPM | SYSTOLIC BLOOD PRESSURE: 120 MMHG | WEIGHT: 234 LBS | TEMPERATURE: 98.6 F | HEIGHT: 73 IN | BODY MASS INDEX: 31.01 KG/M2 | OXYGEN SATURATION: 96 % | HEART RATE: 72 BPM

## 2018-07-23 LAB — GLUCOSE BLDC GLUCOMTR-MCNC: 144 MG/DL (ref 70–99)

## 2018-07-23 PROCEDURE — 25000132 ZZH RX MED GY IP 250 OP 250 PS 637: Mod: GY | Performed by: NEUROLOGICAL SURGERY

## 2018-07-23 PROCEDURE — 25000132 ZZH RX MED GY IP 250 OP 250 PS 637: Mod: GY | Performed by: INTERNAL MEDICINE

## 2018-07-23 PROCEDURE — A9270 NON-COVERED ITEM OR SERVICE: HCPCS | Mod: GY | Performed by: NEUROLOGICAL SURGERY

## 2018-07-23 PROCEDURE — 00000146 ZZHCL STATISTIC GLUCOSE BY METER IP

## 2018-07-23 PROCEDURE — 99232 SBSQ HOSP IP/OBS MODERATE 35: CPT | Performed by: INTERNAL MEDICINE

## 2018-07-23 PROCEDURE — 25000128 H RX IP 250 OP 636

## 2018-07-23 PROCEDURE — A9270 NON-COVERED ITEM OR SERVICE: HCPCS | Mod: GY | Performed by: INTERNAL MEDICINE

## 2018-07-23 RX ORDER — HYDROCODONE BITARTRATE AND ACETAMINOPHEN 5; 325 MG/1; MG/1
1-2 TABLET ORAL EVERY 4 HOURS PRN
Qty: 45 TABLET | Refills: 0 | Status: SHIPPED | OUTPATIENT
Start: 2018-07-23 | End: 2018-10-02

## 2018-07-23 RX ADMIN — METFORMIN HYDROCHLORIDE 1000 MG: 500 TABLET ORAL at 08:06

## 2018-07-23 RX ADMIN — ACETAMINOPHEN 650 MG: 325 TABLET, FILM COATED ORAL at 03:26

## 2018-07-23 RX ADMIN — ACETAMINOPHEN 975 MG: 325 TABLET, FILM COATED ORAL at 06:39

## 2018-07-23 RX ADMIN — LOSARTAN POTASSIUM 25 MG: 25 TABLET, FILM COATED ORAL at 08:06

## 2018-07-23 RX ADMIN — CEFAZOLIN SODIUM 2 G: 2 INJECTION, SOLUTION INTRAVENOUS at 02:12

## 2018-07-23 RX ADMIN — DOCUSATE SODIUM 100 MG: 100 CAPSULE, LIQUID FILLED ORAL at 08:06

## 2018-07-23 RX ADMIN — ATORVASTATIN CALCIUM 40 MG: 40 TABLET, FILM COATED ORAL at 08:05

## 2018-07-23 ASSESSMENT — ACTIVITIES OF DAILY LIVING (ADL)
ADLS_ACUITY_SCORE: 11

## 2018-07-23 NOTE — PROGRESS NOTES
"               [facility]  Hospitalist Progress Note  Name: Edwardo Dumont    MRN: [id number]  YOB: 1950    Age: 68 year old  Date of admission: 7/20/2018  Primary care provider: Isa Valverde      Reason for Stay (Diagnosis): L spine surgery         Assessment and Plan:      Summary of Stay:  Patient is a 68-year-old gentleman with a history of coronary artery disease status post stenting 3-4 years ago, hypertension, hyperlipidemia, sleep apnea, gout, and type 2 diabetes only on metformin who presents here for lumbar spine surgery.    Problem List/Plan:  1. Lumbar spine surgery: Pain control.  Defer pain management and DVT prophylaxis per neurosurgery.  2. Type 2 diabetes: Continue metformin.  Blood glucose improved.  May resume his chronic jardience at discharge is not available in supply.  3. Hyperlipidemia: Continue Lipitor  4. Hypertension: Controlled.  Continue Cozaar      DVT Prophylaxis: Defer to primary service  Code Status: Full Code  Discharge Dispo: Home  Estimated Disch Date / # of Days until Disch: Okay to discharge from medicine perspective        Interval History (Subjective):      Pain controlled.  Drains now pulled         Physical Exam:      Vital signs:  Temp: 98.6  F (37  C) Temp src: Oral BP: 120/63   Heart Rate: 85 Resp: 16 SpO2: 96 % O2 Device: None (Room air) Oxygen Delivery: 2 LPM Height: 185.4 cm (6' 1\") Weight: 106.1 kg (234 lb)  Estimated body mass index is 30.87 kg/(m^2) as calculated from the following:    Height as of this encounter: 1.854 m (6' 1\").    Weight as of this encounter: 106.1 kg (234 lb).    I/O last 3 completed shifts:  In: 1465 [P.O.:1465]  Out: 125 [Drains:125]  Vitals:    07/20/18 0551   Weight: 106.1 kg (234 lb)       Constitutional: Awake, alert, cooperative, no apparent distress               Skin: No rashes, no cyanosis, dry to touch   Neuro: CN 2-12 intact, no localizing weakness   Extremities: No edema, normal range of motion "   HEENT Normocephalic, atraumatic, normal nasal turbinates; oropharynx clear   Neck Supple; nl inspection; trachea midline; no thryomegaly   Psychiatric: A+O x3. Normal affect          Medications:      All current medications were reviewed with changes reflected in problem list.         Data:      All new lab and imaging data was reviewed.   Labs:    Recent Labs  Lab 07/21/18 0629 07/20/18 0621   WBC 8.9  --    HGB 13.3 15.6   HCT 40.5  --      --    *  --        Recent Labs  Lab 07/21/18 0629 07/20/18 0621 07/17/18  1559     --  142   POTASSIUM 4.1  --  4.2   CHLORIDE 105  --  109   CO2 23  --  21   ANIONGAP 10  --  12   * 180* 155*   BUN 13  --  27   CR 1.04  --  1.38*   GFRESTIMATED 71  --  51*   GFRESTBLACK 86  --  62   ANCA 8.2*  --  9.0      Imaging:   No results found for this or any previous visit (from the past 24 hour(s)).    Venancio Conley -[phone]

## 2018-07-23 NOTE — DISCHARGE SUMMARY
[facility]    Discharge Summary  Neurosurgery    Date of Admission:  7/20/2018  Date of Discharge:  7/23/2018  Discharging Provider: Fay Baker  Date of Service (when I saw the patient): 07/23/18    Discharge Diagnoses   Active Problems:    S/P lumbar fusion      History of Present Illness    Edwardo Dumont is a 68 year old male who was admitted on 7/20/2018 with low back pain and lumbar radiculopathy.  He underwent L3-4 bilateral laminectomy with bilateral facetectomies with resection of bilateral synovial cyst and L4-5 TLIF with Dr. Shoaib Magaña on 7/20/18.  Pt is sitting up in chair today ready to DC home.  He is up independently and pain is controlled with oral Tylenol.       Hospital Course   Edwardo Dumont was admitted on 7/20/2018.  The following problems were addressed during his hospitalization:    Active Problems:    S/P lumbar fusion    Assessment: stable    Plan: DC home        I have discussed the following assessment and plan with Dr. Shoaib Magaña  who is in agreement with initial plan and will follow up with further consultation recommendations.    Fay Baker [facility]  [facility]  [place] [address]    Tel [phone]  Pager [phone]        Significant Results and Procedures   Post lumbar fusion     Pending Results     Unresulted Labs Ordered in the Past 30 Days of this Admission     No orders found from 5/21/2018 to 7/21/2018.          Code Status   Full Code    Primary Care Physician   AIDEN PASCUAL    Physical Exam   Temp: 98.6  F (37  C) Temp src: Oral BP: 120/63   Heart Rate: 85 Resp: 16 SpO2: 96 % O2 Device: None (Room air)    Vitals:    07/20/18 0551   Weight: 234 lb (106.1 kg)     Vital Signs with Ranges  Temp:  [97.4  F (36.3  C)-98.6  F (37  C)] 98.6  F (37  C)  Heart Rate:  [74-85] 85  Resp:  [16-17] 16  BP: (120-153)/(63-85) 120/63  SpO2:  [94 %-96 %] 96 %  I/O last 3 completed  shifts:  In: 1465 [P.O.:1465]  Out: 125 [Drains:125]    Constitutional: Awake, alert, cooperative, no apparent distress, and appears stated age.  Eyes: Lids and lashes normal, pupils equal, round and reactive to light, extra ocular muscles intact  ENT: Normocephalic, without obvious abnormality, atraumatic  Respiratory: No increased work of breathing  Skin: No bruising or bleeding, normal skin color, texture, turgor, no redness, warmth, or swelling.  Incision with staples intact, minimal drainage, open to air.  Musculoskeletal: There is no redness, warmth, or swelling of the joints.  Full range of motion noted.  Motor strength is 5 out of 5 all extremities bilaterally.  Tone is normal.  Neurologic: Awake, alert, oriented to name, place and time.  Cranial nerves II-XII are grossly intact.  Motor is 5 out of 5 bilaterally.     Neuropsychiatric: Calm, normal eye contact, alert, normal affect, oriented to self, place, time and situation, memory for past and recent events intact and thought process normal.       Time Spent on this Encounter   I, Fay Baker, personally saw the patient today and spent greater than 30 minutes discharging this patient.    Discharge Disposition   Discharged to home  Condition at discharge: Stable    Consultations This Hospital Stay   OCCUPATIONAL THERAPY ADULT IP CONSULT  PHYSICAL THERAPY ADULT IP CONSULT  ORTHOSIS SPINAL IP CONSULT  HOSPITALIST IP CONSULT    Discharge Orders     Reason for your hospital stay   You were in the hospital for lumbar fusion surgery.     Follow-up and recommended labs and tests    Please follow up at the [facility] in 10-12 days for staple removal.  Please call the clinic at [phone] to schedule your appointment with Fay Baker CNP or Mita Cardenas CNP.     Activity   Your activity upon discharge: Discharge instructions:  No lifting of more than 10 pounds, no bending, no twisting until follow up visit.  Wear brace when out of  bed.    Ok to shampoo hair, shower or bathe, but, do not scrub or submerge incision under water until evaluated post-operatively in clinic.    Ok to walk as tolerated, avoid bed rest and prolonged sitting.    No contact sports until after follow up visit  No high impact activities such as; running/jogging, snowmobile or 4 arias riding or any other recreational vehicles.    Do not take NSAIDS (ibuprofen, advil, aleve, naproxen, etc) for pain control.    Do NOT drive while taking narcotic pain medication.    Call the [facility] at [phone] for increasing redness, swelling or pus draining from the incision, increased pain or any other questions and concerns.  \     Wound care and dressings   Instructions to care for your wound at home: Keep your incision clean and dry at all times.  OK to remove dressing on postop day 2.  OK to shower on postop day 3 and allow water to run over incision, pat dry after shower.  No bathing swimming or submerging in water.  Call immediately or come to ED if any drainage occurs, or you develop new pain, redness, or swelling.     Full Code     Diet   Follow this diet upon discharge: Orders Placed This Encounter     Moderate Consistent CHO Diet       Discharge Medications   Current Discharge Medication List      START taking these medications    Details   HYDROcodone-acetaminophen (NORCO) 5-325 MG per tablet Take 1-2 tablets by mouth every 4 hours as needed for moderate to severe pain  Qty: 45 tablet, Refills: 0    Associated Diagnoses: S/P lumbar fusion         CONTINUE these medications which have NOT CHANGED    Details   aspirin 81 MG tablet Take 1 tablet (81 mg) by mouth daily    Associated Diagnoses: CAD (coronary artery disease)      atorvastatin (LIPITOR) 40 MG tablet Take 1 tablet (40 mg) by mouth daily  Qty: 90 tablet, Refills: 4    Associated Diagnoses: Hyperlipidemia LDL goal <100      clopidogrel (PLAVIX) 75 MG tablet Take 75 mg by mouth daily       JARDIANCE 10  MG TABS tablet TAKE ONE TABLET BY MOUTH EVERY DAY  Qty: 90 tablet, Refills: 0    Associated Diagnoses: 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)      losartan (COZAAR) 25 MG tablet Take 1 tablet (25 mg) by mouth daily  Qty: 90 tablet, Refills: 4    Associated Diagnoses: Essential hypertension, benign      metFORMIN (GLUCOPHAGE) 500 MG tablet TAKE 2 TABLETS BY MOUTH TWICE DAILY WITH MEALS  Qty: 360 tablet, Refills: 3    Associated Diagnoses: 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)      ACCU-CHEK SMARTVIEW test strip ONCE DAILY TESTING AND AS NEEDED  Qty: 100 strip, Refills: 4    Associated Diagnoses: Type 2 diabetes, HbA1C goal < 8% (H)      ACE NOT PRESCRIBED, INTENTIONAL, ACE Inhibitor not prescribed due to Other: cough  Qty: 0 each, Refills: 0    Associated Diagnoses: Hypertension goal BP (blood pressure) < 140/90      blood glucose monitoring ([employer] CONTOUR MONITOR) meter device kit Use to test blood sugar  times daily or as directed.  Qty: 1 kit, Refills: 0    Associated Diagnoses: Type 2 diabetes, HbA1C goal < 8% (H)      nitroglycerin (NITROSTAT) 0.4 MG SL tablet Place 1 tablet (0.4 mg) under the tongue every 5 minutes as needed for chest pain  Qty: 90 tablet, Refills: 4    Associated Diagnoses: CAD (coronary artery disease)         STOP taking these medications       IBUPROFEN PO Comments:   Reason for Stopping:             Allergies   Allergies   Allergen Reactions     Benzonatate Anaphylaxis and Swelling     Lisinopril Cough     Zocor [Simvastatin - High Dose]      Poor memory

## 2018-07-23 NOTE — PLAN OF CARE
Problem: Patient Care Overview  Goal: Plan of Care/Patient Progress Review  A/Ox4,VSS: stable, up with indepedently ambulating to bathroom. CMS intact. Dressing: cdi. PO pain meds managing pain, voiding in good amts, plans to dc today

## 2018-07-23 NOTE — PROGRESS NOTES
Pt. Discharged to home with wife and [place] script at 1015. Reviewed discharge instructions, medications, and post surgical precautions with patient and wife. Answered patient questions.

## 2018-07-23 NOTE — PLAN OF CARE
Problem: Patient Care Overview  Goal: Plan of Care/Patient Progress Review  Outcome: Improving  Vital signs stable.  Lungs clear, using inspirometer.Bowel sounds hypoactive,passing flatus, voiding.  CMS intact, wears corset brace when up ambulating.  Dressing to lumbar spine intact, ice pack applied.Pain controlled with tylenol, ice pack application.GLEN drain intact.On iv abx. Post op plan of care reviewed with pt.  Plan to dc to home.

## 2018-07-24 ENCOUNTER — PATIENT OUTREACH (OUTPATIENT)
Dept: CARE COORDINATION | Facility: CLINIC | Age: 68
End: 2018-07-24

## 2018-07-24 NOTE — PROGRESS NOTES
Clinic Care Coordination Contact    Clinic Care Coordination Contact  OUTREACH    Referral Information:  Referral Source: IP Report    Primary Diagnosis: Neurological Disorders - lumbar fusion L3-5    Chief Complaint   Patient presents with     Clinic Care Coordination - Post Hospital     RN        [place] Utilization:   Clinic Utilization  Difficulty keeping appointments:: No  Utilization    Last refreshed: 7/23/2018 12:47 PM:  No Show Count (past year) 0       Last refreshed: 7/23/2018 12:47 PM:  ED visits 0       Last refreshed: 7/23/2018 12:47 PM:  Hospital admissions 1          Current as of: 7/23/2018 12:47 PM         Clinical Concerns:    Current Medical Concerns:  Inpatient at [facility] from 7/20/18 to 7/23/18. Underwent L3-4 bilateral laminectomy with bilateral facetectomies with resection of bilateral synovial cyst and L4-5 TLIF with Dr. Germán Magaña.     Patient states he is feeling good. His back is sore, with constant aching pain. He states he is using Tylenol only for pain. He prefers to stay away from the narcotics.     Patient states he slept well last night. No pain in legs. Bowel and bladder function is normal.     Patient states his blood sugars are good.     Current Behavioral Concerns: Denies concerns      Education Provided to patient: discussed pain management, ice, and using narcotic possibly at bedtime or if he is going to be more active.      Pain  Chronic pain (GOAL):: No    Health Maintenance Reviewed: Up to date    Clinical Pathway: None    Medication Management:  Independent with medication management     Functional Status:  Dependent ADLs:: Ambulation-no assistive device, Independent  Bed or wheelchair confined:: No  Mobility Status: Independent  Fallen 2 or more times in the past year?: No  Any fall with injury in the past year?: No    Patient enjoys biking, usually rides for 45 mins to an hour 3-5 times per week.  He will not be biking currently    Living  Situation:  Current living arrangement:: I live in a private home with spouse  Type of residence:: Private home - stairs - patient states he is having no difficulty going up and down the stairs    Diet/Exercise/Sleep:  Diet:: Diabetic diet  Inadequate nutrition (GOAL):: No  Food Insecurity: No  Tube Feeding: No  Exercise:: Currently not exercising  Inadequate activity/exercise (GOAL):: No  Significant changes in sleep pattern (GOAL): No    Transportation:  Transportation concerns (GOAL):: No  Transportation means:: Accessible car, Regular car     Psychosocial:  Baptism or spiritual beliefs that impact treatment:: No  Mental health DX:: No  Mental health management concern (GOAL):: No  Informal Support system:: Spouse     Financial/Insurance:   Financial/Insurance concerns (GOAL):: No       Resources and Interventions:  Current Resources: None    Community Resources: None  Supplies used at home:: None  Equipment Currently Used at Home: shower chair (sock aid)    Advance Care Plan/Directive  Advanced Care Plans/Directives on file:: No    Referrals Placed: None    Patient/Caregiver understanding: Patient has clear understanding       Future Appointments              In 2 weeks Mita Cardenas NP [facility]    In 1 month Mita Cardenas NP [facility]          Plan: Patient will attend follow up appointment with neurosurgery as scheduled.   Patient declines the need for further follow up with clinic care coordination.   Patient has contact information if needed.     Will not open to active clinic care coordination at this time.    Tammy Alegre RN, CCM - Care Coordinator     7/24/2018    9:51 AM  [phone]

## 2018-08-02 ENCOUNTER — OFFICE VISIT (OUTPATIENT)
Dept: NEUROSURGERY | Facility: CLINIC | Age: 68
End: 2018-08-02
Attending: NEUROLOGICAL SURGERY
Payer: COMMERCIAL

## 2018-08-02 VITALS
DIASTOLIC BLOOD PRESSURE: 65 MMHG | OXYGEN SATURATION: 97 % | TEMPERATURE: 97.4 F | HEART RATE: 78 BPM | SYSTOLIC BLOOD PRESSURE: 94 MMHG | WEIGHT: 228 LBS | BODY MASS INDEX: 30.08 KG/M2

## 2018-08-02 ASSESSMENT — PAIN SCALES - GENERAL: PAINLEVEL: MODERATE PAIN (5)

## 2018-08-02 NOTE — NURSING NOTE
"Edwardo Dumont is a 68 year old male who presents for:  Chief Complaint   Patient presents with     Neurologic Problem     s/p lumbar fusion 7-20-18, staple removal        Vitals:    There were no vitals filed for this visit.    BMI:  Estimated body mass index is 30.87 kg/(m^2) as calculated from the following:    Height as of 7/20/18: 6' 1\" (1.854 m).    Weight as of 7/20/18: 234 lb (106.1 kg).    Pain Score:  Data Unavailable        Kasandra Tellez, BRANDON, AAS      "

## 2018-08-08 NOTE — PROGRESS NOTES
SUBJECTIVE:   Edwardo Dumont is a 68 year old male who presents to clinic today for the following health issues:      Chief Complaint   Patient presents with     UTI     Patient states possible UTI infection with achiness, burning and pain in gron area  in the last week              Problem list and histories reviewed & adjusted, as indicated.  Additional history: as documented    Patient Active Problem List   Diagnosis     Cough     Sleep apnea     Gout     Low back pain     Radiculopathy of leg     Hyperlipidemia LDL goal <100     OA (osteoarthritis) of knee     Degenerative arthritis of hip - right     Urinary retention     Advanced directives, counseling/discussion     S/P hip replacement     Closed dislocation of acromioclavicular joint     Impingement syndrome of right shoulder     Ischemic vascular disease   one stent coronary artery 5/12     Essential hypertension, benign     Visual disturbance, transient, right eye     Dermatochalasis     Insomnia     S/P coronary angiogram     CKD (chronic kidney disease) stage 3, GFR 30-59 ml/min     Type II diabetes mellitus with peripheral circulatory disorder (H)     Spinal stenosis of lumbar region with neurogenic claudication     S/P lumbar fusion     Past Surgical History:   Procedure Laterality Date     C ABLATION SUPRAVENT ARRHYTHMOGENIC FOCUS  12/21/15    at [facility] TOTAL HIP ARTHROPLASTY  1/11/11    Rt YOGI     ENDOSCOPIC SINUS SURGERY  12/14/15     ENDOSCOPIC SINUS SURGERY Bilateral 12/14/2015    Procedure: ENDOSCOPIC SINUS SURGERY;  Surgeon: Kei Cevallos MD;  Location: [facility] OR     GASTRIC BYPASS  1/03    lap band     OPTICAL TRACKING SYSTEM FUSION SPINE POSTERIOR LUMBAR TWO LEVELS N/A 7/20/2018    Procedure: OPTICAL TRACKING SYSTEM FUSION SPINE POSTERIOR LUMBAR TWO LEVELS;  L3-4 bilateral laminectomy with bilateral facetectomies and resection of bilateral synovial cyst  4-5 Transforaminal lumbar interbody fusion  Placement of Globus Creo pedicle  screws from L3-5 with open reduction and internal fixation of the L4-5 spondylolisthesis  Posterior lateral fusion from C3-5;  Surgeon: Rio Magaña     SINUS SURGERY Right 12-14-15    Dr. Cevallos     STENT  5/8/12    OM2 cornary artery stent     STENT  01/2017    3 stents placed in coronary arteries while in [place]     STENT, CORONARY, HANG  01/2017    2 stents in [place].       Social History   Substance Use Topics     Smoking status: Former Smoker     Packs/day: 1.00     Years: 5.00     Types: Cigarettes     Start date: 1/1/1970     Quit date: 9/8/1989     Smokeless tobacco: Never Used      Comment: former smoker     Alcohol use Yes      Comment: couple drinks 3 x a week     Family History   Problem Relation Age of Onset     Allergies Son      Allergies Son      Cancer Mother      kidney     Neurologic Disorder Father      parkinsons     Glaucoma No family hx of      Macular Degeneration No family hx of          Current Outpatient Prescriptions   Medication Sig Dispense Refill     ACCU-CHEK SMARTVIEW test strip ONCE DAILY TESTING AND AS NEEDED 100 strip 4     ACE NOT PRESCRIBED, INTENTIONAL, ACE Inhibitor not prescribed due to Other: cough 0 each 0     aspirin 81 MG tablet Take 1 tablet (81 mg) by mouth daily       atorvastatin (LIPITOR) 40 MG tablet Take 1 tablet (40 mg) by mouth daily 90 tablet 4     blood glucose monitoring ([employer] CONTOUR MONITOR) meter device kit Use to test blood sugar  times daily or as directed. 1 kit 0     ciprofloxacin (CIPRO) 500 MG tablet Take 1 tablet (500 mg) by mouth 2 times daily 14 tablet 0     clopidogrel (PLAVIX) 75 MG tablet Take 75 mg by mouth daily        JARDIANCE 10 MG TABS tablet TAKE ONE TABLET BY MOUTH EVERY DAY 90 tablet 0     losartan (COZAAR) 25 MG tablet Take 1 tablet (25 mg) by mouth daily 90 tablet 4     metFORMIN (GLUCOPHAGE) 500 MG tablet TAKE 2 TABLETS BY MOUTH TWICE DAILY WITH MEALS 360 tablet 3     nitroglycerin (NITROSTAT) 0.4 MG SL tablet Place 1 tablet (0.4 mg) under  the tongue every 5 minutes as needed for chest pain 90 tablet 4     HYDROcodone-acetaminophen (NORCO) 5-325 MG per tablet Take 1-2 tablets by mouth every 4 hours as needed for moderate to severe pain (Patient not taking: Reported on 8/2/2018) 45 tablet 0     Allergies   Allergen Reactions     Benzonatate Anaphylaxis and Swelling     Lisinopril Cough     Zocor [Simvastatin - High Dose]      Poor memory     Recent Labs   Lab Test  07/21/18   0629  07/17/18   1559  06/26/18   0842  11/28/17   0848  06/12/17   0846   06/01/16   0929   01/03/12   0903   A1C   --    --   8.3*  7.8*  7.1*   < >  8.5*   < >  7.2*   LDL   --    --   41   --   36   --   35   < >  89   HDL   --    --   43   --   44   --   44   < >  43   TRIG   --    --   120   --   96   --   130   < >  136   ALT   --    --    --   36  29   --    --    --   22   CR  1.04  1.38*   --   1.25  1.20   < >  1.03   < >  1.34*   GFRESTIMATED  71  51*   --   58*  61   < >  72   < >  54*   GFRESTBLACK  86  62   --   70  73   < >  88   < >  66   POTASSIUM  4.1  4.2   --   4.3  4.4   < >  4.2   < >  4.6   TSH   --    --   1.08   --    --    --   1.34   < >  1.16    < > = values in this interval not displayed.      BP Readings from Last 3 Encounters:   08/09/18 120/80   08/02/18 94/65   07/23/18 120/63    Wt Readings from Last 3 Encounters:   08/09/18 229 lb 6.4 oz (104.1 kg)   08/02/18 228 lb (103.4 kg)   07/20/18 234 lb (106.1 kg)                  Labs reviewed in EPIC    Reviewed and updated as needed this visit by clinical staff  Tobacco  Allergies  Meds  Med Hx  Surg Hx  Fam Hx  Soc Hx      Reviewed and updated as needed this visit by Provider         ROS:  This 68 year old male is here today because he had L3-4 and L4-5 fusion surgery at [place] on 7/20/18. He had gastelum catheter for about 24 hours. He is diabetic. He has been doing well since his back surgery. However, about 10 days ago, he developed sudden severe pain with urination. He had left over  "augmentin from a past sinus infection and he took it for 2 days and his symptoms resolved so he stopped the antibiotics. Then, 2 days ago, his symptoms returned. He started the augmentin again and last took one last night. He is feeling better with his urination today but worries it could come back again. No fevers and denies any flank pain. All other review of systems are negative  Personal, family, and social history reviewed with patient and revised.         OBJECTIVE:     /80  Pulse 87  Temp 98.6  F (37  C) (Oral)  Resp 18  Ht 6' 1\" (1.854 m)  Wt 229 lb 6.4 oz (104.1 kg)  SpO2 96%  BMI 30.27 kg/m2  Body mass index is 30.27 kg/(m^2).  GENERAL: healthy, alert and no distress  NECK: no adenopathy, no asymmetry, masses, or scars and thyroid normal to palpation  RESP: lungs clear to auscultation - no rales, rhonchi or wheezes  CV: regular rate and rhythm, normal S1 S2, no S3 or S4, no murmur, click or rub, no peripheral edema and peripheral pulses strong  ABDOMEN: soft, nontender, no hepatosplenomegaly, no masses and bowel sounds normal  MS: no gross musculoskeletal defects noted, no edema    Diagnostic Test Results:  Results for orders placed or performed in visit on 08/09/18 (from the past 24 hour(s))   *UA reflex to Microscopic and Culture ([place] and [facility] (except [place] and [place])   Result Value Ref Range    Color Urine Yellow     Appearance Urine Clear     Glucose Urine >=1000 (A) NEG^Negative mg/dL    Bilirubin Urine Negative NEG^Negative    Ketones Urine Negative NEG^Negative mg/dL    Specific Gravity Urine 1.015 1.003 - 1.035    Blood Urine Negative NEG^Negative    pH Urine 5.5 5.0 - 7.0 pH    Protein Albumin Urine Negative NEG^Negative mg/dL    Urobilinogen Urine 0.2 0.2 - 1.0 EU/dL    Nitrite Urine Negative NEG^Negative    Leukocyte Esterase Urine Negative NEG^Negative    Source Midstream Urine        ASSESSMENT/PLAN:              (R30.0) Burning with urination  (primary " encounter diagnosis)  Comment: as above, I worry that he could have a resistant organism by now. I feel he needs full treatment with 7 days of cipro given his high glucose in his urine and having had incomplete treatment last week with just 2 days of augmentin. Will obtain a culture to be certain.   Plan: *UA reflex to Microscopic and Culture ([place]         and [facility] (except [facility] and         [place]), ciprofloxacin (CIPRO) 500 MG tablet,         Urine Culture Aerobic Bacterial               Return to clinic if no improvement     AIDEN PASCUAL MD  [facility]

## 2018-08-09 ENCOUNTER — OFFICE VISIT (OUTPATIENT)
Dept: FAMILY MEDICINE | Facility: CLINIC | Age: 68
End: 2018-08-09
Payer: COMMERCIAL

## 2018-08-09 VITALS
HEIGHT: 73 IN | OXYGEN SATURATION: 96 % | SYSTOLIC BLOOD PRESSURE: 120 MMHG | RESPIRATION RATE: 18 BRPM | TEMPERATURE: 98.6 F | HEART RATE: 87 BPM | DIASTOLIC BLOOD PRESSURE: 80 MMHG | BODY MASS INDEX: 30.4 KG/M2 | WEIGHT: 229.4 LBS

## 2018-08-09 DIAGNOSIS — R30.0 BURNING WITH URINATION: Primary | ICD-10-CM

## 2018-08-09 LAB
ALBUMIN UR-MCNC: NEGATIVE MG/DL
APPEARANCE UR: CLEAR
BILIRUB UR QL STRIP: NEGATIVE
COLOR UR AUTO: YELLOW
GLUCOSE UR STRIP-MCNC: >=1000 MG/DL
HGB UR QL STRIP: NEGATIVE
KETONES UR STRIP-MCNC: NEGATIVE MG/DL
LEUKOCYTE ESTERASE UR QL STRIP: NEGATIVE
NITRATE UR QL: NEGATIVE
PH UR STRIP: 5.5 PH (ref 5–7)
SOURCE: ABNORMAL
SP GR UR STRIP: 1.01 (ref 1–1.03)
UROBILINOGEN UR STRIP-ACNC: 0.2 EU/DL (ref 0.2–1)

## 2018-08-09 PROCEDURE — 81003 URINALYSIS AUTO W/O SCOPE: CPT | Performed by: FAMILY MEDICINE

## 2018-08-09 PROCEDURE — 87086 URINE CULTURE/COLONY COUNT: CPT | Performed by: FAMILY MEDICINE

## 2018-08-09 PROCEDURE — 99213 OFFICE O/P EST LOW 20 MIN: CPT | Performed by: FAMILY MEDICINE

## 2018-08-09 RX ORDER — CIPROFLOXACIN 500 MG/1
500 TABLET, FILM COATED ORAL 2 TIMES DAILY
Qty: 14 TABLET | Refills: 0 | Status: SHIPPED | OUTPATIENT
Start: 2018-08-09 | End: 2019-12-12

## 2018-08-09 ASSESSMENT — PAIN SCALES - GENERAL: PAINLEVEL: MILD PAIN (2)

## 2018-08-09 NOTE — MR AVS SNAPSHOT
After Visit Summary   8/9/2018    Edwardo Dumont    MRN: [id number]           Patient Information     Date Of Birth          1950        Visit Information        Provider Department      8/9/2018 10:45 AM Isa Valverde MD [facility]        Today's Diagnoses     Burning with urination    -  1      Care Instructions    [facility]    If you have any questions regarding to your visit please contact your care team:       Team Purple:   Clinic Hours Telephone Number   Dr. Isa Nichols   7am-7pm  Monday - Thursday   7am-5pm  Fridays  [phone]  (Appointment scheduling available 24/7)    Questions about your recent visit?   Team Line:  [phone]   Urgent Care - [place] and [facility] - 11am-9pm Monday-Friday Saturday-Sunday- 9am-5pm   [place] - 5pm-9pm Monday-Friday Saturday-Sunday- 9am-5pm  [phone] - [place]  [phone] [facility]       What options do I have for a visit other than an office visit? We offer electronic visits (e-visits) and telephone visits, when medically appropriate.  Please check with your medical insurance to see if these types of visits are covered, as you will be responsible for any charges that are not paid by your insurance.      You can use DrinkWiser (secure electronic communication) to access to your chart, send your primary care provider a message, or make an appointment. Ask a team member how to get started.     For a price quote for your services, please call our Consumer Price Line at [phone] or our Imaging Cost estimation line at [phone] (for imaging tests).              Follow-ups after your visit        Your next 10 appointments already scheduled     Aug 30, 2018 10:00 AM CDT   Return Visit with Mita Cardenas NP   [facility]    [address] [id number] [phone]             "  Who to contact     If you have questions or need follow up information about today's clinic visit or your schedule please contact [facility] directly at [phone].  Normal or non-critical lab and imaging results will be communicated to you by MyChart, letter or phone within 4 business days after the clinic has received the results. If you do not hear from us within 7 days, please contact the clinic through WebVethart or phone. If you have a critical or abnormal lab result, we will notify you by phone as soon as possible.  Submit refill requests through [employer] or call your pharmacy and they will forward the refill request to us. Please allow 3 business days for your refill to be completed.          Additional Information About Your Visit        [employer] Information     [employer] gives you secure access to your electronic health record. If you see a primary care provider, you can also send messages to your care team and make appointments. If you have questions, please call your primary care clinic.  If you do not have a primary care provider, please call [phone] and they will assist you.        Care EveryWhere ID     This is your Care EveryWhere ID. This could be used by other organizations to access your [facility] medical records  [id number]        Your Vitals Were     Pulse Temperature Respirations Height Pulse Oximetry BMI (Body Mass Index)    87 98.6  F (37  C) (Oral) 18 6' 1\" (1.854 m) 96% 30.27 kg/m2       Blood Pressure from Last 3 Encounters:   08/09/18 120/80   08/02/18 94/65   07/23/18 120/63    Weight from Last 3 Encounters:   08/09/18 229 lb 6.4 oz (104.1 kg)   08/02/18 228 lb (103.4 kg)   07/20/18 234 lb (106.1 kg)              We Performed the Following     *UA reflex to Microscopic and Culture ([facility] [facility] (except [place] and [place])     Urine Culture Aerobic Bacterial          Today's Medication Changes          These changes are accurate as of 8/9/18 11:37 AM.  " If you have any questions, ask your nurse or doctor.               Start taking these medicines.        Dose/Directions    ciprofloxacin 500 MG tablet   Commonly known as:  CIPRO   Used for:  Burning with urination   Started by:  Isa Valverde MD        Dose:  500 mg   Take 1 tablet (500 mg) by mouth 2 times daily   Quantity:  14 tablet   Refills:  0            Where to get your medicines      These medications [facility] [address] [place] [address] [address] AT [facility] [address], [place] [address]     Phone:  [phone]     ciprofloxacin 500 MG tablet                Primary Care Provider Office Phone # Fax #    Isa Valverde [phone] [address] [facility] [phone]        Equal Access to Services     [facility]: Hadii anthony argueta hadasho Soomaali, waaxda luqadaha, qaybta kaalmada adeegyada, flor barrios haydorinda duran . So [facility] [phone].    ATENCIÓN: Si habla español, tiene a menendez disposición servicios gratuitos de asistencia lingüística. [facility] [phone].    We comply with applicable federal civil rights laws and [place] laws. We do not discriminate on the basis of race, color, national origin, age, disability, sex, sexual orientation, or gender identity.            Thank you!     Thank you for choosing [facility]  for your care. Our goal is always to provide you with excellent care. Hearing back from our patients is one way we can continue to improve our services. Please take a few minutes to complete the written survey that you may receive in the mail after your visit with us. Thank you!             Your Updated Medication List - Protect others around you: Learn how to safely use, store and throw away your medicines at www.disposemymeds.org.          This list is accurate as of 8/9/18 11:37 AM.  Always use your most recent med list.                   Brand  Name Dispense Instructions for use Diagnosis    ACCU-CHEK SMARTVIEW test strip   Generic drug:  blood glucose monitoring     100 strip    ONCE DAILY TESTING AND AS NEEDED    Type 2 diabetes, HbA1C goal < 8% (H)       ACE NOT PRESCRIBED (INTENTIONAL)     0 each    ACE Inhibitor not prescribed due to Other: cough    Hypertension goal BP (blood pressure) < 140/90       aspirin 81 MG tablet      Take 1 tablet (81 mg) by mouth daily    CAD (coronary artery disease)       atorvastatin 40 MG tablet    LIPITOR    90 tablet    Take 1 tablet (40 mg) by mouth daily    Hyperlipidemia LDL goal <100       blood glucose monitoring meter device kit     1 kit    Use to test blood sugar  times daily or as directed.    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

## 2018-08-09 NOTE — PATIENT INSTRUCTIONS
[facility]    If you have any questions regarding to your visit please contact your care team:       Team Purple:   Clinic Hours Telephone Number   Dr. Isa Nichols   7am-7pm  Monday - Thursday   7am-5pm  Fridays  [phone]  (Appointment scheduling available 24/7)    Questions about your recent visit?   Team Line:  [phone]   Urgent Care - [place] and [facility] - 11am-9pm Monday-Friday Saturday-Sunday- 9am-5pm   [place] - 5pm-9pm Monday-Friday Saturday-Sunday- 9am-5pm  [phone] - [place]  [phone] [facility] [place]       What options do I have for a visit other than an office visit? We offer electronic visits (e-visits) and telephone visits, when medically appropriate.  Please check with your medical insurance to see if these types of visits are covered, as you will be responsible for any charges that are not paid by your insurance.      You can use ScrollMotion (secure electronic communication) to access to your chart, send your primary care provider a message, or make an appointment. Ask a team member how to get started.     For a price quote for your services, please call our Consumer Price Line at [phone] or our Imaging Cost estimation line at [phone] (for imaging tests).

## 2018-08-10 LAB
BACTERIA SPEC CULT: NO GROWTH
SPECIMEN SOURCE: NORMAL

## 2018-08-23 ENCOUNTER — DOCUMENTATION ONLY (OUTPATIENT)
Dept: NEUROSURGERY | Facility: CLINIC | Age: 68
End: 2018-08-23

## 2018-08-23 DIAGNOSIS — Z98.1 S/P LUMBAR FUSION: Primary | ICD-10-CM

## 2018-08-30 ENCOUNTER — OFFICE VISIT (OUTPATIENT)
Dept: NEUROSURGERY | Facility: CLINIC | Age: 68
End: 2018-08-30
Payer: COMMERCIAL

## 2018-08-30 ENCOUNTER — RADIANT APPOINTMENT (OUTPATIENT)
Dept: GENERAL RADIOLOGY | Facility: CLINIC | Age: 68
End: 2018-08-30
Attending: NEUROLOGICAL SURGERY
Payer: COMMERCIAL

## 2018-08-30 VITALS
SYSTOLIC BLOOD PRESSURE: 108 MMHG | OXYGEN SATURATION: 100 % | TEMPERATURE: 97.9 F | DIASTOLIC BLOOD PRESSURE: 73 MMHG | HEART RATE: 67 BPM

## 2018-08-30 DIAGNOSIS — Z98.1 S/P LUMBAR FUSION: Primary | ICD-10-CM

## 2018-08-30 PROCEDURE — 72100 X-RAY EXAM L-S SPINE 2/3 VWS: CPT

## 2018-08-30 PROCEDURE — 99024 POSTOP FOLLOW-UP VISIT: CPT | Performed by: NURSE PRACTITIONER

## 2018-08-30 ASSESSMENT — PAIN SCALES - GENERAL: PAINLEVEL: NO PAIN (0)

## 2018-08-30 NOTE — NURSING NOTE
"Edwardo Dumont is a 68 year old male who presents for:  Chief Complaint   Patient presents with     Neurologic Problem     s/p lumbar fusion 7-20-18        Vitals:    There were no vitals filed for this visit.    BMI:  Estimated body mass index is 30.27 kg/(m^2) as calculated from the following:    Height as of 8/9/18: 6' 1\" (1.854 m).    Weight as of 8/9/18: 229 lb 6.4 oz (104.1 kg).    Pain Score:  Data Unavailable        Kasandra Tellez, BRANDON, AAS      "

## 2018-08-30 NOTE — PATIENT INSTRUCTIONS
-May increase lifting restriction to 25-30 pounds   - Followup in 6 weeks with xray prior   - Call the clinic at [phone] for increased pain or any other questions and concerns.

## 2018-08-30 NOTE — LETTER
8/30/2018         RE: Edwardo Dumont  [address] [address] [facility] [address] [address]        Dear Colleague,    Thank you for referring your patient, Edwardo Dumont, to the [facility]. Please see a copy of my visit note below.    [facility]  Neurosurgery followup:    HPI: The patient is a 68 year old male who underwent L3-4 bilateral laminectomy with bilateral facetectomies with resection of bilateral synovial cyst and L4-5 TLIF with Dr. Shoaib Magaña on 7/20/18.  He is here for his 6 week post op appointment. He states he has no pain other than occasional stiffness.  He has been walking regularly and is eager to get back to biking and playing golf.  He denies falls or changes to bowel or bladder.      Exam:  Constitutional:  Alert, well nourished, NAD.  HEENT: Normocephalic, atraumatic.   Pulm:  Without shortness of breath   CV:  No pitting edema of BLE.      Neurological:  Awake  Alert  Oriented x 3  Motor exam:        IP Q DF PF EHL  R   5  5   5   5    5  L   5  5   5   5    5     Able to spontaneously move L/E bilaterally  Sensation intact throughout all L/E dermatomes     Incision:  Clean, dry, intact.  Mild scabbing at top and bottom border.  Imaging: Per Xray fusion intact and stable    A/P:   The patient is a 68 year old male who underwent L3-4 bilateral laminectomy with bilateral facetectomies with resection of bilateral synovial cyst and L4-5 TLIF with Dr. Shoaib Magaña on 7/20/18.  He is here for his 6 week post op appointment. He states he has no pain other than occasional stiffness.  He has been walking regularly and is eager to get back to biking and playing golf.  He denies falls or changes to bowel or bladder.  We reviewed restrictions the first 12 weeks post-op.  We recommend no golf or biking at this point and he is agreeable.  He will continue to walk as tolerated.  He states he walked all day [place] [facility] [place] yesterday and did well. We will see him back in 6 weeks for his  12 week post op appointment.    Patient Instructions    -May increase lifting restriction to 25-30 pounds   - Followup in 6 weeks with xray prior   - Call the clinic at [phone] for increased pain or any other questions and concerns.        Mita Cardenas CNP  [facility]  [address] [address]    Tel [phone]  Pager [phone]        Again, thank you for allowing me to participate in the care of your patient.        Sincerely,        Mita Cardenas, NP

## 2018-08-30 NOTE — MR AVS SNAPSHOT
After Visit Summary   8/30/2018    Edwardo Dumont    MRN: [id number]           Patient Information     Date Of Birth          1950        Visit Information        Provider Department      8/30/2018 10:00 AM Mita Cardenas NP [facility]        Today's Diagnoses     S/P lumbar fusion    -  1      Care Instructions     -May increase lifting restriction to 25-30 pounds   - Followup in 6 weeks with xray prior   - Call the clinic at [phone] for increased pain or any other questions and concerns.              Follow-ups after your visit        Future tests that were ordered for you today     Open Future Orders        Priority Expected Expires Ordered    XR Lumbar Spine 2/3 Views Routine 9/30/2018 8/30/2019 8/30/2018            Who to contact     If you have questions or need follow up information about today's clinic visit or your schedule please contact [facility] directly at [phone].  Normal or non-critical lab and imaging results will be communicated to you by Localminthart, letter or phone within 4 business days after the clinic has received the results. If you do not hear from us within 7 days, please contact the clinic through [email or web address] or phone. If you have a critical or abnormal lab result, we will notify you by phone as soon as possible.  Submit refill requests through DoorDash or call your pharmacy and they will forward the refill request to us. Please allow 3 business days for your refill to be completed.          Additional Information About Your Visit        MyChart Information     DoorDash gives you secure access to your electronic health record. If you see a primary care provider, you can also send messages to your care team and make appointments. If you have questions, please call your primary care clinic.  If you do not have a primary care provider, please call [phone] and they will assist you.        Care EveryWhere ID     This is your Care  EveryWhere ID. This could be used by other organizations to access your [facility] medical records  [id number]        Your Vitals Were     Pulse Temperature Pulse Oximetry             67 97.9  F (36.6  C) (Oral) 100%          Blood Pressure from Last 3 Encounters:   08/30/18 108/73   08/09/18 120/80   08/02/18 94/65    Weight from Last 3 Encounters:   08/09/18 229 lb 6.4 oz (104.1 kg)   08/02/18 228 lb (103.4 kg)   07/20/18 234 lb (106.1 kg)               Primary Care Provider Office Phone # Fax #    Isa Valverde [phone] [address] [facility] [phone]        Equal Access to Services     JADYN GARCIA : Hadii aad ku hadasho Soomaali, waaxda luqadaha, qaybta kaalmada adeegyada, flor duran . So [facility] [phone].    ATENCIÓN: Si habla español, tiene a menendez disposición servicios gratuitos de asistencia lingüística. Llame al [phone].    We comply with applicable federal civil rights laws and [place] laws. We do not discriminate on the basis of race, color, national origin, age, disability, sex, sexual orientation, or gender identity.            Thank you!     Thank you for choosing [facility]  for your care. Our goal is always to provide you with excellent care. Hearing back from our patients is one way we can continue to improve our services. Please take a few minutes to complete the written survey that you may receive in the mail after your visit with us. Thank you!             Your Updated Medication List - Protect others around you: Learn how to safely use, store and throw away your medicines at www.disposemymeds.org.          This list is accurate as of 8/30/18 10:51 AM.  Always use your most recent med list.                   Brand Name Dispense Instructions for use Diagnosis    ACCU-CHEK SMARTVIEW test strip   Generic drug:  blood glucose monitoring     100 strip    ONCE DAILY TESTING AND AS NEEDED    Type 2  diabetes, HbA1C goal < 8% (H)       ACE NOT PRESCRIBED (INTENTIONAL)     0 each    ACE Inhibitor not prescribed due to Other: cough    Hypertension goal BP (blood pressure) < 140/90       aspirin 81 MG tablet      Take 1 tablet (81 mg) by mouth daily    CAD (coronary artery disease)       atorvastatin 40 MG tablet    LIPITOR    90 tablet    Take 1 tablet (40 mg) by mouth daily    Hyperlipidemia LDL goal <100       blood glucose monitoring meter device kit     1 kit    Use to test blood sugar  times daily or as directed.    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

## 2018-08-30 NOTE — PROGRESS NOTES
[facility]  Neurosurgery followup:    HPI: The patient is a 68 year old male who underwent L3-4 bilateral laminectomy with bilateral facetectomies with resection of bilateral synovial cyst and L4-5 TLIF with Dr. Shoaib Magaña on 7/20/18.  He is here for his 6 week post op appointment. He states he has no pain other than occasional stiffness.  He has been walking regularly and is eager to get back to biking and playing golf.  He denies falls or changes to bowel or bladder.      Exam:  Constitutional:  Alert, well nourished, NAD.  HEENT: Normocephalic, atraumatic.   Pulm:  Without shortness of breath   CV:  No pitting edema of BLE.      Neurological:  Awake  Alert  Oriented x 3  Motor exam:        IP Q DF PF EHL  R   5  5   5   5    5  L   5  5   5   5    5     Able to spontaneously move L/E bilaterally  Sensation intact throughout all L/E dermatomes     Incision:  Clean, dry, intact.  Mild scabbing at top and bottom border.  Imaging: Per Xray fusion intact and stable    A/P:   The patient is a 68 year old male who underwent L3-4 bilateral laminectomy with bilateral facetectomies with resection of bilateral synovial cyst and L4-5 TLIF with Dr. Shoaib Magaña on 7/20/18.  He is here for his 6 week post op appointment. He states he has no pain other than occasional stiffness.  He has been walking regularly and is eager to get back to biking and playing golf.  He denies falls or changes to bowel or bladder.  We reviewed restrictions the first 12 weeks post-op.  We recommend no golf or biking at this point and he is agreeable.  He will continue to walk as tolerated.  [place]. We will see him back in 6 weeks for his 12 week post op appointment.    Patient Instructions    -May increase lifting restriction to 25-30 pounds   - Followup in 6 weeks with xray prior   - Call the clinic at [phone] for increased pain or any other questions and  concerns.        Mita Cardenas [facility]  [facility]  [address] [address]    Tel [phone]  Pager [phone]

## 2018-10-02 ENCOUNTER — OFFICE VISIT (OUTPATIENT)
Dept: NEUROSURGERY | Facility: CLINIC | Age: 68
End: 2018-10-02
Payer: COMMERCIAL

## 2018-10-02 ENCOUNTER — RADIANT APPOINTMENT (OUTPATIENT)
Dept: GENERAL RADIOLOGY | Facility: CLINIC | Age: 68
End: 2018-10-02
Attending: NURSE PRACTITIONER
Payer: COMMERCIAL

## 2018-10-02 VITALS
SYSTOLIC BLOOD PRESSURE: 105 MMHG | OXYGEN SATURATION: 95 % | WEIGHT: 228.2 LBS | DIASTOLIC BLOOD PRESSURE: 70 MMHG | HEART RATE: 76 BPM | BODY MASS INDEX: 30.24 KG/M2 | HEIGHT: 73 IN | TEMPERATURE: 98 F

## 2018-10-02 DIAGNOSIS — Z98.1 S/P LUMBAR FUSION: Primary | ICD-10-CM

## 2018-10-02 PROCEDURE — 99024 POSTOP FOLLOW-UP VISIT: CPT | Performed by: NURSE PRACTITIONER

## 2018-10-02 PROCEDURE — 72100 X-RAY EXAM L-S SPINE 2/3 VWS: CPT

## 2018-10-02 ASSESSMENT — PAIN SCALES - GENERAL: PAINLEVEL: NO PAIN (0)

## 2018-10-02 NOTE — LETTER
10/2/2018         RE: Edwardo Dumont  [address] [address]  [place] [address]        Dear Colleague,    Thank you for referring your patient, Edwardo Dumont, to the [facility]. Please see a copy of my visit note below.    [facility]  Neurosurgery followup:    HPI: The patient is a 68 year old male who underwent L3-4 bilateral laminectomy with bilateral facetectomies with resection of bilateral synovial cyst and L4-5 TLIF with Dr. Shoaib Magaña on 7/20/18.  He is here today for his 3 month post op evaluation.  He states he essentially has no pain. He describes occasional aching to the lower back.  He denies weakness to BLE, falls or changes to bowel or bladder.  He continues to walk regularly.  He is interested in getting back to regular activities, specifically golfing.      Exam:  Constitutional:  Alert, well nourished, NAD.  HEENT: Normocephalic, atraumatic.   Pulm:  Without shortness of breath   CV:  No pitting edema of BLE.      Neurological:  Awake  Alert  Oriented x 3  Motor exam:        IP Q DF PF EHL  R   5  5   5   5    5  L   5  5   5   5    5     Able to spontaneously move L/E bilaterally  Sensation intact throughout all L/E dermatomes     Imaging: Per Xray fusion intact and stable    A/P:   The patient is a 68 year old male who underwent L3-4 bilateral laminectomy with bilateral facetectomies with resection of bilateral synovial cyst and L4-5 TLIF with Dr. Shoaib Magaña on 7/20/18.  He is here today for his 3 month post op evaluation.  He states he essentially has no pain. He describes occasional aching to the lower back.  He denies weakness to BLE, falls or changes to bowel or bladder.  He continues to walk regularly.  He is interested in getting back to regular activities, specifically golfing.  He is leaving next week for [place] for the winter and plans to come back for a short time in December.  We reviewed easing into normal activities at 12 weeks following surgery, and to  engage in golfing slightly initially.  He is agreeable.  He will return in 3 months for his 6 month post appointment.     Patient Instructions   -Ease into normal activities 12 weeks post op  -Return in 3 months for follow up and Xrays  -Please contact the clinic if pain persists at [phone].        Mita Cardenas CNP  [facility]  [address] [address]    Tel [phone]  Pager [phone]      Again, thank you for allowing me to participate in the care of your patient.        Sincerely,        Mita Cardenas, NP

## 2018-10-02 NOTE — MR AVS SNAPSHOT
After Visit Summary   10/2/2018    Edwardo Dumont    MRN: [id number]           Patient Information     Date Of Birth          1950        Visit Information        Provider Department      10/2/2018 9:40 AM Mita Cardenas NP [facility]        Today's Diagnoses     S/P lumbar fusion    -  1      Care Instructions    -Ease into normal activities 12 weeks post op  -Return in 3 months for follow up and Xrays  -Please contact the clinic if pain persists at [phone].            Follow-ups after your visit        Future tests that were ordered for you today     Open Future Orders        Priority Expected Expires Ordered    XR Lumbar Spine 2/3 Views Routine 12/2/2018 10/2/2019 10/2/2018            Who to contact     If you have questions or need follow up information about today's clinic visit or your schedule please contact [facility] directly at [phone].  Normal or non-critical lab and imaging results will be communicated to you by MyChart, letter or phone within 4 business days after the clinic has received the results. If you do not hear from us within 7 days, please contact the clinic through "[employer]hart or phone. If you have a critical or abnormal lab result, we will notify you by phone as soon as possible.  Submit refill requests through [employer] or call your pharmacy and they will forward the refill request to us. Please allow 3 business days for your refill to be completed.          Additional Information About Your Visit        MyChart Information     [employer] gives you secure access to your electronic health record. If you see a primary care provider, you can also send messages to your care team and make appointments. If you have questions, please call your primary care clinic.  If you do not have a primary care provider, please call [phone] and they will assist you.        Care EveryWhere ID     This is your Care EveryWhere ID. This could be used by  "other organizations to access your [place] medical records  [id number]        Your Vitals Were     Pulse Temperature Height Pulse Oximetry BMI (Body Mass Index)       76 98  F (36.7  C) (Oral) 6' 1\" (1.854 m) 95% 30.11 kg/m2        Blood Pressure from Last 3 Encounters:   10/02/18 105/70   08/30/18 108/73   08/09/18 120/80    Weight from Last 3 Encounters:   10/02/18 228 lb 3.2 oz (103.5 kg)   08/09/18 229 lb 6.4 oz (104.1 kg)   08/02/18 228 lb (103.4 kg)               Primary Care Provider Office Phone # Fax #    Isa Valverde [phone] [address] [facility] [address]        Equal Access to Services     [facility]: Hadii anthony argueta hadasho Soomaali, waaxda luqadaha, qaybta kaalmada adeegyada, flor duran . So [facility] [phone].    ATENCIÓN: Si habla español, tiene a menendez disposición servicios gratuitos de asistencia lingüística. Farshad al [phone].    We comply with applicable federal civil rights laws and [place] laws. We do not discriminate on the basis of race, color, national origin, age, disability, sex, sexual orientation, or gender identity.            Thank you!     Thank you for choosing [facility]  for your care. Our goal is always to provide you with excellent care. Hearing back from our patients is one way we can continue to improve our services. Please take a few minutes to complete the written survey that you may receive in the mail after your visit with us. Thank you!             Your Updated Medication List - Protect others around you: Learn how to safely use, store and throw away your medicines at www.disposemymeds.org.          This list is accurate as of 10/2/18 10:07 AM.  Always use your most recent med list.                   Brand Name Dispense Instructions for use Diagnosis    ACCU-CHEK SMARTVIEW test strip   Generic drug:  blood glucose monitoring     100 strip    ONCE DAILY TESTING AND AS NEEDED "    Type 2 diabetes, HbA1C goal < 8% (H)       ACE NOT PRESCRIBED (INTENTIONAL)     0 each    ACE Inhibitor not prescribed due to Other: cough    Hypertension goal BP (blood pressure) < 140/90       aspirin 81 MG tablet      Take 1 tablet (81 mg) by mouth daily    CAD (coronary artery disease)       atorvastatin 40 MG tablet    LIPITOR    90 tablet    Take 1 tablet (40 mg) by mouth daily    Hyperlipidemia LDL goal <100       blood glucose monitoring meter device kit     1 kit    Use to test blood sugar  times daily or as directed.    Type 2 diabetes, HbA1C goal < 8% (H)       ciprofloxacin 500 MG tablet    CIPRO    14 tablet    Take 1 tablet (500 mg) by mouth 2 times daily    Burning with urination       clopidogrel 75 MG tablet    PLAVIX     Take 75 mg by mouth daily        JARDIANCE 10 MG Tabs tablet   Generic drug:  empagliflozin     90 tablet    TAKE ONE TABLET BY MOUTH EVERY DAY    Type 2 diabetes mellitus with other circulatory complications (CODE)       losartan 25 MG tablet    COZAAR    90 tablet    Take 1 tablet (25 mg) by mouth daily    Essential hypertension, benign       metFORMIN 500 MG tablet    GLUCOPHAGE    360 tablet    TAKE 2 TABLETS BY MOUTH TWICE DAILY WITH MEALS    Type 2 diabetes mellitus with other circulatory complications (CODE)       nitroGLYcerin 0.4 MG sublingual tablet    NITROSTAT    90 tablet    Place 1 tablet (0.4 mg) under the tongue every 5 minutes as needed for chest pain    CAD (coronary artery disease)

## 2018-10-02 NOTE — PATIENT INSTRUCTIONS
-Ease into normal activities 12 weeks post op  -Return in 3 months for follow up and Xrays  -Please contact the clinic if pain persists at [phone].

## 2018-10-02 NOTE — NURSING NOTE
"Edwardo Dumont is a 68 year old male who presents for:  Chief Complaint   Patient presents with     Neurologic Problem     L4-5 TLIF with resection of synovial cyst. DOS 7/20/18. Xray today. Patient states his back is doing good. He is not wearing a brace. Denies any pain in the LB or legs. Denies any N/T.         Vitals:    Vitals:    10/02/18 0932   BP: 105/70   Pulse: 76   Temp: 98  F (36.7  C)   TempSrc: Oral   SpO2: 95%   Weight: 228 lb 3.2 oz (103.5 kg)   Height: 6' 1\" (1.854 m)       BMI:  Estimated body mass index is 30.11 kg/(m^2) as calculated from the following:    Height as of this encounter: 6' 1\" (1.854 m).    Weight as of this encounter: 228 lb 3.2 oz (103.5 kg).    Pain Score:  No Pain (0)        Monse Shelton      "

## 2018-10-02 NOTE — PROGRESS NOTES
[facility]  Neurosurgery followup:    HPI: The patient is a 68 year old male who underwent L3-4 bilateral laminectomy with bilateral facetectomies with resection of bilateral synovial cyst and L4-5 TLIF with Dr. Shoaib Magaña on 7/20/18.  He is here today for his 3 month post op evaluation.  He states he essentially has no pain. He describes occasional aching to the lower back.  He denies weakness to BLE, falls or changes to bowel or bladder.  He continues to walk regularly.  He is interested in getting back to regular activities, specifically golfing.      Exam:  Constitutional:  Alert, well nourished, NAD.  HEENT: Normocephalic, atraumatic.   Pulm:  Without shortness of breath   CV:  No pitting edema of BLE.      Neurological:  Awake  Alert  Oriented x 3  Motor exam:        IP Q DF PF EHL  R   5  5   5   5    5  L   5  5   5   5    5     Able to spontaneously move L/E bilaterally  Sensation intact throughout all L/E dermatomes     Imaging: Per Xray fusion intact and stable    A/P:   The patient is a 68 year old male who underwent L3-4 bilateral laminectomy with bilateral facetectomies with resection of bilateral synovial cyst and L4-5 TLIF with Dr. Shoaib Magaña on 7/20/18.  He is here today for his 3 month post op evaluation.  He states he essentially has no pain. He describes occasional aching to the lower back.  He denies weakness to BLE, falls or changes to bowel or bladder.  He continues to walk regularly.  He is interested in getting back to regular activities, specifically golfing.  He is leaving next week for [place] for the winter and plans to come back for a short time in December.  We reviewed easing into normal activities at 12 weeks following surgery, and to engage in golfing slightly initially.  He is agreeable.  He will return in 3 months for his 6 month post appointment.     Patient Instructions   -Ease into normal activities 12 weeks post op  -Return in 3 months for follow up and  Xrays  -Please contact the clinic if pain persists at [phone].        Mita Cardenas CNP  [facility]  [address] [address]    Tel [phone]  Pager [phone]

## 2018-11-28 ENCOUNTER — OFFICE VISIT (OUTPATIENT)
Dept: OPHTHALMOLOGY | Facility: CLINIC | Age: 68
End: 2018-11-28
Payer: COMMERCIAL

## 2018-11-28 DIAGNOSIS — H52.223 REGULAR ASTIGMATISM OF BOTH EYES: ICD-10-CM

## 2018-11-28 DIAGNOSIS — E11.51 TYPE II DIABETES MELLITUS WITH PERIPHERAL CIRCULATORY DISORDER (H): Primary | ICD-10-CM

## 2018-11-28 DIAGNOSIS — H52.4 PRESBYOPIA: ICD-10-CM

## 2018-11-28 PROCEDURE — 92015 DETERMINE REFRACTIVE STATE: CPT | Performed by: STUDENT IN AN ORGANIZED HEALTH CARE EDUCATION/TRAINING PROGRAM

## 2018-11-28 PROCEDURE — 92004 COMPRE OPH EXAM NEW PT 1/>: CPT | Performed by: STUDENT IN AN ORGANIZED HEALTH CARE EDUCATION/TRAINING PROGRAM

## 2018-11-28 ASSESSMENT — REFRACTION_WEARINGRX
OS_SPHERE: -0.50
OS_CYLINDER: +0.50
OD_CYLINDER: +0.75
OD_SPHERE: -0.75
OS_AXIS: 005
OD_AXIS: 180
OS_ADD: +2.50
OD_ADD: +2.50
SPECS_TYPE: PAL

## 2018-11-28 ASSESSMENT — VISUAL ACUITY
OD_CC: 20/20
OS_CC: 20/20
METHOD: SNELLEN - LINEAR
OD_CC+: -2
CORRECTION_TYPE: GLASSES
OS_CC+: -1

## 2018-11-28 ASSESSMENT — SLIT LAMP EXAM - LIDS: COMMENTS: 2+ DERMATOCHALASIS - UPPER LID

## 2018-11-28 ASSESSMENT — CONF VISUAL FIELD
METHOD: COUNTING FINGERS
OD_NORMAL: 1
OS_NORMAL: 1

## 2018-11-28 ASSESSMENT — REFRACTION_MANIFEST
OS_AXIS: 005
OS_ADD: +2.50
OD_AXIS: 177
OS_SPHERE: -0.25
OD_SPHERE: -0.75
OS_CYLINDER: +1.00
OD_CYLINDER: +1.25
OD_ADD: +2.50

## 2018-11-28 ASSESSMENT — TONOMETRY
OS_IOP_MMHG: 15
IOP_METHOD: APPLANATION
OD_IOP_MMHG: 16

## 2018-11-28 ASSESSMENT — EXTERNAL EXAM - LEFT EYE: OS_EXAM: NORMAL

## 2018-11-28 ASSESSMENT — CUP TO DISC RATIO
OS_RATIO: 0.1
OD_RATIO: 0.2

## 2018-11-28 ASSESSMENT — EXTERNAL EXAM - RIGHT EYE: OD_EXAM: NORMAL

## 2018-11-28 NOTE — PROGRESS NOTES
Current Eye Medications:  none     Subjective:  Complete diabetic eye exam. Vision is doing well distance both eyes. Starting to get a little fuzzy at near, comes and goes. No eye pain or discomfort in either eye.     Diabetic type 2 since 2007 or 2008. Blood sugar has been fair.  Lab Results   Component Value Date    A1C 8.3 06/26/2018    A1C 7.8 11/28/2017    A1C 7.1 06/12/2017    A1C 7.3 11/28/2016    A1C 6.7 08/24/2016      Objective:  See Ophthalmology Exam.       Assessment:  Edwardo Dumont is a 68 year old male who presents with:   Encounter Diagnoses   Name Primary?     Type II diabetes mellitus with peripheral circulatory disorder (H) Negative diabetic retinopathy        Plan:  Glasses prescription given - optional  Keep blood sugars and blood pressure under good control.    Evelina Varner MD  [phone]

## 2018-11-28 NOTE — MR AVS SNAPSHOT
After Visit Summary   11/28/2018    Edwardo Dumont    MRN: [id number]           Patient Information     Date Of Birth          1950        Visit Information        Provider Department      11/28/2018 2:00 PM Evelina Varner MD [facility]        Today's Diagnoses     Type II diabetes mellitus with peripheral circulatory disorder (H)    -  1    Presbyopia        Regular astigmatism of both eyes          Care Instructions    Glasses prescription given - optional  Keep blood sugars and blood pressure under good control.    Evelina Varner MD  [phone]    Diabetes weakens the blood vessels all over the body, including the eyes. Damage to the blood vessels in the eyes can cause swelling or bleeding into part of the eye (called the retina). This is called diabetic retinopathy (VAL-tin-AH-puh-thee). If not treated, this disease can cause vision loss or blindness.   Symptoms may include blurred or distorted vision, but many people have no symptoms. It's important to see your eye doctor regularly to check for problems.   Early treatment and good control can help protect your vision. Here are the things you can do to help prevent vision loss:      1. Keep your blood sugar levels under tight control.      2. Bring high blood pressure under control.      3. No smoking.      4. Have yearly dilated eye exams.            Follow-ups after your visit        Follow-up notes from your care team     Return in about 1 year (around 11/28/2019) for Complete Exam.      Your next 10 appointments already scheduled     Dec 18, 2018 10:40 AM CST   Return Visit with Mita Cardenas NP   [facility] ([facility] [id number] [phone]              Who to contact     If you have questions or need follow up information about today's clinic visit or your schedule please contact [facility] directly at [phone].  Normal  or non-critical lab and imaging results will be communicated to you by MyChart, letter or phone within 4 business days after the clinic has received the results. If you do not hear from us within 7 days, please contact the clinic through [employer] or phone. If you have a critical or abnormal lab result, we will notify you by phone as soon as possible.  Submit refill requests through [employer] or call your pharmacy and they will forward the refill request to us. Please allow 3 business days for your refill to be completed.          Additional Information About Your Visit        [employer] Information     [employer] gives you secure access to your electronic health record. If you see a primary care provider, you can also send messages to your care team and make appointments. If you have questions, please call your primary care clinic.  If you do not have a primary care provider, please call [phone] and they will assist you.        Care EveryWhere ID     This is your Care EveryWhere ID. This could be used by other organizations to access your [place] medical records  [id number]         Blood Pressure from Last 3 Encounters:   10/02/18 105/70   08/30/18 108/73   08/09/18 120/80    Weight from Last 3 Encounters:   10/02/18 103.5 kg (228 lb 3.2 oz)   08/09/18 104.1 kg (229 lb 6.4 oz)   08/02/18 103.4 kg (228 lb)              We Performed the Following     EYE EXAM (SIMPLE-NONBILLABLE)     REFRACTIVE STATUS        Primary Care Provider Office Phone # Fax #    Isa Valverde [phone] [address] [facility] [address]        Equal Access to Services     [facility]: Hadii aad ku hadasho Soomaali, waaxda luqadaha, qaybta kaalmada adeegyaavelino, flor pedro. So [facility] [phone].    ATENCIÓN: Si habla español, tiene a menendez disposición servicios gratuitos de asistencia lingüística. Llame al [phone].    We comply with applicable federal civil rights laws  and [place] laws. We do not discriminate on the basis of race, color, national origin, age, disability, sex, sexual orientation, or gender identity.            Thank you!     Thank you for choosing [facility]  for your care. Our goal is always to provide you with excellent care. Hearing back from our patients is one way we can continue to improve our services. Please take a few minutes to complete the written survey that you may receive in the mail after your visit with us. Thank you!             Your Updated Medication List - Protect others around you: Learn how to safely use, store and throw away your medicines at www.disposemymeds.org.          This list is accurate as of 11/28/18  2:55 PM.  Always use your most recent med list.                   Brand Name Dispense Instructions for use Diagnosis    ACCU-CHEK SMARTVIEW test strip   Generic drug:  blood glucose monitoring     100 strip    ONCE DAILY TESTING AND AS NEEDED    Type 2 diabetes, HbA1C goal < 8% (H)       ACE NOT PRESCRIBED (INTENTIONAL)     0 each    ACE Inhibitor not prescribed due to Other: cough    Hypertension goal BP (blood pressure) < 140/90       aspirin 81 MG tablet    ASA     Take 1 tablet (81 mg) by mouth daily    CAD (coronary artery disease)       atorvastatin 40 MG tablet    LIPITOR    90 tablet    Take 1 tablet (40 mg) by mouth daily    Hyperlipidemia LDL goal <100       blood glucose monitoring meter device kit     1 kit    Use to test blood sugar  times daily or as directed.    Type 2 diabetes, HbA1C goal < 8% (H)       ciprofloxacin 500 MG tablet    CIPRO    14 tablet    Take 1 tablet (500 mg) by mouth 2 times daily    Burning with urination       clopidogrel 75 MG tablet    PLAVIX     Take 75 mg by mouth daily        JARDIANCE 10 MG Tabs tablet   Generic drug:  empagliflozin     90 tablet    TAKE ONE TABLET BY MOUTH EVERY DAY    Type 2 diabetes mellitus with other circulatory complications (CODE)       losartan 25 MG  tablet    COZAAR    90 tablet    Take 1 tablet (25 mg) by mouth daily    Essential hypertension, benign       metFORMIN 500 MG tablet    GLUCOPHAGE    360 tablet    TAKE 2 TABLETS BY MOUTH TWICE DAILY WITH MEALS    Type 2 diabetes mellitus with other circulatory complications (CODE)       nitroGLYcerin 0.4 MG sublingual tablet    NITROSTAT    90 tablet    Place 1 tablet (0.4 mg) under the tongue every 5 minutes as needed for chest pain    CAD (coronary artery disease)

## 2018-11-28 NOTE — PATIENT INSTRUCTIONS
Glasses prescription given - optional  Keep blood sugars and blood pressure under good control.    Evelina Varner MD  [phone]    Diabetes weakens the blood vessels all over the body, including the eyes. Damage to the blood vessels in the eyes can cause swelling or bleeding into part of the eye (called the retina). This is called diabetic retinopathy (VAL-tin-AH-puh-thee). If not treated, this disease can cause vision loss or blindness.   Symptoms may include blurred or distorted vision, but many people have no symptoms. It's important to see your eye doctor regularly to check for problems.   Early treatment and good control can help protect your vision. Here are the things you can do to help prevent vision loss:      1. Keep your blood sugar levels under tight control.      2. Bring high blood pressure under control.      3. No smoking.      4. Have yearly dilated eye exams.

## 2018-11-28 NOTE — LETTER
11/28/2018         RE: Edwardo Dumont  [address] [facility] [address]        Dear Colleague,    Thank you for referring your patient, Edwardo Dumont, to the [facility].     No signs of diabetic retinopathy in either eye today.  Please see a copy of my visit note below.     Current Eye Medications:  none     Subjective:  Complete diabetic eye exam. Vision is doing well distance both eyes. Starting to get a little fuzzy at near, comes and goes. No eye pain or discomfort in either eye.     Diabetic type 2 since 2007 or 2008. Blood sugar has been fair.  Lab Results   Component Value Date    A1C 8.3 06/26/2018    A1C 7.8 11/28/2017    A1C 7.1 06/12/2017    A1C 7.3 11/28/2016    A1C 6.7 08/24/2016      Objective:  See Ophthalmology Exam.       Assessment:  Edwardo Dumont is a 68 year old male who presents with:   Encounter Diagnoses   Name Primary?     Type II diabetes mellitus with peripheral circulatory disorder (H) Negative diabetic retinopathy        Plan:  Glasses prescription given - optional  Keep blood sugars and blood pressure under good control.    Evelina Varner MD  [phone]             Again, thank you for allowing me to participate in the care of your patient.        Sincerely,        Evelina Varner MD

## 2018-12-13 ENCOUNTER — OFFICE VISIT (OUTPATIENT)
Dept: ORTHOPEDICS | Facility: CLINIC | Age: 68
End: 2018-12-13
Payer: COMMERCIAL

## 2018-12-13 VITALS
HEART RATE: 66 BPM | RESPIRATION RATE: 16 BRPM | SYSTOLIC BLOOD PRESSURE: 121 MMHG | WEIGHT: 231 LBS | HEIGHT: 73 IN | OXYGEN SATURATION: 97 % | DIASTOLIC BLOOD PRESSURE: 71 MMHG | BODY MASS INDEX: 30.62 KG/M2

## 2018-12-13 DIAGNOSIS — M75.42 IMPINGEMENT SYNDROME OF BOTH SHOULDERS: Primary | ICD-10-CM

## 2018-12-13 DIAGNOSIS — M75.41 IMPINGEMENT SYNDROME OF BOTH SHOULDERS: Primary | ICD-10-CM

## 2018-12-13 PROCEDURE — 20610 DRAIN/INJ JOINT/BURSA W/O US: CPT | Mod: 50 | Performed by: ORTHOPAEDIC SURGERY

## 2018-12-13 RX ORDER — LIDOCAINE HYDROCHLORIDE 10 MG/ML
4 INJECTION, SOLUTION INFILTRATION; PERINEURAL
Status: DISCONTINUED | OUTPATIENT
Start: 2018-12-13 | End: 2019-09-20

## 2018-12-13 RX ORDER — BUPIVACAINE HYDROCHLORIDE 2.5 MG/ML
3 INJECTION, SOLUTION INFILTRATION; PERINEURAL
Status: DISCONTINUED | OUTPATIENT
Start: 2018-12-13 | End: 2019-09-20

## 2018-12-13 RX ORDER — TRIAMCINOLONE ACETONIDE 40 MG/ML
80 INJECTION, SUSPENSION INTRA-ARTICULAR; INTRAMUSCULAR
Status: DISCONTINUED | OUTPATIENT
Start: 2018-12-13 | End: 2019-09-20

## 2018-12-13 RX ADMIN — TRIAMCINOLONE ACETONIDE 80 MG: 40 INJECTION, SUSPENSION INTRA-ARTICULAR; INTRAMUSCULAR at 15:54

## 2018-12-13 RX ADMIN — LIDOCAINE HYDROCHLORIDE 4 ML: 10 INJECTION, SOLUTION INFILTRATION; PERINEURAL at 15:54

## 2018-12-13 RX ADMIN — BUPIVACAINE HYDROCHLORIDE 3 ML: 2.5 INJECTION, SOLUTION INFILTRATION; PERINEURAL at 15:54

## 2018-12-13 ASSESSMENT — MIFFLIN-ST. JEOR: SCORE: 1873.51

## 2018-12-13 ASSESSMENT — PAIN SCALES - GENERAL: PAINLEVEL: MILD PAIN (2)

## 2018-12-13 NOTE — PROGRESS NOTES
CHIEF COMPLAINT:   Chief Complaint   Patient presents with     RECHECK     Bilateral shoulder pain.Last cortisone injection: 12/21/2017. Injecitons worked good. Pain started to come back gradually starting in April. He is going to be leaving for the winter so would like more injections today.   Shoulder pain if reaching above shoulder level       HISTORY:  Tanvir Dumont is a 68 year old male, right-hand dominant, who is seen for follow up evaluation of bilateral shoulder pain, right > left. He sustained an injury 9/2012, fell onto right shoulder while playing soccer with grand daughter. Had MRI 2012 negative for rotator cuff tear but showing acromio-clavicular injury and rotator cuff tendinosis. Noted to have AC separation. Treated non-opertively, healed well. Patient returns today with continued pain. MRI 2015 showed partial thickness rotator cuff tear on right. His last injections were on 12/21/2017. He reports injections did well until April, then pain slowly crept back. He is leaving for the winter and would like repeat injections today. He has learned to modify his activity, noting he doesn't lift much past shoulder level.  He's eager to get back to golfing.      Symptoms have been waxing and waning right shoulder, starting now left shoulder.  Aggrevated by: overhead activities.  Relieved by: rest, cortisone injections  Present symptoms: pain with overhead activities, pain reaching behind back  Pain location: lateral shoulder, deltoid and upper arm  Pain severity: 2/10.  Pain quality: dull and sharp  Frequency of symptoms: occasionally  Associated symptoms: none  Treatment up to this point: injections 12/2014, 6/2015, 12/2015, 12/6/2016, 12/21/2017 with good relief.    Significant Orthopedic past medical history: right total hip arthroplasty 1/2011  Usual level of recreational activity: golf, pickle ball.  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 He also has no past medical history of Amblyopia, Bleeding disorder (H), Cancer (H), Cataract, Cerebral infarction (H), Chronic infection, Complication of anesthesia, Congestive heart failure (H), COPD (chronic obstructive pulmonary disease) (H), Depressive disorder, Diabetic retinopathy (H), Glaucoma (increased eye pressure), Heart disease, History of blood transfusion, Inflammatory arthritis, Malignant hyperthermia, Retinal detachment, Senile macular degeneration, Strabismus, Stroke (H), Surgical complications, Thyroid disease, Uncomplicated asthma, Unspecified asthma(493.90), or Uveitis.  Patient Active Problem List    Diagnosis Date Noted     S/P lumbar fusion 07/20/2018     Priority: Medium     Spinal stenosis of lumbar region with neurogenic claudication 07/10/2018     Priority: Medium     Type II diabetes mellitus with peripheral circulatory disorder (H) 01/31/2018     Priority: Medium     CKD (chronic kidney disease) stage 3, GFR 30-59 ml/min (H)      Priority: Medium     S/P coronary angiogram 09/23/2015     Priority: Medium     Insomnia 08/03/2015     Priority: Medium     Dermatochalasis 04/10/2015     Priority: Medium     Visual disturbance, transient, right eye 02/09/2014     Priority: Medium     Essential hypertension, benign 05/14/2013     Priority: Medium     Ischemic vascular disease   one stent coronary artery 5/12 12/19/2012     Priority: Medium     Closed dislocation of  acromioclavicular joint 12/17/2012     Priority: Medium     Problem list name updated by automated process. Provider to review       Impingement syndrome of right shoulder 12/17/2012     Priority: Medium     S/P hip replacement 12/13/2012     Priority: Medium     Advanced directives, counseling/discussion 05/25/2011     Priority: Medium     Discussed Advance Directive planning with patient; information given to patient to review.         Urinary retention 01/21/2011     Priority: Medium     Degenerative arthritis of hip - right 12/27/2010     Priority: Medium     OA (osteoarthritis) of knee 10/25/2010     Priority: Medium     Hyperlipidemia LDL goal <100 10/14/2010     Priority: Medium     Low back pain 04/23/2010     Priority: Medium     Radiculopathy of leg 04/23/2010     Priority: Medium     Gout 11/27/2009     Priority: Medium     Sleep apnea      Priority: Medium     Cough 12/05/2007     Priority: Medium       Surgical Hx:  has a past surgical history that includes gastric bypass (1/03); TOTAL HIP ARTHROPLASTY (1/11/11); Stent (5/8/12); Endoscopic sinus surgery (12/14/15); Endoscopic sinus surgery (Bilateral, 12/14/2015); sinus surgery (Right, 12-14-15); Stent (01/2017); ABLATION SUPRAVENT ARRHYTHMOGENIC FOCUS (12/21/15); stent, coronary, shelia (01/2017); and Optical tracking system fusion spine posterior lumbar two levels (N/A, 7/20/2018).    Medications:   Current Outpatient Medications:      ACCU-CHEK SMARTVIEW test strip, ONCE DAILY TESTING AND AS NEEDED, Disp: 100 strip, Rfl: 4     ACE NOT PRESCRIBED, INTENTIONAL,, ACE Inhibitor not prescribed due to Other: cough, Disp: 0 each, Rfl: 0     aspirin 81 MG tablet, Take 1 tablet (81 mg) by mouth daily, Disp: , Rfl:      atorvastatin (LIPITOR) 40 MG tablet, Take 1 tablet (40 mg) by mouth daily, Disp: 90 tablet, Rfl: 4     blood glucose monitoring (Waizy CONTOUR MONITOR) meter device kit, Use to test blood sugar  times daily or as directed., Disp: 1 kit, Rfl: 0      "ciprofloxacin (CIPRO) 500 MG tablet, Take 1 tablet (500 mg) by mouth 2 times daily, Disp: 14 tablet, Rfl: 0     clopidogrel (PLAVIX) 75 MG tablet, Take 75 mg by mouth daily , Disp: , Rfl:      JARDIANCE 10 MG TABS tablet, TAKE ONE TABLET BY MOUTH EVERY DAY, Disp: 90 tablet, Rfl: 0     losartan (COZAAR) 25 MG tablet, Take 1 tablet (25 mg) by mouth daily, Disp: 90 tablet, Rfl: 4     metFORMIN (GLUCOPHAGE) 500 MG tablet, TAKE 2 TABLETS BY MOUTH TWICE DAILY WITH MEALS, Disp: 360 tablet, Rfl: 3     nitroglycerin (NITROSTAT) 0.4 MG SL tablet, Place 1 tablet (0.4 mg) under the tongue every 5 minutes as needed for chest pain, Disp: 90 tablet, Rfl: 4    Allergies:   Allergies   Allergen Reactions     Benzonatate Anaphylaxis and Swelling     Lisinopril Cough     Zocor [Simvastatin - High Dose]      Poor memory       REVIEW OF SYSTEMS:   CONSTITUTIONAL:NEGATIVE for fever, chills, change in weight  INTEGUMENTARY/SKIN: NEGATIVE for worrisome rashes, moles or lesions  MUSCULOSKELETAL:See HPI above  NEURO: NEGATIVE for weakness, dizziness or paresthesias    This document serves as a record of the services and decisions personally performed and made by Skip Reyes MD. It was created on his behalf by Dulce Pacheco, a trained medical scribe. The creation of this document is based the provider's statements to the medical scribe.    Scribbry Pacheco 2:57 PM 12/13/2018        PHYSICAL EXAM:  /71   Pulse 66   Resp 16   Ht 1.849 m (6' 0.8\")   Wt 104.8 kg (231 lb)   SpO2 97%   BMI 30.64 kg/m     GENERAL APPEARANCE: healthy, alert, no distress  SKIN: no suspicious lesions or rashes  NEURO: Normal strength and tone, mentation intact and speech normal  PSYCH:  mentation appears normal and affect normal, not anxious  RESPIRATORY: No increased work of breathing.      RIGHT UPPER EXTREMITY:  Sensation intact to light touch in median, radial, ulnar and axillary nerve distributions  Palpable 2+ radial pulse, brisk capillary refill to " all fingers, wwp  Intact epl fpl fdp edc wrist flexion/extension biceps triceps deltoid    RIGHT SHOULDER:  Shoulder Inspection: no swelling, bruising, discoloration, there is mild AC prominence deformity and asymmetry, mild muscle atrophy supraspinatus and infraspinatus compared to left.    Tender:greater tuberosity, upper trapezius,  nontender to palpation: AC joint, proximal bicep tendon, supraspinatus  Range of Motion:   Active:forward flexion 170 degrees, external rotation  60 degrees, internal rotation  T12   Passive: same  Strength: forward flexion 5-/5, External rotation 5-/5  Liftoff: Able  Impingement: all grade 2 positive  Special tests: Spurling's: Negative  Belly Press: Negative  Empty Can: Positive for pain.    LEFT UPPER EXTREMITY:  Sensation intact to light touch in median, radial, ulnar and axillary nerve distributions  Palpable 2+ radial pulse, brisk capillary refill to all fingers, wwp  Intact epl fpl fdp edc wrist flexion/extension biceps triceps deltoid    LEFT SHOULDER:  Shoulder Inspection: no swelling, bruising, discoloration, or obvious deformity or asymmetry  no atrophy  Tender: AC joint, greater tuberosity, anterior capsule, proximal biceps, deltoid  Non-tender: SC joint, proximal-mid clavicle, mid-distal clavicle, acromion, proximal humerus, supraspinatus , infraspinatus, upper trapezius muscle and rhomboids  Range of Motion:   Active:forward flexion 170 degrees, external rotation  60 degrees, internal rotation  T12   Passive: same  Strength: forward flexion 5/5, External rotation 5/5  Liftoff: Able  Impingement: all grade 2 positive      X-RAY:   No new shoulder xrays today.  3 views right  Shoulder, bilateral AC joints obtained 11/19/2012 were again reviewed personally in clinic today with the patient. On my review, there is elevation and widening of distal clavicle relative to acromion, 100%. Mild acromio-clavicular degenerative changes. Sclerosis at greater tuberosity. There is no  increased in AC separation with weights compared to without weights. Moderate left acromio-clavicular degenerative changes.    3 views left shoulder 3/17/2014 reviewed, No obvious fracture or dislocation. No obvious bony abnormality or lesion. Moderate acromio-clavicular and mild gleno-humeral degenerative changes.       MRI right shoulder CDI 6/2/2015: moderate sized area of poorly defined interstitial and deep fiber tearing of the distal supraspinatus tendon involves up to 50% of thickness. Moderate infraspinatus and subscapularis tendinosis. Chronic acromio-clavicular injury with CC sprain, mild narrowing of subacromial space with minimal bursitis. Long head biceps grossly intact. No significant gleno-humeral degenerative changes.    MRI left shoulder CDI 6/2/2015: mild supraspinatus tendinopathy with fraying involving bursal surface distally. Mild subscapularis tendinopathy. Moderate to marked acromio-clavicular degenerative changes with mild to  Moderate narrowing of the subacromial space. Mild bursitis. No significant gleno-humeral degenerative changes. Proximal biceps grossly intact.    MRI right shoulder 11/26/2012 reviewed:  IMPRESSION:  1. Extensive separation of the acromioclavicular joint including  disruption of the acromioclavicular joint ligaments. The  coracoclavicular ligaments are intact.  2. Mild tendinosis of the distal supraspinatous tendon. No actual  rotator cuff tear is seen.      ASSESSMENT: Edwardo Dumont is a 68 year old male, right  -hand dominant with chronic right shoulder AC separation, pain, rotator cuff tendinosis and impingement with right partial thickness tear; left shoulder impingement and tendinosis.      PLAN:   * again discussed nonsurgical and surgical options. He states he will continue with injections as along as they help.  * again, could consider arthroscopic versus open surgery (for example: subacromial decompression, distal clavicle excision, rotator cuff repair versus  debridement, biceps tenodesis versus tenotomy, etc.). Perioperative course discussed, length of recovery. Right side likely rotator cuff repair and decompression, left side likely decompression only. Risks and perceived benefits discussed.    * at this time, he'd like to proceed with bilateral cortisone injections. See procedure notes below.    In the meantime:    * Rest  * Activity modification - avoid activities that aggravate symptoms or started symptoms at onset.  * NSAIDS - regular use for inflammation, with food, as long as no contra-indications. Please discuss with pcp if needed.  * Ice twice daily to three times daily, 15-20 minutes at a time  * heat may be beneficial prior to exercising  * home exercise program for strengthening, stretching and range of motion exercises of rotator cuff and periscapular stabilization.  * Tylenol as needed for pain  * Injections: cortisone injections may be beneficial to help decrease swelling and inflammation within the shoulder or bursa, and decrease pain. With decreased pain, Physical Therapy and exercises will be more effective and efficient. Patient elects to proceed with bilateral cortisone injections.  * Return to clinic as needed.    PROCEDURE NOTE:  The risks, perceived benefits and potential complications (including but not limited to: bleeding, infection, pain, scar, damage to adjacent structures, atrophy or necrosis of soft tissue, skin blanching, failure to relieve symptoms, worsening of symptoms, allergic reaction) of injection were discussed with the patient. Questions were addressed and answered.The patient elected to proceed. Written informed consent was obtained. The correct procedural site was identified and confirmed. A right shoulder subacromial injection was performed using 2mL Kenalog-40 40mg per mL and 7mL (4mL 1% lidocaine, 3mL 0.25% marcaine)  of local anesthetic after sterile prep, to the correct procedural site. Sterile bandaid applied. This was  tolerated well by the patient. No apparent complications. Did also discuss that if diabetic, recommend close monitoring of blood sugars over the next week as cortisone injections can temporarily elevate blood sugars.    This document serves The risks, perceived benefits and potential complications (including but not limited to: bleeding, infection, pain, scar, damage to adjacent structures, atrophy or necrosis of soft tissue, skin blanching, failure to relieve symptoms, worsening of symptoms, allergic reaction) of injection were discussed with the patient. Questions were addressed and answered.The patient elected to proceed. Written informed consent was obtained. The correct procedural site was identified and confirmed. A left shoulder subacromial injection was performed using 2mL Kenalog-40 40mg per mL and 7mL (4mL 1% lidocaine, 3mL 0.25% marcaine)  of local anesthetic after sterile prep, to the correct procedural site. Sterile bandaid applied. This was tolerated well by the patient. No apparent complications. Did also discuss that if diabetic, recommend close monitoring of blood sugars over the next week as cortisone injections can temporarily elevate blood sugars.    The information in this document, created by a scribe for me, accurately reflects the services I personally performed and the decisions made by me. I have reviewed and approved this document for accuracy.      Skip Reyes M.D., M.S.  Dept. of Orthopaedic Surgery  [facility]    Large Joint Injection/Arthocentesis: bilateral subacromial bursa  Date/Time: 12/13/2018 3:54 PM  Performed by: Igor Rose PA  Authorized by: Skip Reyes MD     Indications:  Pain  Needle Size:  22 G  Guidance: landmark guided    Approach:  Posterolateral  Location:  Shoulder  Laterality:  Bilateral  Site:  Bilateral subacromial bursa  Medications (Right):  4 mL lidocaine 1 %; 80 mg triamcinolone 40 MG/ML; 3 mL bupivacaine 0.25 %  Medications  (Left):  4 mL lidocaine 1 %; 80 mg triamcinolone 40 MG/ML; 3 mL bupivacaine 0.25 %  Outcome:  Tolerated well, no immediate complications  Procedure discussed: discussed risks, benefits, and alternatives    Consent Given by:  Patient  Prep: patient was prepped and draped in usual sterile fashion

## 2018-12-13 NOTE — LETTER
12/13/2018         RE: Edwardo Dumont  [address]  [place] [address]        Dear Colleague,    Thank you for referring your patient, Edwardo Dumont, to the [facility]. Please see a copy of my visit note below.    CHIEF COMPLAINT:   Chief Complaint   Patient presents with     RECHECK     Bilateral shoulder pain.Last cortisone injection: 12/21/2017. Injecitons worked good. Pain started to come back gradually starting in April. He is going to be leaving for the winter so would like more injections today.   Shoulder pain if reaching above shoulder level       HISTORY:  Tanvir Dumont is a 68 year old male, right-hand dominant, who is seen for follow up evaluation of bilateral shoulder pain, right > left. He sustained an injury 9/2012, fell onto right shoulder while playing soccer with grand daughter. Had MRI 2012 negative for rotator cuff tear but showing acromio-clavicular injury and rotator cuff tendinosis. Noted to have AC separation. Treated non-opertively, healed well. Patient returns today with continued pain. MRI 2015 showed partial thickness rotator cuff tear on right. His last injections were on 12/21/2017. He reports injections did well until April, then pain slowly crept back. He is leaving for the winter and would like repeat injections today. He has learned to modify his activity, noting he doesn't lift much past shoulder level.  He's eager to get back to golfing.      Symptoms have been waxing and waning right shoulder, starting now left shoulder.  Aggrevated by: overhead activities.  Relieved by: rest, cortisone injections  Present symptoms: pain with overhead activities, pain reaching behind back  Pain location: lateral shoulder, deltoid and upper arm  Pain severity: 2/10.  Pain quality: dull and sharp  Frequency of symptoms: occasionally  Associated symptoms: none  Treatment up to this point: injections 12/2014, 6/2015, 12/2015, 12/6/2016, 12/21/2017 with good  relief.    Significant Orthopedic past medical history: right total hip arthroplasty 1/2011  Usual level of recreational activity: golf, pickle ball.  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 He also has no past medical history of Amblyopia, Bleeding disorder (H), Cancer (H), Cataract, Cerebral infarction (H), Chronic infection, Complication of anesthesia, Congestive heart failure (H), COPD (chronic obstructive pulmonary disease) (H), Depressive disorder, Diabetic retinopathy (H), Glaucoma (increased eye pressure), Heart disease, History of blood transfusion, Inflammatory arthritis, Malignant hyperthermia, Retinal detachment, Senile macular degeneration, Strabismus, Stroke (H), Surgical complications, Thyroid disease, Uncomplicated asthma, Unspecified asthma(493.90), or Uveitis.  Patient Active Problem List    Diagnosis Date Noted     S/P lumbar fusion 07/20/2018     Priority: Medium     Spinal stenosis of lumbar region with neurogenic claudication 07/10/2018     Priority: Medium     Type II diabetes mellitus with peripheral circulatory disorder (H) 01/31/2018     Priority: Medium     CKD (chronic kidney disease) stage 3, GFR 30-59 ml/min (H)      Priority: Medium     S/P coronary angiogram 09/23/2015     Priority: Medium     Insomnia 08/03/2015     Priority: Medium     Dermatochalasis 04/10/2015     Priority: Medium      Visual disturbance, transient, right eye 02/09/2014     Priority: Medium     Essential hypertension, benign 05/14/2013     Priority: Medium     Ischemic vascular disease   one stent coronary artery 5/12 12/19/2012     Priority: Medium     Closed dislocation of acromioclavicular joint 12/17/2012     Priority: Medium     Problem list name updated by automated process. Provider to review       Impingement syndrome of right shoulder 12/17/2012     Priority: Medium     S/P hip replacement 12/13/2012     Priority: Medium     Advanced directives, counseling/discussion 05/25/2011     Priority: Medium     Discussed Advance Directive planning with patient; information given to patient to review.         Urinary retention 01/21/2011     Priority: Medium     Degenerative arthritis of hip - right 12/27/2010     Priority: Medium     OA (osteoarthritis) of knee 10/25/2010     Priority: Medium     Hyperlipidemia LDL goal <100 10/14/2010     Priority: Medium     Low back pain 04/23/2010     Priority: Medium     Radiculopathy of leg 04/23/2010     Priority: Medium     Gout 11/27/2009     Priority: Medium     Sleep apnea      Priority: Medium     Cough 12/05/2007     Priority: Medium       Surgical Hx:  has a past surgical history that includes gastric bypass (1/03); TOTAL HIP ARTHROPLASTY (1/11/11); Stent (5/8/12); Endoscopic sinus surgery (12/14/15); Endoscopic sinus surgery (Bilateral, 12/14/2015); sinus surgery (Right, 12-14-15); Stent (01/2017); ABLATION SUPRAVENT ARRHYTHMOGENIC FOCUS (12/21/15); stent, coronary, shelia (01/2017); and Optical tracking system fusion spine posterior lumbar two levels (N/A, 7/20/2018).    Medications:   Current Outpatient Medications:      ACCU-CHEK SMARTVIEW test strip, ONCE DAILY TESTING AND AS NEEDED, Disp: 100 strip, Rfl: 4     ACE NOT PRESCRIBED, INTENTIONAL,, ACE Inhibitor not prescribed due to Other: cough, Disp: 0 each, Rfl: 0     aspirin 81 MG tablet, Take 1 tablet (81 mg) by mouth daily,  "Disp: , Rfl:      atorvastatin (LIPITOR) 40 MG tablet, Take 1 tablet (40 mg) by mouth daily, Disp: 90 tablet, Rfl: 4     blood glucose monitoring (VIANNEY CONTOUR MONITOR) meter device kit, Use to test blood sugar  times daily or as directed., Disp: 1 kit, Rfl: 0     ciprofloxacin (CIPRO) 500 MG tablet, Take 1 tablet (500 mg) by mouth 2 times daily, Disp: 14 tablet, Rfl: 0     clopidogrel (PLAVIX) 75 MG tablet, Take 75 mg by mouth daily , Disp: , Rfl:      JARDIANCE 10 MG TABS tablet, TAKE ONE TABLET BY MOUTH EVERY DAY, Disp: 90 tablet, Rfl: 0     losartan (COZAAR) 25 MG tablet, Take 1 tablet (25 mg) by mouth daily, Disp: 90 tablet, Rfl: 4     metFORMIN (GLUCOPHAGE) 500 MG tablet, TAKE 2 TABLETS BY MOUTH TWICE DAILY WITH MEALS, Disp: 360 tablet, Rfl: 3     nitroglycerin (NITROSTAT) 0.4 MG SL tablet, Place 1 tablet (0.4 mg) under the tongue every 5 minutes as needed for chest pain, Disp: 90 tablet, Rfl: 4    Allergies:   Allergies   Allergen Reactions     Benzonatate Anaphylaxis and Swelling     Lisinopril Cough     Zocor [Simvastatin - High Dose]      Poor memory       REVIEW OF SYSTEMS:   CONSTITUTIONAL:NEGATIVE for fever, chills, change in weight  INTEGUMENTARY/SKIN: NEGATIVE for worrisome rashes, moles or lesions  MUSCULOSKELETAL:See HPI above  NEURO: NEGATIVE for weakness, dizziness or paresthesias    This document serves as a record of the services and decisions personally performed and made by Skip Reyes MD. It was created on his behalf by Dulce Pacheco, a trained medical scribe. The creation of this document is based the provider's statements to the medical scribe.    Stoneyibbry Pacheco 2:57 PM 12/13/2018        PHYSICAL EXAM:  /71   Pulse 66   Resp 16   Ht 1.849 m (6' 0.8\")   Wt 104.8 kg (231 lb)   SpO2 97%   BMI 30.64 kg/m      GENERAL APPEARANCE: healthy, alert, no distress  SKIN: no suspicious lesions or rashes  NEURO: Normal strength and tone, mentation intact and speech normal  PSYCH:  " mentation appears normal and affect normal, not anxious  RESPIRATORY: No increased work of breathing.      RIGHT UPPER EXTREMITY:  Sensation intact to light touch in median, radial, ulnar and axillary nerve distributions  Palpable 2+ radial pulse, brisk capillary refill to all fingers, wwp  Intact epl fpl fdp edc wrist flexion/extension biceps triceps deltoid    RIGHT SHOULDER:  Shoulder Inspection: no swelling, bruising, discoloration, there is mild AC prominence deformity and asymmetry, mild muscle atrophy supraspinatus and infraspinatus compared to left.    Tender:greater tuberosity, upper trapezius,  nontender to palpation: AC joint, proximal bicep tendon, supraspinatus  Range of Motion:   Active:forward flexion 170 degrees, external rotation  60 degrees, internal rotation  T12   Passive: same  Strength: forward flexion 5-/5, External rotation 5-/5  Liftoff: Able  Impingement: all grade 2 positive  Special tests: Spurling's: Negative  Belly Press: Negative  Empty Can: Positive for pain.    LEFT UPPER EXTREMITY:  Sensation intact to light touch in median, radial, ulnar and axillary nerve distributions  Palpable 2+ radial pulse, brisk capillary refill to all fingers, wwp  Intact epl fpl fdp edc wrist flexion/extension biceps triceps deltoid    LEFT SHOULDER:  Shoulder Inspection: no swelling, bruising, discoloration, or obvious deformity or asymmetry  no atrophy  Tender: AC joint, greater tuberosity, anterior capsule, proximal biceps, deltoid  Non-tender: SC joint, proximal-mid clavicle, mid-distal clavicle, acromion, proximal humerus, supraspinatus , infraspinatus, upper trapezius muscle and rhomboids  Range of Motion:   Active:forward flexion 170 degrees, external rotation  60 degrees, internal rotation  T12   Passive: same  Strength: forward flexion 5/5, External rotation 5/5  Liftoff: Able  Impingement: all grade 2 positive      X-RAY:   No new shoulder xrays today.  3 views right  Shoulder, bilateral AC  joints obtained 11/19/2012 were again reviewed personally in clinic today with the patient. On my review, there is elevation and widening of distal clavicle relative to acromion, 100%. Mild acromio-clavicular degenerative changes. Sclerosis at greater tuberosity. There is no increased in AC separation with weights compared to without weights. Moderate left acromio-clavicular degenerative changes.    3 views left shoulder 3/17/2014 reviewed, No obvious fracture or dislocation. No obvious bony abnormality or lesion. Moderate acromio-clavicular and mild gleno-humeral degenerative changes.       MRI right shoulder CDI 6/2/2015: moderate sized area of poorly defined interstitial and deep fiber tearing of the distal supraspinatus tendon involves up to 50% of thickness. Moderate infraspinatus and subscapularis tendinosis. Chronic acromio-clavicular injury with CC sprain, mild narrowing of subacromial space with minimal bursitis. Long head biceps grossly intact. No significant gleno-humeral degenerative changes.    MRI left shoulder CDI 6/2/2015: mild supraspinatus tendinopathy with fraying involving bursal surface distally. Mild subscapularis tendinopathy. Moderate to marked acromio-clavicular degenerative changes with mild to  Moderate narrowing of the subacromial space. Mild bursitis. No significant gleno-humeral degenerative changes. Proximal biceps grossly intact.    MRI right shoulder 11/26/2012 reviewed:  IMPRESSION:  1. Extensive separation of the acromioclavicular joint including  disruption of the acromioclavicular joint ligaments. The  coracoclavicular ligaments are intact.  2. Mild tendinosis of the distal supraspinatous tendon. No actual  rotator cuff tear is seen.      ASSESSMENT: Edwardo Dumont is a 68 year old male, right  -hand dominant with chronic right shoulder AC separation, pain, rotator cuff tendinosis and impingement with right partial thickness tear; left shoulder impingement and  tendinosis.      PLAN:   * again discussed nonsurgical and surgical options. He states he will continue with injections as along as they help.  * again, could consider arthroscopic versus open surgery (for example: subacromial decompression, distal clavicle excision, rotator cuff repair versus debridement, biceps tenodesis versus tenotomy, etc.). Perioperative course discussed, length of recovery. Right side likely rotator cuff repair and decompression, left side likely decompression only. Risks and perceived benefits discussed.    * at this time, he'd like to proceed with bilateral cortisone injections. See procedure notes below.    In the meantime:    * Rest  * Activity modification - avoid activities that aggravate symptoms or started symptoms at onset.  * NSAIDS - regular use for inflammation, with food, as long as no contra-indications. Please discuss with pcp if needed.  * Ice twice daily to three times daily, 15-20 minutes at a time  * heat may be beneficial prior to exercising  * home exercise program for strengthening, stretching and range of motion exercises of rotator cuff and periscapular stabilization.  * Tylenol as needed for pain  * Injections: cortisone injections may be beneficial to help decrease swelling and inflammation within the shoulder or bursa, and decrease pain. With decreased pain, Physical Therapy and exercises will be more effective and efficient. Patient elects to proceed with bilateral cortisone injections.  * Return to clinic as needed.    PROCEDURE NOTE:  The risks, perceived benefits and potential complications (including but not limited to: bleeding, infection, pain, scar, damage to adjacent structures, atrophy or necrosis of soft tissue, skin blanching, failure to relieve symptoms, worsening of symptoms, allergic reaction) of injection were discussed with the patient. Questions were addressed and answered.The patient elected to proceed. Written informed consent was obtained. The  correct procedural site was identified and confirmed. A right shoulder subacromial injection was performed using 2mL Kenalog-40 40mg per mL and 7mL (4mL 1% lidocaine, 3mL 0.25% marcaine)  of local anesthetic after sterile prep, to the correct procedural site. Sterile bandaid applied. This was tolerated well by the patient. No apparent complications. Did also discuss that if diabetic, recommend close monitoring of blood sugars over the next week as cortisone injections can temporarily elevate blood sugars.    This document serves The risks, perceived benefits and potential complications (including but not limited to: bleeding, infection, pain, scar, damage to adjacent structures, atrophy or necrosis of soft tissue, skin blanching, failure to relieve symptoms, worsening of symptoms, allergic reaction) of injection were discussed with the patient. Questions were addressed and answered.The patient elected to proceed. Written informed consent was obtained. The correct procedural site was identified and confirmed. A left shoulder subacromial injection was performed using 2mL Kenalog-40 40mg per mL and 7mL (4mL 1% lidocaine, 3mL 0.25% marcaine)  of local anesthetic after sterile prep, to the correct procedural site. Sterile bandaid applied. This was tolerated well by the patient. No apparent complications. Did also discuss that if diabetic, recommend close monitoring of blood sugars over the next week as cortisone injections can temporarily elevate blood sugars.    The information in this document, created by a scribe for me, accurately reflects the services I personally performed and the decisions made by me. I have reviewed and approved this document for accuracy.      Skip Reyes M.D., M.S.  Dept. of Orthopaedic Surgery  [facility]    Large Joint Injection/Arthocentesis: bilateral subacromial bursa  Date/Time: 12/13/2018 3:54 PM  Performed by: Igor Rose PA  Authorized by: Skip Reyes Dov,  MD     Indications:  Pain  Needle Size:  22 G  Guidance: landmark guided    Approach:  Posterolateral  Location:  Shoulder  Laterality:  Bilateral  Site:  Bilateral subacromial bursa  Medications (Right):  4 mL lidocaine 1 %; 80 mg triamcinolone 40 MG/ML; 3 mL bupivacaine 0.25 %  Medications (Left):  4 mL lidocaine 1 %; 80 mg triamcinolone 40 MG/ML; 3 mL bupivacaine 0.25 %  Outcome:  Tolerated well, no immediate complications  Procedure discussed: discussed risks, benefits, and alternatives    Consent Given by:  Patient  Prep: patient was prepped and draped in usual sterile fashion            Again, thank you for allowing me to participate in the care of your patient.        Sincerely,        Skip Reyes MD

## 2019-02-07 ENCOUNTER — TELEPHONE (OUTPATIENT)
Dept: FAMILY MEDICINE | Facility: CLINIC | Age: 69
End: 2019-02-07

## 2019-02-07 NOTE — LETTER
February 7, 2019          Edwardo Dumont,  [address] [address] [address]        Dear Edwardo Dumont      Monitoring and managing your preventative and chronic health conditions are very important to us. Our records indicate that you have not scheduled for Diabetic Check and HgbA1C  which was recommended by Dr. Altamirano.      If you have received your health care elsewhere, please call the clinic so the information can be documented in your chart.    Please call [phone] or message us through your [employer] account to schedule an appointment or provide information for your chart.     Feel free to contact us if you have any questions or concerns!    I look forward to seeing you and working with you on your health care needs.     Sincerely,       [facility]/Mayra SHETH CMA ([facility])

## 2019-02-08 NOTE — TELEPHONE ENCOUNTER
Panel Management Review      Patient has the following on his problem list:     Diabetes    ASA: Passed    Last A1C  Lab Results   Component Value Date    A1C 8.3 06/26/2018    A1C 7.8 11/28/2017    A1C 7.1 06/12/2017    A1C 7.3 11/28/2016    A1C 6.7 08/24/2016     A1C tested: Passed    Last LDL:    Lab Results   Component Value Date    CHOL 108 06/26/2018     Lab Results   Component Value Date    HDL 43 06/26/2018     Lab Results   Component Value Date    LDL 41 06/26/2018     Lab Results   Component Value Date    TRIG 120 06/26/2018     Lab Results   Component Value Date    CHOLHDLRATIO 2.0 04/08/2015     Lab Results   Component Value Date    NHDL 65 06/26/2018       Is the patient on a Statin? YES             Is the patient on Aspirin? YES    Medications     HMG CoA Reductase Inhibitors    atorvastatin (LIPITOR) 40 MG tablet    Salicylates    aspirin 81 MG tablet          Last three blood pressure readings:  BP Readings from Last 3 Encounters:   12/13/18 121/71   10/02/18 105/70   08/30/18 108/73       Date of last diabetes office visit: 06/28/2018     Tobacco History:     History   Smoking Status     Former Smoker     Packs/day: 1.00     Years: 5.00     Types: Cigarettes     Start date: 1/1/1970     Quit date: 9/8/1989   Smokeless Tobacco     Never Used     Comment: former smoker           Composite cancer screening  Chart review shows that this patient is due/due soon for the following None  Summary:    Patient is due/failing the following:   Zoster, aortic, and A1C    Action needed:   Patient needs office visit for diabetes.    Type of outreach:    Sent letter.    Questions for provider review:    None                                                                                                                                    Mayra SHETH CMA ([facility])       Chart routed to none .

## 2019-05-04 ENCOUNTER — OFFICE VISIT (OUTPATIENT)
Dept: URGENT CARE | Facility: URGENT CARE | Age: 69
End: 2019-05-04
Payer: MEDICARE

## 2019-05-04 VITALS
HEART RATE: 74 BPM | DIASTOLIC BLOOD PRESSURE: 84 MMHG | BODY MASS INDEX: 30.78 KG/M2 | SYSTOLIC BLOOD PRESSURE: 153 MMHG | TEMPERATURE: 98.3 F | WEIGHT: 232 LBS | OXYGEN SATURATION: 96 %

## 2019-05-04 DIAGNOSIS — L02.512 ABSCESS OF FINGER OF LEFT HAND: Primary | ICD-10-CM

## 2019-05-04 DIAGNOSIS — I10 ESSENTIAL HYPERTENSION, BENIGN: ICD-10-CM

## 2019-05-04 PROCEDURE — 10060 I&D ABSCESS SIMPLE/SINGLE: CPT | Performed by: INTERNAL MEDICINE

## 2019-05-04 PROCEDURE — 87070 CULTURE OTHR SPECIMN AEROBIC: CPT | Performed by: INTERNAL MEDICINE

## 2019-05-04 RX ORDER — CLINDAMYCIN HCL 300 MG
300 CAPSULE ORAL 4 TIMES DAILY
Qty: 40 CAPSULE | Refills: 0 | Status: SHIPPED | OUTPATIENT
Start: 2019-05-04 | End: 2019-05-04

## 2019-05-04 RX ORDER — CLINDAMYCIN HCL 300 MG
300 CAPSULE ORAL 4 TIMES DAILY
Qty: 40 CAPSULE | Refills: 0 | Status: SHIPPED | OUTPATIENT
Start: 2019-05-04

## 2019-05-04 NOTE — PROGRESS NOTES
SUBJECTIVE:  Edwardo Dumont is an 68 year old male who presents for left index finger infection.  Started about two weeks ago and has worsened this week.  Is painful.  No pus or drainage from it.  Did try to pop it but didn't help.  No fevers, chills, sweats.  No injury to finger.  Is right handed.  No meds taken for sxs.  No recent intl travel.      PMH:   has a past medical history of Acromioclavicular joint separation, type 1 (9/12), Allergic rhinitis, Arthritis, CKD (chronic kidney disease) stage 3, GFR 30-59 ml/min (H), Degenerative arthritis of hip - right (12/27/2010), Gout, Hip arthrosis - right (10/25/2010), Hyperlipidemia, Ischaemic vascular disease, Ischemic vascular disease (5/12), OA (osteoarthritis) of knee (10/25/2010), Renal insufficiency, Sleep apnea, Type II or unspecified type diabetes mellitus without mention of complication, not stated as uncontrolled, and Unspecified essential hypertension.  Patient Active Problem List   Diagnosis     Cough     Sleep apnea     Gout     Low back pain     Radiculopathy of leg     Hyperlipidemia LDL goal <100     OA (osteoarthritis) of knee     Degenerative arthritis of hip - right     Urinary retention     Advanced directives, counseling/discussion     S/P hip replacement     Closed dislocation of acromioclavicular joint     Impingement syndrome of right shoulder     Ischemic vascular disease   one stent coronary artery 5/12     Essential hypertension, benign     Visual disturbance, transient, right eye     Dermatochalasis     Insomnia     S/P coronary angiogram     CKD (chronic kidney disease) stage 3, GFR 30-59 ml/min (H)     Type II diabetes mellitus with peripheral circulatory disorder (H)     Spinal stenosis of lumbar region with neurogenic claudication     S/P lumbar fusion     Social History     Socioeconomic History     Marital status:      Spouse name: None     Number of children: None     Years of education: None     Highest education level: None    Occupational History     None   Social Needs     Financial resource strain: None     Food insecurity:     Worry: None     Inability: None     Transportation needs:     Medical: None     Non-medical: None   Tobacco Use     Smoking status: Former Smoker     Packs/day: 1.00     Years: 5.00     Pack years: 5.00     Types: Cigarettes     Start date: 1970     Last attempt to quit: 1989     Years since quittin.6     Smokeless tobacco: Never Used     Tobacco comment: former smoker   Substance and Sexual Activity     Alcohol use: Yes     Comment: couple drinks 3 x a week     Drug use: No     Sexual activity: Yes     Partners: Female   Lifestyle     Physical activity:     Days per week: None     Minutes per session: None     Stress: None   Relationships     Social connections:     Talks on phone: None     Gets together: None     Attends [employer] service: None     Active member of club or organization: None     Attends meetings of clubs or organizations: None     Relationship status: None     Intimate partner violence:     Fear of current or ex partner: None     Emotionally abused: None     Physically abused: None     Forced sexual activity: None   Other Topics Concern     Parent/sibling w/ CABG, MI or angioplasty before 65F 55M? Not Asked   Social History Narrative     None     Family History   Problem Relation Age of Onset     Allergies Son      Allergies Son      Cancer Mother         kidney     Neurologic Disorder Father         parkinsons     Glaucoma No family hx of      Macular Degeneration No family hx of        ALLERGIES:  Benzonatate; Lisinopril; and Zocor [simvastatin - high dose]    Current Outpatient Medications   Medication     ACCU-CHEK SMARTVIEW test strip     ACE NOT PRESCRIBED, INTENTIONAL,     aspirin 81 MG tablet     blood glucose monitoring (VIANNEY CONTOUR MONITOR) meter device kit     clopidogrel (PLAVIX) 75 MG tablet     JARDIANCE 10 MG TABS tablet     metFORMIN (GLUCOPHAGE) 500 MG tablet      nitroglycerin (NITROSTAT) 0.4 MG SL tablet     atorvastatin (LIPITOR) 40 MG tablet     ciprofloxacin (CIPRO) 500 MG tablet     losartan (COZAAR) 25 MG tablet     Current Facility-Administered Medications   Medication     bupivacaine (MARCAINE) 0.25 % injection 3 mL     bupivacaine (MARCAINE) 0.25 % injection 3 mL     lidocaine 1 % injection 4 mL     lidocaine 1 % injection 4 mL     triamcinolone (KENALOG-40) injection 80 mg     triamcinolone (KENALOG-40) injection 80 mg         ROS:  ROS is done and is negative for general/constitutional, eye, ENT, Respiratory, cardiovascular, GI, , Skin, musculoskeletal except as noted elsewhere.  All other review of systems negative except as noted elsewhere.      OBJECTIVE:  /84   Pulse 74   Temp 98.3  F (36.8  C) (Oral)   Wt 105.2 kg (232 lb)   SpO2 96%   BMI 30.78 kg/m    GENERAL APPEARANCE: Alert, in no acute distress  EYES: normal  NOSE:normal  OROPHARYNX:normal  NECK:No adenopathy,masses or thyromegaly  RESP: normal and clear to auscultation  CV:regular rate and rhythm and no murmurs, clicks, or gallops  ABDOMEN: Abdomen soft, non-tender. BS normal. No masses, organomegaly  SKIN: left index finger - moderate edema with some fluctuance present along ulnar aspect of nail extending onto side of finger slightly, this area is mildly erythematous, mildly warm to touch and very tender to touch.  Affected area approx 1cmx0.5cm.  MUSCULOSKELETAL:Musculoskeletal normal      PROCEDURE:   the left index finger is cleaned and prepped. Pt opted for no lidocaine for the procedure. The abscess is lanced with a scapel with immediate drainage of thick white fluid which is swabbed for cx and sent to lab.  The area is dressed and covered.  .      ASSESSMENT/PLAN:    ASSESSMENT / PLAN:  (L02.512) Abscess of finger of left hand  (primary encounter diagnosis)  Comment: drained as noted above  Plan: Wound Culture Aerobic Bacterial, clindamycin         (CLEOCIN) 300 MG capsule,  DRAIN SKIN ABSCESS         Reviewed medication instructions and side effects. Follow up if experiences side effects.. I reviewed supportive care, otc meds to use if needed, expected course, and signs of concern.  Follow up as needed or if he does not improve within 5 day(s) or if worsens in any way.  Reviewed red flag symptoms and is to go to the ER if experiences any of these.    (I10) Essential hypertension, benign  Comment: bp above goal today, likely due to pain  Plan: Recheck BP in 1-2 weeks with a nurse visit, [place] pharmacy visit, or a visit to primary care doctor.      See [facility] for orders, medications, letters, patient instructions    Marilou Alanis M.D.

## 2019-05-06 LAB
BACTERIA SPEC CULT: NORMAL
Lab: NORMAL
SPECIMEN SOURCE: NORMAL

## 2019-05-13 ENCOUNTER — TELEPHONE (OUTPATIENT)
Dept: FAMILY MEDICINE | Facility: CLINIC | Age: 69
End: 2019-05-13

## 2019-05-13 NOTE — TELEPHONE ENCOUNTER
Panel Management Review      Patient has the following on his problem list:     Diabetes    ASA: Passed    Last A1C  Lab Results   Component Value Date    A1C 8.3 06/26/2018    A1C 7.8 11/28/2017    A1C 7.1 06/12/2017    A1C 7.3 11/28/2016    A1C 6.7 08/24/2016     A1C tested: FAILED    Last LDL:    Lab Results   Component Value Date    CHOL 108 06/26/2018     Lab Results   Component Value Date    HDL 43 06/26/2018     Lab Results   Component Value Date    LDL 41 06/26/2018     Lab Results   Component Value Date    TRIG 120 06/26/2018     Lab Results   Component Value Date    CHOLHDLRATIO 2.0 04/08/2015     Lab Results   Component Value Date    NHDL 65 06/26/2018       Is the patient on a Statin? YES             Is the patient on Aspirin? YES    Medications     HMG CoA Reductase Inhibitors     atorvastatin (LIPITOR) 40 MG tablet       Salicylates     aspirin 81 MG tablet             Last three blood pressure readings:  BP Readings from Last 3 Encounters:   05/04/19 153/84   12/13/18 121/71   10/02/18 105/70       Date of last diabetes office visit: 06/28/2018     Tobacco History:     History   Smoking Status     Former Smoker     Packs/day: 1.00     Years: 5.00     Types: Cigarettes     Start date: 1/1/1970     Quit date: 9/8/1989   Smokeless Tobacco     Never Used     Comment: former smoker           Composite cancer screening  Chart review shows that this patient is due/due soon for the following None  Summary:    Patient is due/failing the following:   Wellness, zoster, aortic, dtap, A1C and PHQ9    Action needed:   Patient needs office visit for diabetes check.    Type of outreach:    Sent [employer] message.    Questions for provider review:    None                                                                                                                                    Mayra GRAVES CMA ([facility])       Chart routed to none .

## 2019-05-30 ENCOUNTER — OFFICE VISIT (OUTPATIENT)
Dept: FAMILY MEDICINE | Facility: CLINIC | Age: 69
End: 2019-05-30
Payer: MEDICARE

## 2019-05-30 VITALS
RESPIRATION RATE: 20 BRPM | HEART RATE: 81 BPM | BODY MASS INDEX: 30.75 KG/M2 | WEIGHT: 232 LBS | DIASTOLIC BLOOD PRESSURE: 68 MMHG | HEIGHT: 73 IN | SYSTOLIC BLOOD PRESSURE: 118 MMHG | TEMPERATURE: 98 F | OXYGEN SATURATION: 96 %

## 2019-05-30 DIAGNOSIS — E11.51 TYPE II DIABETES MELLITUS WITH PERIPHERAL CIRCULATORY DISORDER (H): Primary | ICD-10-CM

## 2019-05-30 DIAGNOSIS — M10.9 GOUT, UNSPECIFIED CAUSE, UNSPECIFIED CHRONICITY, UNSPECIFIED SITE: ICD-10-CM

## 2019-05-30 DIAGNOSIS — J01.90 ACUTE SINUSITIS WITH SYMPTOMS > 10 DAYS: ICD-10-CM

## 2019-05-30 DIAGNOSIS — E78.5 HYPERLIPIDEMIA LDL GOAL <100: ICD-10-CM

## 2019-05-30 DIAGNOSIS — Z23 NEED FOR VACCINATION: ICD-10-CM

## 2019-05-30 PROCEDURE — 90746 HEPB VACCINE 3 DOSE ADULT IM: CPT | Performed by: FAMILY MEDICINE

## 2019-05-30 PROCEDURE — 99214 OFFICE O/P EST MOD 30 MIN: CPT | Mod: 25 | Performed by: FAMILY MEDICINE

## 2019-05-30 PROCEDURE — 90471 IMMUNIZATION ADMIN: CPT | Performed by: FAMILY MEDICINE

## 2019-05-30 PROCEDURE — 90472 IMMUNIZATION ADMIN EACH ADD: CPT | Performed by: FAMILY MEDICINE

## 2019-05-30 PROCEDURE — 90714 TD VACC NO PRESV 7 YRS+ IM: CPT | Performed by: FAMILY MEDICINE

## 2019-05-30 RX ORDER — AZITHROMYCIN 250 MG/1
TABLET, FILM COATED ORAL
Qty: 6 TABLET | Refills: 0 | Status: SHIPPED | OUTPATIENT
Start: 2019-05-30 | End: 2019-06-13

## 2019-05-30 ASSESSMENT — PAIN SCALES - GENERAL: PAINLEVEL: NO PAIN (0)

## 2019-05-30 ASSESSMENT — MIFFLIN-ST. JEOR: SCORE: 1873.29

## 2019-05-30 NOTE — NURSING NOTE
"Chief Complaint   Patient presents with     [facility]     Diabetes     Health Maintenance     orders pended, PHQ2, Wellness visit,        Initial /82   Pulse 81   Temp 98  F (36.7  C) (Oral)   Resp 20   Ht 1.842 m (6' 0.5\")   Wt 105.2 kg (232 lb)   SpO2 96%   BMI 31.03 kg/m   Estimated body mass index is 31.03 kg/m  as calculated from the following:    Height as of this encounter: 1.842 m (6' 0.5\").    Weight as of this encounter: 105.2 kg (232 lb).  Medication Reconciliation: complete  Daniella Loomis, BRANDON  "

## 2019-05-30 NOTE — NURSING NOTE
Screening Questionnaire for Adult Immunization    Are you sick today?   No   Do you have allergies to medications, food, a vaccine component or latex?   No   Have you ever had a serious reaction after receiving a vaccination?   No   Do you have a long-term health problem with heart disease, lung disease, asthma, kidney disease, metabolic disease (e.g. diabetes), anemia, or other blood disorder?   No   Do you have cancer, leukemia, HIV/AIDS, or any other immune system problem?   No   In the past 3 months, have you taken medications that affect  your immune system, such as prednisone, other steroids, or anticancer drugs; drugs for the treatment of rheumatoid arthritis, Crohn s disease, or psoriasis; or have you had radiation treatments?   Steroids for back   Have you had a seizure, or a brain or other nervous system problem?   No   During the past year, have you received a transfusion of blood or blood     products, or been given immune (gamma) globulin or antiviral drug?   No   For women: Are you pregnant or is there a chance you could become        pregnant during the next month?   No   Have you received any vaccinations in the past 4 weeks?   No     Immunization questionnaire was positive for at least one answer.  Notified Dr Madrigal.        Per orders of Dr. Madrigal, injection of TD and HEP B given by Rosa Valencia. Patient instructed to remain in clinic for 15 minutes afterwards, and to report any adverse reaction to me immediately.       Screening performed by Rosa Valencia on 5/30/2019 at 6:04 PM.

## 2019-05-30 NOTE — PROGRESS NOTES
[place] diabetes resourses:  [email or web address]        To access diabetes educational materials with in EPIC use <dot>RADHA      Put this in AFTER VISIT SUMMARY if needed for the patient to access information online [email or web address]      SUBJECTIVE:  Edwardo Dumont is a 68 year old male who presents today for a follow up appointment for management of DIABETES MELLITUS.  He was diagnosed about 11 years ago.    Patient Active Problem List    Diagnosis Date Noted     S/P lumbar fusion 07/20/2018     Priority: Medium     Spinal stenosis of lumbar region with neurogenic claudication 07/10/2018     Priority: Medium     Type II diabetes mellitus with peripheral circulatory disorder (H) 01/31/2018     Priority: Medium     CKD (chronic kidney disease) stage 3, GFR 30-59 ml/min (H)      Priority: Medium     S/P coronary angiogram 09/23/2015     Priority: Medium     Insomnia 08/03/2015     Priority: Medium     Dermatochalasis 04/10/2015     Priority: Medium     Visual disturbance, transient, right eye 02/09/2014     Priority: Medium     Essential hypertension, benign 05/14/2013     Priority: Medium     Ischemic vascular disease   one stent coronary artery 5/12 12/19/2012     Priority: Medium     Closed dislocation of acromioclavicular joint 12/17/2012     Priority: Medium     Problem list name updated by automated process. Provider to review       Impingement syndrome of right shoulder 12/17/2012     Priority: Medium     S/P hip replacement 12/13/2012     Priority: Medium     Advanced directives, counseling/discussion 05/25/2011     Priority: Medium     Discussed Advance Directive planning with patient; information given to patient to review.         Urinary retention 01/21/2011     Priority: Medium     Degenerative arthritis of hip - right 12/27/2010     Priority: Medium     OA (osteoarthritis) of knee 10/25/2010     Priority:  Medium     Hyperlipidemia LDL goal <100 10/14/2010     Priority: Medium     Low back pain 04/23/2010     Priority: Medium     Radiculopathy of leg 04/23/2010     Priority: Medium     Gout 11/27/2009     Priority: Medium     Sleep apnea      Priority: Medium     Cough 12/05/2007     Priority: Medium          The patient checks his blood sugar as follows: 2-3 times a week, once daily.   Results as follows: fasting glucose- 150's    The patient reports that he IS taking the medication as prescribed. He denies side effects of medication.    ----------------------------------------------------------------------------------------------------------------------------------------    BP Readings from Last 3 Encounters:   05/30/19 118/68   05/04/19 153/84   12/13/18 121/71     The patient reports that he IS NOT currently smoking.  History   Smoking Status     Former Smoker     Packs/day: 1.00     Years: 5.00     Types: Cigarettes     Start date: 1/1/1970     Quit date: 9/8/1989   Smokeless Tobacco     Never Used     Comment: former smoker           The ASCVD Risk score (Sprakers LOGAN Jr., et al., 2013) failed to calculate for the following reasons:    The valid total cholesterol range is 130 to 320 mg/dL        Current Outpatient Medications   Medication Sig Dispense Refill     ACCU-CHEK SMARTVIEW test strip ONCE DAILY TESTING AND AS NEEDED 100 strip 4     ACE NOT PRESCRIBED, INTENTIONAL, ACE Inhibitor not prescribed due to Other: cough 0 each 0     aspirin 81 MG tablet Take 1 tablet (81 mg) by mouth daily       atorvastatin (LIPITOR) 40 MG tablet Take 1 tablet (40 mg) by mouth daily 90 tablet 4     blood glucose monitoring (PrePlay CONTOUR MONITOR) meter device kit Use to test blood sugar  times daily or as directed. 1 kit 0     ciprofloxacin (CIPRO) 500 MG tablet Take 1 tablet (500 mg) by mouth 2 times daily 14 tablet 0     clindamycin (CLEOCIN) 300 MG capsule Take 1 capsule (300 mg) by mouth 4 times daily 40 capsule 0      clopidogrel (PLAVIX) 75 MG tablet Take 75 mg by mouth daily        JARDIANCE 10 MG TABS tablet TAKE ONE TABLET BY MOUTH EVERY DAY 90 tablet 0     losartan (COZAAR) 25 MG tablet Take 1 tablet (25 mg) by mouth daily 90 tablet 4     metFORMIN (GLUCOPHAGE) 500 MG tablet TAKE 2 TABLETS BY MOUTH TWICE DAILY WITH MEALS 360 tablet 3     nitroglycerin (NITROSTAT) 0.4 MG SL tablet Place 1 tablet (0.4 mg) under the tongue every 5 minutes as needed for chest pain 90 tablet 4       The patient reports that he IS taking a statin.   (Reminder all diabetics with a ASCVD risk greater or equal to 7.5% should be on a high intensity statin, otherwise on a moderate intensity statin)    The patient reports that he IS taking 81mg of  aspirin daily.  (Reminder all diabetic patients with a cardiovascular risk factor and > 50 should take a daily aspirin)       The patient reports that he IS not doing a self foot exam at all.  The patient reports that he does exercise in the form of biking occasionally .  The patient reports that he IS following the recommended diabetic diet. He  would give himself a C to C- grade on his diet.  The patient reports that his last eye exam was 7 months ago.       Immunization History   Administered Date(s) Administered     HEPA 01/08/2009, 09/08/2009     HepB 03/06/2013, 01/15/2014     Influenza (High Dose) 3 valent vaccine 11/01/2016, 09/26/2017     Influenza (IIV3) PF 1950, 10/01/2010, 09/26/2012, 11/04/2013, 10/01/2014     Influenza Vaccine, 3 YRS +, IM (QUADRIVALENT W/PRESERVATIVES) 10/06/2015     Pneumo Conj 13-V (2010&after) 06/02/2016     Pneumococcal 23 valent 02/27/2009, 06/13/2017     TDAP Vaccine (Adacel) 01/08/2009     The patient reports that he has had the hepatitis B vaccine series in the past. (recommended for age 19-59 and can be given to age 60 or older)  The patient reports that he has had a pnuemovax in the past.  The patient reports that he has had a flu shot for the current influenza  "season.  The patient would like to have a Td    Patient concerns: is concerned about his sinus symptom(s)       EXAM:  /68   Pulse 81   Temp 98  F (36.7  C) (Oral)   Resp 20   Ht 1.842 m (6' 0.5\")   Wt 105.2 kg (232 lb)   SpO2 96%   BMI 31.03 kg/m    Wt Readings from Last 4 Encounters:   05/30/19 105.2 kg (232 lb)   05/04/19 105.2 kg (232 lb)   12/13/18 104.8 kg (231 lb)   10/02/18 103.5 kg (228 lb 3.2 oz)     Body mass index is 31.03 kg/m .    General:  Edwardo is awake, alert, and cooperative.  NAD.      Diabetic Foot Screen:  Any complaints of increased pain or numbness ? No  Is there a foot ulcer now or a history of foot ulcer? No  Does the foot have an abnormal shape? No  Are the nails thick, too long or ingrown? No  Are there any redness or open areas? No         Sensation Testing done at all points on the diagram with monofilament     Right Foot: Sensation Normal at all points  Left Foot: Sensation Normal at all points     Risk Category: 0- No loss of protective sensation  Performed by Bola Madrigal MD                  Previsit labs drawn include:  Office Visit on 05/04/2019   Component Date Value Ref Range Status     Specimen Description 05/04/2019 Left Hand index finger   Final     Special Requests 05/04/2019 Specimen collected in eSwab transport (white cap)   Final     Culture Micro 05/04/2019    Final                    Value:Light growth  Normal skin delmy           ASSESSMENT and PLAN:    Type II Diabetes, .    DIABETES Type II:                       Lab Results   Component Value Date    A1C 8.3 06/26/2018       Control   unable to assess  Lab Results   Component Value Date    A1C 8.3 06/26/2018    A1C 7.8 11/28/2017    A1C 7.1 06/12/2017     Lab Results   Component Value Date    MICROL 10 06/26/2018     No results found for: MICROALBUMIN Control   unable to assess    The pt will make a future lab appoinment for all bloodwork as they are not fasting today, Check a HGBA1C , Check a " "microalbumin, Check a Creatinine/GFR, Recommended to take ASA  mg daily for appropriate patient, Compliance with the recommended diabetic diet was stressed, Regular aerobic exercise was encouraged, Home glucose monitoring encouraged, Annual eye exam recommended, Self foot exam demonstrated and recommended to be done nightly, Apply moisturizer to feet with in 3 minutes of showering or bathing, Follow up in clinic in 3-6 months for a diabetes check and the patient was instructed to make a lab appt 1 week prior to next diabetes visit      BP/HTN:   BP Readings from Last 3 Encounters:   05/30/19 118/68   05/04/19 153/84   12/13/18 121/71     Control   good    - Medication:continue the current doses of medication.  (make sure to order \"Ace not prescribed intentionally if not on an ACE/ARB)  The patient was advised to do the following therapuetic life style changes  - Dietary sodium restriction and increase potassium and Calcium intake  - Regular aerobic exercise  - Weight loss  - Discontinue smoking if applicable  - Avoid regular NSAID use if applicable  - Avoid regular decongestant use if applicable  - Follow up in clinic in 3-6 months for a recheck  - Check a basic metabolic panel today if needed    Patient Education: Reviewed risks of hypertension and principles of   Treatment.    HYPERCHOLESTEROLEMIA:     The ASCVD Risk score (Rajan LOGAN Jr., et al., 2013) failed to calculate for the following reasons:    The valid total cholesterol range is 130 to 320 mg/dL    Lab Results   Component Value Date    LDL 41 06/26/2018      Control   good  Cholesterol   Date Value Ref Range Status   06/26/2018 108 <200 mg/dL Final   06/12/2017 99 <200 mg/dL Final     HDL Cholesterol   Date Value Ref Range Status   06/26/2018 43 >39 mg/dL Final   06/12/2017 44 >39 mg/dL Final     LDL Cholesterol Calculated   Date Value Ref Range Status   06/26/2018 41 <100 mg/dL Final     Comment:     Desirable:       <100 mg/dl   06/12/2017 36 <100 " mg/dL Final     Comment:     Desirable:       <100 mg/dl     Triglycerides   Date Value Ref Range Status   06/26/2018 120 <150 mg/dL Final     Comment:     Fasting specimen   06/12/2017 96 <150 mg/dL Final     Comment:     Fasting specimen     Cholesterol/HDL Ratio   Date Value Ref Range Status   04/08/2015 2.0 0.0 - 5.0 Final   12/08/2014 1.9 0.0 - 5.0 Final         The patient is on a high intensity statin and this is the appropriate treatment based on the ASCVD risk.    The patient's HDL is normal  The patient's triglycerides are normal.    We have discussed the results and we will continue the current management.           The patient is not  fasting today and he  will recheck the fasting lipid panel at a future laboratory appointment.        --------------------------------------------------------------------------------------------------------------------------------------    SUBJECTIVE:   Edwardo Dumont is a 68 year old male presenting with a chief complaint of rhinorrhea which is yellow.  The patient first noted the onset of symptoms was 2 week(s) ago.  The patient (or parent) reports that he first had symptoms of nasal congestion and a cough . After that he started having symptoms of a cough. After that he started having symptoms of a cough productive of yellow mucous. Other symptoms that followed include sinus pressure.    He (or parent) denies: fever.  He (or parent) denies: a sore throat.  He (or parent) denies: shortness of breath.    The patient (or parent) reports that he had tried clindamycin  and it has not been helpful.  The patient has the following predisposing factors for infection: history of sinus surgery .    Patient Active Problem List   Diagnosis     Cough     Sleep apnea     Gout     Low back pain     Radiculopathy of leg     Hyperlipidemia LDL goal <100     OA (osteoarthritis) of knee     Degenerative arthritis of hip - right     Urinary retention     Advanced directives,  counseling/discussion     S/P hip replacement     Closed dislocation of acromioclavicular joint     Impingement syndrome of right shoulder     Ischemic vascular disease   one stent coronary artery 5/12     Essential hypertension, benign     Visual disturbance, transient, right eye     Dermatochalasis     Insomnia     S/P coronary angiogram     CKD (chronic kidney disease) stage 3, GFR 30-59 ml/min (H)     Type II diabetes mellitus with peripheral circulatory disorder (H)     Spinal stenosis of lumbar region with neurogenic claudication     S/P lumbar fusion     Current Outpatient Medications   Medication Sig Dispense Refill     ACCU-CHEK SMARTVIEW test strip ONCE DAILY TESTING AND AS NEEDED 100 strip 4     ACE NOT PRESCRIBED, INTENTIONAL, ACE Inhibitor not prescribed due to Other: cough 0 each 0     aspirin 81 MG tablet Take 1 tablet (81 mg) by mouth daily       atorvastatin (LIPITOR) 40 MG tablet Take 1 tablet (40 mg) by mouth daily 90 tablet 4     blood glucose monitoring (Talkray CONTOUR MONITOR) meter device kit Use to test blood sugar  times daily or as directed. 1 kit 0     ciprofloxacin (CIPRO) 500 MG tablet Take 1 tablet (500 mg) by mouth 2 times daily 14 tablet 0     clindamycin (CLEOCIN) 300 MG capsule Take 1 capsule (300 mg) by mouth 4 times daily 40 capsule 0     clopidogrel (PLAVIX) 75 MG tablet Take 75 mg by mouth daily        JARDIANCE 10 MG TABS tablet TAKE ONE TABLET BY MOUTH EVERY DAY 90 tablet 0     losartan (COZAAR) 25 MG tablet Take 1 tablet (25 mg) by mouth daily 90 tablet 4     metFORMIN (GLUCOPHAGE) 500 MG tablet TAKE 2 TABLETS BY MOUTH TWICE DAILY WITH MEALS 360 tablet 3     nitroglycerin (NITROSTAT) 0.4 MG SL tablet Place 1 tablet (0.4 mg) under the tongue every 5 minutes as needed for chest pain 90 tablet 4     Social History     Tobacco Use     Smoking status: Former Smoker     Packs/day: 1.00     Years: 5.00     Pack years: 5.00     Types: Cigarettes     Start date: 1/1/1970     Last  "attempt to quit: 1989     Years since quittin.7     Smokeless tobacco: Never Used     Tobacco comment: former smoker   Substance Use Topics     Alcohol use: Yes     Comment: couple drinks 3 x a week           OBJECTIVE  :/68   Pulse 81   Temp 98  F (36.7  C) (Oral)   Resp 20   Ht 1.842 m (6' 0.5\")   Wt 105.2 kg (232 lb)   SpO2 96%   BMI 31.03 kg/m    GENERAL APPEARANCE: healthy, alert and no distress  EYES: EOMI,  PERRL, conjunctiva clear  HENT: ear canals and TM's normal.  Nose and mouth without ulcers, erythema or lesions  HENT: frontal sinus tenderness   NECK: supple, nontender, no lymphadenopathy  RESP: lungs clear to auscultation - no rales, rhonchi or wheezes  CV: regular rates and rhythm, normal S1 S2, no murmur noted  ABDOMEN:  soft, nontender, no HSM or masses and bowel sounds normal  NEURO: Normal strength and tone, sensory exam grossly normal,  normal speech and mentation  SKIN: no suspicious lesions or rashes    ASSESSMENT:  Sinusitis    PLAN:  I recommended that the patient get lots of fluids and rest. and A prescription for zithromax was given        "

## 2019-05-30 NOTE — PATIENT INSTRUCTIONS
Please schedule a future laboratory appointment(s) and be sure to have fasted for 10 hours prior to arrival          Patient Education     Diabetes: Inspecting Your Feet    Diabetes increases your chances of developing foot problems. So inspect your feet every day. This helps you find small skin irritations before they become serious ulcers or infections. If you have trouble seeing the bottoms of your feet, use a mirror or ask a family member or friend to help.  How to check your feet  Below are tips to help you look for foot problems. Try to check your feet at the same time each day, such as when you get out of bed in the morning:    Check the top of each foot. The tops of toes, back of the heel, and outer edge of the foot can get a lot of rubbing from poor-fitting shoes.    Check the bottom of each foot. Daily wear and tear often leads to problems at pressure spots.    Check the toes and nails. Fungal infections often occur between toes. Toenail problems can also be a sign of fungal infections or lead to breaks in the skin.    Check your shoes, too. Loose objects inside a shoe can injure the foot. Use your hand to feel inside your shoes for things like anuj, loose stitching, or rough areas that could irritate your skin.  Warning signs  Look for any color changes in the foot. Redness with streaks can signal a severe infection, which needs immediate medical attention. Tell your healthcare provider right away if you have any of these problems:    Swelling, sometimes with color changes, may be a sign of poor blood flow or infection. Symptoms include tenderness and an increase in the size of your foot.    Warm or hot areas on your feet may be signs of infection. A foot that is cold may not be getting enough blood.    Sensations such as burning, tingling, or  pins and needles  can be signs of a problem. Also check for areas that may be numb.    Hot spots are caused by friction or pressure. Look for hot spots in areas  that get a lot of rubbing. Hot spots can turn into blisters, calluses, or sores.    Cracks and sores are caused by dry or irritated skin. They are a sign that the skin is breaking down, which can lead to infection.    Toenail problems to watch for include nails growing into the skin (ingrown toenail) and causing redness or pain. Thick, yellow, or discolored nails can signal a fungal infection.    Drainage and odor can develop from untreated sores and ulcers. Call your healthcare provider right away if you notice white or yellow drainage, bleeding, or unpleasant odor.   Date Last Reviewed: 6/1/2016 2000-2018 [employer]. [address] [place] [address], [place] [address]. All rights reserved. This information is not intended as a substitute for professional medical care. Always follow your healthcare professional's instructions.           Patient Education     Diabetes: Keeping Feet Healthy     Have your feet checked every time you see your healthcare provider, and at least once a year.     Diabetes can damage nerves in your feet and cause neuropathy. This condition makes it hard for you to feel injuries or sore spots. Diabetes can also change blood flow, making it harder for small problems, like a blister, to heal properly. In fact, minor injuries can quickly become serious infections that send you to the hospital. Practice self-care to protect your feet and keep them healthy.   Take special care  Here are tips for taking special care of your feet:    Inspect your feet daily for problems such as redness, blisters, cracks, dry skin, or numbness. Use a mirror to see the bottoms of your feet. Or, ask for help.    Manage your diabetes. Monitor and control your blood sugar. Take all your medicines as prescribed.    Avoid walking barefoot, even indoors. Always wear socks inside your shoes.     Wash your feet with warm water and mild soap. Dry well, especially between toes.    Don t treat corns or calluses  yourself. Talk to your healthcare provider or podiatrist (a healthcare provider who specializes in foot care) if you need assistance trimming your toenails.    Use moisturizing cream or lotion if you have dry skin, but don t use it between toes.    Don t use heating pads on your feet. If you have neuropathy, you could get a burn and not feel it.    Stop smoking. Smoking restricts blood flow and can make it harder for wounds to heal.    Don't use sharp blades to trim your nails. Use a nail clipper and file instead.   Have regular checkups  Foot problems can develop quickly. So be sure to follow your healthcare team s schedule for regular checkups. During office visits, take off your shoes and socks as soon as you get in the exam room. Ask your healthcare provider to examine your feet for problems. This will make it easier to find and treat small skin irritations before they get worse. Regular checkups can also help keep track of the blood flow and feeling in your feet. If you have neuropathy, you may need to have checkups more often.  Wear proper footwear  Wearing proper footwear is very important. If areas of your feet have been damaged by too much pressure, your healthcare provider may recommend changing your footwear. In some cases, avoiding high heels or tight work boots may be all that s needed. Or, your healthcare provider may recommend special shoes or custom inserts. These help protect your feet and keep existing irritations from getting worse. If you need special footwear, ask your healthcare provider if you qualify for Medicare's custom-molded and extra-depth diabetic shoe and insert program.   Make sure shoes and socks fit  Any pair of shoes--new or old--should feel comfortable as soon as you put them on. There shouldn t be any rubbing when you walk. Wear the right shoe for any activity. For instance, a running shoe is designed to keep your feet injury-free while jogging. Buy shoes at the end of the day,  when your feet are larger. Make sure they provide support without feeling too loose. Make sure your socks fit, too. Wear soft, seamless, well-padded socks for activity. Cotton or microfiber socks are best to help to absorb sweat. To protect your feet, avoid shoes that are open-toed or open-heeled. If you have questions about what kinds of shoes and socks are best, talk to your healthcare team.  Get regular exercise  Regular exercise improves blood flow in your feet. It also increases foot strength and flexibility. Gentle exercises, like walking or riding a stationary bicycle, are best. You can also do special foot exercises. Just be sure to talk with your healthcare provider before starting any exercise program. Also mention if any exercise causes pain, redness, or other signs of foot problems.     Note: If you have any kind of break in the skin of your foot or ankle, keep the area clean. Then call your healthcare provider--especially if the area doesn t appear to be healing.   Date Last Reviewed: 6/1/2016 2000-2018 [employer]. [address] [place] [address], [place] [address]. All rights reserved. This information is not intended as a substitute for professional medical care. Always follow your healthcare professional's instructions.

## 2019-06-04 ENCOUNTER — MYC MEDICAL ADVICE (OUTPATIENT)
Dept: FAMILY MEDICINE | Facility: CLINIC | Age: 69
End: 2019-06-04

## 2019-06-04 DIAGNOSIS — J01.90 ACUTE SINUSITIS WITH SYMPTOMS > 10 DAYS: Primary | ICD-10-CM

## 2019-06-04 RX ORDER — LEVOFLOXACIN 750 MG/1
750 TABLET, FILM COATED ORAL DAILY
Qty: 10 TABLET | Refills: 0 | Status: SHIPPED | OUTPATIENT
Start: 2019-06-04 | End: 2020-09-29

## 2019-06-11 DIAGNOSIS — E11.9 TYPE 2 DIABETES MELLITUS WITHOUT COMPLICATION, WITHOUT LONG-TERM CURRENT USE OF INSULIN (H): ICD-10-CM

## 2019-06-11 DIAGNOSIS — E11.51 TYPE II DIABETES MELLITUS WITH PERIPHERAL CIRCULATORY DISORDER (H): ICD-10-CM

## 2019-06-11 DIAGNOSIS — M10.9 GOUT, UNSPECIFIED CAUSE, UNSPECIFIED CHRONICITY, UNSPECIFIED SITE: ICD-10-CM

## 2019-06-11 DIAGNOSIS — E78.5 HYPERLIPIDEMIA LDL GOAL <100: ICD-10-CM

## 2019-06-11 LAB
ALBUMIN SERPL-MCNC: 3.9 G/DL (ref 3.4–5)
ALP SERPL-CCNC: 120 U/L (ref 40–150)
ALT SERPL W P-5'-P-CCNC: 18 U/L (ref 0–70)
ANION GAP SERPL CALCULATED.3IONS-SCNC: 6 MMOL/L (ref 3–14)
AST SERPL W P-5'-P-CCNC: 14 U/L (ref 0–45)
BILIRUB SERPL-MCNC: 0.8 MG/DL (ref 0.2–1.3)
BUN SERPL-MCNC: 20 MG/DL (ref 7–30)
CALCIUM SERPL-MCNC: 9.2 MG/DL (ref 8.5–10.1)
CHLORIDE SERPL-SCNC: 107 MMOL/L (ref 94–109)
CHOLEST SERPL-MCNC: 100 MG/DL
CO2 SERPL-SCNC: 27 MMOL/L (ref 20–32)
CREAT SERPL-MCNC: 1.18 MG/DL (ref 0.66–1.25)
CREAT UR-MCNC: 77 MG/DL
GFR SERPL CREATININE-BSD FRML MDRD: 63 ML/MIN/{1.73_M2}
GLUCOSE SERPL-MCNC: 179 MG/DL (ref 70–99)
HBA1C MFR BLD: 8.3 % (ref 0–5.6)
HDLC SERPL-MCNC: 48 MG/DL
LDLC SERPL CALC-MCNC: 33 MG/DL
MICROALBUMIN UR-MCNC: 11 MG/L
MICROALBUMIN/CREAT UR: 14.6 MG/G CR (ref 0–17)
NONHDLC SERPL-MCNC: 52 MG/DL
POTASSIUM SERPL-SCNC: 4.2 MMOL/L (ref 3.4–5.3)
PROT SERPL-MCNC: 7.2 G/DL (ref 6.8–8.8)
SODIUM SERPL-SCNC: 140 MMOL/L (ref 133–144)
TRIGL SERPL-MCNC: 95 MG/DL
URATE SERPL-MCNC: 5.9 MG/DL (ref 3.5–7.2)

## 2019-06-11 PROCEDURE — 80061 LIPID PANEL: CPT | Performed by: FAMILY MEDICINE

## 2019-06-11 PROCEDURE — 84550 ASSAY OF BLOOD/URIC ACID: CPT | Performed by: FAMILY MEDICINE

## 2019-06-11 PROCEDURE — 82043 UR ALBUMIN QUANTITATIVE: CPT | Performed by: FAMILY MEDICINE

## 2019-06-11 PROCEDURE — 83036 HEMOGLOBIN GLYCOSYLATED A1C: CPT | Performed by: FAMILY MEDICINE

## 2019-06-11 PROCEDURE — 80053 COMPREHEN METABOLIC PANEL: CPT | Performed by: FAMILY MEDICINE

## 2019-06-11 PROCEDURE — 36415 COLL VENOUS BLD VENIPUNCTURE: CPT | Performed by: FAMILY MEDICINE

## 2019-06-13 ENCOUNTER — OFFICE VISIT (OUTPATIENT)
Dept: ALLERGY | Facility: CLINIC | Age: 69
End: 2019-06-13
Payer: MEDICARE

## 2019-06-13 VITALS
RESPIRATION RATE: 16 BRPM | BODY MASS INDEX: 30.43 KG/M2 | WEIGHT: 227.51 LBS | DIASTOLIC BLOOD PRESSURE: 70 MMHG | OXYGEN SATURATION: 97 % | SYSTOLIC BLOOD PRESSURE: 113 MMHG | HEART RATE: 78 BPM | TEMPERATURE: 97 F

## 2019-06-13 DIAGNOSIS — R05.9 COUGH: ICD-10-CM

## 2019-06-13 DIAGNOSIS — J31.0 CHRONIC RHINITIS: Primary | ICD-10-CM

## 2019-06-13 PROCEDURE — 99204 OFFICE O/P NEW MOD 45 MIN: CPT | Performed by: ALLERGY & IMMUNOLOGY

## 2019-06-13 RX ORDER — AZELASTINE 1 MG/ML
2 SPRAY, METERED NASAL 2 TIMES DAILY PRN
Qty: 30 ML | Refills: 3 | Status: SHIPPED | OUTPATIENT
Start: 2019-06-13 | End: 2020-09-29

## 2019-06-13 RX ORDER — FLUTICASONE PROPIONATE 50 MCG
1 SPRAY, SUSPENSION (ML) NASAL DAILY
COMMUNITY

## 2019-06-13 ASSESSMENT — ENCOUNTER SYMPTOMS
FEVER: 0
COUGH: 1
VOMITING: 0
RHINORRHEA: 0
MYALGIAS: 0
NAUSEA: 0
JOINT SWELLING: 0
EYE REDNESS: 0
WHEEZING: 0
ACTIVITY CHANGE: 0
EYE ITCHING: 0
SINUS PRESSURE: 0
FACIAL SWELLING: 0
FATIGUE: 0
ARTHRALGIAS: 0
DIARRHEA: 0
ADENOPATHY: 0
CHEST TIGHTNESS: 0
SHORTNESS OF BREATH: 0
EYE DISCHARGE: 0
HEADACHES: 0

## 2019-06-13 NOTE — LETTER
6/13/2019         RE: Edwardo Dumont  [id number] [facility]  [place] [address]        Dear Colleague,    Thank you for referring your patient, Edwardo Dumont, to the [facility]. Please see a copy of my visit note below.    SUBJECTIVE:                                                                   Edwardo Dumont presents today to our Allergy Clinic at [facility] for a new patient visit. He is a 68 year old male with history of allergic rhinitis.  About 15 years ago, he began to have seasonally-exacerbated (Spring) chronic nasal symptoms (clear rhinorrhea and sneezing), cough due to postnasal drip, and occasionally ocular symptoms (itching and watering).  He is not worse around dogs/cats.  He has been using intranasal fluticasone 2 sprays in each nostril once daily, and he doesn't think it has been helpful. He takes loratadine without any positive effect.    There is a history of a sinus surgery 3 years ago. He has 1-2 sinus infections per year that resolve with one course of oral antibiotic. Usually, Cipro works well for him. He had tonsillectomy as a child. No history of PE tubes.    He was tested in 2012. SPT was positive for tree pollen. IDs with possible sensitivity for dust mites, pets, molds, grass, and weed pollen. The patient was on allergen immunotherapy for almost 2 years that he found very helpful.   1 month ago, he started having symptoms again after coming back from [place]. Initially, he had a cold, but then postnasal drip and cough persisted. He used to have this kind of cough after common cold before he was started on immunotherapy.     Patient Active Problem List   Diagnosis     Cough     Sleep apnea     Gout     Low back pain     Radiculopathy of leg     Hyperlipidemia LDL goal <100     OA (osteoarthritis) of knee     Degenerative arthritis of hip - right     Urinary retention     Advanced directives, counseling/discussion     S/P hip replacement     Closed  dislocation of acromioclavicular joint     Impingement syndrome of right shoulder     Ischemic vascular disease   one stent coronary artery 5/12     Essential hypertension, benign     Visual disturbance, transient, right eye     Dermatochalasis     Insomnia     S/P coronary angiogram     CKD (chronic kidney disease) stage 3, GFR 30-59 ml/min (H)     Type II diabetes mellitus with peripheral circulatory disorder (H)     Spinal stenosis of lumbar region with neurogenic claudication     S/P lumbar fusion       Past Medical History:   Diagnosis Date     Acromioclavicular joint separation, type 1 9/12    right     Allergic rhinitis     causes chronic  cough     Arthritis      CKD (chronic kidney disease) stage 3, GFR 30-59 ml/min (H)      Degenerative arthritis of hip - right 12/27/2010     Gout      Hip arthrosis - right 10/25/2010     Hyperlipidemia      Ischaemic vascular disease      Ischemic vascular disease 5/12    one stent     OA (osteoarthritis) of knee 10/25/2010     Renal insufficiency      Sleep apnea     Does Not use a CPAP-  Lost weight     Type II or unspecified type diabetes mellitus without mention of complication, not stated as uncontrolled      Unspecified essential hypertension       Problem (# of Occurrences) Relation (Name,Age of Onset)    Allergies (2) Son (Gregory), Son (Marlon)    Cancer (1) Mother: kidney    Neurologic Disorder (1) Father: parkinsons       Negative family history of: Glaucoma, Macular Degeneration        Past Surgical History:   Procedure Laterality Date     C ABLATION SUPRAVENT ARRHYTHMOGENIC FOCUS  12/21/15    at [facility]     C TOTAL HIP ARTHROPLASTY  1/11/11    Rt [facility]     ENDOSCOPIC SINUS SURGERY  12/14/15     ENDOSCOPIC SINUS SURGERY Bilateral 12/14/2015    Procedure: ENDOSCOPIC SINUS SURGERY;  Surgeon: Kei Cevallos MD;  Location: [facility] OR     GASTRIC BYPASS  1/03    lap band     OPTICAL TRACKING SYSTEM FUSION SPINE POSTERIOR LUMBAR TWO LEVELS N/A 7/20/2018    Procedure: OPTICAL  TRACKING SYSTEM FUSION SPINE POSTERIOR LUMBAR TWO LEVELS;  L3-4 bilateral laminectomy with bilateral facetectomies and resection of bilateral synovial cyst  4-5 Transforaminal lumbar interbody fusion  Placement of Globus Creo pedicle screws from L3-5 with open reduction and internal fixation of the L4-5 spondylolisthesis  Posterior lateral fusion from C3-5;  Surgeon: Rio Magaña     SINUS SURGERY Right 12-14-15    Dr. Cevallos     STENT  12    OM2 cornary artery stent     STENT  2017    3 stents placed in coronary arteries while in [place]     STENT, CORONARY, HANG  2017    2 stents in [place].     Social History     Socioeconomic History     Marital status:      Spouse name: None     Number of children: None     Years of education: None     Highest education level: None   Occupational History     None   Social Needs     Financial resource strain: None     Food insecurity:     Worry: None     Inability: None     Transportation needs:     Medical: None     Non-medical: None   Tobacco Use     Smoking status: Former Smoker     Packs/day: 1.00     Years: 5.00     Pack years: 5.00     Types: Cigarettes     Start date: 1970     Last attempt to quit: 1989     Years since quittin.7     Smokeless tobacco: Never Used     Tobacco comment: former smoker   Substance and Sexual Activity     Alcohol use: Yes     Comment: couple drinks 3 x a week     Drug use: No     Sexual activity: Yes     Partners: Female   Lifestyle     Physical activity:     Days per week: None     Minutes per session: None     Stress: None   Relationships     Social connections:     Talks on phone: None     Gets together: None     Attends Voodoo service: None     Active member of club or organization: None     Attends meetings of clubs or organizations: None     Relationship status: None     Intimate partner violence:     Fear of current or ex partner: None     Emotionally abused: None     Physically abused: None     Forced sexual activity:  None   Other Topics Concern     Parent/sibling w/ CABG, MI or angioplasty before 65F 55M? Not Asked   Social History Narrative    June 13, 2019    ENVIRONMENTAL HISTORY: The family [place] 2 year [place]. The home is heated with a forced air. They do have central air conditioning. The patient's bedroom is furnished with carpeting in bedroom and allergen mattress cover. No pets inside the house. There is no history of cockroach or mice infestation. There are no smokers in the house.  The house does not have a damp basement.        Review of Systems   Constitutional: Negative for activity change, fatigue and fever.   HENT: Positive for congestion, postnasal drip and sneezing. Negative for dental problem, ear pain, facial swelling, nosebleeds, rhinorrhea and sinus pressure.    Eyes: Negative for discharge, redness and itching.   Respiratory: Positive for cough. Negative for chest tightness, shortness of breath and wheezing.    Cardiovascular: Negative for chest pain.   Gastrointestinal: Negative for diarrhea, nausea and vomiting.   Musculoskeletal: Negative for arthralgias, joint swelling and myalgias.   Skin: Negative for rash.   Neurological: Negative for headaches.   Hematological: Negative for adenopathy.   Psychiatric/Behavioral: Negative for behavioral problems and self-injury.       Current Outpatient Medications:      ACCU-CHEK SMARTVIEW test strip, ONCE DAILY TESTING AND AS NEEDED, Disp: 100 strip, Rfl: 4     aspirin 81 MG tablet, Take 1 tablet (81 mg) by mouth daily, Disp: , Rfl:      atorvastatin (LIPITOR) 40 MG tablet, Take 1 tablet (40 mg) by mouth daily, Disp: 90 tablet, Rfl: 4     azelastine (ASTELIN) 0.1 % nasal spray, Spray 2 sprays into both nostrils 2 times daily as needed for rhinitis, Disp: 30 mL, Rfl: 3     blood glucose monitoring (VIANNEY CONTOUR MONITOR) meter device kit, Use to test blood sugar  times daily or as directed., Disp: 1 kit, Rfl: 0     empagliflozin  (JARDIANCE) 25 MG TABS tablet, Take 1 tablet (25 mg) by mouth daily, Disp: 90 tablet, Rfl: 3     fluticasone (FLONASE) 50 MCG/ACT nasal spray, Spray 1 spray into both nostrils daily, Disp: , Rfl:      Loratadine (ALAVERT PO), , Disp: , Rfl:      losartan (COZAAR) 25 MG tablet, Take 1 tablet (25 mg) by mouth daily, Disp: 90 tablet, Rfl: 4     metFORMIN (GLUCOPHAGE) 500 MG tablet, TAKE 2 TABLETS BY MOUTH TWICE DAILY WITH MEALS, Disp: 360 tablet, Rfl: 3     ACE NOT PRESCRIBED, INTENTIONAL,, ACE Inhibitor not prescribed due to Other: cough, Disp: 0 each, Rfl: 0     ciprofloxacin (CIPRO) 500 MG tablet, Take 1 tablet (500 mg) by mouth 2 times daily (Patient not taking: Reported on 6/13/2019), Disp: 14 tablet, Rfl: 0     clindamycin (CLEOCIN) 300 MG capsule, Take 1 capsule (300 mg) by mouth 4 times daily (Patient not taking: Reported on 6/13/2019), Disp: 40 capsule, Rfl: 0     clopidogrel (PLAVIX) 75 MG tablet, Take 75 mg by mouth daily , Disp: , Rfl:      levofloxacin (LEVAQUIN) 750 MG tablet, Take 1 tablet (750 mg) by mouth daily (Patient not taking: Reported on 6/13/2019), Disp: 10 tablet, Rfl: 0     nitroglycerin (NITROSTAT) 0.4 MG SL tablet, Place 1 tablet (0.4 mg) under the tongue every 5 minutes as needed for chest pain (Patient not taking: Reported on 6/13/2019), Disp: 90 tablet, Rfl: 4    Current Facility-Administered Medications:      bupivacaine (MARCAINE) 0.25 % injection 3 mL, 3 mL, , , Skip Reyes MD, 3 mL at 12/13/18 1554     bupivacaine (MARCAINE) 0.25 % injection 3 mL, 3 mL, , , Skip Reyes MD, 3 mL at 12/13/18 1554     lidocaine 1 % injection 4 mL, 4 mL, , , Skip Reyes MD, 4 mL at 12/13/18 1554     lidocaine 1 % injection 4 mL, 4 mL, , , Skip Reyes MD, 4 mL at 12/13/18 1554     triamcinolone (KENALOG-40) injection 80 mg, 80 mg, , , Skip Reyes MD, 80 mg at 12/13/18 1554     triamcinolone (KENALOG-40) injection 80 mg, 80 mg, , , Skip Reyes MD, 80 mg at 12/13/18  1554  Immunization History   Administered Date(s) Administered     HEPA 01/08/2009, 09/08/2009     HepB 03/06/2013, 01/15/2014     HepB-Adult 05/30/2019     Influenza (High Dose) 3 valent vaccine 11/01/2016, 09/26/2017     Influenza (IIV3) PF 1950, 10/01/2010, 09/26/2012, 11/04/2013, 10/01/2014     Influenza Vaccine, 3 YRS +, IM (QUADRIVALENT W/PRESERVATIVES) 10/06/2015     Pneumo Conj 13-V (2010&after) 06/02/2016     Pneumococcal 23 valent 02/27/2009, 06/13/2017     TD (ADULT, 7+) 05/30/2019     TDAP Vaccine (Adacel) 01/08/2009     Allergies   Allergen Reactions     Benzonatate Anaphylaxis and Swelling     Lisinopril Cough     Zocor [Simvastatin - High Dose]      Poor memory     OBJECTIVE:                                                                 /70 (BP Location: Right arm, Patient Position: Sitting, Cuff Size: Adult Regular)   Pulse 78   Temp 97  F (36.1  C) (Oral)   Resp 16   Wt 103.2 kg (227 lb 8.2 oz)   SpO2 97%   BMI 30.43 kg/m           Physical Exam   Constitutional: He is oriented to person, place, and time. No distress.   HENT:   Head: Normocephalic and atraumatic.   Right Ear: Tympanic membrane, external ear and ear canal normal.   Left Ear: Tympanic membrane, external ear and ear canal normal.   Nose: No mucosal edema or rhinorrhea.   Mouth/Throat: Oropharynx is clear and moist and mucous membranes are normal. No oropharyngeal exudate or posterior oropharyngeal edema.   Eyes: Conjunctivae are normal. Right eye exhibits no discharge. Left eye exhibits no discharge.   Neck: Normal range of motion.   Cardiovascular: Normal rate, regular rhythm and normal heart sounds.   No murmur heard.  Pulmonary/Chest: Effort normal and breath sounds normal. No respiratory distress. He has no wheezes. He has no rales.   Musculoskeletal: Normal range of motion.   Lymphadenopathy:     He has no cervical adenopathy.   Neurological: He is alert and oriented to person, place, and time.   Skin: Skin is  warm. He is not diaphoretic.   Psychiatric: He has a normal mood and affect. His behavior is normal.   Nursing note and vitals reviewed.      ASSESSMENT/PLAN:      Problem List Items Addressed This Visit        Respiratory    1. Cough  Upper airway cough syndrome versus GERD versus postinfectious.  The history suggests a high likelihood of postinfectious cough; however, it seems that he is stopped having it after allergen immunotherapy.  -Start intranasal fluticasone 2 sprays in each nostril once daily.  -Start azelastine 2 sprays in each nostril twice daily as needed.  By doing that, I will address upper airway cough syndrome.        Relevant Medications        fluticasone (FLONASE) 50 MCG/ACT nasal spray    azelastine (ASTELIN) 0.1 % nasal spray      Other Visit Diagnoses     2. Chronic rhinitis    -  Primary  Unable to perform percutaneous skin puncture testing for aeroallergens today because he did not stop oral antihistamines.  Planning to test in several weeks. He will call to set up an appointment.  He was instructed to stop oral antihistamines per protocol.    Relevant Medications        fluticasone (FLONASE) 50 MCG/ACT nasal spray    azelastine (ASTELIN) 0.1 % nasal spray            Return in about 3 weeks (around 7/4/2019), or if symptoms worsen or fail to improve.    Thank you for allowing us to participate in the care of this patient. Please feel free to contact us if there are any questions or concerns about the patient.    Disclaimer: This note consists of symbols derived from keyboarding, dictation and/or voice recognition software. As a result, there may be errors in the script that have gone undetected. Please consider this when interpreting information found in this chart.    Arthur Parikh MD, FAAAAI, FACAAI  Allergy, Asthma and Immunology  [place], [place] and [place]      Again, thank you for allowing me to participate in the care of your patient.        Sincerely,        Arthur  MD Jl

## 2019-06-13 NOTE — PROGRESS NOTES
SUBJECTIVE:                                                                   Edwardo Dumont presents today to our Allergy Clinic at [facility] for a new patient visit. He is a 68 year old male with history of allergic rhinitis.  About 15 years ago, he began to have seasonally-exacerbated (Spring) chronic nasal symptoms (clear rhinorrhea and sneezing), cough due to postnasal drip, and occasionally ocular symptoms (itching and watering).  He is not worse around dogs/cats.  He has been using intranasal fluticasone 2 sprays in each nostril once daily, and he doesn't think it has been helpful. He takes loratadine without any positive effect.    There is a history of a sinus surgery 3 years ago. He has 1-2 sinus infections per year that resolve with one course of oral antibiotic. Usually, Cipro works well for him. He had tonsillectomy as a child. No history of PE tubes.    He was tested in 2012. SPT was positive for tree pollen. IDs with possible sensitivity for dust mites, pets, molds, grass, and weed pollen. The patient was on allergen immunotherapy for almost 2 years that he found very helpful.   1 month ago, he started having symptoms again after coming back from [place]. Initially, he had a cold, but then postnasal drip and cough persisted. He used to have this kind of cough after common cold before he was started on immunotherapy.     Patient Active Problem List   Diagnosis     Cough     Sleep apnea     Gout     Low back pain     Radiculopathy of leg     Hyperlipidemia LDL goal <100     OA (osteoarthritis) of knee     Degenerative arthritis of hip - right     Urinary retention     Advanced directives, counseling/discussion     S/P hip replacement     Closed dislocation of acromioclavicular joint     Impingement syndrome of right shoulder     Ischemic vascular disease   one stent coronary artery 5/12     Essential hypertension, benign     Visual disturbance, transient, right eye     Dermatochalasis      Insomnia     S/P coronary angiogram     CKD (chronic kidney disease) stage 3, GFR 30-59 ml/min (H)     Type II diabetes mellitus with peripheral circulatory disorder (H)     Spinal stenosis of lumbar region with neurogenic claudication     S/P lumbar fusion       Past Medical History:   Diagnosis Date     Acromioclavicular joint separation, type 1 9/12    right     Allergic rhinitis     causes chronic  cough     Arthritis      CKD (chronic kidney disease) stage 3, GFR 30-59 ml/min (H)      Degenerative arthritis of hip - right 12/27/2010     Gout      Hip arthrosis - right 10/25/2010     Hyperlipidemia      Ischaemic vascular disease      Ischemic vascular disease 5/12    one stent     OA (osteoarthritis) of knee 10/25/2010     Renal insufficiency      Sleep apnea     Does Not use a CPAP-  Lost weight     Type II or unspecified type diabetes mellitus without mention of complication, not stated as uncontrolled      Unspecified essential hypertension       Problem (# of Occurrences) Relation (Name,Age of Onset)    Allergies (2) Son (Gregory), Son (Marlon)    Cancer (1) Mother: kidney    Neurologic Disorder (1) Father: parkinsons       Negative family history of: Glaucoma, Macular Degeneration        Past Surgical History:   Procedure Laterality Date     C ABLATION SUPRAVENT ARRHYTHMOGENIC FOCUS  12/21/15    at [facility]     C TOTAL HIP ARTHROPLASTY  1/11/11    Rt [facility]     ENDOSCOPIC SINUS SURGERY  12/14/15     ENDOSCOPIC SINUS SURGERY Bilateral 12/14/2015    Procedure: ENDOSCOPIC SINUS SURGERY;  Surgeon: Kei Cevallos MD;  Location: [facility] OR     GASTRIC BYPASS  1/03    lap band     OPTICAL TRACKING SYSTEM FUSION SPINE POSTERIOR LUMBAR TWO LEVELS N/A 7/20/2018    Procedure: OPTICAL TRACKING SYSTEM FUSION SPINE POSTERIOR LUMBAR TWO LEVELS;  L3-4 bilateral laminectomy with bilateral facetectomies and resection of bilateral synovial cyst  4-5 Transforaminal lumbar interbody fusion  Placement of Globus Creo pedicle screws  from L3-5 with open reduction and internal fixation of the L4-5 spondylolisthesis  Posterior lateral fusion from C3-5;  Surgeon: Rio Magaña     SINUS SURGERY Right 12-14-15    Dr. Cevallos     STENT  12    OM2 cornary artery stent     STENT  2017    3 stents placed in coronary arteries while in [place]     STENT, CORONARY, HANG  2017    2 stents in [place].     Social History     Socioeconomic History     Marital status:      Spouse name: None     Number of children: None     Years of education: None     Highest education level: None   Occupational History     None   Social Needs     Financial resource strain: None     Food insecurity:     Worry: None     Inability: None     Transportation needs:     Medical: None     Non-medical: None   Tobacco Use     Smoking status: Former Smoker     Packs/day: 1.00     Years: 5.00     Pack years: 5.00     Types: Cigarettes     Start date: 1970     Last attempt to quit: 1989     Years since quittin.7     Smokeless tobacco: Never Used     Tobacco comment: former smoker   Substance and Sexual Activity     Alcohol use: Yes     Comment: couple drinks 3 x a week     Drug use: No     Sexual activity: Yes     Partners: Female   Lifestyle     Physical activity:     Days per week: None     Minutes per session: None     Stress: None   Relationships     Social connections:     Talks on phone: None     Gets together: None     Attends Sikhism service: None     Active member of club or organization: None     Attends meetings of clubs or organizations: None     Relationship status: None     Intimate partner violence:     Fear of current or ex partner: None     Emotionally abused: None     Physically abused: None     Forced sexual activity: None   Other Topics Concern     Parent/sibling w/ CABG, MI or angioplasty before 65F 55M? Not Asked   Social History Narrative    2019    ENVIRONMENTAL HISTORY: The family [place] in a 2 year old [place] The home  is heated with a forced air. They do have central air conditioning. The patient's bedroom is furnished with carpeting in bedroom and allergen mattress cover. No pets inside the house. There is no history of cockroach or mice infestation. There are no smokers in the house.  The house does not have a damp basement.        Review of Systems   Constitutional: Negative for activity change, fatigue and fever.   HENT: Positive for congestion, postnasal drip and sneezing. Negative for dental problem, ear pain, facial swelling, nosebleeds, rhinorrhea and sinus pressure.    Eyes: Negative for discharge, redness and itching.   Respiratory: Positive for cough. Negative for chest tightness, shortness of breath and wheezing.    Cardiovascular: Negative for chest pain.   Gastrointestinal: Negative for diarrhea, nausea and vomiting.   Musculoskeletal: Negative for arthralgias, joint swelling and myalgias.   Skin: Negative for rash.   Neurological: Negative for headaches.   Hematological: Negative for adenopathy.   Psychiatric/Behavioral: Negative for behavioral problems and self-injury.       Current Outpatient Medications:      ACCU-CHEK SMARTVIEW test strip, ONCE DAILY TESTING AND AS NEEDED, Disp: 100 strip, Rfl: 4     aspirin 81 MG tablet, Take 1 tablet (81 mg) by mouth daily, Disp: , Rfl:      atorvastatin (LIPITOR) 40 MG tablet, Take 1 tablet (40 mg) by mouth daily, Disp: 90 tablet, Rfl: 4     azelastine (ASTELIN) 0.1 % nasal spray, Spray 2 sprays into both nostrils 2 times daily as needed for rhinitis, Disp: 30 mL, Rfl: 3     blood glucose monitoring (VIANNEY CONTOUR MONITOR) meter device kit, Use to test blood sugar  times daily or as directed., Disp: 1 kit, Rfl: 0     empagliflozin (JARDIANCE) 25 MG TABS tablet, Take 1 tablet (25 mg) by mouth daily, Disp: 90 tablet, Rfl: 3     fluticasone (FLONASE) 50 MCG/ACT nasal spray, Spray 1 spray into both nostrils daily, Disp: , Rfl:      Loratadine (ALAVERT PO), , Disp: , Rfl:       losartan (COZAAR) 25 MG tablet, Take 1 tablet (25 mg) by mouth daily, Disp: 90 tablet, Rfl: 4     metFORMIN (GLUCOPHAGE) 500 MG tablet, TAKE 2 TABLETS BY MOUTH TWICE DAILY WITH MEALS, Disp: 360 tablet, Rfl: 3     ACE NOT PRESCRIBED, INTENTIONAL,, ACE Inhibitor not prescribed due to Other: cough, Disp: 0 each, Rfl: 0     ciprofloxacin (CIPRO) 500 MG tablet, Take 1 tablet (500 mg) by mouth 2 times daily (Patient not taking: Reported on 6/13/2019), Disp: 14 tablet, Rfl: 0     clindamycin (CLEOCIN) 300 MG capsule, Take 1 capsule (300 mg) by mouth 4 times daily (Patient not taking: Reported on 6/13/2019), Disp: 40 capsule, Rfl: 0     clopidogrel (PLAVIX) 75 MG tablet, Take 75 mg by mouth daily , Disp: , Rfl:      levofloxacin (LEVAQUIN) 750 MG tablet, Take 1 tablet (750 mg) by mouth daily (Patient not taking: Reported on 6/13/2019), Disp: 10 tablet, Rfl: 0     nitroglycerin (NITROSTAT) 0.4 MG SL tablet, Place 1 tablet (0.4 mg) under the tongue every 5 minutes as needed for chest pain (Patient not taking: Reported on 6/13/2019), Disp: 90 tablet, Rfl: 4    Current Facility-Administered Medications:      bupivacaine (MARCAINE) 0.25 % injection 3 mL, 3 mL, , , Skip Reyes MD, 3 mL at 12/13/18 1554     bupivacaine (MARCAINE) 0.25 % injection 3 mL, 3 mL, , , Skip Reyes MD, 3 mL at 12/13/18 1554     lidocaine 1 % injection 4 mL, 4 mL, , , Skip Reyes MD, 4 mL at 12/13/18 1554     lidocaine 1 % injection 4 mL, 4 mL, , , Skip Reyes MD, 4 mL at 12/13/18 1554     triamcinolone (KENALOG-40) injection 80 mg, 80 mg, , , Skip Reyes MD, 80 mg at 12/13/18 1554     triamcinolone (KENALOG-40) injection 80 mg, 80 mg, , , Skip Reyes MD, 80 mg at 12/13/18 1554  Immunization History   Administered Date(s) Administered     HEPA 01/08/2009, 09/08/2009     HepB 03/06/2013, 01/15/2014     HepB-Adult 05/30/2019     Influenza (High Dose) 3 valent vaccine 11/01/2016, 09/26/2017     Influenza (IIV3) PF  1950, 10/01/2010, 09/26/2012, 11/04/2013, 10/01/2014     Influenza Vaccine, 3 YRS +, IM (QUADRIVALENT W/PRESERVATIVES) 10/06/2015     Pneumo Conj 13-V (2010&after) 06/02/2016     Pneumococcal 23 valent 02/27/2009, 06/13/2017     TD (ADULT, 7+) 05/30/2019     TDAP Vaccine (Adacel) 01/08/2009     Allergies   Allergen Reactions     Benzonatate Anaphylaxis and Swelling     Lisinopril Cough     Zocor [Simvastatin - High Dose]      Poor memory     OBJECTIVE:                                                                 /70 (BP Location: Right arm, Patient Position: Sitting, Cuff Size: Adult Regular)   Pulse 78   Temp 97  F (36.1  C) (Oral)   Resp 16   Wt 103.2 kg (227 lb 8.2 oz)   SpO2 97%   BMI 30.43 kg/m          Physical Exam   Constitutional: He is oriented to person, place, and time. No distress.   HENT:   Head: Normocephalic and atraumatic.   Right Ear: Tympanic membrane, external ear and ear canal normal.   Left Ear: Tympanic membrane, external ear and ear canal normal.   Nose: No mucosal edema or rhinorrhea.   Mouth/Throat: Oropharynx is clear and moist and mucous membranes are normal. No oropharyngeal exudate or posterior oropharyngeal edema.   Eyes: Conjunctivae are normal. Right eye exhibits no discharge. Left eye exhibits no discharge.   Neck: Normal range of motion.   Cardiovascular: Normal rate, regular rhythm and normal heart sounds.   No murmur heard.  Pulmonary/Chest: Effort normal and breath sounds normal. No respiratory distress. He has no wheezes. He has no rales.   Musculoskeletal: Normal range of motion.   Lymphadenopathy:     He has no cervical adenopathy.   Neurological: He is alert and oriented to person, place, and time.   Skin: Skin is warm. He is not diaphoretic.   Psychiatric: He has a normal mood and affect. His behavior is normal.   Nursing note and vitals reviewed.      ASSESSMENT/PLAN:      Problem List Items Addressed This Visit        Respiratory    1. Cough  Upper  airway cough syndrome versus GERD versus postinfectious.  The history suggests a high likelihood of postinfectious cough; however, it seems that he is stopped having it after allergen immunotherapy.  -Start intranasal fluticasone 2 sprays in each nostril once daily.  -Start azelastine 2 sprays in each nostril twice daily as needed.  By doing that, I will address upper airway cough syndrome.        Relevant Medications        fluticasone (FLONASE) 50 MCG/ACT nasal spray    azelastine (ASTELIN) 0.1 % nasal spray      Other Visit Diagnoses     2. Chronic rhinitis    -  Primary  Unable to perform percutaneous skin puncture testing for aeroallergens today because he did not stop oral antihistamines.  Planning to test in several weeks. He will call to set up an appointment.  He was instructed to stop oral antihistamines per protocol.    Relevant Medications        fluticasone (FLONASE) 50 MCG/ACT nasal spray    azelastine (ASTELIN) 0.1 % nasal spray            Return in about 3 weeks (around 7/4/2019), or if symptoms worsen or fail to improve.    Thank you for allowing us to participate in the care of this patient. Please feel free to contact us if there are any questions or concerns about the patient.    Disclaimer: This note consists of symbols derived from keyboarding, dictation and/or voice recognition software. As a result, there may be errors in the script that have gone undetected. Please consider this when interpreting information found in this chart.    Arthur Parikh MD, FAAAAI, FACAAI  Allergy, Asthma and Immunology  [place], [place] and [place]

## 2019-06-13 NOTE — PATIENT INSTRUCTIONS
Continue Flonase 2 sprays in each nostril once daily.  -Use azelastine 2 sprays in each nostril twice a day when necessary. Stop it 3 days before the skin test.     Stop all oral antihistamines for 7 days before the skin test.

## 2019-07-05 ENCOUNTER — TELEPHONE (OUTPATIENT)
Dept: FAMILY MEDICINE | Facility: CLINIC | Age: 69
End: 2019-07-05

## 2019-07-05 DIAGNOSIS — E11.9 DIABETES MELLITUS, TYPE 2 (H): ICD-10-CM

## 2019-07-05 NOTE — TELEPHONE ENCOUNTER
Patient came in to the clinic asking if this is being completed. He is confused as to why this is not being taken care of. He was given a few days worth of prescription, but needs this refill completed. Medication is Metformin. Please call pharmacy and patient when completed.

## 2019-07-05 NOTE — TELEPHONE ENCOUNTER
Patient needs refill of Metformin. Has switched care to Dr Madrigal and been seen, but request was sent to Dr Valverde. Patient is out of this med. Pharmacy notified they may not get a same day call back on messages sent after 3 pm.

## 2019-08-14 ENCOUNTER — TELEPHONE (OUTPATIENT)
Dept: FAMILY MEDICINE | Facility: CLINIC | Age: 69
End: 2019-08-14

## 2019-08-14 NOTE — TELEPHONE ENCOUNTER
Panel Management Review      Patient has the following on his problem list:     Diabetes    ASA: Passed    Last A1C  Lab Results   Component Value Date    A1C 8.3 06/11/2019    A1C 8.3 06/26/2018    A1C 7.8 11/28/2017    A1C 7.1 06/12/2017    A1C 7.3 11/28/2016     A1C tested: FAILED    Last LDL:    Lab Results   Component Value Date    CHOL 100 06/11/2019     Lab Results   Component Value Date    HDL 48 06/11/2019     Lab Results   Component Value Date    LDL 33 06/11/2019     Lab Results   Component Value Date    TRIG 95 06/11/2019     Lab Results   Component Value Date    CHOLHDLRATIO 2.0 04/08/2015     Lab Results   Component Value Date    NHDL 52 06/11/2019       Is the patient on a Statin? YES             Is the patient on Aspirin? YES    Medications     [facility] CoA Reductase Inhibitors     atorvastatin (LIPITOR) 40 MG tablet       Salicylates     aspirin 81 MG tablet             Last three blood pressure readings:  BP Readings from Last 3 Encounters:   06/13/19 113/70   05/30/19 118/68   05/04/19 153/84       Date of last diabetes office visit: 5/30/2019     Tobacco History:     History   Smoking Status     Former Smoker     Packs/day: 1.00     Years: 5.00     Types: Cigarettes     Start date: 1/1/1970     Quit date: 9/8/1989   Smokeless Tobacco     Never Used     Comment: former smoker           Composite cancer screening  Chart review shows that this patient is due/due soon for the following None  Summary:    Patient is due/failing the following:   PHYSICAL, per last office note to follow up for diabetes 8/30/2019    Action needed:   Patient needs office visit for see above.    Type of outreach:    Sent [employer] message.    Questions for provider review:    None                                                                                                                                    Daniella Loomis [facility]       Chart routed to [employer] message sent .

## 2019-09-05 ENCOUNTER — DOCUMENTATION ONLY (OUTPATIENT)
Dept: FAMILY MEDICINE | Facility: CLINIC | Age: 69
End: 2019-09-05

## 2019-09-05 DIAGNOSIS — E11.51 TYPE II DIABETES MELLITUS WITH PERIPHERAL CIRCULATORY DISORDER (H): Primary | ICD-10-CM

## 2019-09-13 DIAGNOSIS — E11.51 TYPE II DIABETES MELLITUS WITH PERIPHERAL CIRCULATORY DISORDER (H): ICD-10-CM

## 2019-09-13 LAB — HBA1C MFR BLD: 8.4 % (ref 0–5.6)

## 2019-09-13 PROCEDURE — 83036 HEMOGLOBIN GLYCOSYLATED A1C: CPT | Performed by: FAMILY MEDICINE

## 2019-09-13 PROCEDURE — 36415 COLL VENOUS BLD VENIPUNCTURE: CPT | Performed by: FAMILY MEDICINE

## 2019-09-20 ENCOUNTER — OFFICE VISIT (OUTPATIENT)
Dept: FAMILY MEDICINE | Facility: CLINIC | Age: 69
End: 2019-09-20
Payer: MEDICARE

## 2019-09-20 VITALS
SYSTOLIC BLOOD PRESSURE: 116 MMHG | BODY MASS INDEX: 30.75 KG/M2 | OXYGEN SATURATION: 97 % | WEIGHT: 227 LBS | RESPIRATION RATE: 18 BRPM | HEART RATE: 80 BPM | TEMPERATURE: 98.3 F | HEIGHT: 72 IN | DIASTOLIC BLOOD PRESSURE: 73 MMHG

## 2019-09-20 DIAGNOSIS — R35.1 BENIGN PROSTATIC HYPERPLASIA WITH NOCTURIA: ICD-10-CM

## 2019-09-20 DIAGNOSIS — Z23 NEED FOR PROPHYLACTIC VACCINATION AND INOCULATION AGAINST INFLUENZA: ICD-10-CM

## 2019-09-20 DIAGNOSIS — N40.1 BENIGN PROSTATIC HYPERPLASIA WITH NOCTURIA: ICD-10-CM

## 2019-09-20 DIAGNOSIS — N18.30 CKD (CHRONIC KIDNEY DISEASE) STAGE 3, GFR 30-59 ML/MIN (H): ICD-10-CM

## 2019-09-20 DIAGNOSIS — E11.51 TYPE II DIABETES MELLITUS WITH PERIPHERAL CIRCULATORY DISORDER (H): Primary | ICD-10-CM

## 2019-09-20 PROCEDURE — 90662 IIV NO PRSV INCREASED AG IM: CPT | Performed by: FAMILY MEDICINE

## 2019-09-20 PROCEDURE — G0008 ADMIN INFLUENZA VIRUS VAC: HCPCS | Performed by: FAMILY MEDICINE

## 2019-09-20 PROCEDURE — 99207 C FOOT EXAM  NO CHARGE: CPT | Performed by: FAMILY MEDICINE

## 2019-09-20 PROCEDURE — 99214 OFFICE O/P EST MOD 30 MIN: CPT | Performed by: FAMILY MEDICINE

## 2019-09-20 ASSESSMENT — MIFFLIN-ST. JEOR: SCORE: 1838.46

## 2019-09-20 ASSESSMENT — PAIN SCALES - GENERAL: PAINLEVEL: NO PAIN (0)

## 2019-09-20 NOTE — NURSING NOTE
"Chief Complaint   Patient presents with     Diabetes       Initial /73   Pulse 80   Temp 98.3  F (36.8  C) (Oral)   Resp 18   Ht 1.83 m (6' 0.05\")   Wt 103 kg (227 lb)   SpO2 97%   BMI 30.74 kg/m   Estimated body mass index is 30.74 kg/m  as calculated from the following:    Height as of this encounter: 1.83 m (6' 0.05\").    Weight as of this encounter: 103 kg (227 lb).  Medication Reconciliation: complete  Hina Kilgore M.A.    "

## 2019-09-20 NOTE — PROGRESS NOTES
Subjective     Edwardo Dumont is a 69 year old male who presents to clinic today for the following health issues:    HPI     Diabetes Follow-up      How often are you checking your blood sugar? One time daily    What time of day are you checking your blood sugars (select all that apply)?  Before meals    Have you had any blood sugars above 200?  No    Have you had any blood sugars below 70?  No    What symptoms do you notice when your blood sugar is low?  None    What concerns do you have today about your diabetes? None     Do you have any of these symptoms? (Select all that apply)  No numbness or tingling in feet.  No redness, sores or blisters on feet.  No complaints of excessive thirst.  No reports of blurry vision.  No significant changes to weight.Better now     Have you had a diabetic eye exam in the last 12 months? Yes- Date of last eye exam: ABHINAV - has christopher in 2 weeks    BP Readings from Last 2 Encounters:   09/20/19 116/73   06/13/19 113/70     Hemoglobin A1C (%)   Date Value   09/13/2019 8.4 (H)   06/11/2019 8.3 (H)     LDL Cholesterol Calculated (mg/dL)   Date Value   06/11/2019 33   06/26/2018 41       Diabetes Management Resources    {      Objective        --------------------------------------------------------------------------------------------------------------------------------------  [place] diabetes resourses:  [email or web address]        To access diabetes educational materials with in EPIC use <dot>RADHA      Put this in AFTER VISIT SUMMARY if needed for the patient to access information online [email or web address]      SUBJECTIVE:  Edwardo Dumont is a 69 year old male who presents today for a follow up appointment for management of DIABETES MELLITUS.    Patient Active Problem List    Diagnosis Date Noted     S/P lumbar fusion 07/20/2018     Priority: Medium     Spinal stenosis of lumbar region with  neurogenic claudication 07/10/2018     Priority: Medium     Type II diabetes mellitus with peripheral circulatory disorder (H) 01/31/2018     Priority: Medium     CKD (chronic kidney disease) stage 3, GFR 30-59 ml/min (H)      Priority: Medium     S/P coronary angiogram 09/23/2015     Priority: Medium     Insomnia 08/03/2015     Priority: Medium     Dermatochalasis 04/10/2015     Priority: Medium     Visual disturbance, transient, right eye 02/09/2014     Priority: Medium     Essential hypertension, benign 05/14/2013     Priority: Medium     Ischemic vascular disease   one stent coronary artery 5/12 12/19/2012     Priority: Medium     Closed dislocation of acromioclavicular joint 12/17/2012     Priority: Medium     Problem list name updated by automated process. Provider to review       Impingement syndrome of right shoulder 12/17/2012     Priority: Medium     S/P hip replacement 12/13/2012     Priority: Medium     Advanced directives, counseling/discussion 05/25/2011     Priority: Medium     Discussed Advance Directive planning with patient; information given to patient to review.         Urinary retention 01/21/2011     Priority: Medium     Degenerative arthritis of hip - right 12/27/2010     Priority: Medium     OA (osteoarthritis) of knee 10/25/2010     Priority: Medium     Hyperlipidemia LDL goal <100 10/14/2010     Priority: Medium     Low back pain 04/23/2010     Priority: Medium     Radiculopathy of leg 04/23/2010     Priority: Medium     Gout 11/27/2009     Priority: Medium     Sleep apnea      Priority: Medium     Cough 12/05/2007     Priority: Medium        The patient checks his blood sugar as follows: one time daily, once daily.   Results as follows for the last 3 weeks after changing his diet: fasting glucose- 130 -140     The patient reports that he IS taking the medication as prescribed. He denies side effects of  medication.      ----------------------------------------------------------------------------------------------------------------------------------------    BP Readings from Last 3 Encounters:   09/20/19 116/73   06/13/19 113/70   05/30/19 118/68     The patient reports that he IS NOT currently smoking.  History   Smoking Status     Former Smoker     Packs/day: 1.00     Years: 5.00     Types: Cigarettes     Start date: 1/1/1970     Quit date: 9/8/1989   Smokeless Tobacco     Never Used     Comment: former smoker         The ASCVD Risk score (Rajan FORD Jr., et al., 2013) failed to calculate for the following reasons:    The valid total cholesterol range is 130 to 320 mg/dL        Current Outpatient Medications   Medication Sig Dispense Refill     ACCU-CHEK SMARTVIEW test strip ONCE DAILY TESTING AND AS NEEDED 100 strip 4     ACE NOT PRESCRIBED, INTENTIONAL, ACE Inhibitor not prescribed due to Other: cough 0 each 0     aspirin 81 MG tablet Take 1 tablet (81 mg) by mouth daily       atorvastatin (LIPITOR) 40 MG tablet Take 1 tablet (40 mg) by mouth daily 90 tablet 4     azelastine (ASTELIN) 0.1 % nasal spray Spray 2 sprays into both nostrils 2 times daily as needed for rhinitis 30 mL 3     blood glucose monitoring ([employer] CONTOUR MONITOR) meter device kit Use to test blood sugar  times daily or as directed. 1 kit 0     ciprofloxacin (CIPRO) 500 MG tablet Take 1 tablet (500 mg) by mouth 2 times daily 14 tablet 0     clindamycin (CLEOCIN) 300 MG capsule Take 1 capsule (300 mg) by mouth 4 times daily 40 capsule 0     clopidogrel (PLAVIX) 75 MG tablet Take 75 mg by mouth daily        empagliflozin (JARDIANCE) 25 MG TABS tablet Take 1 tablet (25 mg) by mouth daily 90 tablet 3     fluticasone (FLONASE) 50 MCG/ACT nasal spray Spray 1 spray into both nostrils daily       levofloxacin (LEVAQUIN) 750 MG tablet Take 1 tablet (750 mg) by mouth daily 10 tablet 0     Loratadine (ALAVERT PO)        losartan (COZAAR) 25 MG tablet Take 1  "tablet (25 mg) by mouth daily 90 tablet 4     metFORMIN (GLUCOPHAGE) 500 MG tablet TAKE 2 TABLETS BY MOUTH TWICE DAILY WITH MEALS 360 tablet 0     nitroglycerin (NITROSTAT) 0.4 MG SL tablet Place 1 tablet (0.4 mg) under the tongue every 5 minutes as needed for chest pain 90 tablet 4       The patient reports that he IS taking a statin.   (Reminder all diabetics with a ASCVD risk greater or equal to 7.5% should be on a high intensity statin, otherwise on a moderate intensity statin)      The patient reports that he IS taking 81mg of  aspirin daily.  (Reminder all diabetic patients with a cardiovascular risk factor and > 50 should take a daily aspirin)         The patient reports that his last eye exam was 10 months ago.         Immunization History   Administered Date(s) Administered     HEPA 01/08/2009, 09/08/2009     HepB 03/06/2013, 01/15/2014     HepB-Adult 05/30/2019     Influenza (High Dose) 3 valent vaccine 11/01/2016, 09/26/2017     Influenza (IIV3) PF 1950, 10/01/2010, 09/26/2012, 11/04/2013, 10/01/2014     Influenza Vaccine, 3 YRS +, IM (QUADRIVALENT W/PRESERVATIVES) 10/06/2015     Pneumo Conj 13-V (2010&after) 06/02/2016     Pneumococcal 23 valent 02/27/2009, 06/13/2017     TD (ADULT, 7+) 05/30/2019     TDAP Vaccine (Adacel) 01/08/2009     The patient reports that he has had the hepatitis B vaccine series in the past. (recommended for age 19-59 and can be given to age 60 or older)  The patient reports that he has had a pnuemovax in the past.  The patient reports that he has not had a flu shot for the current influenza season.  The patient would like to have a Influenza    Patient concerns: has no specific complaints and has been feeling well.      EXAM:  /73   Pulse 80   Temp 98.3  F (36.8  C) (Oral)   Resp 18   Ht 1.83 m (6' 0.05\")   Wt 103 kg (227 lb)   SpO2 97%   BMI 30.74 kg/m    Wt Readings from Last 4 Encounters:   09/20/19 103 kg (227 lb)   06/13/19 103.2 kg (227 lb 8.2 oz) "   05/30/19 105.2 kg (232 lb)   05/04/19 105.2 kg (232 lb)     Body mass index is 30.74 kg/m .     General:  Edwardo is awake, alert, and cooperative.  NAD.    Diabetic Foot Screen:  Any complaints of increased pain or numbness ? No  Is there a foot ulcer now or a history of foot ulcer? No  Does the foot have an abnormal shape? No  Are the nails thick, too long or ingrown? No  Are there any redness or open areas? No         Sensation Testing done at all points on the diagram with monofilament     Right Foot: Sensation Normal at all points  Left Foot: Sensation Normal at all points     Risk Category: 0- No loss of protective sensation  Performed by Bola Madrigal MD                  Previsit labs drawn include:  Orders Only on 09/13/2019   Component Date Value Ref Range Status     Hemoglobin A1C 09/13/2019 8.4* 0 - 5.6 % Final    Comment: Normal <5.7% Prediabetes 5.7-6.4%  Diabetes 6.5% or higher - adopted from ADA   consensus guidelines.           ASSESSMENT and PLAN:    Type II Diabetes, Unchanged  Stable.    DIABETES Type II:                       Lab Results   Component Value Date    A1C 8.4 09/13/2019       Control   poor  Lab Results   Component Value Date    A1C 8.4 09/13/2019    A1C 8.3 06/11/2019    A1C 8.3 06/26/2018     Lab Results   Component Value Date    MICROL 11 06/11/2019     He would like to continue(s) with his diet and exercise and recheck in 3 month(s).    Recommended to take ASA  mg daily for appropriate patient, Compliance with the recommended diabetic diet was stressed, Regular aerobic exercise was encouraged, Home glucose monitoring encouraged, Annual eye exam recommended, Self foot exam demonstrated and recommended to be done nightly, Apply moisturizer to feet with in 3 minutes of showering or bathing, Follow up in clinic in 3 months for a diabetes check and Future labs ordered and the patient was instructed to make a lab appt 1 week prior to next diabetes visit      BP/HTN:   BP  "Readings from Last 3 Encounters:   09/20/19 116/73   06/13/19 113/70   05/30/19 118/68     Control   good    - Medication:continue the current doses of medication.  (make sure to order \"Ace not prescribed intentionally if not on an ACE/ARB)  The patient was advised to do the following therapuetic life style changes  - Dietary sodium restriction and increase potassium and Calcium intake  - Regular aerobic exercise  - Weight loss  - Discontinue smoking if applicable  - Avoid regular NSAID use if applicable  - Avoid regular decongestant use if applicable  - Follow up in clinic in 3 months for a recheck  - Check a basic metabolic panel today if needed    Patient Education: Reviewed risks of hypertension and principles of   Treatment.    HYPERCHOLESTEROLEMIA:     The ASCVD Risk score ([place] FORD Jr., et al., 2013) failed to calculate for the following reasons:    The valid total cholesterol range is 130 to 320 mg/dL    Lab Results   Component Value Date    LDL 33 06/11/2019      Control   good  Cholesterol   Date Value Ref Range Status   06/11/2019 100 <200 mg/dL Final   06/26/2018 108 <200 mg/dL Final     HDL Cholesterol   Date Value Ref Range Status   06/11/2019 48 >39 mg/dL Final   06/26/2018 43 >39 mg/dL Final     LDL Cholesterol Calculated   Date Value Ref Range Status   06/11/2019 33 <100 mg/dL Final     Comment:     Desirable:       <100 mg/dl   06/26/2018 41 <100 mg/dL Final     Comment:     Desirable:       <100 mg/dl     Triglycerides   Date Value Ref Range Status   06/11/2019 95 <150 mg/dL Final     Comment:     Fasting specimen   06/26/2018 120 <150 mg/dL Final     Comment:     Fasting specimen     Cholesterol/HDL Ratio   Date Value Ref Range Status   04/08/2015 2.0 0.0 - 5.0 Final   12/08/2014 1.9 0.0 - 5.0 Final         The patient is on a high intensity statin and this is the appropriate treatment based on the ASCVD risk.    (If LDL>69 on maximum dose statin and patient has CAD/ASCVD add additional LDL " lowering therapy)    The patient's LDL is less than 70 so no statin is indicated at this time.    The patient's HDL is normal  The patient's triglycerides are normal.    We have discussed the results and we will continue the current management.         --------------------------------------------------------------------------------------------------------------------------------------    SUBJECTIVE:   Edwardo Dumont is a 69 year old male who presents today for symptom of hesitancy. He started having these symptoms 3year(s) ago.  He reports slightly slow flow and nocturia x 1    He  denies hematuria, denies back pain,  denies nausea or vomiting,  denies fever or chills.  He denies a history of penile discharge.   This patient does not  have a history of frequent urinary tract infections.     Past Medical History:   Diagnosis Date     Acromioclavicular joint separation, type 1 9/12    right     Allergic rhinitis     causes chronic  cough     Arthritis      CKD (chronic kidney disease) stage 3, GFR 30-59 ml/min (H)      Degenerative arthritis of hip - right 12/27/2010     Gout      Hip arthrosis - right 10/25/2010     Hyperlipidemia      Ischaemic vascular disease      Ischemic vascular disease 5/12    one stent     OA (osteoarthritis) of knee 10/25/2010     Renal insufficiency      Sleep apnea     Does Not use a CPAP-  Lost weight     Type II or unspecified type diabetes mellitus without mention of complication, not stated as uncontrolled      Unspecified essential hypertension      Current Outpatient Medications   Medication Sig Dispense Refill     ACCU-CHEK SMARTVIEW test strip ONCE DAILY TESTING AND AS NEEDED 100 strip 4     ACE NOT PRESCRIBED, INTENTIONAL, ACE Inhibitor not prescribed due to Other: cough 0 each 0     aspirin 81 MG tablet Take 1 tablet (81 mg) by mouth daily       atorvastatin (LIPITOR) 40 MG tablet Take 1 tablet (40 mg) by mouth daily 90 tablet 4     azelastine (ASTELIN) 0.1 % nasal spray Spray 2  "sprays into both nostrils 2 times daily as needed for rhinitis 30 mL 3     blood glucose monitoring ([employer] CONTOUR MONITOR) meter device kit Use to test blood sugar  times daily or as directed. 1 kit 0     ciprofloxacin (CIPRO) 500 MG tablet Take 1 tablet (500 mg) by mouth 2 times daily 14 tablet 0     clindamycin (CLEOCIN) 300 MG capsule Take 1 capsule (300 mg) by mouth 4 times daily 40 capsule 0     clopidogrel (PLAVIX) 75 MG tablet Take 75 mg by mouth daily        empagliflozin (JARDIANCE) 25 MG TABS tablet Take 1 tablet (25 mg) by mouth daily 90 tablet 3     fluticasone (FLONASE) 50 MCG/ACT nasal spray Spray 1 spray into both nostrils daily       levofloxacin (LEVAQUIN) 750 MG tablet Take 1 tablet (750 mg) by mouth daily 10 tablet 0     Loratadine (ALAVERT PO)        losartan (COZAAR) 25 MG tablet Take 1 tablet (25 mg) by mouth daily 90 tablet 4     metFORMIN (GLUCOPHAGE) 500 MG tablet TAKE 2 TABLETS BY MOUTH TWICE DAILY WITH MEALS 360 tablet 0     nitroglycerin (NITROSTAT) 0.4 MG SL tablet Place 1 tablet (0.4 mg) under the tongue every 5 minutes as needed for chest pain 90 tablet 4     Social History     Tobacco Use     Smoking status: Former Smoker     Packs/day: 1.00     Years: 5.00     Pack years: 5.00     Types: Cigarettes     Start date: 1970     Last attempt to quit: 1989     Years since quittin.0     Smokeless tobacco: Never Used     Tobacco comment: former smoker   Substance Use Topics     Alcohol use: Yes     Comment: couple drinks 3 x a week           OBJECTIVE:  /73   Pulse 80   Temp 98.3  F (36.8  C) (Oral)   Resp 18   Ht 1.83 m (6' 0.05\")   Wt 103 kg (227 lb)   SpO2 97%   BMI 30.74 kg/m    GENERAL APPEARANCE: healthy, alert and no distress    ABDOMEN:  soft, nontender, no HSM or masses and bowel sounds normal  BACK: No CVA tenderness  Rectal exam: prostate 1+ enlarged without nodules, prostate nontender to palpation      Results for orders placed or performed in visit on " 09/13/19   Hemoglobin A1c   Result Value Ref Range    Hemoglobin A1C 8.4 (H) 0 - 5.6 %        ASSESSMENT:   Mild bph     PLAN:  Pathophysiology of the condition was discussed and the patient declined treatment at this time  As per ordered above  Drink plenty of fluids.  Prevention and treatment of UTI's discussed.Signs and symptoms of pyelonephritis mentioned.  Follow up with primary care physician if not improving

## 2019-09-21 PROBLEM — R35.1 BENIGN PROSTATIC HYPERPLASIA WITH NOCTURIA: Status: ACTIVE | Noted: 2019-09-21

## 2019-09-21 PROBLEM — N40.1 BENIGN PROSTATIC HYPERPLASIA WITH NOCTURIA: Status: ACTIVE | Noted: 2019-09-21

## 2019-10-04 ENCOUNTER — HEALTH MAINTENANCE LETTER (OUTPATIENT)
Age: 69
End: 2019-10-04

## 2019-10-22 ENCOUNTER — E-VISIT (OUTPATIENT)
Dept: FAMILY MEDICINE | Facility: CLINIC | Age: 69
End: 2019-10-22
Payer: MEDICARE

## 2019-10-22 ENCOUNTER — MYC MEDICAL ADVICE (OUTPATIENT)
Dept: FAMILY MEDICINE | Facility: CLINIC | Age: 69
End: 2019-10-22

## 2019-10-22 DIAGNOSIS — J32.9 RHINOSINUSITIS: Primary | ICD-10-CM

## 2019-10-22 PROCEDURE — 99444 ZZC PHYSICIAN ONLINE EVALUATION & MANAGEMENT SERVICE: CPT | Performed by: FAMILY MEDICINE

## 2019-11-15 ENCOUNTER — TELEPHONE (OUTPATIENT)
Dept: FAMILY MEDICINE | Facility: CLINIC | Age: 69
End: 2019-11-15

## 2019-11-15 NOTE — LETTER
Edwardo Dumont  [address] [facility] [address]          November 15, 2019      Dear Edwardo Dumont      Our records indicate that you have not scheduled for a(n)Annual physical exam  which was recommended by your health care team. Monitoring and managing your preventative and chronic health conditions are very important to us.       If you have received your health care elsewhere, please provide us with that information so it can be documented in your chart.    Please call [phone] or message us through your [employer] account to schedule an appointment or provide information for your chart.     We look forward to seeing you and working with you on your health care needs.     Sincerely,   Bola Madrigal MD/ms          *If you have already scheduled an appointment, please disregard this reminder

## 2019-11-15 NOTE — TELEPHONE ENCOUNTER
Panel Management Review      Patient has the following on his problem list:     Diabetes    ASA: Passed    Last A1C  Lab Results   Component Value Date    A1C 8.4 09/13/2019    A1C 8.3 06/11/2019    A1C 8.3 06/26/2018    A1C 7.8 11/28/2017    A1C 7.1 06/12/2017     A1C tested: FAILED    Last LDL:    Lab Results   Component Value Date    CHOL 100 06/11/2019     Lab Results   Component Value Date    HDL 48 06/11/2019     Lab Results   Component Value Date    LDL 33 06/11/2019     Lab Results   Component Value Date    TRIG 95 06/11/2019     Lab Results   Component Value Date    CHOLHDLRATIO 2.0 04/08/2015     Lab Results   Component Value Date    NHDL 52 06/11/2019       Is the patient on a Statin? YES             Is the patient on Aspirin? YES    Medications     HMG CoA Reductase Inhibitors     atorvastatin (LIPITOR) 40 MG tablet       Salicylates     aspirin 81 MG tablet             Last three blood pressure readings:  BP Readings from Last 3 Encounters:   09/20/19 116/73   06/13/19 113/70   05/30/19 118/68       Date of last diabetes office visit: 9/20/2019     Tobacco History:     History   Smoking Status     Former Smoker     Packs/day: 1.00     Years: 5.00     Types: Cigarettes     Start date: 1/1/1970     Quit date: 9/8/1989   Smokeless Tobacco     Never Used     Comment: former smoker           Composite cancer screening  Chart review shows that this patient is due/due soon for the following None  Summary:    Patient is due/failing the following:   Diabetic appointment already scheduled and PHYSICAL    Action needed:   Patient needs office visit for see above.    Type of outreach:    Sent letter.    Questions for provider review:    None                                                                                                                                    Daniella Loomis cma       Chart routed to closed letter sent. .

## 2019-11-21 DIAGNOSIS — E11.9 DIABETES MELLITUS, TYPE 2 (H): ICD-10-CM

## 2019-11-22 NOTE — TELEPHONE ENCOUNTER
Patient has upcoming/ pending appointment:    Next 5 appointments (look out 90 days)    Dec 12, 2019 12:30 PM CST  Prakash Samaniego with Bola Madrigal MD  [facility] [address] [facility] [address]  [phone]            Rx refilled per [facility] refill protocol.    Sandra Santo BSN, RN

## 2019-12-04 ENCOUNTER — TELEPHONE (OUTPATIENT)
Dept: FAMILY MEDICINE | Facility: CLINIC | Age: 69
End: 2019-12-04

## 2019-12-04 DIAGNOSIS — E11.51 TYPE II DIABETES MELLITUS WITH PERIPHERAL CIRCULATORY DISORDER (H): Primary | ICD-10-CM

## 2019-12-04 DIAGNOSIS — E78.5 HYPERLIPIDEMIA LDL GOAL <100: ICD-10-CM

## 2019-12-04 NOTE — TELEPHONE ENCOUNTER
Reason for Call: Request for an order or referral:    Order or referral being requested: labs for A1C for appointment on 12/12/2019    Date needed: as soon as possible    Has the patient been seen by the PCP for this problem? YES    Additional comments:     Phone number Patient can be reached at:  Home number on file [phone] (home)    Best Time:      Can we leave a detailed message on this number?  YES    Call taken on 12/4/2019 at 1:44 PM by Dede Ruiz

## 2019-12-05 ENCOUNTER — TRANSFERRED RECORDS (OUTPATIENT)
Dept: HEALTH INFORMATION MANAGEMENT | Facility: CLINIC | Age: 69
End: 2019-12-05

## 2019-12-05 DIAGNOSIS — E11.51 TYPE II DIABETES MELLITUS WITH PERIPHERAL CIRCULATORY DISORDER (H): ICD-10-CM

## 2019-12-05 DIAGNOSIS — E78.5 HYPERLIPIDEMIA LDL GOAL <100: ICD-10-CM

## 2019-12-05 LAB
CHOLEST SERPL-MCNC: 175 MG/DL
HBA1C MFR BLD: 6.7 % (ref 0–5.6)
HDLC SERPL-MCNC: 54 MG/DL
LDLC SERPL CALC-MCNC: 99 MG/DL
NONHDLC SERPL-MCNC: 121 MG/DL
TRIGL SERPL-MCNC: 109 MG/DL

## 2019-12-05 PROCEDURE — 80061 LIPID PANEL: CPT | Performed by: FAMILY MEDICINE

## 2019-12-05 PROCEDURE — 36415 COLL VENOUS BLD VENIPUNCTURE: CPT | Performed by: FAMILY MEDICINE

## 2019-12-05 PROCEDURE — 83036 HEMOGLOBIN GLYCOSYLATED A1C: CPT | Performed by: FAMILY MEDICINE

## 2019-12-12 ENCOUNTER — OFFICE VISIT (OUTPATIENT)
Dept: ORTHOPEDICS | Facility: CLINIC | Age: 69
End: 2019-12-12
Payer: MEDICARE

## 2019-12-12 ENCOUNTER — OFFICE VISIT (OUTPATIENT)
Dept: FAMILY MEDICINE | Facility: CLINIC | Age: 69
End: 2019-12-12
Payer: MEDICARE

## 2019-12-12 ENCOUNTER — MYC MEDICAL ADVICE (OUTPATIENT)
Dept: FAMILY MEDICINE | Facility: CLINIC | Age: 69
End: 2019-12-12

## 2019-12-12 VITALS
HEIGHT: 72 IN | BODY MASS INDEX: 30.48 KG/M2 | SYSTOLIC BLOOD PRESSURE: 120 MMHG | DIASTOLIC BLOOD PRESSURE: 77 MMHG | WEIGHT: 225 LBS | HEART RATE: 66 BPM

## 2019-12-12 VITALS
WEIGHT: 226 LBS | HEIGHT: 73 IN | SYSTOLIC BLOOD PRESSURE: 121 MMHG | DIASTOLIC BLOOD PRESSURE: 76 MMHG | RESPIRATION RATE: 16 BRPM | BODY MASS INDEX: 29.95 KG/M2

## 2019-12-12 DIAGNOSIS — E11.9 TYPE 2 DIABETES MELLITUS WITHOUT COMPLICATION, WITHOUT LONG-TERM CURRENT USE OF INSULIN (H): ICD-10-CM

## 2019-12-12 DIAGNOSIS — M75.42 IMPINGEMENT SYNDROME OF BOTH SHOULDERS: Primary | ICD-10-CM

## 2019-12-12 DIAGNOSIS — M75.41 IMPINGEMENT SYNDROME OF BOTH SHOULDERS: Primary | ICD-10-CM

## 2019-12-12 DIAGNOSIS — I25.10 CORONARY ARTERY DISEASE DUE TO LIPID RICH PLAQUE: ICD-10-CM

## 2019-12-12 DIAGNOSIS — M25.512 CHRONIC PAIN OF BOTH SHOULDERS: ICD-10-CM

## 2019-12-12 DIAGNOSIS — I10 ESSENTIAL HYPERTENSION, BENIGN: ICD-10-CM

## 2019-12-12 DIAGNOSIS — E78.5 HYPERLIPIDEMIA LDL GOAL <100: ICD-10-CM

## 2019-12-12 DIAGNOSIS — E11.21 TYPE 2 DIABETES MELLITUS WITH DIABETIC NEPHROPATHY, WITHOUT LONG-TERM CURRENT USE OF INSULIN (H): ICD-10-CM

## 2019-12-12 DIAGNOSIS — E13.42 DIABETIC POLYNEUROPATHY ASSOCIATED WITH OTHER SPECIFIED DIABETES MELLITUS (H): Primary | ICD-10-CM

## 2019-12-12 DIAGNOSIS — G89.29 CHRONIC PAIN OF BOTH SHOULDERS: ICD-10-CM

## 2019-12-12 DIAGNOSIS — M25.511 CHRONIC PAIN OF BOTH SHOULDERS: ICD-10-CM

## 2019-12-12 DIAGNOSIS — I25.83 CORONARY ARTERY DISEASE DUE TO LIPID RICH PLAQUE: ICD-10-CM

## 2019-12-12 PROCEDURE — 20610 DRAIN/INJ JOINT/BURSA W/O US: CPT | Mod: 50 | Performed by: ORTHOPAEDIC SURGERY

## 2019-12-12 PROCEDURE — 99214 OFFICE O/P EST MOD 30 MIN: CPT | Performed by: FAMILY MEDICINE

## 2019-12-12 RX ORDER — NITROGLYCERIN 0.4 MG/1
0.4 TABLET SUBLINGUAL EVERY 5 MIN PRN
Qty: 90 TABLET | Refills: 4 | Status: SHIPPED | OUTPATIENT
Start: 2019-12-12

## 2019-12-12 RX ORDER — TRIAMCINOLONE ACETONIDE 40 MG/ML
80 INJECTION, SUSPENSION INTRA-ARTICULAR; INTRAMUSCULAR
Status: DISCONTINUED | OUTPATIENT
Start: 2019-12-12 | End: 2020-06-23

## 2019-12-12 RX ORDER — LIDOCAINE HYDROCHLORIDE 10 MG/ML
4 INJECTION, SOLUTION INFILTRATION; PERINEURAL
Status: DISCONTINUED | OUTPATIENT
Start: 2019-12-12 | End: 2020-06-23

## 2019-12-12 RX ORDER — BUPIVACAINE HYDROCHLORIDE 2.5 MG/ML
3 INJECTION, SOLUTION INFILTRATION; PERINEURAL
Status: DISCONTINUED | OUTPATIENT
Start: 2019-12-12 | End: 2020-06-23

## 2019-12-12 RX ORDER — DULOXETIN HYDROCHLORIDE 60 MG/1
60 CAPSULE, DELAYED RELEASE ORAL DAILY
Qty: 30 CAPSULE | Refills: 1 | Status: SHIPPED | OUTPATIENT
Start: 2019-12-12 | End: 2020-02-10

## 2019-12-12 RX ORDER — LOSARTAN POTASSIUM 25 MG/1
25 TABLET ORAL DAILY
Qty: 90 TABLET | Refills: 3 | Status: SHIPPED | OUTPATIENT
Start: 2019-12-12 | End: 2021-01-05

## 2019-12-12 RX ORDER — ATORVASTATIN CALCIUM 40 MG/1
40 TABLET, FILM COATED ORAL DAILY
Qty: 90 TABLET | Refills: 3 | Status: SHIPPED | OUTPATIENT
Start: 2019-12-12 | End: 2020-06-23

## 2019-12-12 RX ADMIN — TRIAMCINOLONE ACETONIDE 80 MG: 40 INJECTION, SUSPENSION INTRA-ARTICULAR; INTRAMUSCULAR at 15:11

## 2019-12-12 RX ADMIN — LIDOCAINE HYDROCHLORIDE 4 ML: 10 INJECTION, SOLUTION INFILTRATION; PERINEURAL at 15:11

## 2019-12-12 RX ADMIN — BUPIVACAINE HYDROCHLORIDE 3 ML: 2.5 INJECTION, SOLUTION INFILTRATION; PERINEURAL at 15:11

## 2019-12-12 ASSESSMENT — PAIN SCALES - GENERAL: PAINLEVEL: SEVERE PAIN (7)

## 2019-12-12 ASSESSMENT — MIFFLIN-ST. JEOR
SCORE: 1829.38
SCORE: 1845.83

## 2019-12-12 NOTE — PROGRESS NOTES
CHIEF COMPLAINT:   Chief Complaint   Patient presents with     Shoulder Pain     bilateral shoulder pain. last cortisone injections on 12/13/18. R>L.        HISTORY:  Tanvir Dumont is a 69 year old male, right-hand dominant, who is seen for follow up evaluation of bilateral shoulder pain, right > left. He sustained an injury 9/2012, fell onto right shoulder while playing soccer with grand daughter. Had MRI 2012 negative for rotator cuff tear but showing acromio-clavicular injury and rotator cuff tendinosis. Noted to have AC separation. Treated non-opertively, healed well. Patient returns today with continued pain. MRI 2015 showed partial thickness rotator cuff tear on right. His last injections were on 12/13/2018. He reports injections did well until April, then pain slowly crept back. He is leaving for the winter and would like repeat injections today. He has learned to modify his activity, noting he doesn't lift much past shoulder level.  He's eager to get back to golfing.      Symptoms have been waxing and waning right shoulder, starting now left shoulder.  Aggrevated by: overhead activities.  Relieved by: rest, cortisone injections  Present symptoms: pain with overhead activities, pain reaching behind back  Pain location: lateral shoulder, deltoid and upper arm  Pain severity: 7/10.  Pain quality: dull and sharp  Frequency of symptoms: occasionally  Associated symptoms: none  Treatment up to this point: injections 12/2014, 6/2015, 12/2015, 12/6/2016, 12/21/2017, 12/2018 with good relief.    Significant Orthopedic past medical history: right total hip arthroplasty 1/2011  Usual level of recreational activity: golf, pickle ball.  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 He also has no past medical history of Amblyopia, Bleeding disorder (H), Cancer (H), Cataract, Cerebral infarction (H), Chronic infection, Complication of anesthesia, Congestive heart failure (H), COPD (chronic obstructive pulmonary disease) (H), Depressive disorder, Diabetic retinopathy (H), Glaucoma (increased eye pressure), Heart disease, History of blood transfusion, Inflammatory arthritis, Malignant hyperthermia, Retinal detachment, Senile macular degeneration, Strabismus, Stroke (H), Surgical complications, Thyroid disease, Uncomplicated asthma, Unspecified asthma(493.90), or Uveitis.  Patient Active Problem List    Diagnosis Date Noted     Benign prostatic hyperplasia with nocturia 09/21/2019     Priority: Medium     S/P lumbar fusion 07/20/2018     Priority: Medium     Spinal stenosis of lumbar region with neurogenic claudication 07/10/2018     Priority: Medium     Type II diabetes mellitus with peripheral circulatory disorder (H) 01/31/2018     Priority: Medium     CKD (chronic kidney disease) stage 3, GFR 30-59 ml/min (H)      Priority: Medium     S/P coronary angiogram 09/23/2015     Priority: Medium     Insomnia 08/03/2015     Priority: Medium     Dermatochalasis 04/10/2015     Priority: Medium     Visual disturbance, transient, right eye 02/09/2014     Priority: Medium     Essential hypertension, benign 05/14/2013     Priority: Medium     Ischemic vascular disease   one stent coronary artery 5/12 12/19/2012     Priority: Medium     Closed dislocation of acromioclavicular joint 12/17/2012     Priority: Medium     Problem list name updated by automated  process. Provider to review       Impingement syndrome of right shoulder 12/17/2012     Priority: Medium     S/P hip replacement 12/13/2012     Priority: Medium     Advanced directives, counseling/discussion 05/25/2011     Priority: Medium     Discussed Advance Directive planning with patient; information given to patient to review.         Urinary retention 01/21/2011     Priority: Medium     Degenerative arthritis of hip - right 12/27/2010     Priority: Medium     OA (osteoarthritis) of knee 10/25/2010     Priority: Medium     Hyperlipidemia LDL goal <100 10/14/2010     Priority: Medium     Low back pain 04/23/2010     Priority: Medium     Radiculopathy of leg 04/23/2010     Priority: Medium     Gout 11/27/2009     Priority: Medium     Sleep apnea      Priority: Medium     Cough 12/05/2007     Priority: Medium       Surgical Hx:  has a past surgical history that includes gastric bypass (1/03); TOTAL HIP ARTHROPLASTY (1/11/11); Stent (5/8/12); Endoscopic sinus surgery (12/14/15); Endoscopic sinus surgery (Bilateral, 12/14/2015); sinus surgery (Right, 12-14-15); Stent (01/2017); ABLATION SUPRAVENT ARRHYTHMOGENIC FOCUS (12/21/15); stent, coronary, shelia (01/2017); and Optical tracking system fusion spine posterior lumbar two levels (N/A, 7/20/2018).    Medications:   Current Outpatient Medications:      ACCU-CHEK SMARTVIEW test strip, ONCE DAILY TESTING AND AS NEEDED, Disp: 100 strip, Rfl: 4     ACE NOT PRESCRIBED, INTENTIONAL,, ACE Inhibitor not prescribed due to Other: cough, Disp: 0 each, Rfl: 0     aspirin 81 MG tablet, Take 1 tablet (81 mg) by mouth daily, Disp: , Rfl:      atorvastatin (LIPITOR) 40 MG tablet, Take 1 tablet (40 mg) by mouth daily, Disp: 90 tablet, Rfl: 3     azelastine (ASTELIN) 0.1 % nasal spray, Spray 2 sprays into both nostrils 2 times daily as needed for rhinitis, Disp: 30 mL, Rfl: 3     blood glucose monitoring ([employer] CONTOUR MONITOR) meter device kit, Use to test blood sugar  times daily or  "as directed., Disp: 1 kit, Rfl: 0     clindamycin (CLEOCIN) 300 MG capsule, Take 1 capsule (300 mg) by mouth 4 times daily, Disp: 40 capsule, Rfl: 0     clopidogrel (PLAVIX) 75 MG tablet, Take 75 mg by mouth daily , Disp: , Rfl:      DULoxetine (CYMBALTA) 60 MG capsule, Take 1 capsule (60 mg) by mouth daily, Disp: 30 capsule, Rfl: 1     empagliflozin (JARDIANCE) 25 MG TABS tablet, Take 1 tablet (25 mg) by mouth daily, Disp: 90 tablet, Rfl: 3     fluticasone (FLONASE) 50 MCG/ACT nasal spray, Spray 1 spray into both nostrils daily, Disp: , Rfl:      levofloxacin (LEVAQUIN) 750 MG tablet, Take 1 tablet (750 mg) by mouth daily, Disp: 10 tablet, Rfl: 0     losartan (COZAAR) 25 MG tablet, Take 1 tablet (25 mg) by mouth daily, Disp: 90 tablet, Rfl: 3     metFORMIN (GLUCOPHAGE) 500 MG tablet, TAKE 2 TABLETS BY MOUTH TWICE DAILY WITH MEALS, Disp: 360 tablet, Rfl: 3     nitroGLYcerin (NITROSTAT) 0.4 MG sublingual tablet, Place 1 tablet (0.4 mg) under the tongue every 5 minutes as needed for chest pain, Disp: 90 tablet, Rfl: 4    Allergies:   Allergies   Allergen Reactions     Benzonatate Anaphylaxis and Swelling     Lisinopril Cough     Zocor [Simvastatin - High Dose]      Poor memory       REVIEW OF SYSTEMS:   CONSTITUTIONAL:NEGATIVE for fever, chills, change in weight  INTEGUMENTARY/SKIN: NEGATIVE for worrisome rashes, moles or lesions  MUSCULOSKELETAL:See HPI above  NEURO: NEGATIVE for weakness, dizziness or paresthesias         PHYSICAL EXAM:  /76   Resp 16   Ht 1.849 m (6' 0.8\")   Wt 102.5 kg (226 lb)   BMI 29.98 kg/m     GENERAL APPEARANCE: healthy, alert, no distress  SKIN: no suspicious lesions or rashes  NEURO: Normal strength and tone, mentation intact and speech normal  PSYCH:  mentation appears normal and affect normal, not anxious  RESPIRATORY: No increased work of breathing.      RIGHT UPPER EXTREMITY:  Sensation intact to light touch in median, radial, ulnar and axillary nerve distributions  Palpable " 2+ radial pulse, brisk capillary refill to all fingers, wwp  Intact epl fpl fdp edc wrist flexion/extension biceps triceps deltoid    RIGHT SHOULDER:  Shoulder Inspection: no swelling, bruising, discoloration, there is moderate  AC prominence deformity and asymmetry, mild muscle atrophy supraspinatus and infraspinatus compared to left.    Tender:greater tuberosity, upper trapezius,  nontender to palpation: AC joint, proximal bicep tendon, supraspinatus  Range of Motion:   Active:forward flexion 170 degrees, external rotation  60 degrees, internal rotation  T12   Passive: same  Strength: forward flexion 5-/5, External rotation 5-/5  Liftoff: Able  Impingement: all grade 2 positive  Special tests: Spurling's: Negative  Belly Press: Negative  Empty Can: Positive for pain.    LEFT UPPER EXTREMITY:  Sensation intact to light touch in median, radial, ulnar and axillary nerve distributions  Palpable 2+ radial pulse, brisk capillary refill to all fingers, wwp  Intact epl fpl fdp edc wrist flexion/extension biceps triceps deltoid    LEFT SHOULDER:  Shoulder Inspection: no swelling, bruising, discoloration, or obvious deformity or asymmetry  no atrophy  Tender: AC joint, greater tuberosity, anterior capsule, proximal biceps, deltoid  Non-tender: SC joint, proximal-mid clavicle, mid-distal clavicle, acromion, proximal humerus, supraspinatus , infraspinatus, upper trapezius muscle and rhomboids  Range of Motion:   Active:forward flexion 170 degrees, external rotation  60 degrees, internal rotation  T12   Passive: same  Strength: forward flexion 5/5, External rotation 5/5  Liftoff: Able  Impingement: all grade 2 positive      X-RAY:   No new shoulder xrays today.  3 views right  Shoulder, bilateral AC joints obtained 11/19/2012 were again reviewed personally in clinic today with the patient. On my review, there is elevation and widening of distal clavicle relative to acromion, 100%. Mild acromio-clavicular degenerative changes.  Sclerosis at greater tuberosity. There is no increased in AC separation with weights compared to without weights. Moderate left acromio-clavicular degenerative changes.    3 views left shoulder 3/17/2014 reviewed, No obvious fracture or dislocation. No obvious bony abnormality or lesion. Moderate acromio-clavicular and mild gleno-humeral degenerative changes.       MRI right shoulder CDI 6/2/2015: moderate sized area of poorly defined interstitial and deep fiber tearing of the distal supraspinatus tendon involves up to 50% of thickness. Moderate infraspinatus and subscapularis tendinosis. Chronic acromio-clavicular injury with CC sprain, mild narrowing of subacromial space with minimal bursitis. Long head biceps grossly intact. No significant gleno-humeral degenerative changes.    MRI left shoulder CDI 6/2/2015: mild supraspinatus tendinopathy with fraying involving bursal surface distally. Mild subscapularis tendinopathy. Moderate to marked acromio-clavicular degenerative changes with mild to  Moderate narrowing of the subacromial space. Mild bursitis. No significant gleno-humeral degenerative changes. Proximal biceps grossly intact.    MRI right shoulder 11/26/2012 reviewed:  IMPRESSION:  1. Extensive separation of the acromioclavicular joint including  disruption of the acromioclavicular joint ligaments. The  coracoclavicular ligaments are intact.  2. Mild tendinosis of the distal supraspinatous tendon. No actual  rotator cuff tear is seen.      ASSESSMENT: Edwardo Dumont is a 69 year old male, right  -hand dominant with chronic right shoulder AC separation, pain, rotator cuff tendinosis and impingement with right partial thickness tear; left shoulder impingement and tendinosis.      PLAN:   * again discussed nonsurgical and surgical options. He states he will continue with injections as along as they help.  * again, could consider arthroscopic versus open surgery (for example: subacromial decompression, distal  clavicle excision, rotator cuff repair versus debridement, biceps tenodesis versus tenotomy, etc.). Perioperative course discussed, length of recovery. Right side likely rotator cuff repair and decompression, left side likely decompression only. Risks and perceived benefits discussed.    * at this time, he'd like to proceed with bilateral cortisone injections. See procedure notes below.    In the meantime:    * Rest  * Activity modification - avoid activities that aggravate symptoms or started symptoms at onset.  * NSAIDS - regular use for inflammation, with food, as long as no contra-indications. Please discuss with pcp if needed.  * Ice twice daily to three times daily, 15-20 minutes at a time  * heat may be beneficial prior to exercising  * home exercise program for strengthening, stretching and range of motion exercises of rotator cuff and periscapular stabilization.  * Tylenol as needed for pain  * Injections: cortisone injections may be beneficial to help decrease swelling and inflammation within the shoulder or bursa, and decrease pain. With decreased pain, Physical Therapy and exercises will be more effective and efficient. Patient elects to proceed with bilateral cortisone injections.  * Return to clinic as needed.    Skip Reyes M.D., M.S.  Dept. of Orthopaedic Surgery  [facility]    Large Joint Injection/Arthocentesis: bilateral subacromial bursa  Date/Time: 12/12/2019 3:11 PM  Performed by: Igor Rose PA  Authorized by: Skip Reyes MD     Indications:  Pain  Needle Size:  22 G  Guidance: landmark guided    Approach:  Posterolateral  Location:  Shoulder  Laterality:  Bilateral      Site:  Bilateral subacromial bursa  Medications (Right):  3 mL bupivacaine 0.25 %; 4 mL lidocaine 1 %; 80 mg triamcinolone 40 MG/ML  Medications (Left):  3 mL bupivacaine 0.25 %; 4 mL lidocaine 1 %; 80 mg triamcinolone 40 MG/ML  Outcome:  Tolerated well, no immediate complications  Procedure  discussed: discussed risks, benefits, and alternatives    Consent Given by:  Patient  Prep: patient was prepped and draped in usual sterile fashion

## 2019-12-12 NOTE — PROGRESS NOTES
[facility] diabetes resourses:  [email or web address]         To access diabetes educational materials with in EPIC use <dot>RADHA      Put this in AFTER VISIT SUMMARY if needed for the patient to access information online [email or web address]      SUBJECTIVE:  Edwardo Dumont is a 69 year old male who presents today for a follow up appointment for management of DIABETES MELLITUS.  He was diagnosed 12 years ago     Patient Active Problem List    Diagnosis Date Noted     Benign prostatic hyperplasia with nocturia 09/21/2019     Priority: Medium     S/P lumbar fusion 07/20/2018     Priority: Medium     Spinal stenosis of lumbar region with neurogenic claudication 07/10/2018     Priority: Medium     Type II diabetes mellitus with peripheral circulatory disorder (H) 01/31/2018     Priority: Medium     CKD (chronic kidney disease) stage 3, GFR 30-59 ml/min (H)      Priority: Medium     S/P coronary angiogram 09/23/2015     Priority: Medium     Insomnia 08/03/2015     Priority: Medium     Dermatochalasis 04/10/2015     Priority: Medium     Visual disturbance, transient, right eye 02/09/2014     Priority: Medium     Essential hypertension, benign 05/14/2013     Priority: Medium     Ischemic vascular disease   one stent coronary artery 5/12 12/19/2012     Priority: Medium     Closed dislocation of acromioclavicular joint 12/17/2012     Priority: Medium     Problem list name updated by automated process. Provider to review       Impingement syndrome of right shoulder 12/17/2012     Priority: Medium     S/P hip replacement 12/13/2012     Priority: Medium     Advanced directives, counseling/discussion 05/25/2011     Priority: Medium     Discussed Advance Directive planning with patient; information given to patient to review.         Urinary retention 01/21/2011     Priority: Medium     Degenerative arthritis of hip - right 12/27/2010      Priority: Medium     OA (osteoarthritis) of knee 10/25/2010     Priority: Medium     Hyperlipidemia LDL goal <100 10/14/2010     Priority: Medium     Low back pain 04/23/2010     Priority: Medium     Radiculopathy of leg 04/23/2010     Priority: Medium     Gout 11/27/2009     Priority: Medium     Sleep apnea      Priority: Medium     Cough 12/05/2007     Priority: Medium        The patient checks his blood sugar as follows: one time daily, once daily.   Results as follows: fasting glucose- 115-130 on old meter now 140 -150     The patient reports that he IS taking the medication as prescribed. He denies side effects of medication.    ----------------------------------------------------------------------------------------------------------------------------------------    BP Readings from Last 3 Encounters:   12/12/19 120/77   09/20/19 116/73   06/13/19 113/70     The patient reports that he IS NOT currently smoking.  History   Smoking Status     Former Smoker     Packs/day: 1.00     Years: 5.00     Types: Cigarettes     Start date: 1/1/1970     Quit date: 9/8/1989   Smokeless Tobacco     Never Used     Comment: former smoker       The 10-year ASCVD risk score (Balfour LOGAN Jr., et al., 2013) is: 26.4%    Values used to calculate the score:      Age: 69 years      Sex: Male      Is Non- : No      Diabetic: Yes      Tobacco smoker: No      Systolic Blood Pressure: 120 mmHg      Is BP treated: Yes      HDL Cholesterol: 54 mg/dL      Total Cholesterol: 175 mg/dL        Current Outpatient Medications   Medication Sig Dispense Refill     ACCU-CHEK SMARTVIEW test strip ONCE DAILY TESTING AND AS NEEDED 100 strip 4     ACE NOT PRESCRIBED, INTENTIONAL, ACE Inhibitor not prescribed due to Other: cough 0 each 0     aspirin 81 MG tablet Take 1 tablet (81 mg) by mouth daily       atorvastatin (LIPITOR) 40 MG tablet Take 1 tablet (40 mg) by mouth daily 90 tablet 4     azelastine (ASTELIN) 0.1 % nasal spray Spray  2 sprays into both nostrils 2 times daily as needed for rhinitis 30 mL 3     blood glucose monitoring ([employer] CONTOUR MONITOR) meter device kit Use to test blood sugar  times daily or as directed. 1 kit 0     clindamycin (CLEOCIN) 300 MG capsule Take 1 capsule (300 mg) by mouth 4 times daily 40 capsule 0     clopidogrel (PLAVIX) 75 MG tablet Take 75 mg by mouth daily        empagliflozin (JARDIANCE) 25 MG TABS tablet Take 1 tablet (25 mg) by mouth daily 90 tablet 3     fluticasone (FLONASE) 50 MCG/ACT nasal spray Spray 1 spray into both nostrils daily       levofloxacin (LEVAQUIN) 750 MG tablet Take 1 tablet (750 mg) by mouth daily 10 tablet 0     losartan (COZAAR) 25 MG tablet Take 1 tablet (25 mg) by mouth daily 90 tablet 4     metFORMIN (GLUCOPHAGE) 500 MG tablet TAKE 2 TABLETS BY MOUTH TWICE DAILY WITH MEALS 360 tablet 0     nitroglycerin (NITROSTAT) 0.4 MG SL tablet Place 1 tablet (0.4 mg) under the tongue every 5 minutes as needed for chest pain (Patient not taking: Reported on 12/12/2019) 90 tablet 4       The patient reports that he IS taking a statin.   (Reminder all diabetics with a ASCVD risk greater or equal to 7.5% should be on a high intensity statin, otherwise on a moderate intensity statin)      The patient reports that he IS taking 81mg of  aspirin daily.  (Reminder all diabetic patients with a cardiovascular risk factor and > 50 should take a daily aspirin)       The patient reports that he IS doing a self foot exam daily.  The patient reports that he does exercise in the form of biking  7 times a week when the weather is good   The patient reports that he IS following the recommended diabetic diet. He  would give himself a B+ grade on his diet.  The patient reports that his last eye exam was this month(s).        Immunization History   Administered Date(s) Administered     HEPA 01/08/2009, 09/08/2009     HepB 03/06/2013, 01/15/2014     HepB-Adult 05/30/2019     Influenza (High Dose) 3 valent  "vaccine 11/01/2016, 09/26/2017, 09/20/2019     Influenza (IIV3) PF 1950, 10/01/2010, 09/26/2012, 11/04/2013, 10/01/2014     Influenza Vaccine, 3 YRS +, IM (QUADRIVALENT W/PRESERVATIVES) 10/06/2015     Pneumo Conj 13-V (2010&after) 06/02/2016     Pneumococcal 23 valent 02/27/2009, 06/13/2017     TD (ADULT, 7+) 05/30/2019     TDAP Vaccine (Adacel) 01/08/2009     The patient reports that he has had the hepatitis B vaccine series in the past. (recommended for age 19-59 and can be given to age 60 or older)  The patient reports that he has had a pnuemovax in the past.  The patient reports that he has had a flu shot for the current influenza season.  The patient would like to have a no vaccinations today    Patient concerns: is concerned about a pins and needles sensation when laying down to sleep. It occasionally wakes him up at night. He has had this for years. He recently cut out refined carbs from his diet and this symptom(s) got better. He would like to start treating it.        EXAM:  /77   Pulse 66   Ht 1.83 m (6' 0.05\")   Wt 102.1 kg (225 lb)   BMI 30.47 kg/m    Wt Readings from Last 4 Encounters:   12/12/19 102.1 kg (225 lb)   09/20/19 103 kg (227 lb)   06/13/19 103.2 kg (227 lb 8.2 oz)   05/30/19 105.2 kg (232 lb)     Body mass index is 30.47 kg/m .    General:  Edwardo is awake, alert, and cooperative.  NAD.      Diabetic Foot Screen:  Any complaints of increased pain or numbness ?  YES as above  Is there a foot ulcer now or a history of foot ulcer? No  Does the foot have an abnormal shape? No  Are the nails thick, too long or ingrown?  YES several especially left great toenail  Are there any redness or open areas? No         Sensation Testing done at all points on the diagram with monofilament     Right Foot: Sensation Normal at all points  Left Foot: Sensation Normal at all points     Risk Category: 0- No loss of protective sensation  Performed by Bola Madrigal MD                  Previsit " "labs drawn include:  Orders Only on 12/05/2019   Component Date Value Ref Range Status     Cholesterol 12/05/2019 175  <200 mg/dL Final     Triglycerides 12/05/2019 109  <150 mg/dL Final    Fasting specimen     HDL Cholesterol 12/05/2019 54  >39 mg/dL Final     LDL Cholesterol Calculated 12/05/2019 99  <100 mg/dL Final    Desirable:       <100 mg/dl     Non HDL Cholesterol 12/05/2019 121  <130 mg/dL Final     Hemoglobin A1C 12/05/2019 6.7* 0 - 5.6 % Final    Comment: Normal <5.7% Prediabetes 5.7-6.4%  Diabetes 6.5% or higher - adopted from ADA   consensus guidelines.           ASSESSMENT and PLAN:    Type II Diabetes, Markedly improving.    DIABETES Type II:                       Lab Results   Component Value Date    A1C 6.7 12/05/2019       Control   good  Lab Results   Component Value Date    A1C 6.7 12/05/2019    A1C 8.4 09/13/2019    A1C 8.3 06/11/2019     Lab Results   Component Value Date    MICROL 11 06/11/2019     No results found for: MICROALBUMIN Control   good    Recommended to take ASA  mg daily for appropriate patient, Compliance with the recommended diabetic diet was stressed, Regular aerobic exercise was encouraged, Home glucose monitoring encouraged, Annual eye exam recommended, Self foot exam demonstrated and recommended to be done nightly, Apply moisturizer to feet with in 3 minutes of showering or bathing, Follow up in clinic in 3 months for a diabetes check when he is back from [place] for the winter.  Future labs ordered and the patient was instructed to make a lab appt 1 week prior to next diabetes visit      BP/HTN:   BP Readings from Last 3 Encounters:   12/12/19 120/77   09/20/19 116/73   06/13/19 113/70     Control   good    - Medication:continue the current doses of medication.  (make sure to order \"Ace not prescribed intentionally if not on an ACE/ARB)  The patient was advised to do the following therapuetic life style changes  - Dietary sodium restriction and increase potassium and " Calcium intake  - Regular aerobic exercise  - Weight loss  - Discontinue smoking if applicable  - Avoid regular NSAID use if applicable  - Avoid regular decongestant use if applicable  - Follow up in clinic in 6 months for a recheck  - Check a basic metabolic panel today if needed    Patient Education: Reviewed risks of hypertension and principles of   Treatment.    HYPERCHOLESTEROLEMIA:     The 10-year ASCVD risk score (Rajan FORD Jr., et al., 2013) is: 26.4%    Values used to calculate the score:      Age: 69 years      Sex: Male      Is Non- : No      Diabetic: Yes      Tobacco smoker: No      Systolic Blood Pressure: 120 mmHg      Is BP treated: Yes      HDL Cholesterol: 54 mg/dL      Total Cholesterol: 175 mg/dL    Lab Results   Component Value Date    LDL 99 12/05/2019      Control   fair  Cholesterol   Date Value Ref Range Status   12/05/2019 175 <200 mg/dL Final   06/11/2019 100 <200 mg/dL Final     HDL Cholesterol   Date Value Ref Range Status   12/05/2019 54 >39 mg/dL Final   06/11/2019 48 >39 mg/dL Final     LDL Cholesterol Calculated   Date Value Ref Range Status   12/05/2019 99 <100 mg/dL Final     Comment:     Desirable:       <100 mg/dl   06/11/2019 33 <100 mg/dL Final     Comment:     Desirable:       <100 mg/dl     Triglycerides   Date Value Ref Range Status   12/05/2019 109 <150 mg/dL Final     Comment:     Fasting specimen   06/11/2019 95 <150 mg/dL Final     Comment:     Fasting specimen     Cholesterol/HDL Ratio   Date Value Ref Range Status   04/08/2015 2.0 0.0 - 5.0 Final   12/08/2014 1.9 0.0 - 5.0 Final         The patient is on a high intensity statin and this is the appropriate treatment based on the ASCVD risk.  His LDL went up from 33 to 99 in the setting of significant improvement in his Hemoglobin A1C .   I can not explain the change. The patient and I decieded to recheck it in the spring.     Previsit lab(s) ordered        The patient's HDL is normal  The patient's  triglycerides are normal.    We have discussed the results and we will continue the current management.         The patients chronic medication was filled for 12  months.

## 2019-12-12 NOTE — PATIENT INSTRUCTIONS
Call your cardiologist to ask when to discontinue the plavix   Go to your pharmacy in [place] to enquire about Shingrix.    Let me know via MyChart in about a month(s) how well the new medication for neuropathy is working.

## 2019-12-12 NOTE — LETTER
12/12/2019         RE: Edwardo Dumont  [address]  [place] [address]        Dear Colleague,    Thank you for referring your patient, Edwardo Dumont, to the [facility]. Please see a copy of my visit note below.    CHIEF COMPLAINT:   Chief Complaint   Patient presents with     Shoulder Pain     bilateral shoulder pain. last cortisone injections on 12/13/18. R>L.        HISTORY:  Tanvir Dumont is a 69 year old male, right-hand dominant, who is seen for follow up evaluation of bilateral shoulder pain, right > left. He sustained an injury 9/2012, fell onto right shoulder while playing soccer with grand daughter. Had MRI 2012 negative for rotator cuff tear but showing acromio-clavicular injury and rotator cuff tendinosis. Noted to have AC separation. Treated non-opertively, healed well. Patient returns today with continued pain. MRI 2015 showed partial thickness rotator cuff tear on right. His last injections were on 12/13/2018. He reports injections did well until April, then pain slowly crept back. He is leaving for the winter and would like repeat injections today. He has learned to modify his activity, noting he doesn't lift much past shoulder level.  He's eager to get back to golfing.      Symptoms have been waxing and waning right shoulder, starting now left shoulder.  Aggrevated by: overhead activities.  Relieved by: rest, cortisone injections  Present symptoms: pain with overhead activities, pain reaching behind back  Pain location: lateral shoulder, deltoid and upper arm  Pain severity: 7/10.  Pain quality: dull and sharp  Frequency of symptoms: occasionally  Associated symptoms: none  Treatment up to this point: injections 12/2014, 6/2015, 12/2015, 12/6/2016, 12/21/2017, 12/2018 with good relief.    Significant Orthopedic past medical history: right total hip arthroplasty 1/2011  Usual level of recreational activity: golf, pickle ball.  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 He also has no past medical history of Amblyopia, Bleeding disorder (H), Cancer (H), Cataract, Cerebral infarction (H), Chronic infection, Complication of anesthesia, Congestive heart failure (H), COPD (chronic obstructive pulmonary disease) (H), Depressive disorder, Diabetic retinopathy (H), Glaucoma (increased eye pressure), Heart disease, History of blood transfusion, Inflammatory arthritis, Malignant hyperthermia, Retinal detachment, Senile macular degeneration, Strabismus, Stroke (H), Surgical complications, Thyroid disease, Uncomplicated asthma, Unspecified asthma(493.90), or Uveitis.  Patient Active Problem List    Diagnosis Date Noted     Benign prostatic hyperplasia with nocturia 09/21/2019     Priority: Medium     S/P lumbar fusion 07/20/2018     Priority: Medium     Spinal stenosis of lumbar region with neurogenic claudication 07/10/2018     Priority: Medium     Type II diabetes mellitus with peripheral circulatory disorder (H) 01/31/2018     Priority: Medium     CKD (chronic kidney disease) stage 3, GFR 30-59 ml/min (H)      Priority: Medium     S/P coronary angiogram 09/23/2015     Priority: Medium     Insomnia 08/03/2015     Priority: Medium     Dermatochalasis 04/10/2015     Priority: Medium     Visual disturbance, transient, right eye 02/09/2014     Priority: Medium     Essential hypertension, benign  05/14/2013     Priority: Medium     Ischemic vascular disease   one stent coronary artery 5/12 12/19/2012     Priority: Medium     Closed dislocation of acromioclavicular joint 12/17/2012     Priority: Medium     Problem list name updated by automated process. Provider to review       Impingement syndrome of right shoulder 12/17/2012     Priority: Medium     S/P hip replacement 12/13/2012     Priority: Medium     Advanced directives, counseling/discussion 05/25/2011     Priority: Medium     Discussed Advance Directive planning with patient; information given to patient to review.         Urinary retention 01/21/2011     Priority: Medium     Degenerative arthritis of hip - right 12/27/2010     Priority: Medium     OA (osteoarthritis) of knee 10/25/2010     Priority: Medium     Hyperlipidemia LDL goal <100 10/14/2010     Priority: Medium     Low back pain 04/23/2010     Priority: Medium     Radiculopathy of leg 04/23/2010     Priority: Medium     Gout 11/27/2009     Priority: Medium     Sleep apnea      Priority: Medium     Cough 12/05/2007     Priority: Medium       Surgical Hx:  has a past surgical history that includes gastric bypass (1/03); TOTAL HIP ARTHROPLASTY (1/11/11); Stent (5/8/12); Endoscopic sinus surgery (12/14/15); Endoscopic sinus surgery (Bilateral, 12/14/2015); sinus surgery (Right, 12-14-15); Stent (01/2017); ABLATION SUPRAVENT ARRHYTHMOGENIC FOCUS (12/21/15); stent, coronary, shelia (01/2017); and Optical tracking system fusion spine posterior lumbar two levels (N/A, 7/20/2018).    Medications:   Current Outpatient Medications:      ACCU-CHEK SMARTVIEW test strip, ONCE DAILY TESTING AND AS NEEDED, Disp: 100 strip, Rfl: 4     ACE NOT PRESCRIBED, INTENTIONAL,, ACE Inhibitor not prescribed due to Other: cough, Disp: 0 each, Rfl: 0     aspirin 81 MG tablet, Take 1 tablet (81 mg) by mouth daily, Disp: , Rfl:      atorvastatin (LIPITOR) 40 MG tablet, Take 1 tablet (40 mg) by mouth daily, Disp: 90 tablet,  "Rfl: 3     azelastine (ASTELIN) 0.1 % nasal spray, Spray 2 sprays into both nostrils 2 times daily as needed for rhinitis, Disp: 30 mL, Rfl: 3     blood glucose monitoring ([employer] CONTOUR MONITOR) meter device kit, Use to test blood sugar  times daily or as directed., Disp: 1 kit, Rfl: 0     clindamycin (CLEOCIN) 300 MG capsule, Take 1 capsule (300 mg) by mouth 4 times daily, Disp: 40 capsule, Rfl: 0     clopidogrel (PLAVIX) 75 MG tablet, Take 75 mg by mouth daily , Disp: , Rfl:      DULoxetine (CYMBALTA) 60 MG capsule, Take 1 capsule (60 mg) by mouth daily, Disp: 30 capsule, Rfl: 1     empagliflozin (JARDIANCE) 25 MG TABS tablet, Take 1 tablet (25 mg) by mouth daily, Disp: 90 tablet, Rfl: 3     fluticasone (FLONASE) 50 MCG/ACT nasal spray, Spray 1 spray into both nostrils daily, Disp: , Rfl:      levofloxacin (LEVAQUIN) 750 MG tablet, Take 1 tablet (750 mg) by mouth daily, Disp: 10 tablet, Rfl: 0     losartan (COZAAR) 25 MG tablet, Take 1 tablet (25 mg) by mouth daily, Disp: 90 tablet, Rfl: 3     metFORMIN (GLUCOPHAGE) 500 MG tablet, TAKE 2 TABLETS BY MOUTH TWICE DAILY WITH MEALS, Disp: 360 tablet, Rfl: 3     nitroGLYcerin (NITROSTAT) 0.4 MG sublingual tablet, Place 1 tablet (0.4 mg) under the tongue every 5 minutes as needed for chest pain, Disp: 90 tablet, Rfl: 4    Allergies:   Allergies   Allergen Reactions     Benzonatate Anaphylaxis and Swelling     Lisinopril Cough     Zocor [Simvastatin - High Dose]      Poor memory       REVIEW OF SYSTEMS:   CONSTITUTIONAL:NEGATIVE for fever, chills, change in weight  INTEGUMENTARY/SKIN: NEGATIVE for worrisome rashes, moles or lesions  MUSCULOSKELETAL:See HPI above  NEURO: NEGATIVE for weakness, dizziness or paresthesias         PHYSICAL EXAM:  /76   Resp 16   Ht 1.849 m (6' 0.8\")   Wt 102.5 kg (226 lb)   BMI 29.98 kg/m      GENERAL APPEARANCE: healthy, alert, no distress  SKIN: no suspicious lesions or rashes  NEURO: Normal strength and tone, mentation intact and " speech normal  PSYCH:  mentation appears normal and affect normal, not anxious  RESPIRATORY: No increased work of breathing.      RIGHT UPPER EXTREMITY:  Sensation intact to light touch in median, radial, ulnar and axillary nerve distributions  Palpable 2+ radial pulse, brisk capillary refill to all fingers, wwp  Intact epl fpl fdp edc wrist flexion/extension biceps triceps deltoid    RIGHT SHOULDER:  Shoulder Inspection: no swelling, bruising, discoloration, there is moderate  AC prominence deformity and asymmetry, mild muscle atrophy supraspinatus and infraspinatus compared to left.    Tender:greater tuberosity, upper trapezius,  nontender to palpation: AC joint, proximal bicep tendon, supraspinatus  Range of Motion:   Active:forward flexion 170 degrees, external rotation  60 degrees, internal rotation  T12   Passive: same  Strength: forward flexion 5-/5, External rotation 5-/5  Liftoff: Able  Impingement: all grade 2 positive  Special tests: Spurling's: Negative  Belly Press: Negative  Empty Can: Positive for pain.    LEFT UPPER EXTREMITY:  Sensation intact to light touch in median, radial, ulnar and axillary nerve distributions  Palpable 2+ radial pulse, brisk capillary refill to all fingers, wwp  Intact epl fpl fdp edc wrist flexion/extension biceps triceps deltoid    LEFT SHOULDER:  Shoulder Inspection: no swelling, bruising, discoloration, or obvious deformity or asymmetry  no atrophy  Tender: AC joint, greater tuberosity, anterior capsule, proximal biceps, deltoid  Non-tender: SC joint, proximal-mid clavicle, mid-distal clavicle, acromion, proximal humerus, supraspinatus , infraspinatus, upper trapezius muscle and rhomboids  Range of Motion:   Active:forward flexion 170 degrees, external rotation  60 degrees, internal rotation  T12   Passive: same  Strength: forward flexion 5/5, External rotation 5/5  Liftoff: Able  Impingement: all grade 2 positive      X-RAY:   No new shoulder xrays today.  3 views right   Shoulder, bilateral AC joints obtained 11/19/2012 were again reviewed personally in clinic today with the patient. On my review, there is elevation and widening of distal clavicle relative to acromion, 100%. Mild acromio-clavicular degenerative changes. Sclerosis at greater tuberosity. There is no increased in AC separation with weights compared to without weights. Moderate left acromio-clavicular degenerative changes.    3 views left shoulder 3/17/2014 reviewed, No obvious fracture or dislocation. No obvious bony abnormality or lesion. Moderate acromio-clavicular and mild gleno-humeral degenerative changes.       MRI right shoulder CDI 6/2/2015: moderate sized area of poorly defined interstitial and deep fiber tearing of the distal supraspinatus tendon involves up to 50% of thickness. Moderate infraspinatus and subscapularis tendinosis. Chronic acromio-clavicular injury with CC sprain, mild narrowing of subacromial space with minimal bursitis. Long head biceps grossly intact. No significant gleno-humeral degenerative changes.    MRI left shoulder CDI 6/2/2015: mild supraspinatus tendinopathy with fraying involving bursal surface distally. Mild subscapularis tendinopathy. Moderate to marked acromio-clavicular degenerative changes with mild to  Moderate narrowing of the subacromial space. Mild bursitis. No significant gleno-humeral degenerative changes. Proximal biceps grossly intact.    MRI right shoulder 11/26/2012 reviewed:  IMPRESSION:  1. Extensive separation of the acromioclavicular joint including  disruption of the acromioclavicular joint ligaments. The  coracoclavicular ligaments are intact.  2. Mild tendinosis of the distal supraspinatous tendon. No actual  rotator cuff tear is seen.      ASSESSMENT: Edwardo Dumont is a 69 year old male, right  -hand dominant with chronic right shoulder AC separation, pain, rotator cuff tendinosis and impingement with right partial thickness tear; left shoulder impingement  and tendinosis.      PLAN:   * again discussed nonsurgical and surgical options. He states he will continue with injections as along as they help.  * again, could consider arthroscopic versus open surgery (for example: subacromial decompression, distal clavicle excision, rotator cuff repair versus debridement, biceps tenodesis versus tenotomy, etc.). Perioperative course discussed, length of recovery. Right side likely rotator cuff repair and decompression, left side likely decompression only. Risks and perceived benefits discussed.    * at this time, he'd like to proceed with bilateral cortisone injections. See procedure notes below.    In the meantime:    * Rest  * Activity modification - avoid activities that aggravate symptoms or started symptoms at onset.  * NSAIDS - regular use for inflammation, with food, as long as no contra-indications. Please discuss with pcp if needed.  * Ice twice daily to three times daily, 15-20 minutes at a time  * heat may be beneficial prior to exercising  * home exercise program for strengthening, stretching and range of motion exercises of rotator cuff and periscapular stabilization.  * Tylenol as needed for pain  * Injections: cortisone injections may be beneficial to help decrease swelling and inflammation within the shoulder or bursa, and decrease pain. With decreased pain, Physical Therapy and exercises will be more effective and efficient. Patient elects to proceed with bilateral cortisone injections.  * Return to clinic as needed.    Skip Reyes M.D., M.S.  Dept. of Orthopaedic Surgery  [facility]    Large Joint Injection/Arthocentesis: bilateral subacromial bursa  Date/Time: 12/12/2019 3:11 PM  Performed by: Igor Rose PA  Authorized by: Skip Reyes MD     Indications:  Pain  Needle Size:  22 G  Guidance: landmark guided    Approach:  Posterolateral  Location:  Shoulder  Laterality:  Bilateral      Site:  Bilateral subacromial  bursa  Medications (Right):  3 mL bupivacaine 0.25 %; 4 mL lidocaine 1 %; 80 mg triamcinolone 40 MG/ML  Medications (Left):  3 mL bupivacaine 0.25 %; 4 mL lidocaine 1 %; 80 mg triamcinolone 40 MG/ML  Outcome:  Tolerated well, no immediate complications  Procedure discussed: discussed risks, benefits, and alternatives    Consent Given by:  Patient  Prep: patient was prepped and draped in usual sterile fashion          Again, thank you for allowing me to participate in the care of your patient.        Sincerely,        Skip Reyes MD

## 2020-01-08 ENCOUNTER — MYC MEDICAL ADVICE (OUTPATIENT)
Dept: FAMILY MEDICINE | Facility: CLINIC | Age: 70
End: 2020-01-08

## 2020-01-09 ENCOUNTER — MYC MEDICAL ADVICE (OUTPATIENT)
Dept: FAMILY MEDICINE | Facility: CLINIC | Age: 70
End: 2020-01-09

## 2020-01-09 DIAGNOSIS — E11.21 TYPE 2 DIABETES MELLITUS WITH DIABETIC NEPHROPATHY, WITHOUT LONG-TERM CURRENT USE OF INSULIN (H): ICD-10-CM

## 2020-01-09 DIAGNOSIS — E13.42 DIABETIC POLYNEUROPATHY ASSOCIATED WITH OTHER SPECIFIED DIABETES MELLITUS (H): ICD-10-CM

## 2020-01-09 RX ORDER — AMITRIPTYLINE HYDROCHLORIDE 10 MG/1
TABLET ORAL
Qty: 150 TABLET | Refills: 1 | Status: SHIPPED | OUTPATIENT
Start: 2020-01-09 | End: 2020-06-23

## 2020-01-09 NOTE — TELEPHONE ENCOUNTER
Patient responding to providers message to him yesterday about trying a lesser dose of cymbalta to see if the side effects are better.    Routing to provider to advise.  Sandra Santo BSN, RN

## 2020-02-08 ENCOUNTER — HEALTH MAINTENANCE LETTER (OUTPATIENT)
Age: 70
End: 2020-02-08

## 2020-02-08 DIAGNOSIS — E13.42 DIABETIC POLYNEUROPATHY ASSOCIATED WITH OTHER SPECIFIED DIABETES MELLITUS (H): ICD-10-CM

## 2020-02-08 DIAGNOSIS — E11.21 TYPE 2 DIABETES MELLITUS WITH DIABETIC NEPHROPATHY, WITHOUT LONG-TERM CURRENT USE OF INSULIN (H): ICD-10-CM

## 2020-02-10 RX ORDER — DULOXETIN HYDROCHLORIDE 60 MG/1
CAPSULE, DELAYED RELEASE ORAL
Qty: 30 CAPSULE | Refills: 0 | Status: SHIPPED | OUTPATIENT
Start: 2020-02-10 | End: 2020-02-28

## 2020-02-28 DIAGNOSIS — E13.42 DIABETIC POLYNEUROPATHY ASSOCIATED WITH OTHER SPECIFIED DIABETES MELLITUS (H): ICD-10-CM

## 2020-02-28 DIAGNOSIS — E11.21 TYPE 2 DIABETES MELLITUS WITH DIABETIC NEPHROPATHY, WITHOUT LONG-TERM CURRENT USE OF INSULIN (H): ICD-10-CM

## 2020-02-28 RX ORDER — DULOXETIN HYDROCHLORIDE 60 MG/1
CAPSULE, DELAYED RELEASE ORAL
Qty: 30 CAPSULE | Refills: 0 | Status: SHIPPED | OUTPATIENT
Start: 2020-02-28 | End: 2020-06-23 | Stop reason: SINTOL

## 2020-02-28 NOTE — TELEPHONE ENCOUNTER
"Requested Prescriptions   Signed Prescriptions Disp Refills    DULoxetine (CYMBALTA) 60 MG capsule 30 capsule 0     Sig: TAKE 1 CAPSULE BY MOUTH EVERY DAY       Serotonin-Norepinephrine Reuptake Inhibitors  Passed - 2/28/2020  3:21 PM        Passed - Blood pressure under 140/90 in past 12 months     BP Readings from Last 3 Encounters:   12/12/19 121/76   12/12/19 120/77   09/20/19 116/73                 Passed - Recent (12 mo) or future (30 days) visit within the authorizing provider's specialty     Patient has had an office visit with the authorizing provider or a provider within the authorizing providers department within the previous 12 mos or has a future within next 30 days. See \"Patient Info\" tab in inbasket, or \"Choose Columns\" in Meds & Orders section of the refill encounter.              Passed - Medication is active on med list        Passed - Patient is age 18 or older          "

## 2020-06-15 DIAGNOSIS — I25.10 CORONARY ARTERY DISEASE DUE TO LIPID RICH PLAQUE: ICD-10-CM

## 2020-06-15 DIAGNOSIS — I25.83 CORONARY ARTERY DISEASE DUE TO LIPID RICH PLAQUE: ICD-10-CM

## 2020-06-15 DIAGNOSIS — E11.21 TYPE 2 DIABETES MELLITUS WITH DIABETIC NEPHROPATHY, WITHOUT LONG-TERM CURRENT USE OF INSULIN (H): ICD-10-CM

## 2020-06-15 LAB
CHOLEST SERPL-MCNC: 98 MG/DL
HBA1C MFR BLD: 7.1 % (ref 0–5.6)
HDLC SERPL-MCNC: 43 MG/DL
LDLC SERPL CALC-MCNC: 36 MG/DL
NONHDLC SERPL-MCNC: 55 MG/DL
TRIGL SERPL-MCNC: 94 MG/DL
TSH SERPL DL<=0.005 MIU/L-ACNC: 2.12 MU/L (ref 0.4–4)

## 2020-06-15 PROCEDURE — 83036 HEMOGLOBIN GLYCOSYLATED A1C: CPT | Performed by: FAMILY MEDICINE

## 2020-06-15 PROCEDURE — 84443 ASSAY THYROID STIM HORMONE: CPT | Performed by: FAMILY MEDICINE

## 2020-06-15 PROCEDURE — 36415 COLL VENOUS BLD VENIPUNCTURE: CPT | Performed by: FAMILY MEDICINE

## 2020-06-15 PROCEDURE — 80061 LIPID PANEL: CPT | Performed by: FAMILY MEDICINE

## 2020-06-23 ENCOUNTER — TELEPHONE (OUTPATIENT)
Dept: FAMILY MEDICINE | Facility: CLINIC | Age: 70
End: 2020-06-23

## 2020-06-23 ENCOUNTER — OFFICE VISIT (OUTPATIENT)
Dept: FAMILY MEDICINE | Facility: CLINIC | Age: 70
End: 2020-06-23
Payer: MEDICARE

## 2020-06-23 VITALS
WEIGHT: 25 LBS | TEMPERATURE: 97.3 F | SYSTOLIC BLOOD PRESSURE: 118 MMHG | DIASTOLIC BLOOD PRESSURE: 78 MMHG | BODY MASS INDEX: 3.39 KG/M2 | OXYGEN SATURATION: 96 % | HEIGHT: 72 IN | HEART RATE: 75 BPM

## 2020-06-23 DIAGNOSIS — E11.21 TYPE 2 DIABETES MELLITUS WITH DIABETIC NEPHROPATHY, WITHOUT LONG-TERM CURRENT USE OF INSULIN (H): ICD-10-CM

## 2020-06-23 DIAGNOSIS — E78.5 HYPERLIPIDEMIA LDL GOAL <100: ICD-10-CM

## 2020-06-23 DIAGNOSIS — I25.10 CORONARY ARTERY DISEASE DUE TO LIPID RICH PLAQUE: ICD-10-CM

## 2020-06-23 DIAGNOSIS — I25.83 CORONARY ARTERY DISEASE DUE TO LIPID RICH PLAQUE: ICD-10-CM

## 2020-06-23 DIAGNOSIS — E13.42 DIABETIC POLYNEUROPATHY ASSOCIATED WITH OTHER SPECIFIED DIABETES MELLITUS (H): ICD-10-CM

## 2020-06-23 PROCEDURE — 99214 OFFICE O/P EST MOD 30 MIN: CPT | Performed by: FAMILY MEDICINE

## 2020-06-23 RX ORDER — ATORVASTATIN CALCIUM 20 MG/1
20 TABLET, FILM COATED ORAL DAILY
COMMUNITY
Start: 2020-06-23 | End: 2021-01-05

## 2020-06-23 RX ORDER — AMITRIPTYLINE HYDROCHLORIDE 10 MG/1
TABLET ORAL
Qty: 150 TABLET | Refills: 1 | Status: SHIPPED | OUTPATIENT
Start: 2020-06-23 | End: 2020-09-29

## 2020-06-23 ASSESSMENT — PAIN SCALES - GENERAL: PAINLEVEL: SEVERE PAIN (7)

## 2020-06-23 ASSESSMENT — MIFFLIN-ST. JEOR: SCORE: 916.4

## 2020-06-23 NOTE — TELEPHONE ENCOUNTER
Central Prior Authorization Team   Phone: [phone]    PA Initiation    Medication:   Insurance Company: [employer] - Phone [phone] Fax [phone]  Pharmacy Filling the Rx: [id number] [place] - [address]   Filling Pharmacy Phone: [phone]  Filling Pharmacy Fax: [phone]  Start Date: 6/23/2020

## 2020-06-23 NOTE — TELEPHONE ENCOUNTER
Prior Authorization Approval    Authorization Effective Date: 5/24/2020  Authorization Expiration Date: 6/23/2021  Medication: AMITRIPTYLINE-APPROVED  Approved Dose/Quantity:    Reference #:     Insurance Company: [employer] - Phone [phone] Fax [phone]  Expected CoPay:       CoPay Card Available:      Foundation Assistance Needed:    Which Pharmacy is filling the prescription (Not needed for infusion/clinic administered): [id number] - [place], [place] - [address]  Pharmacy Notified: Yes  Patient Notified: Yes  **Instructed pharmacy to notify patient when script is ready to /ship.**

## 2020-06-23 NOTE — PROGRESS NOTES
Michael diabetes resourses:  [email or web address]        To access diabetes educational materials with in EPIC use <dot>RADHA      Put this in AFTER VISIT SUMMARY if needed for the patient to access information online [email or web address]      SUBJECTIVE:  Edwardo Dumont is a 70 year old male who presents today for a follow up appointment for management of DIABETES MELLITUS.    Patient Active Problem List    Diagnosis Date Noted     Benign prostatic hyperplasia with nocturia 09/21/2019     Priority: Medium     S/P lumbar fusion 07/20/2018     Priority: Medium     Spinal stenosis of lumbar region with neurogenic claudication 07/10/2018     Priority: Medium     Type II diabetes mellitus with peripheral circulatory disorder (H) 01/31/2018     Priority: Medium     CKD (chronic kidney disease) stage 3, GFR 30-59 ml/min (H)      Priority: Medium     S/P coronary angiogram 09/23/2015     Priority: Medium     Insomnia 08/03/2015     Priority: Medium     Dermatochalasis 04/10/2015     Priority: Medium     Visual disturbance, transient, right eye 02/09/2014     Priority: Medium     Essential hypertension, benign 05/14/2013     Priority: Medium     Ischemic vascular disease   one stent coronary artery 5/12 12/19/2012     Priority: Medium     Closed dislocation of acromioclavicular joint 12/17/2012     Priority: Medium     Problem list name updated by automated process. Provider to review       Impingement syndrome of right shoulder 12/17/2012     Priority: Medium     S/P hip replacement 12/13/2012     Priority: Medium     Advanced directives, counseling/discussion 05/25/2011     Priority: Medium     Discussed Advance Directive planning with patient; information given to patient to review.         Urinary retention 01/21/2011     Priority: Medium     Degenerative arthritis of hip - right 12/27/2010     Priority: Medium     OA  (osteoarthritis) of knee 10/25/2010     Priority: Medium     Hyperlipidemia LDL goal <100 10/14/2010     Priority: Medium     Low back pain 04/23/2010     Priority: Medium     Radiculopathy of leg 04/23/2010     Priority: Medium     Gout 11/27/2009     Priority: Medium     Sleep apnea      Priority: Medium     Cough 12/05/2007     Priority: Medium        The patient checks his blood sugar as follows: 1 times a week, once daily.   Results as follows: fasting glucose- 130's    The patient reports that he IS taking the medication as prescribed. He denies side effects of medication.    ----------------------------------------------------------------------------------------------------------------------------------------    BP Readings from Last 3 Encounters:   06/23/20 118/78   12/12/19 121/76   12/12/19 120/77     The patient reports that he IS NOT currently smoking.  History   Smoking Status     Former Smoker     Packs/day: 1.00     Years: 5.00     Types: Cigarettes     Start date: 1/1/1970     Quit date: 9/8/1989   Smokeless Tobacco     Never Used     Comment: former smoker       The patient's coronary risk factors in addition to diabetes include:      The ASCVD Risk score ([place] LOGAN Jr., et al., 2013) failed to calculate for the following reasons:    The valid total cholesterol range is 130 to 320 mg/dL        Current Outpatient Medications   Medication Sig Dispense Refill     ACCU-CHEK SMARTVIEW test strip ONCE DAILY TESTING AND AS NEEDED 100 strip 4     ACE NOT PRESCRIBED, INTENTIONAL, ACE Inhibitor not prescribed due to Other: cough 0 each 0     amitriptyline (ELAVIL) 10 MG tablet Start taking 1 tab at bedtime for 7 day(s). If this dose is effective then continue  If this dose is not effective increase by 10 mg weekly until you reach an effective dose or reach 50 mg 150 tablet 1     aspirin 81 MG tablet Take 1 tablet (81 mg) by mouth daily       atorvastatin (LIPITOR) 40 MG tablet Take 1 tablet (40 mg) by mouth  daily 90 tablet 3     azelastine (ASTELIN) 0.1 % nasal spray Spray 2 sprays into both nostrils 2 times daily as needed for rhinitis 30 mL 3     blood glucose monitoring (Ninite CONTOUR MONITOR) meter device kit Use to test blood sugar  times daily or as directed. 1 kit 0     clindamycin (CLEOCIN) 300 MG capsule Take 1 capsule (300 mg) by mouth 4 times daily 40 capsule 0     clopidogrel (PLAVIX) 75 MG tablet Take 75 mg by mouth daily        empagliflozin (JARDIANCE) 25 MG TABS tablet Take 1 tablet (25 mg) by mouth daily 90 tablet 3     fluticasone (FLONASE) 50 MCG/ACT nasal spray Spray 1 spray into both nostrils daily       levofloxacin (LEVAQUIN) 750 MG tablet Take 1 tablet (750 mg) by mouth daily 10 tablet 0     losartan (COZAAR) 25 MG tablet Take 1 tablet (25 mg) by mouth daily 90 tablet 3     metFORMIN (GLUCOPHAGE) 500 MG tablet TAKE 2 TABLETS BY MOUTH TWICE DAILY WITH MEALS 360 tablet 3     nitroGLYcerin (NITROSTAT) 0.4 MG sublingual tablet Place 1 tablet (0.4 mg) under the tongue every 5 minutes as needed for chest pain 90 tablet 4     DULoxetine (CYMBALTA) 60 MG capsule TAKE 1 CAPSULE BY MOUTH EVERY DAY (Patient not taking: Reported on 6/23/2020) 30 capsule 0       The patient reports that he IS taking a statin.   (Reminder all diabetics with a ASCVD risk greater or equal to 7.5% should be on a high intensity statin, otherwise on a moderate intensity statin)      The patient reports that he IS taking 81 mg of  aspirin daily.  (Reminder all diabetic patients with a cardiovascular risk factor and > 50 should take a daily aspirin)       The patient reports that he IS doing a self foot exam weekly.  The patient reports that he does exercise in the form of biking 5 times a week.  The patient reports that he IS following the recommended diabetic diet. He  would give himself a C grade on his diet.  The patient reports that his last eye exam was 6 months ago.     Immunization History   Administered Date(s) Administered      HEPA 01/08/2009, 09/08/2009     HepB 03/06/2013, 01/15/2014     HepB-Adult 05/30/2019     Influenza (High Dose) 3 valent vaccine 11/01/2016, 09/26/2017, 09/20/2019     Influenza (IIV3) PF 1950, 10/01/2010, 01/03/2011, 09/26/2012, 11/04/2013, 10/01/2014     Influenza Vaccine, 6+MO IM (QUADRIVALENT W/PRESERVATIVES) 10/06/2015     Pneumo Conj 13-V (2010&after) 06/02/2016     Pneumococcal 23 valent 02/27/2009, 06/13/2017     TD (ADULT, 7+) 05/30/2019     TDAP Vaccine (Adacel) 01/08/2009     The patient reports that he has had the hepatitis B vaccine series in the past. (recommended for age 19-59 and can be given to age 60 or older)  The patient reports that he has had a pnuemovax in the past.  The patient reports that he has had a flu shot for the current influenza season.  The patient would like to have a Shingrix at his pharmacy     Patient concerns: is concerned about the neuropathy. His pharmacy never got the new prescription(s).         EXAM:  /78   Pulse 75   Temp 97.3  F (36.3  C) (Tympanic)   Ht 1.829 m (6')   Wt (!) 11.3 kg (25 lb)   SpO2 96%   BMI 3.39 kg/m    Wt Readings from Last 4 Encounters:   06/23/20 (!) 11.3 kg (25 lb)   12/12/19 102.5 kg (226 lb)   12/12/19 102.1 kg (225 lb)   09/20/19 103 kg (227 lb)     Body mass index is 3.39 kg/m .    General:  Edwardo is awake, alert, and cooperative.  NAD.      Diabetic Foot Screen:  Any complaints of increased pain or numbness ? No  Is there a foot ulcer now or a history of foot ulcer? No  Does the foot have an abnormal shape? No  Are the nails thick, too long or ingrown? No  Are there any redness or open areas? No         Sensation Testing done at all points on the diagram with monofilament     Right Foot: Sensation Normal at all points  Left Foot: Sensation Normal at all points     Risk Category: 0- No loss of protective sensation  Performed by Bola Madrigal MD                  Previsit labs drawn include:  Orders Only on 06/15/2020  "  Component Date Value Ref Range Status     TSH 06/15/2020 2.12  0.40 - 4.00 mU/L Final     Hemoglobin A1C 06/15/2020 7.1* 0 - 5.6 % Final    Comment: Normal <5.7% Prediabetes 5.7-6.4%  Diabetes 6.5% or higher - adopted from ADA   consensus guidelines.       Cholesterol 06/15/2020 98  <200 mg/dL Final     Triglycerides 06/15/2020 94  <150 mg/dL Final    Fasting specimen     HDL Cholesterol 06/15/2020 43  >39 mg/dL Final     LDL Cholesterol Calculated 06/15/2020 36  <100 mg/dL Final    Desirable:       <100 mg/dl     Non HDL Cholesterol 06/15/2020 55  <130 mg/dL Final         ASSESSMENT and PLAN:    Type II Diabetes, Worsening.    DIABETES Type II:                       Lab Results   Component Value Date    A1C 7.1 06/15/2020       Control   fair  Lab Results   Component Value Date    A1C 7.1 06/15/2020    A1C 6.7 12/05/2019    A1C 8.4 09/13/2019     Lab Results   Component Value Date    MICROL 11 06/11/2019     No results found for: MICROALBUMIN Control   good    Recommended to take ASA  mg daily for appropriate patient, Compliance with the recommended diabetic diet was stressed, Regular aerobic exercise was encouraged, Home glucose monitoring encouraged, Annual eye exam recommended, Self foot exam demonstrated and recommended to be done nightly, Apply moisturizer to feet with in 3 minutes of showering or bathing, Follow up in clinic in 3 months for a diabetes check and Future labs ordered and the patient was instructed to make a lab appt 1 week prior to next diabetes visit      BP/HTN:   BP Readings from Last 3 Encounters:   06/23/20 118/78   12/12/19 121/76   12/12/19 120/77     Control   good    - Medication:continue the current doses of medication.  (make sure to order \"Ace not prescribed intentionally if not on an ACE/ARB)  The patient was advised to do the following therapuetic life style changes  - Dietary sodium restriction and increase potassium and Calcium intake  - Regular aerobic exercise  - Weight " loss  - Discontinue smoking if applicable  - Avoid regular NSAID use if applicable  - Avoid regular decongestant use if applicable  - Follow up in clinic in 3 months for a recheck  - Check a basic metabolic panel today if needed    Patient Education: Reviewed risks of hypertension and principles of   Treatment.    HYPERCHOLESTEROLEMIA:     The ASCVD Risk score (Rajan FORD Jr., et al., 2013) failed to calculate for the following reasons:    The valid total cholesterol range is 130 to 320 mg/dL    Lab Results   Component Value Date    LDL 36 06/15/2020      Control   good  Cholesterol   Date Value Ref Range Status   06/15/2020 98 <200 mg/dL Final   12/05/2019 175 <200 mg/dL Final     HDL Cholesterol   Date Value Ref Range Status   06/15/2020 43 >39 mg/dL Final   12/05/2019 54 >39 mg/dL Final     LDL Cholesterol Calculated   Date Value Ref Range Status   06/15/2020 36 <100 mg/dL Final     Comment:     Desirable:       <100 mg/dl   12/05/2019 99 <100 mg/dL Final     Comment:     Desirable:       <100 mg/dl     Triglycerides   Date Value Ref Range Status   06/15/2020 94 <150 mg/dL Final     Comment:     Fasting specimen   12/05/2019 109 <150 mg/dL Final     Comment:     Fasting specimen     Cholesterol/HDL Ratio   Date Value Ref Range Status   04/08/2015 2.0 0.0 - 5.0 Final   12/08/2014 1.9 0.0 - 5.0 Final         The patient is on a high intensity statin and his LDL is significant(ly) lower than 70 so we will decrease the dose of atorvastatin to 20 mg.        The patient's HDL is normal  The patient's triglycerides are normal.      Patient Instructions   ASK [facility] about the Shingrix vaccination(s)     Take one half of a atorvastatin at bedtime

## 2020-06-23 NOTE — TELEPHONE ENCOUNTER
Prior Authorization Retail Medication Request    Medication/Dose: amitriptyline (ELAVIL) 10 MG tablet   ICD code (if different than what is on RX):  Diabetic polyneuropathy associated with other specified diabetes mellitus (H) [E13.42] Type 2 diabetes mellitus with diabetic nephropathy, without long-term current use of insulin (H) [E11.21]  Previously Tried and Failed:    Rationale:      Insurance Name:  Medicare / [employer]  Insurance ID:  [id number] / [id number]      Pharmacy Information (if different than what is on RX)  Name:  [facility]  Phone:  [phone]

## 2020-09-04 ENCOUNTER — TELEPHONE (OUTPATIENT)
Dept: FAMILY MEDICINE | Facility: CLINIC | Age: 70
End: 2020-09-04

## 2020-09-04 DIAGNOSIS — E11.51 TYPE II DIABETES MELLITUS WITH PERIPHERAL CIRCULATORY DISORDER (H): ICD-10-CM

## 2020-09-04 DIAGNOSIS — E78.5 HYPERLIPIDEMIA LDL GOAL <100: Primary | ICD-10-CM

## 2020-09-04 NOTE — TELEPHONE ENCOUNTER
Reason for call:  Order   Order or referral being requested: Diabetes Lab Work   Reason for request: per patient   Date needed: 09/29/2020  Has the patient been seen by the PCP for this problem? YES    Additional comments: Patient is requesting lab work before his diabetes appointment. Please call to advise.     Phone number to reach patient:  Home number on file [phone] (home)    Best Time:  Any     Can we leave a detailed message on this number?  YES    Travel screening: Not Applicable

## 2020-09-26 DIAGNOSIS — E78.5 HYPERLIPIDEMIA LDL GOAL <100: ICD-10-CM

## 2020-09-26 DIAGNOSIS — E11.51 TYPE II DIABETES MELLITUS WITH PERIPHERAL CIRCULATORY DISORDER (H): ICD-10-CM

## 2020-09-26 LAB — HBA1C MFR BLD: 7.1 % (ref 0–5.6)

## 2020-09-26 PROCEDURE — 80053 COMPREHEN METABOLIC PANEL: CPT | Performed by: FAMILY MEDICINE

## 2020-09-26 PROCEDURE — 80061 LIPID PANEL: CPT | Performed by: FAMILY MEDICINE

## 2020-09-26 PROCEDURE — 83036 HEMOGLOBIN GLYCOSYLATED A1C: CPT | Performed by: FAMILY MEDICINE

## 2020-09-26 PROCEDURE — 82043 UR ALBUMIN QUANTITATIVE: CPT | Performed by: FAMILY MEDICINE

## 2020-09-26 PROCEDURE — 84443 ASSAY THYROID STIM HORMONE: CPT | Performed by: FAMILY MEDICINE

## 2020-09-26 PROCEDURE — 36415 COLL VENOUS BLD VENIPUNCTURE: CPT | Performed by: FAMILY MEDICINE

## 2020-09-28 LAB
ALBUMIN SERPL-MCNC: 4 G/DL (ref 3.4–5)
ALP SERPL-CCNC: 118 U/L (ref 40–150)
ALT SERPL W P-5'-P-CCNC: 24 U/L (ref 0–70)
ANION GAP SERPL CALCULATED.3IONS-SCNC: 9 MMOL/L (ref 3–14)
AST SERPL W P-5'-P-CCNC: 13 U/L (ref 0–45)
BILIRUB SERPL-MCNC: 0.9 MG/DL (ref 0.2–1.3)
BUN SERPL-MCNC: 31 MG/DL (ref 7–30)
CALCIUM SERPL-MCNC: 8.8 MG/DL (ref 8.5–10.1)
CHLORIDE SERPL-SCNC: 107 MMOL/L (ref 94–109)
CHOLEST SERPL-MCNC: 117 MG/DL
CO2 SERPL-SCNC: 22 MMOL/L (ref 20–32)
CREAT SERPL-MCNC: 1.31 MG/DL (ref 0.66–1.25)
CREAT UR-MCNC: 94 MG/DL
GFR SERPL CREATININE-BSD FRML MDRD: 55 ML/MIN/{1.73_M2}
GLUCOSE SERPL-MCNC: 156 MG/DL (ref 70–99)
HDLC SERPL-MCNC: 47 MG/DL
LDLC SERPL CALC-MCNC: 53 MG/DL
MICROALBUMIN UR-MCNC: 20 MG/L
MICROALBUMIN/CREAT UR: 21.07 MG/G CR (ref 0–17)
NONHDLC SERPL-MCNC: 70 MG/DL
POTASSIUM SERPL-SCNC: 4.4 MMOL/L (ref 3.4–5.3)
PROT SERPL-MCNC: 7.2 G/DL (ref 6.8–8.8)
SODIUM SERPL-SCNC: 138 MMOL/L (ref 133–144)
TRIGL SERPL-MCNC: 86 MG/DL
TSH SERPL DL<=0.005 MIU/L-ACNC: 1.1 MU/L (ref 0.4–4)

## 2020-09-29 ENCOUNTER — OFFICE VISIT (OUTPATIENT)
Dept: FAMILY MEDICINE | Facility: CLINIC | Age: 70
End: 2020-09-29
Payer: MEDICARE

## 2020-09-29 VITALS
BODY MASS INDEX: 30.38 KG/M2 | OXYGEN SATURATION: 96 % | SYSTOLIC BLOOD PRESSURE: 113 MMHG | WEIGHT: 224 LBS | HEART RATE: 84 BPM | DIASTOLIC BLOOD PRESSURE: 60 MMHG | TEMPERATURE: 97.7 F

## 2020-09-29 DIAGNOSIS — E11.51 TYPE II DIABETES MELLITUS WITH PERIPHERAL CIRCULATORY DISORDER (H): ICD-10-CM

## 2020-09-29 DIAGNOSIS — Z23 NEED FOR PROPHYLACTIC VACCINATION AND INOCULATION AGAINST INFLUENZA: ICD-10-CM

## 2020-09-29 DIAGNOSIS — E11.42 DIABETIC POLYNEUROPATHY ASSOCIATED WITH TYPE 2 DIABETES MELLITUS (H): Primary | ICD-10-CM

## 2020-09-29 PROCEDURE — G0008 ADMIN INFLUENZA VIRUS VAC: HCPCS | Performed by: FAMILY MEDICINE

## 2020-09-29 PROCEDURE — 90662 IIV NO PRSV INCREASED AG IM: CPT | Performed by: FAMILY MEDICINE

## 2020-09-29 PROCEDURE — 99207 C FOOT EXAM  NO CHARGE: CPT | Performed by: FAMILY MEDICINE

## 2020-09-29 PROCEDURE — 99214 OFFICE O/P EST MOD 30 MIN: CPT | Mod: 25 | Performed by: FAMILY MEDICINE

## 2020-09-29 RX ORDER — GABAPENTIN 300 MG/1
CAPSULE ORAL
Qty: 90 CAPSULE | Refills: 2 | Status: SHIPPED | OUTPATIENT
Start: 2020-09-29 | End: 2021-01-08

## 2020-09-29 ASSESSMENT — PAIN SCALES - GENERAL: PAINLEVEL: NO PAIN (0)

## 2020-09-29 NOTE — PROGRESS NOTES
[facility] diabetes resourses:  [email or web address]        To access diabetes educational materials with in EPIC use <dot>RADHA      Put this in AFTER VISIT SUMMARY if needed for the patient to access information online [email or web address]      SUBJECTIVE:  Edwardo Dumont is a 70 year old male who presents today for a follow up appointment for management of DIABETES MELLITUS.  He was diagnosed about 12 years ago     Patient Active Problem List    Diagnosis Date Noted     Benign prostatic hyperplasia with nocturia 09/21/2019     Priority: Medium     S/P lumbar fusion 07/20/2018     Priority: Medium     Spinal stenosis of lumbar region with neurogenic claudication 07/10/2018     Priority: Medium     Type II diabetes mellitus with peripheral circulatory disorder (H) 01/31/2018     Priority: Medium     CKD (chronic kidney disease) stage 3, GFR 30-59 ml/min (H)      Priority: Medium     S/P coronary angiogram 09/23/2015     Priority: Medium     Insomnia 08/03/2015     Priority: Medium     Dermatochalasis 04/10/2015     Priority: Medium     Visual disturbance, transient, right eye 02/09/2014     Priority: Medium     Essential hypertension, benign 05/14/2013     Priority: Medium     Ischemic vascular disease   one stent coronary artery 5/12 12/19/2012     Priority: Medium     Closed dislocation of acromioclavicular joint 12/17/2012     Priority: Medium     Problem list name updated by automated process. Provider to review       Impingement syndrome of right shoulder 12/17/2012     Priority: Medium     S/P hip replacement 12/13/2012     Priority: Medium     Advanced directives, counseling/discussion 05/25/2011     Priority: Medium     Discussed Advance Directive planning with patient; information given to patient to review.         Urinary retention 01/21/2011     Priority: Medium     Degenerative arthritis of hip - right 12/27/2010      Priority: Medium     OA (osteoarthritis) of knee 10/25/2010     Priority: Medium     Hyperlipidemia LDL goal <100 10/14/2010     Priority: Medium     Low back pain 04/23/2010     Priority: Medium     Radiculopathy of leg 04/23/2010     Priority: Medium     Gout 11/27/2009     Priority: Medium     Sleep apnea      Priority: Medium     Cough 12/05/2007     Priority: Medium        The patient checks his blood sugar as follows: 3 times a week, once daily.   Results as follows: fasting glucose- 120-135    The patient reports that he IS taking the medication as prescribed.   ----------------------------------------------------------------------------------------------------------------------------------------    BP Readings from Last 3 Encounters:   09/29/20 113/60   06/23/20 118/78   12/12/19 121/76     The patient reports that he IS NOT currently smoking.  History   Smoking Status     Former Smoker     Packs/day: 1.00     Years: 5.00     Types: Cigarettes     Start date: 1/1/1970     Quit date: 9/8/1989   Smokeless Tobacco     Never Used     Comment: former smoker           The ASCVD Risk score (Eurekamercy FORD Jr., et al., 2013) failed to calculate for the following reasons:    The valid total cholesterol range is 130 to 320 mg/dL        Current Outpatient Medications   Medication Sig Dispense Refill     ACCU-CHEK SMARTVIEW test strip ONCE DAILY TESTING AND AS NEEDED 100 strip 4     ACE NOT PRESCRIBED, INTENTIONAL, ACE Inhibitor not prescribed due to Other: cough 0 each 0     aspirin 81 MG tablet Take 1 tablet (81 mg) by mouth daily       atorvastatin (LIPITOR) 20 MG tablet Take 1 tablet (20 mg) by mouth daily       blood glucose monitoring ([employer] CONTOUR MONITOR) meter device kit Use to test blood sugar  times daily or as directed. 1 kit 0     clopidogrel (PLAVIX) 75 MG tablet Take 75 mg by mouth daily        empagliflozin (JARDIANCE) 25 MG TABS tablet Take 1 tablet (25 mg) by mouth daily 90 tablet 3     fluticasone  (FLONASE) 50 MCG/ACT nasal spray Spray 1 spray into both nostrils daily       losartan (COZAAR) 25 MG tablet Take 1 tablet (25 mg) by mouth daily 90 tablet 3     metFORMIN (GLUCOPHAGE) 500 MG tablet TAKE 2 TABLETS BY MOUTH TWICE DAILY WITH MEALS 360 tablet 3     nitroGLYcerin (NITROSTAT) 0.4 MG sublingual tablet Place 1 tablet (0.4 mg) under the tongue every 5 minutes as needed for chest pain 90 tablet 4     clindamycin (CLEOCIN) 300 MG capsule Take 1 capsule (300 mg) by mouth 4 times daily (Patient not taking: Reported on 9/29/2020) 40 capsule 0       The patient reports that he IS taking a statin.   (Reminder all diabetics with a ASCVD risk greater or equal to 7.5% should be on a high intensity statin, otherwise on a moderate intensity statin)      The patient reports that he IS taking 81mg of  aspirin daily.  (Reminder all diabetic patients with a cardiovascular risk factor and > 50 should take a daily aspirin)     The patient reports that he IS doing a self foot exam daily.  The patient reports that he does exercise occasionally     The patient reports that he IS following the recommended diabetic diet. He  would give himself a C grade on his diet.  The patient reports that his last eye exam was 9 months ago.         Immunization History   Administered Date(s) Administered     HEPA 01/08/2009, 09/08/2009     HepB 03/06/2013, 01/15/2014     HepB-Adult 05/30/2019     Influenza (High Dose) 3 valent vaccine 11/01/2016, 09/26/2017, 09/20/2019     Influenza (IIV3) PF 1950, 10/01/2010, 01/03/2011, 09/26/2012, 11/04/2013, 10/01/2014     Influenza Vaccine, 6+MO IM (QUADRIVALENT W/PRESERVATIVES) 10/06/2015     Pneumo Conj 13-V (2010&after) 06/02/2016     Pneumococcal 23 valent 02/27/2009, 06/13/2017     TD (ADULT, 7+) 05/30/2019     TDAP Vaccine (Adacel) 01/08/2009     The patient reports that he has had the hepatitis B vaccine series in the past. (recommended for age 19-59 and can be given to age 60 or older)  The  patient reports that he has had a pnuemovax in the past.  The patient reports that he has not had a flu shot for the current influenza season.  The patient would like to have a no vaccinations today        EXAM:  /60   Pulse 84   Temp 97.7  F (36.5  C) (Tympanic)   Wt 101.6 kg (224 lb)   SpO2 96%   BMI 30.38 kg/m    Wt Readings from Last 4 Encounters:   09/29/20 101.6 kg (224 lb)   06/23/20 (!) 11.3 kg (25 lb)   12/12/19 102.5 kg (226 lb)   12/12/19 102.1 kg (225 lb)     Body mass index is 30.38 kg/m .    General:  Edwardo is awake, alert, and cooperative.  NAD.      Diabetic Foot Screen:  Any complaints of increased pain or numbness ? No  Is there a foot ulcer now or a history of foot ulcer? No  Does the foot have an abnormal shape? No  Are the nails thick, too long or ingrown? No  Are there any redness or open areas? No         Sensation Testing done at all points on the diagram with monofilament     Right Foot: Sensation Normal at all points  Left Foot: Sensation Normal at all points     Risk Category: 0- No loss of protective sensation  Performed by Bola Madrigal MD                  Previsit labs drawn include:  Orders Only on 09/26/2020   Component Date Value Ref Range Status     TSH 09/26/2020 1.10  0.40 - 4.00 mU/L Final     Cholesterol 09/26/2020 117  <200 mg/dL Final     Triglycerides 09/26/2020 86  <150 mg/dL Final    Fasting specimen     HDL Cholesterol 09/26/2020 47  >39 mg/dL Final     LDL Cholesterol Calculated 09/26/2020 53  <100 mg/dL Final    Desirable:       <100 mg/dl     Non HDL Cholesterol 09/26/2020 70  <130 mg/dL Final     Hemoglobin A1C 09/26/2020 7.1* 0 - 5.6 % Final    Comment: Normal <5.7% Prediabetes 5.7-6.4%  Diabetes 6.5% or higher - adopted from ADA   consensus guidelines.       Sodium 09/26/2020 138  133 - 144 mmol/L Final     Potassium 09/26/2020 4.4  3.4 - 5.3 mmol/L Final     Chloride 09/26/2020 107  94 - 109 mmol/L Final     Carbon Dioxide 09/26/2020 22  20 - 32  mmol/L Final     Anion Gap 09/26/2020 9  3 - 14 mmol/L Final     Glucose 09/26/2020 156* 70 - 99 mg/dL Final    Fasting specimen     Urea Nitrogen 09/26/2020 31* 7 - 30 mg/dL Final     Creatinine 09/26/2020 1.31* 0.66 - 1.25 mg/dL Final     GFR Estimate 09/26/2020 55* >60 mL/min/[1.73_m2] Final    Comment: Non  GFR Calc  Starting 12/18/2018, serum creatinine based estimated GFR (eGFR) will be   calculated using the Chronic Kidney Disease Epidemiology Collaboration   (CKD-EPI) equation.       GFR Estimate If Black 09/26/2020 64  >60 mL/min/[1.73_m2] Final    Comment:  GFR Calc  Starting 12/18/2018, serum creatinine based estimated GFR (eGFR) will be   calculated using the Chronic Kidney Disease Epidemiology Collaboration   (CKD-EPI) equation.       Calcium 09/26/2020 8.8  8.5 - 10.1 mg/dL Final     Bilirubin Total 09/26/2020 0.9  0.2 - 1.3 mg/dL Final     Albumin 09/26/2020 4.0  3.4 - 5.0 g/dL Final     Protein Total 09/26/2020 7.2  6.8 - 8.8 g/dL Final     Alkaline Phosphatase 09/26/2020 118  40 - 150 U/L Final     ALT 09/26/2020 24  0 - 70 U/L Final     AST 09/26/2020 13  0 - 45 U/L Final     Creatinine Urine 09/26/2020 94  mg/dL Final     Albumin Urine mg/L 09/26/2020 20  mg/L Final     Albumin Urine mg/g Cr 09/26/2020 21.07* 0 - 17 mg/g Cr Final         ASSESSMENT and PLAN:    Type II Diabetes, Unchanged  Stable.    DIABETES Type II:                       Lab Results   Component Value Date    A1C 7.1 09/26/2020       Control   fair  Lab Results   Component Value Date    A1C 7.1 09/26/2020    A1C 7.1 06/15/2020    A1C 6.7 12/05/2019     Lab Results   Component Value Date    MICROL 20 09/26/2020     No results found for: MICROALBUMIN Control   good    Recommended to take ASA  mg daily for appropriate patient, Compliance with the recommended diabetic diet was stressed, Regular aerobic exercise was encouraged, Home glucose monitoring encouraged, Annual eye exam recommended, Self  "foot exam demonstrated and recommended to be done nightly, Apply moisturizer to feet with in 3 minutes of showering or bathing, Follow up in clinic in 3 months for a diabetes check and Future labs ordered and the patient was instructed to make a lab appt 1 week prior to next diabetes visit    He did not tolerate the sedation from the amitriptyline   He also has side effects of an upset stomach from duloxetine   We will try try the patient on gabapentin       BP/HTN:   BP Readings from Last 3 Encounters:   09/29/20 113/60   06/23/20 118/78   12/12/19 121/76     Control   good    - Medication:continue the current doses of medication.  (make sure to order \"Ace not prescribed intentionally if not on an ACE/ARB)  The patient was advised to do the following therapuetic life style changes  - Dietary sodium restriction and increase potassium and Calcium intake  - Regular aerobic exercise  - Weight loss  - Discontinue smoking if applicable  - Avoid regular NSAID use if applicable  - Avoid regular decongestant use if applicable  - Follow up in clinic in 6 months for a recheck  - Check a basic metabolic panel today if needed    Patient Education: Reviewed risks of hypertension and principles of   Treatment.    HYPERCHOLESTEROLEMIA:     The ASCVD Risk score (Rajan LOGAN Jr., et al., 2013) failed to calculate for the following reasons:    The valid total cholesterol range is 130 to 320 mg/dL    Lab Results   Component Value Date    LDL 53 09/26/2020      Control   good  Cholesterol   Date Value Ref Range Status   09/26/2020 117 <200 mg/dL Final   06/15/2020 98 <200 mg/dL Final     HDL Cholesterol   Date Value Ref Range Status   09/26/2020 47 >39 mg/dL Final   06/15/2020 43 >39 mg/dL Final     LDL Cholesterol Calculated   Date Value Ref Range Status   09/26/2020 53 <100 mg/dL Final     Comment:     Desirable:       <100 mg/dl   06/15/2020 36 <100 mg/dL Final     Comment:     Desirable:       <100 mg/dl     Triglycerides   Date Value " Ref Range Status   09/26/2020 86 <150 mg/dL Final     Comment:     Fasting specimen   06/15/2020 94 <150 mg/dL Final     Comment:     Fasting specimen     Cholesterol/HDL Ratio   Date Value Ref Range Status   04/08/2015 2.0 0.0 - 5.0 Final   12/08/2014 1.9 0.0 - 5.0 Final         The patient is on a moderate intensity statin and this is the appropriate treatment based on the patient's LDL being less than 70.        The patient's HDL is normal  The patient's triglycerides are normal.    We have discussed the results and we will continue the current management.

## 2020-10-01 ENCOUNTER — OFFICE VISIT (OUTPATIENT)
Dept: ORTHOPEDICS | Facility: CLINIC | Age: 70
End: 2020-10-01
Payer: MEDICARE

## 2020-10-01 VITALS
HEART RATE: 80 BPM | WEIGHT: 224 LBS | SYSTOLIC BLOOD PRESSURE: 122 MMHG | OXYGEN SATURATION: 99 % | BODY MASS INDEX: 29.69 KG/M2 | DIASTOLIC BLOOD PRESSURE: 73 MMHG | HEIGHT: 73 IN | TEMPERATURE: 98.4 F

## 2020-10-01 DIAGNOSIS — M25.512 CHRONIC PAIN OF BOTH SHOULDERS: ICD-10-CM

## 2020-10-01 DIAGNOSIS — M75.42 IMPINGEMENT SYNDROME OF BOTH SHOULDERS: Primary | ICD-10-CM

## 2020-10-01 DIAGNOSIS — G89.29 CHRONIC PAIN OF BOTH SHOULDERS: ICD-10-CM

## 2020-10-01 DIAGNOSIS — M75.41 IMPINGEMENT SYNDROME OF BOTH SHOULDERS: Primary | ICD-10-CM

## 2020-10-01 DIAGNOSIS — M25.511 CHRONIC PAIN OF BOTH SHOULDERS: ICD-10-CM

## 2020-10-01 PROCEDURE — 20610 DRAIN/INJ JOINT/BURSA W/O US: CPT | Mod: 50 | Performed by: ORTHOPAEDIC SURGERY

## 2020-10-01 RX ORDER — TRIAMCINOLONE ACETONIDE 40 MG/ML
80 INJECTION, SUSPENSION INTRA-ARTICULAR; INTRAMUSCULAR
Status: DISCONTINUED | OUTPATIENT
Start: 2020-10-01 | End: 2021-04-29

## 2020-10-01 RX ORDER — BUPIVACAINE HYDROCHLORIDE 2.5 MG/ML
4 INJECTION, SOLUTION INFILTRATION; PERINEURAL
Status: DISCONTINUED | OUTPATIENT
Start: 2020-10-01 | End: 2021-04-29

## 2020-10-01 RX ADMIN — TRIAMCINOLONE ACETONIDE 80 MG: 40 INJECTION, SUSPENSION INTRA-ARTICULAR; INTRAMUSCULAR at 14:49

## 2020-10-01 RX ADMIN — BUPIVACAINE HYDROCHLORIDE 4 ML: 2.5 INJECTION, SOLUTION INFILTRATION; PERINEURAL at 14:49

## 2020-10-01 ASSESSMENT — MIFFLIN-ST. JEOR: SCORE: 1834.94

## 2020-10-01 ASSESSMENT — PAIN SCALES - GENERAL: PAINLEVEL: MODERATE PAIN (5)

## 2020-10-01 NOTE — PROGRESS NOTES
CHIEF COMPLAINT:   Chief Complaint   Patient presents with     Right Shoulder - Pain     Left Shoulder - Pain     Shoulder Pain     Bilat shoulder pain. Last injection 12/12/19. Noticed pain coming back in March/April.        HISTORY:  Tanvir Dumont is a 70 year old male, right-hand dominant, who is seen for follow up evaluation of bilateral shoulder pain, right > left. He sustained an injury 9/2012, fell onto right shoulder while playing soccer with grand daughter. Had MRI 2012 negative for rotator cuff tear but showing acromio-clavicular injury and rotator cuff tendinosis. Noted to have AC separation. Treated non-opertively, healed well. Patient returns today with continued pain. MRI 2015 showed partial thickness rotator cuff tear on right. His last injections were on 12/12/2019. He reports injections did well until March/April, then pain slowly crept back. He is leaving for the winter and would like repeat injections today. He usually gets the injections in December but he's leaving now and not coming back til Spring. He has learned to modify his activity, noting he doesn't lift much past shoulder level.  He's eager to get back to golfing.    Also his total hip arthroplasty is doing great, no concerns.    Symptoms have been waxing and waning right shoulder, starting now left shoulder.  Aggrevated by: overhead activities.  Relieved by: rest, cortisone injections  Present symptoms: pain with overhead activities, pain reaching behind back  Pain location: lateral shoulder, deltoid and upper arm  Pain severity: 5/10.  Pain quality: dull and sharp  Frequency of symptoms: occasionally  Associated symptoms: none  Treatment up to this point: injections 12/2014, 6/2015, 12/2015, 12/6/2016, 12/21/2017, 12/2018, 12/2019 with good relief.    Significant Orthopedic past medical history: right total hip arthroplasty 1/2011  Usual level of recreational activity: golf, pickle ball.  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 He also has no past medical history of Amblyopia, Bleeding disorder (H), Cancer (H), Cataract, Cerebral infarction (H), Chronic infection, Complication of anesthesia, Congestive heart failure (H), COPD (chronic obstructive pulmonary disease) (H), Depressive disorder, Diabetic retinopathy (H), Glaucoma (increased eye pressure), Heart disease, History of blood transfusion, Inflammatory arthritis, Malignant hyperthermia, Retinal detachment, Senile macular degeneration, Strabismus, Stroke (H), Surgical complications, Thyroid disease, Uncomplicated asthma, Unspecified asthma(493.90), or Uveitis.  Patient Active Problem List    Diagnosis Date Noted     Benign prostatic hyperplasia with nocturia 09/21/2019     Priority: Medium     S/P lumbar fusion 07/20/2018     Priority: Medium     Spinal stenosis of lumbar region with neurogenic claudication 07/10/2018     Priority: Medium     Type II diabetes mellitus with peripheral circulatory disorder (H) 01/31/2018     Priority: Medium     CKD (chronic kidney disease) stage 3, GFR 30-59 ml/min      Priority: Medium     S/P coronary angiogram 09/23/2015     Priority: Medium     Insomnia 08/03/2015     Priority: Medium     Dermatochalasis 04/10/2015     Priority: Medium     Visual disturbance, transient, right eye 02/09/2014     Priority: Medium     Essential hypertension, benign 05/14/2013      Priority: Medium     Ischemic vascular disease   one stent coronary artery 5/12 12/19/2012     Priority: Medium     Closed dislocation of acromioclavicular joint 12/17/2012     Priority: Medium     Problem list name updated by automated process. Provider to review       Impingement syndrome of right shoulder 12/17/2012     Priority: Medium     S/P hip replacement 12/13/2012     Priority: Medium     Advanced directives, counseling/discussion 05/25/2011     Priority: Medium     Discussed Advance Directive planning with patient; information given to patient to review.         Urinary retention 01/21/2011     Priority: Medium     Degenerative arthritis of hip - right 12/27/2010     Priority: Medium     OA (osteoarthritis) of knee 10/25/2010     Priority: Medium     Hyperlipidemia LDL goal <100 10/14/2010     Priority: Medium     Low back pain 04/23/2010     Priority: Medium     Radiculopathy of leg 04/23/2010     Priority: Medium     Gout 11/27/2009     Priority: Medium     Sleep apnea      Priority: Medium     Cough 12/05/2007     Priority: Medium       Surgical Hx:  has a past surgical history that includes gastric bypass (1/03); TOTAL HIP ARTHROPLASTY (1/11/11); Stent (5/8/12); Endoscopic sinus surgery (12/14/15); Endoscopic sinus surgery (Bilateral, 12/14/2015); sinus surgery (Right, 12-14-15); Stent (01/2017); ABLATION SUPRAVENT ARRHYTHMOGENIC FOCUS (12/21/15); stent, coronary, shelia (01/2017); and Optical tracking system fusion spine posterior lumbar two levels (N/A, 7/20/2018).    Medications:   Current Outpatient Medications:      ACCU-CHEK SMARTVIEW test strip, ONCE DAILY TESTING AND AS NEEDED, Disp: 100 strip, Rfl: 4     ACE NOT PRESCRIBED, INTENTIONAL,, ACE Inhibitor not prescribed due to Other: cough, Disp: 0 each, Rfl: 0     aspirin 81 MG tablet, Take 1 tablet (81 mg) by mouth daily, Disp: , Rfl:      atorvastatin (LIPITOR) 20 MG tablet, Take 1 tablet (20 mg) by mouth daily, Disp: , Rfl:      blood glucose  "monitoring (VIANNEY CONTOUR MONITOR) meter device kit, Use to test blood sugar  times daily or as directed., Disp: 1 kit, Rfl: 0     clindamycin (CLEOCIN) 300 MG capsule, Take 1 capsule (300 mg) by mouth 4 times daily (Patient not taking: Reported on 9/29/2020), Disp: 40 capsule, Rfl: 0     clopidogrel (PLAVIX) 75 MG tablet, Take 75 mg by mouth daily , Disp: , Rfl:      empagliflozin (JARDIANCE) 25 MG TABS tablet, Take 1 tablet (25 mg) by mouth daily, Disp: 90 tablet, Rfl: 3     fluticasone (FLONASE) 50 MCG/ACT nasal spray, Spray 1 spray into both nostrils daily, Disp: , Rfl:      gabapentin (NEURONTIN) 300 MG capsule, Take one capsule daily at night for 3 day(s) Take one capsule in the am and at bedtime for 3 day(s) Then take one capsule in the am, one in the afternoon and one at bedtime thereafter, Disp: 90 capsule, Rfl: 2     losartan (COZAAR) 25 MG tablet, Take 1 tablet (25 mg) by mouth daily, Disp: 90 tablet, Rfl: 3     metFORMIN (GLUCOPHAGE) 500 MG tablet, TAKE 2 TABLETS BY MOUTH TWICE DAILY WITH MEALS, Disp: 360 tablet, Rfl: 3     nitroGLYcerin (NITROSTAT) 0.4 MG sublingual tablet, Place 1 tablet (0.4 mg) under the tongue every 5 minutes as needed for chest pain, Disp: 90 tablet, Rfl: 4    Allergies:   Allergies   Allergen Reactions     Benzonatate Anaphylaxis and Swelling     Lisinopril Cough     Zocor [Simvastatin - High Dose]      Poor memory       REVIEW OF SYSTEMS:   CONSTITUTIONAL:NEGATIVE for fever, chills, change in weight  INTEGUMENTARY/SKIN: NEGATIVE for worrisome rashes, moles or lesions  MUSCULOSKELETAL:See HPI above  NEURO: NEGATIVE for weakness, dizziness or paresthesias         PHYSICAL EXAM:  /73   Pulse 80   Temp 98.4  F (36.9  C) (Oral)   Ht 1.854 m (6' 1\")   Wt 101.6 kg (224 lb)   SpO2 99%   BMI 29.55 kg/m     GENERAL APPEARANCE: healthy, alert, no distress  SKIN: no suspicious lesions or rashes  NEURO: Normal strength and tone, mentation intact and speech normal  PSYCH:  " mentation appears normal and affect normal, not anxious  RESPIRATORY: No increased work of breathing.      RIGHT UPPER EXTREMITY:  Sensation intact to light touch in median, radial, ulnar and axillary nerve distributions  Palpable 2+ radial pulse, brisk capillary refill to all fingers, wwp  Intact epl fpl fdp edc wrist flexion/extension biceps triceps deltoid    RIGHT SHOULDER:  Shoulder Inspection: no swelling, bruising, discoloration, there is moderate  AC prominence deformity and asymmetry, mild muscle atrophy supraspinatus and infraspinatus compared to left.    Tender:greater tuberosity, upper trapezius,  nontender to palpation: AC joint, proximal bicep tendon, supraspinatus  Range of Motion:   Active:forward flexion 170 degrees, external rotation  60 degrees, internal rotation  T12   Passive: same  Strength: forward flexion 5-/5, External rotation 5-/5  Liftoff: Able  Impingement: all grade 2 positive  Special tests: Spurling's: Negative  Belly Press: Negative  Empty Can: Positive for pain.    LEFT UPPER EXTREMITY:  Sensation intact to light touch in median, radial, ulnar and axillary nerve distributions  Palpable 2+ radial pulse, brisk capillary refill to all fingers, wwp  Intact epl fpl fdp edc wrist flexion/extension biceps triceps deltoid    LEFT SHOULDER:  Shoulder Inspection: no swelling, bruising, discoloration, or obvious deformity or asymmetry  no atrophy  Tender: AC joint, greater tuberosity, anterior capsule, proximal biceps, deltoid  Non-tender: SC joint, proximal-mid clavicle, mid-distal clavicle, acromion, proximal humerus, supraspinatus , infraspinatus, upper trapezius muscle and rhomboids  Range of Motion:   Active:forward flexion 170 degrees, external rotation  60 degrees, internal rotation  T12   Passive: same  Strength: forward flexion 5/5, External rotation 5/5  Liftoff: Able  Impingement: all grade 2 positive      X-RAY:   No new shoulder xrays today.  3 views right  Shoulder, bilateral AC  joints obtained 11/19/2012 were again reviewed personally in clinic today with the patient. On my review, there is elevation and widening of distal clavicle relative to acromion, 100%. Mild acromio-clavicular degenerative changes. Sclerosis at greater tuberosity. There is no increased in AC separation with weights compared to without weights. Moderate left acromio-clavicular degenerative changes.    3 views left shoulder 3/17/2014 reviewed, No obvious fracture or dislocation. No obvious bony abnormality or lesion. Moderate acromio-clavicular and mild gleno-humeral degenerative changes.       MRI right shoulder CDI 6/2/2015: moderate sized area of poorly defined interstitial and deep fiber tearing of the distal supraspinatus tendon involves up to 50% of thickness. Moderate infraspinatus and subscapularis tendinosis. Chronic acromio-clavicular injury with CC sprain, mild narrowing of subacromial space with minimal bursitis. Long head biceps grossly intact. No significant gleno-humeral degenerative changes.    MRI left shoulder CDI 6/2/2015: mild supraspinatus tendinopathy with fraying involving bursal surface distally. Mild subscapularis tendinopathy. Moderate to marked acromio-clavicular degenerative changes with mild to  Moderate narrowing of the subacromial space. Mild bursitis. No significant gleno-humeral degenerative changes. Proximal biceps grossly intact.    MRI right shoulder 11/26/2012 reviewed:  IMPRESSION:  1. Extensive separation of the acromioclavicular joint including  disruption of the acromioclavicular joint ligaments. The  coracoclavicular ligaments are intact.  2. Mild tendinosis of the distal supraspinatous tendon. No actual  rotator cuff tear is seen.      ASSESSMENT: Edwardo Dumont is a 70 year old male, right  -hand dominant with chronic right shoulder AC separation, pain, rotator cuff tendinosis and impingement with right partial thickness tear; left shoulder impingement and  tendinosis.      PLAN:   * again discussed nonsurgical and surgical options. He states he will continue with injections as along as they help.  * again, could consider arthroscopic versus open surgery (for example: subacromial decompression, distal clavicle excision, rotator cuff repair versus debridement, biceps tenodesis versus tenotomy, etc.). Perioperative course discussed, length of recovery. Right side likely rotator cuff repair and decompression, left side likely decompression only. Risks and perceived benefits discussed.    * at this time, he'd like to proceed with bilateral cortisone injections. See procedure notes below.    In the meantime:    * Rest  * Activity modification - avoid activities that aggravate symptoms or started symptoms at onset.  * NSAIDS - regular use for inflammation, with food, as long as no contra-indications. Please discuss with pcp if needed.  * Ice twice daily to three times daily, 15-20 minutes at a time  * heat may be beneficial prior to exercising  * home exercise program for strengthening, stretching and range of motion exercises of rotator cuff and periscapular stabilization.  * Tylenol as needed for pain  * Injections: cortisone injections may be beneficial to help decrease swelling and inflammation within the shoulder or bursa, and decrease pain. With decreased pain, Physical Therapy and exercises will be more effective and efficient. Patient elects to proceed with bilateral cortisone injections.  * Return to clinic as needed.    Skip Reyes M.D., M.S.  Dept. of Orthopaedic Surgery  [facility]    Large Joint Injection/Arthocentesis: bilateral subacromial bursa    Date/Time: 10/1/2020 2:49 PM  Performed by: Skip Reyes MD  Authorized by: Skip Reyes MD     Indications:  Pain  Needle Size:  22 G  Guidance: landmark guided    Approach:  Posterolateral  Location:  Shoulder  Laterality:  Bilateral      Site:  Bilateral subacromial  bursa  Medications (Right):  80 mg triamcinolone 40 MG/ML; 4 mL bupivacaine 0.25 %  Medications (Left):  80 mg triamcinolone 40 MG/ML; 4 mL bupivacaine 0.25 %  Outcome:  Tolerated well, no immediate complications  Procedure discussed: discussed risks, benefits, and alternatives    Timeout: timeout called immediately prior to procedure    Prep: patient was prepped and draped in usual sterile fashion

## 2020-10-01 NOTE — LETTER
10/1/2020         RE: Edwardo Duomnt  [id number] [facility] [address]        Dear Colleague,    Thank you for referring your patient, Edwardo Dumont, to the [facility]. Please see a copy of my visit note below.    CHIEF COMPLAINT:   Chief Complaint   Patient presents with     Right Shoulder - Pain     Left Shoulder - Pain     Shoulder Pain     Bilat shoulder pain. Last injection 12/12/19. Noticed pain coming back in March/April.        HISTORY:  Tanvir Dumont is a 70 year old male, right-hand dominant, who is seen for follow up evaluation of bilateral shoulder pain, right > left. He sustained an injury 9/2012, fell onto right shoulder while playing soccer with grand daughter. Had MRI 2012 negative for rotator cuff tear but showing acromio-clavicular injury and rotator cuff tendinosis. Noted to have AC separation. Treated non-opertively, healed well. Patient returns today with continued pain. MRI 2015 showed partial thickness rotator cuff tear on right. His last injections were on 12/12/2019. He reports injections did well until March/April, then pain slowly crept back. He is leaving for the winter and would like repeat injections today. He usually gets the injections in December but he's leaving now and not coming back til Spring. He has learned to modify his activity, noting he doesn't lift much past shoulder level.  He's eager to get back to golfing.    Also his total hip arthroplasty is doing great, no concerns.    Symptoms have been waxing and waning right shoulder, starting now left shoulder.  Aggrevated by: overhead activities.  Relieved by: rest, cortisone injections  Present symptoms: pain with overhead activities, pain reaching behind back  Pain location: lateral shoulder, deltoid and upper arm  Pain severity: 5/10.  Pain quality: dull and sharp  Frequency of symptoms: occasionally  Associated symptoms: none  Treatment up to this point: injections 12/2014, 6/2015,  12/2015, 12/6/2016, 12/21/2017, 12/2018, 12/2019 with good relief.    Significant Orthopedic past medical history: right total hip arthroplasty 1/2011  Usual level of recreational activity: golf, pickle ball.  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 He also has no past medical history of Amblyopia, Bleeding disorder (H), Cancer (H), Cataract, Cerebral infarction (H), Chronic infection, Complication of anesthesia, Congestive heart failure (H), COPD (chronic obstructive pulmonary disease) (H), Depressive disorder, Diabetic retinopathy (H), Glaucoma (increased eye pressure), Heart disease, History of blood transfusion, Inflammatory arthritis, Malignant hyperthermia, Retinal detachment, Senile macular degeneration, Strabismus, Stroke (H), Surgical complications, Thyroid disease, Uncomplicated asthma, Unspecified asthma(493.90), or Uveitis.  Patient Active Problem List    Diagnosis Date Noted     Benign prostatic hyperplasia with nocturia 09/21/2019     Priority: Medium     S/P lumbar fusion 07/20/2018     Priority: Medium     Spinal stenosis of lumbar region with neurogenic claudication 07/10/2018     Priority: Medium     Type II diabetes mellitus with peripheral circulatory disorder (H) 01/31/2018     Priority: Medium     CKD (chronic kidney disease) stage 3, GFR 30-59 ml/min      Priority: Medium     S/P coronary angiogram  09/23/2015     Priority: Medium     Insomnia 08/03/2015     Priority: Medium     Dermatochalasis 04/10/2015     Priority: Medium     Visual disturbance, transient, right eye 02/09/2014     Priority: Medium     Essential hypertension, benign 05/14/2013     Priority: Medium     Ischemic vascular disease   one stent coronary artery 5/12 12/19/2012     Priority: Medium     Closed dislocation of acromioclavicular joint 12/17/2012     Priority: Medium     Problem list name updated by automated process. Provider to review       Impingement syndrome of right shoulder 12/17/2012     Priority: Medium     S/P hip replacement 12/13/2012     Priority: Medium     Advanced directives, counseling/discussion 05/25/2011     Priority: Medium     Discussed Advance Directive planning with patient; information given to patient to review.         Urinary retention 01/21/2011     Priority: Medium     Degenerative arthritis of hip - right 12/27/2010     Priority: Medium     OA (osteoarthritis) of knee 10/25/2010     Priority: Medium     Hyperlipidemia LDL goal <100 10/14/2010     Priority: Medium     Low back pain 04/23/2010     Priority: Medium     Radiculopathy of leg 04/23/2010     Priority: Medium     Gout 11/27/2009     Priority: Medium     Sleep apnea      Priority: Medium     Cough 12/05/2007     Priority: Medium       Surgical Hx:  has a past surgical history that includes gastric bypass (1/03); TOTAL HIP ARTHROPLASTY (1/11/11); Stent (5/8/12); Endoscopic sinus surgery (12/14/15); Endoscopic sinus surgery (Bilateral, 12/14/2015); sinus surgery (Right, 12-14-15); Stent (01/2017); ABLATION SUPRAVENT ARRHYTHMOGENIC FOCUS (12/21/15); stent, coronary, shelia (01/2017); and Optical tracking system fusion spine posterior lumbar two levels (N/A, 7/20/2018).    Medications:   Current Outpatient Medications:      ACCU-CHEK SMARTVIEW test strip, ONCE DAILY TESTING AND AS NEEDED, Disp: 100 strip, Rfl: 4     ACE NOT PRESCRIBED, INTENTIONAL,, ACE  Inhibitor not prescribed due to Other: cough, Disp: 0 each, Rfl: 0     aspirin 81 MG tablet, Take 1 tablet (81 mg) by mouth daily, Disp: , Rfl:      atorvastatin (LIPITOR) 20 MG tablet, Take 1 tablet (20 mg) by mouth daily, Disp: , Rfl:      blood glucose monitoring (AuditionBooth CONTOUR MONITOR) meter device kit, Use to test blood sugar  times daily or as directed., Disp: 1 kit, Rfl: 0     clindamycin (CLEOCIN) 300 MG capsule, Take 1 capsule (300 mg) by mouth 4 times daily (Patient not taking: Reported on 9/29/2020), Disp: 40 capsule, Rfl: 0     clopidogrel (PLAVIX) 75 MG tablet, Take 75 mg by mouth daily , Disp: , Rfl:      empagliflozin (JARDIANCE) 25 MG TABS tablet, Take 1 tablet (25 mg) by mouth daily, Disp: 90 tablet, Rfl: 3     fluticasone (FLONASE) 50 MCG/ACT nasal spray, Spray 1 spray into both nostrils daily, Disp: , Rfl:      gabapentin (NEURONTIN) 300 MG capsule, Take one capsule daily at night for 3 day(s) Take one capsule in the am and at bedtime for 3 day(s) Then take one capsule in the am, one in the afternoon and one at bedtime thereafter, Disp: 90 capsule, Rfl: 2     losartan (COZAAR) 25 MG tablet, Take 1 tablet (25 mg) by mouth daily, Disp: 90 tablet, Rfl: 3     metFORMIN (GLUCOPHAGE) 500 MG tablet, TAKE 2 TABLETS BY MOUTH TWICE DAILY WITH MEALS, Disp: 360 tablet, Rfl: 3     nitroGLYcerin (NITROSTAT) 0.4 MG sublingual tablet, Place 1 tablet (0.4 mg) under the tongue every 5 minutes as needed for chest pain, Disp: 90 tablet, Rfl: 4    Allergies:   Allergies   Allergen Reactions     Benzonatate Anaphylaxis and Swelling     Lisinopril Cough     Zocor [Simvastatin - High Dose]      Poor memory       REVIEW OF SYSTEMS:   CONSTITUTIONAL:NEGATIVE for fever, chills, change in weight  INTEGUMENTARY/SKIN: NEGATIVE for worrisome rashes, moles or lesions  MUSCULOSKELETAL:See HPI above  NEURO: NEGATIVE for weakness, dizziness or paresthesias         PHYSICAL EXAM:  /73   Pulse 80   Temp 98.4  F (36.9  C)  "(Oral)   Ht 1.854 m (6' 1\")   Wt 101.6 kg (224 lb)   SpO2 99%   BMI 29.55 kg/m     GENERAL APPEARANCE: healthy, alert, no distress  SKIN: no suspicious lesions or rashes  NEURO: Normal strength and tone, mentation intact and speech normal  PSYCH:  mentation appears normal and affect normal, not anxious  RESPIRATORY: No increased work of breathing.      RIGHT UPPER EXTREMITY:  Sensation intact to light touch in median, radial, ulnar and axillary nerve distributions  Palpable 2+ radial pulse, brisk capillary refill to all fingers, wwp  Intact epl fpl fdp edc wrist flexion/extension biceps triceps deltoid    RIGHT SHOULDER:  Shoulder Inspection: no swelling, bruising, discoloration, there is moderate  AC prominence deformity and asymmetry, mild muscle atrophy supraspinatus and infraspinatus compared to left.    Tender:greater tuberosity, upper trapezius,  nontender to palpation: AC joint, proximal bicep tendon, supraspinatus  Range of Motion:   Active:forward flexion 170 degrees, external rotation  60 degrees, internal rotation  T12   Passive: same  Strength: forward flexion 5-/5, External rotation 5-/5  Liftoff: Able  Impingement: all grade 2 positive  Special tests: Spurling's: Negative  Belly Press: Negative  Empty Can: Positive for pain.    LEFT UPPER EXTREMITY:  Sensation intact to light touch in median, radial, ulnar and axillary nerve distributions  Palpable 2+ radial pulse, brisk capillary refill to all fingers, wwp  Intact epl fpl fdp edc wrist flexion/extension biceps triceps deltoid    LEFT SHOULDER:  Shoulder Inspection: no swelling, bruising, discoloration, or obvious deformity or asymmetry  no atrophy  Tender: AC joint, greater tuberosity, anterior capsule, proximal biceps, deltoid  Non-tender: SC joint, proximal-mid clavicle, mid-distal clavicle, acromion, proximal humerus, supraspinatus , infraspinatus, upper trapezius muscle and rhomboids  Range of Motion:   Active:forward flexion 170 degrees, " external rotation  60 degrees, internal rotation  T12   Passive: same  Strength: forward flexion 5/5, External rotation 5/5  Liftoff: Able  Impingement: all grade 2 positive      X-RAY:   No new shoulder xrays today.  3 views right  Shoulder, bilateral AC joints obtained 11/19/2012 were again reviewed personally in clinic today with the patient. On my review, there is elevation and widening of distal clavicle relative to acromion, 100%. Mild acromio-clavicular degenerative changes. Sclerosis at greater tuberosity. There is no increased in AC separation with weights compared to without weights. Moderate left acromio-clavicular degenerative changes.    3 views left shoulder 3/17/2014 reviewed, No obvious fracture or dislocation. No obvious bony abnormality or lesion. Moderate acromio-clavicular and mild gleno-humeral degenerative changes.       MRI right shoulder CDI 6/2/2015: moderate sized area of poorly defined interstitial and deep fiber tearing of the distal supraspinatus tendon involves up to 50% of thickness. Moderate infraspinatus and subscapularis tendinosis. Chronic acromio-clavicular injury with CC sprain, mild narrowing of subacromial space with minimal bursitis. Long head biceps grossly intact. No significant gleno-humeral degenerative changes.    MRI left shoulder CDI 6/2/2015: mild supraspinatus tendinopathy with fraying involving bursal surface distally. Mild subscapularis tendinopathy. Moderate to marked acromio-clavicular degenerative changes with mild to  Moderate narrowing of the subacromial space. Mild bursitis. No significant gleno-humeral degenerative changes. Proximal biceps grossly intact.    MRI right shoulder 11/26/2012 reviewed:  IMPRESSION:  1. Extensive separation of the acromioclavicular joint including  disruption of the acromioclavicular joint ligaments. The  coracoclavicular ligaments are intact.  2. Mild tendinosis of the distal supraspinatous tendon. No actual  rotator cuff tear is  seen.      ASSESSMENT: Edwardo Dumont is a 70 year old male, right  -hand dominant with chronic right shoulder AC separation, pain, rotator cuff tendinosis and impingement with right partial thickness tear; left shoulder impingement and tendinosis.      PLAN:   * again discussed nonsurgical and surgical options. He states he will continue with injections as along as they help.  * again, could consider arthroscopic versus open surgery (for example: subacromial decompression, distal clavicle excision, rotator cuff repair versus debridement, biceps tenodesis versus tenotomy, etc.). Perioperative course discussed, length of recovery. Right side likely rotator cuff repair and decompression, left side likely decompression only. Risks and perceived benefits discussed.    * at this time, he'd like to proceed with bilateral cortisone injections. See procedure notes below.    In the meantime:    * Rest  * Activity modification - avoid activities that aggravate symptoms or started symptoms at onset.  * NSAIDS - regular use for inflammation, with food, as long as no contra-indications. Please discuss with pcp if needed.  * Ice twice daily to three times daily, 15-20 minutes at a time  * heat may be beneficial prior to exercising  * home exercise program for strengthening, stretching and range of motion exercises of rotator cuff and periscapular stabilization.  * Tylenol as needed for pain  * Injections: cortisone injections may be beneficial to help decrease swelling and inflammation within the shoulder or bursa, and decrease pain. With decreased pain, Physical Therapy and exercises will be more effective and efficient. Patient elects to proceed with bilateral cortisone injections.  * Return to clinic as needed.    Skip Reyes M.D., M.S.  Dept. of Orthopaedic Surgery  [facility]    Large Joint Injection/Arthocentesis: bilateral subacromial bursa    Date/Time: 10/1/2020 2:49 PM  Performed by: Skip Reyes,  MD  Authorized by: Skip Reyes MD     Indications:  Pain  Needle Size:  22 G  Guidance: landmark guided    Approach:  Posterolateral  Location:  Shoulder  Laterality:  Bilateral      Site:  Bilateral subacromial bursa  Medications (Right):  80 mg triamcinolone 40 MG/ML; 4 mL bupivacaine 0.25 %  Medications (Left):  80 mg triamcinolone 40 MG/ML; 4 mL bupivacaine 0.25 %  Outcome:  Tolerated well, no immediate complications  Procedure discussed: discussed risks, benefits, and alternatives    Timeout: timeout called immediately prior to procedure    Prep: patient was prepped and draped in usual sterile fashion            Again, thank you for allowing me to participate in the care of your patient.        Sincerely,        Skip Reyes MD

## 2020-10-23 ENCOUNTER — NEW PATIENT (OUTPATIENT)
Dept: URBAN - METROPOLITAN AREA CLINIC 26 | Facility: CLINIC | Age: 70
End: 2020-10-23
Payer: MEDICARE

## 2020-10-23 DIAGNOSIS — H52.4 PRESBYOPIA: ICD-10-CM

## 2020-10-23 DIAGNOSIS — Z79.84 LONG TERM (CURRENT) USE OF ORAL ANTIDIABETIC DRUGS: ICD-10-CM

## 2020-10-23 DIAGNOSIS — H25.13 AGE-RELATED NUCLEAR CATARACT, BILATERAL: ICD-10-CM

## 2020-10-23 DIAGNOSIS — E11.9 TYPE 2 DIABETES MELLITUS WITHOUT COMPLICATIONS: Primary | ICD-10-CM

## 2020-10-23 PROCEDURE — 92134 CPTRZ OPH DX IMG PST SGM RTA: CPT | Performed by: OPTOMETRIST

## 2020-10-23 PROCEDURE — 92004 COMPRE OPH EXAM NEW PT 1/>: CPT | Performed by: OPTOMETRIST

## 2020-10-23 ASSESSMENT — INTRAOCULAR PRESSURE
OS: 20
OD: 20

## 2020-10-23 ASSESSMENT — KERATOMETRY
OS: 43.25
OD: 43.00

## 2020-10-23 ASSESSMENT — VISUAL ACUITY
OD: 20/20
OS: 20/20

## 2020-11-01 ENCOUNTER — TRANSFERRED RECORDS (OUTPATIENT)
Dept: HEALTH INFORMATION MANAGEMENT | Facility: CLINIC | Age: 70
End: 2020-11-01

## 2020-11-01 LAB — RETINOPATHY: NORMAL

## 2020-12-05 ENCOUNTER — MYC MEDICAL ADVICE (OUTPATIENT)
Dept: FAMILY MEDICINE | Facility: CLINIC | Age: 70
End: 2020-12-05

## 2020-12-05 DIAGNOSIS — E11.9 TYPE 2 DIABETES, HBA1C GOAL < 8% (H): ICD-10-CM

## 2020-12-07 RX ORDER — BLOOD SUGAR DIAGNOSTIC
STRIP MISCELLANEOUS
Qty: 100 STRIP | Refills: 4 | Status: SHIPPED | OUTPATIENT
Start: 2020-12-07 | End: 2020-12-09

## 2020-12-09 ENCOUNTER — TELEPHONE (OUTPATIENT)
Dept: FAMILY MEDICINE | Facility: CLINIC | Age: 70
End: 2020-12-09

## 2020-12-09 DIAGNOSIS — E11.9 TYPE 2 DIABETES, HBA1C GOAL < 8% (H): ICD-10-CM

## 2020-12-09 RX ORDER — BLOOD SUGAR DIAGNOSTIC
STRIP MISCELLANEOUS
Qty: 100 STRIP | Refills: 4 | Status: SHIPPED | OUTPATIENT
Start: 2020-12-09

## 2020-12-09 NOTE — TELEPHONE ENCOUNTER
Message: Need new rx as we cannot transfer Medicare B RX to this pharmacy. [id number].    Drug: Accu-chek Smartview Test Strip.

## 2021-01-03 DIAGNOSIS — I25.83 CORONARY ARTERY DISEASE DUE TO LIPID RICH PLAQUE: ICD-10-CM

## 2021-01-03 DIAGNOSIS — I10 ESSENTIAL HYPERTENSION, BENIGN: ICD-10-CM

## 2021-01-03 DIAGNOSIS — I25.10 CORONARY ARTERY DISEASE DUE TO LIPID RICH PLAQUE: ICD-10-CM

## 2021-01-03 DIAGNOSIS — E78.5 HYPERLIPIDEMIA LDL GOAL <100: ICD-10-CM

## 2021-01-03 DIAGNOSIS — E11.21 TYPE 2 DIABETES MELLITUS WITH DIABETIC NEPHROPATHY, WITHOUT LONG-TERM CURRENT USE OF INSULIN (H): ICD-10-CM

## 2021-01-05 RX ORDER — ATORVASTATIN CALCIUM 20 MG/1
20 TABLET, FILM COATED ORAL DAILY
Qty: 90 TABLET | Refills: 1 | Status: SHIPPED | OUTPATIENT
Start: 2021-01-05 | End: 2021-04-29

## 2021-01-05 RX ORDER — LOSARTAN POTASSIUM 25 MG/1
25 TABLET ORAL DAILY
Qty: 90 TABLET | Refills: 1 | Status: SHIPPED | OUTPATIENT
Start: 2021-01-05 | End: 2021-04-29

## 2021-01-05 NOTE — TELEPHONE ENCOUNTER
Routing refill request to provider for review/approval because:  Labs out of range:    Creatinine   Date Value Ref Range Status   09/26/2020 1.31 (H) 0.66 - 1.25 mg/dL Final     Atorvastatin is listed historical.     Please advise  Ivy Jerry RN

## 2021-01-08 DIAGNOSIS — E11.42 DIABETIC POLYNEUROPATHY ASSOCIATED WITH TYPE 2 DIABETES MELLITUS (H): ICD-10-CM

## 2021-01-08 RX ORDER — GABAPENTIN 300 MG/1
CAPSULE ORAL
Qty: 90 CAPSULE | Refills: 1 | Status: SHIPPED | OUTPATIENT
Start: 2021-01-08

## 2021-01-08 NOTE — TELEPHONE ENCOUNTER
Routing refill request to provider for review/approval because:  Drug not on the [facility] refill protocol   Tali Solis BSN, RN

## 2021-03-27 ENCOUNTER — HEALTH MAINTENANCE LETTER (OUTPATIENT)
Age: 71
End: 2021-03-27

## 2021-04-22 ENCOUNTER — DOCUMENTATION ONLY (OUTPATIENT)
Dept: FAMILY MEDICINE | Facility: CLINIC | Age: 71
End: 2021-04-22

## 2021-04-22 DIAGNOSIS — E11.51 TYPE II DIABETES MELLITUS WITH PERIPHERAL CIRCULATORY DISORDER (H): Primary | ICD-10-CM

## 2021-04-22 DIAGNOSIS — I10 ESSENTIAL HYPERTENSION, BENIGN: ICD-10-CM

## 2021-04-22 DIAGNOSIS — N18.30 STAGE 3 CHRONIC KIDNEY DISEASE, UNSPECIFIED WHETHER STAGE 3A OR 3B CKD (H): ICD-10-CM

## 2021-04-22 NOTE — PROGRESS NOTES
Please review lab orders sign and close encounter. Jacqueline Gold MA/NANCY    Diabetes appt 4/29/21

## 2021-04-24 DIAGNOSIS — N18.30 STAGE 3 CHRONIC KIDNEY DISEASE, UNSPECIFIED WHETHER STAGE 3A OR 3B CKD (H): ICD-10-CM

## 2021-04-24 DIAGNOSIS — I10 ESSENTIAL HYPERTENSION, BENIGN: ICD-10-CM

## 2021-04-24 DIAGNOSIS — E11.51 TYPE II DIABETES MELLITUS WITH PERIPHERAL CIRCULATORY DISORDER (H): ICD-10-CM

## 2021-04-24 LAB
HBA1C MFR BLD: 7.4 % (ref 0–5.6)
HGB BLD-MCNC: 16.9 G/DL (ref 13.3–17.7)
PTH-INTACT SERPL-MCNC: 48 PG/ML (ref 18–80)

## 2021-04-24 PROCEDURE — 83036 HEMOGLOBIN GLYCOSYLATED A1C: CPT | Performed by: FAMILY MEDICINE

## 2021-04-24 PROCEDURE — 82043 UR ALBUMIN QUANTITATIVE: CPT | Performed by: FAMILY MEDICINE

## 2021-04-24 PROCEDURE — 85018 HEMOGLOBIN: CPT | Performed by: FAMILY MEDICINE

## 2021-04-24 PROCEDURE — 36415 COLL VENOUS BLD VENIPUNCTURE: CPT | Performed by: FAMILY MEDICINE

## 2021-04-24 PROCEDURE — 83970 ASSAY OF PARATHORMONE: CPT | Performed by: FAMILY MEDICINE

## 2021-04-24 PROCEDURE — 80053 COMPREHEN METABOLIC PANEL: CPT | Performed by: FAMILY MEDICINE

## 2021-04-26 LAB
ALBUMIN SERPL-MCNC: 4.2 G/DL (ref 3.4–5)
ALP SERPL-CCNC: 108 U/L (ref 40–150)
ALT SERPL W P-5'-P-CCNC: 26 U/L (ref 0–70)
ANION GAP SERPL CALCULATED.3IONS-SCNC: 4 MMOL/L (ref 3–14)
AST SERPL W P-5'-P-CCNC: 12 U/L (ref 0–45)
BILIRUB SERPL-MCNC: 0.9 MG/DL (ref 0.2–1.3)
BUN SERPL-MCNC: 24 MG/DL (ref 7–30)
CALCIUM SERPL-MCNC: 9.2 MG/DL (ref 8.5–10.1)
CHLORIDE SERPL-SCNC: 108 MMOL/L (ref 94–109)
CO2 SERPL-SCNC: 27 MMOL/L (ref 20–32)
CREAT SERPL-MCNC: 1.25 MG/DL (ref 0.66–1.25)
CREAT UR-MCNC: 77 MG/DL
GFR SERPL CREATININE-BSD FRML MDRD: 58 ML/MIN/{1.73_M2}
GLUCOSE SERPL-MCNC: 164 MG/DL (ref 70–99)
MICROALBUMIN UR-MCNC: 25 MG/L
MICROALBUMIN/CREAT UR: 32.17 MG/G CR (ref 0–17)
POTASSIUM SERPL-SCNC: 4.4 MMOL/L (ref 3.4–5.3)
PROT SERPL-MCNC: 7.3 G/DL (ref 6.8–8.8)
SODIUM SERPL-SCNC: 139 MMOL/L (ref 133–144)

## 2021-04-26 NOTE — RESULT ENCOUNTER NOTE
I have reviewed the schedule of future appointments and this patient has an appointment scheduled for 4-.    Visit date not found

## 2021-04-29 ENCOUNTER — OFFICE VISIT (OUTPATIENT)
Dept: FAMILY MEDICINE | Facility: CLINIC | Age: 71
End: 2021-04-29
Payer: MEDICARE

## 2021-04-29 VITALS
BODY MASS INDEX: 29.69 KG/M2 | DIASTOLIC BLOOD PRESSURE: 74 MMHG | HEART RATE: 52 BPM | SYSTOLIC BLOOD PRESSURE: 121 MMHG | WEIGHT: 224 LBS | OXYGEN SATURATION: 98 % | HEIGHT: 73 IN | TEMPERATURE: 97.6 F

## 2021-04-29 DIAGNOSIS — I25.10 CORONARY ARTERY DISEASE DUE TO LIPID RICH PLAQUE: ICD-10-CM

## 2021-04-29 DIAGNOSIS — E11.21 TYPE 2 DIABETES MELLITUS WITH DIABETIC NEPHROPATHY, WITHOUT LONG-TERM CURRENT USE OF INSULIN (H): ICD-10-CM

## 2021-04-29 DIAGNOSIS — E78.5 HYPERLIPIDEMIA LDL GOAL <100: ICD-10-CM

## 2021-04-29 DIAGNOSIS — I25.83 CORONARY ARTERY DISEASE DUE TO LIPID RICH PLAQUE: ICD-10-CM

## 2021-04-29 DIAGNOSIS — E11.42 DIABETIC POLYNEUROPATHY ASSOCIATED WITH TYPE 2 DIABETES MELLITUS (H): ICD-10-CM

## 2021-04-29 DIAGNOSIS — I10 ESSENTIAL HYPERTENSION, BENIGN: ICD-10-CM

## 2021-04-29 DIAGNOSIS — N18.30 STAGE 3 CHRONIC KIDNEY DISEASE, UNSPECIFIED WHETHER STAGE 3A OR 3B CKD (H): Primary | ICD-10-CM

## 2021-04-29 PROCEDURE — 99214 OFFICE O/P EST MOD 30 MIN: CPT | Performed by: FAMILY MEDICINE

## 2021-04-29 RX ORDER — METOPROLOL SUCCINATE 25 MG/1
12.5 TABLET, EXTENDED RELEASE ORAL DAILY
COMMUNITY
Start: 2021-03-05

## 2021-04-29 RX ORDER — GABAPENTIN 300 MG/1
CAPSULE ORAL
Qty: 90 CAPSULE | Refills: 1 | Status: CANCELLED | OUTPATIENT
Start: 2021-04-29

## 2021-04-29 RX ORDER — GABAPENTIN 600 MG/1
600 TABLET ORAL 2 TIMES DAILY
Qty: 180 TABLET | Refills: 3 | Status: SHIPPED | OUTPATIENT
Start: 2021-04-29 | End: 2022-01-27

## 2021-04-29 RX ORDER — LISINOPRIL 2.5 MG/1
2.5 TABLET ORAL DAILY
Qty: 90 TABLET | Refills: 3 | Status: SHIPPED | OUTPATIENT
Start: 2021-04-29 | End: 2022-05-31

## 2021-04-29 RX ORDER — ATORVASTATIN CALCIUM 20 MG/1
20 TABLET, FILM COATED ORAL DAILY
Qty: 90 TABLET | Refills: 3 | Status: SHIPPED | OUTPATIENT
Start: 2021-04-29 | End: 2022-05-31

## 2021-04-29 ASSESSMENT — PAIN SCALES - GENERAL: PAINLEVEL: NO PAIN (0)

## 2021-04-29 ASSESSMENT — MIFFLIN-ST. JEOR: SCORE: 1834.94

## 2021-04-29 NOTE — NURSING NOTE
"Chief Complaint   Patient presents with     Diabetes       Initial /80   Pulse 52   Temp 97.6  F (36.4  C) (Tympanic)   Ht 1.854 m (6' 1\")   Wt 101.6 kg (224 lb)   SpO2 98%   BMI 29.55 kg/m   Estimated body mass index is 29.55 kg/m  as calculated from the following:    Height as of this encounter: 1.854 m (6' 1\").    Weight as of this encounter: 101.6 kg (224 lb).  Medication Reconciliation: complete  Daniella Loomis CMA  "

## 2021-04-29 NOTE — PROGRESS NOTES
[place] diabetes resourses:  [email or web address]        To access diabetes educational materials with in EPIC use <dot>RADHA      Put this in AFTER VISIT SUMMARY if needed for the patient to access information online [email or web address]      SUBJECTIVE:  Edwardo Dumont is a 70 year old male who presents today for a follow up appointment for management of DIABETES MELLITUS.  He was diagnosed with diabetes 13 years ago.     Patient Active Problem List    Diagnosis Date Noted     Benign prostatic hyperplasia with nocturia 09/21/2019     Priority: Medium     S/P lumbar fusion 07/20/2018     Priority: Medium     Spinal stenosis of lumbar region with neurogenic claudication 07/10/2018     Priority: Medium     Type II diabetes mellitus with peripheral circulatory disorder (H) 01/31/2018     Priority: Medium     CKD (chronic kidney disease) stage 3, GFR 30-59 ml/min      Priority: Medium     S/P coronary angiogram 09/23/2015     Priority: Medium     Insomnia 08/03/2015     Priority: Medium     Dermatochalasis 04/10/2015     Priority: Medium     Visual disturbance, transient, right eye 02/09/2014     Priority: Medium     Essential hypertension, benign 05/14/2013     Priority: Medium     Ischemic vascular disease   one stent coronary artery 5/12 12/19/2012     Priority: Medium     Closed dislocation of acromioclavicular joint 12/17/2012     Priority: Medium     Problem list name updated by automated process. Provider to review       Impingement syndrome of right shoulder 12/17/2012     Priority: Medium     S/P hip replacement 12/13/2012     Priority: Medium     Advanced directives, counseling/discussion 05/25/2011     Priority: Medium     Discussed Advance Directive planning with patient; information given to patient to review.         Urinary retention 01/21/2011     Priority: Medium     Degenerative arthritis of hip - right  12/27/2010     Priority: Medium     OA (osteoarthritis) of knee 10/25/2010     Priority: Medium     Hyperlipidemia LDL goal <100 10/14/2010     Priority: Medium     Low back pain 04/23/2010     Priority: Medium     Radiculopathy of leg 04/23/2010     Priority: Medium     Gout 11/27/2009     Priority: Medium     Sleep apnea      Priority: Medium     Cough 12/05/2007     Priority: Medium          The patient checks his blood sugar as follows: 1-2 times a week, once daily.   Results as follows: fasting glucose- 120-145    The patient reports that he IS taking the medication as prescribed. He denies side effects of medication.  ----------------------------------------------------------------------------------------------------------------------------------------    BP Readings from Last 3 Encounters:   04/29/21 121/74   10/01/20 122/73   09/29/20 113/60     The patient reports that he IS NOT currently smoking.  History   Smoking Status     Former Smoker     Packs/day: 1.00     Years: 5.00     Types: Cigarettes     Start date: 1/1/1970     Quit date: 9/8/1989   Smokeless Tobacco     Never Used     Comment: former smoker           The ASCVD Risk score (Rajan LOGAN Jr., et al., 2013) failed to calculate for the following reasons:    The valid total cholesterol range is 130 to 320 mg/dL        Current Outpatient Medications   Medication Sig Dispense Refill     ACE NOT PRESCRIBED, INTENTIONAL, ACE Inhibitor not prescribed due to Other: cough 0 each 0     aspirin 81 MG tablet Take 1 tablet (81 mg) by mouth daily       atorvastatin (LIPITOR) 20 MG tablet Take 1 tablet (20 mg) by mouth daily 90 tablet 1     blood glucose (ACCU-CHEK SMARTVIEW) test strip ONCE DAILY TESTING AND AS NEEDED 100 strip 4     blood glucose monitoring (Houzz CONTOUR MONITOR) meter device kit Use to test blood sugar  times daily or as directed. 1 kit 0     clindamycin (CLEOCIN) 300 MG capsule Take 1 capsule (300 mg) by mouth 4 times daily 40 capsule 0      clopidogrel (PLAVIX) 75 MG tablet Take 75 mg by mouth daily        fluticasone (FLONASE) 50 MCG/ACT nasal spray Spray 1 spray into both nostrils daily       gabapentin (NEURONTIN) 300 MG capsule TAKE ONE CAPSULE DAILY AT NIGHT FOR 3 DAY(S) TAKE ONE CAPSULE IN THE AM AND AT BEDTIME FOR 3 DAY(S) THEN TAKE ONE CAPSULE IN THE AM, ONE IN THE AFTERNOON AND ONE AT BEDTIME THEREAFTER 90 capsule 1     JARDIANCE 25 MG TABS tablet TAKE 1 TABLET BY MOUTH EVERY DAY 30 tablet 0     metFORMIN (GLUCOPHAGE) 500 MG tablet TAKE 2 TABLETS BY MOUTH TWICE DAILY WITH MEALS 360 tablet 1     metoprolol succinate ER (TOPROL-XL) 25 MG 24 hr tablet Take 12.5 mg by mouth daily       nitroGLYcerin (NITROSTAT) 0.4 MG sublingual tablet Place 1 tablet (0.4 mg) under the tongue every 5 minutes as needed for chest pain 90 tablet 4       The patient reports that he IS taking a statin.   (Reminder all diabetics with a ASCVD risk greater or equal to 7.5% should be on a high intensity statin, otherwise on a moderate intensity statin)    The patient reports that he IS taking 81mg of  aspirin daily.  (Reminder all diabetic patients with a cardiovascular risk factor and > 50 should take a daily aspirin)       The patient reports that he IS doing a self foot exam daily.  The patient reports that he does exercise in the form of walking  3 times a week and plays golf once a week.   The patient reports that he IS following the recommended diabetic diet. He  would give himself a C grade on his diet.  The patient reports that his last eye exam was 5 months ago.         Immunization History   Administered Date(s) Administered     HEPA 01/08/2009, 09/08/2009     HepB 03/06/2013, 01/15/2014     HepB-Adult 05/30/2019     Influenza (High Dose) 3 valent vaccine 11/01/2016, 09/26/2017, 09/20/2019     Influenza (IIV3) PF 1950, 10/01/2010, 01/03/2011, 09/26/2012, 11/04/2013, 10/01/2014     Influenza Vaccine, 6+MO IM (QUADRIVALENT W/PRESERVATIVES) 10/06/2015      "Influenza, Quad, High Dose, Pf, 65yr + 09/29/2020     Pneumo Conj 13-V (2010&after) 06/02/2016     Pneumococcal 23 valent 02/27/2009, 06/13/2017     TD (ADULT, 7+) 05/30/2019     TDAP Vaccine (Adacel) 01/08/2009     The patient reports that he has had the hepatitis B vaccine series in the past. (recommended for age 19-59 and can be given to age 60 or older)  The patient reports that he has had a pnuemovax in the past.  The patient reports that he has had a flu shot for the current influenza season.  The patient would like to have a no vaccinations today    Patient concerns: is concerned about his neuropathy. He has had a few nights that are worse with buzzes and tingling. The gabapentin has been very helpful at 300 bid         EXAM:  /74   Pulse 52   Temp 97.6  F (36.4  C) (Tympanic)   Ht 1.854 m (6' 1\")   Wt 101.6 kg (224 lb)   SpO2 98%   BMI 29.55 kg/m    Wt Readings from Last 4 Encounters:   04/29/21 101.6 kg (224 lb)   10/01/20 101.6 kg (224 lb)   09/29/20 101.6 kg (224 lb)   06/23/20 (!) 11.3 kg (25 lb)     Body mass index is 29.55 kg/m .    General:  Edwardo is awake, alert, and cooperative.  NAD.      Diabetic Foot Screen:  Any complaints of increased pain or numbness ? No  Is there a foot ulcer now or a history of foot ulcer? No  Does the foot have an abnormal shape? No  Are the nails thick, too long or ingrown? No  Are there any redness or open areas? No         Sensation Testing done at all points on the diagram with monofilament     Right Foot: Sensation Normal at all points  Left Foot: Sensation Normal at all points     Risk Category: 0- No loss of protective sensation  Performed by Bola Madrigal MD                  Previsit labs drawn include:  Orders Only on 04/24/2021   Component Date Value Ref Range Status     Parathyroid Hormone Intact 04/24/2021 48  18 - 80 pg/mL Final     Hemoglobin 04/24/2021 16.9  13.3 - 17.7 g/dL Final     Hemoglobin A1C 04/24/2021 7.4* 0 - 5.6 % Final    " Comment: Normal <5.7% Prediabetes 5.7-6.4%  Diabetes 6.5% or higher - adopted from ADA   consensus guidelines.       Sodium 04/24/2021 139  133 - 144 mmol/L Final     Potassium 04/24/2021 4.4  3.4 - 5.3 mmol/L Final     Chloride 04/24/2021 108  94 - 109 mmol/L Final     Carbon Dioxide 04/24/2021 27  20 - 32 mmol/L Final     Anion Gap 04/24/2021 4  3 - 14 mmol/L Final     Glucose 04/24/2021 164* 70 - 99 mg/dL Final     Urea Nitrogen 04/24/2021 24  7 - 30 mg/dL Final     Creatinine 04/24/2021 1.25  0.66 - 1.25 mg/dL Final     GFR Estimate 04/24/2021 58* >60 mL/min/[1.73_m2] Final    Comment: Non  GFR Calc  Starting 12/18/2018, serum creatinine based estimated GFR (eGFR) will be   calculated using the Chronic Kidney Disease Epidemiology Collaboration   (CKD-EPI) equation.       GFR Estimate If Black 04/24/2021 67  >60 mL/min/[1.73_m2] Final    Comment:  GFR Calc  Starting 12/18/2018, serum creatinine based estimated GFR (eGFR) will be   calculated using the Chronic Kidney Disease Epidemiology Collaboration   (CKD-EPI) equation.       Calcium 04/24/2021 9.2  8.5 - 10.1 mg/dL Final     Bilirubin Total 04/24/2021 0.9  0.2 - 1.3 mg/dL Final     Albumin 04/24/2021 4.2  3.4 - 5.0 g/dL Final     Protein Total 04/24/2021 7.3  6.8 - 8.8 g/dL Final     Alkaline Phosphatase 04/24/2021 108  40 - 150 U/L Final     ALT 04/24/2021 26  0 - 70 U/L Final     AST 04/24/2021 12  0 - 45 U/L Final     Creatinine Urine 04/24/2021 77  mg/dL Final     Albumin Urine mg/L 04/24/2021 25  mg/L Final     Albumin Urine mg/g Cr 04/24/2021 32.17* 0 - 17 mg/g Cr Final         ASSESSMENT and PLAN:    Type II Diabetes, slight Worsening.    DIABETES Type II:                       Lab Results   Component Value Date    A1C 7.4 04/24/2021       Control   good  Lab Results   Component Value Date    A1C 7.4 04/24/2021    A1C 7.1 09/26/2020    A1C 7.1 06/15/2020     Lab Results   Component Value Date    MICROL 25 04/24/2021  "    No results found for: MICROALBUMIN Control   poor      Recommended to take ASA  mg daily for appropriate patient, Compliance with the recommended diabetic diet was stressed, Regular aerobic exercise was encouraged, Home glucose monitoring encouraged, Annual eye exam recommended, Self foot exam demonstrated and recommended to be done nightly, Apply moisturizer to feet with in 3 minutes of showering or bathing, Follow up in clinic in 3 months for a diabetes check and Future labs ordered and the patient was instructed to make a lab appt 1 week prior to next diabetes visit      BP/HTN:   BP Readings from Last 3 Encounters:   04/29/21 121/74   10/01/20 122/73   09/29/20 113/60     Control   good    - Medication:continue the current doses of medication. Start lisinopril 2.5 mg for his CKD  (make sure to order \"Ace not prescribed intentionally if not on an ACE/ARB)  The patient was advised to do the following therapuetic life style changes  - Dietary sodium restriction and increase potassium and Calcium intake  - Regular aerobic exercise  - Weight loss  - Discontinue smoking if applicable  - Avoid regular NSAID use if applicable  - Avoid regular decongestant use if applicable  - Follow up in clinic in 3 months for a recheck  - Check a basic metabolic panel today if needed    Patient Education: Reviewed risks of hypertension and principles of   Treatment.    HYPERCHOLESTEROLEMIA:     The ASCVD Risk score (Crosby LOGAN Jr., et al., 2013) failed to calculate for the following reasons:    The valid total cholesterol range is 130 to 320 mg/dL    Lab Results   Component Value Date    LDL 53 09/26/2020      Control   good  Cholesterol   Date Value Ref Range Status   09/26/2020 117 <200 mg/dL Final   06/15/2020 98 <200 mg/dL Final     HDL Cholesterol   Date Value Ref Range Status   09/26/2020 47 >39 mg/dL Final   06/15/2020 43 >39 mg/dL Final     LDL Cholesterol Calculated   Date Value Ref Range Status   09/26/2020 53 <100 " mg/dL Final     Comment:     Desirable:       <100 mg/dl   06/15/2020 36 <100 mg/dL Final     Comment:     Desirable:       <100 mg/dl     Triglycerides   Date Value Ref Range Status   09/26/2020 86 <150 mg/dL Final     Comment:     Fasting specimen   06/15/2020 94 <150 mg/dL Final     Comment:     Fasting specimen     Cholesterol/HDL Ratio   Date Value Ref Range Status   04/08/2015 2.0 0.0 - 5.0 Final   12/08/2014 1.9 0.0 - 5.0 Final         The patient is on a moderate intensity statin and this is the appropriate treatment based on the patient's LDL being less than 70    The patient's HDL is normal  The patient's triglycerides are normal.    We have discussed the results and we will continue the current management.       The patients chronic medication was filled for 12  months.

## 2021-04-29 NOTE — Clinical Note
Please abstract the following data from this visit with this patient into the appropriate field in Epic:    Tests that can be patient reported without a hard copy:    Eye exam with ophthalmology on this date: November, in [place]     Other Tests found in the patient's chart through Chart Review/Care Everywhere:    {Abstract Quality List (Optional):[id number]}    Note to Abstraction: If this section is blank, no results were found via Chart Review/Care Everywhere.

## 2021-04-29 NOTE — PATIENT INSTRUCTIONS
Take 600 mg of gabapentin at bedtime and 300 mg in the am for 3 day(s) and then take 600 mg twice a day(s)

## 2021-05-19 ENCOUNTER — ANCILLARY PROCEDURE (OUTPATIENT)
Dept: GENERAL RADIOLOGY | Facility: CLINIC | Age: 71
End: 2021-05-19
Attending: ORTHOPAEDIC SURGERY
Payer: MEDICARE

## 2021-05-19 ENCOUNTER — OFFICE VISIT (OUTPATIENT)
Dept: ORTHOPEDICS | Facility: CLINIC | Age: 71
End: 2021-05-19
Payer: MEDICARE

## 2021-05-19 VITALS
HEIGHT: 73 IN | BODY MASS INDEX: 30.38 KG/M2 | HEART RATE: 71 BPM | WEIGHT: 229.2 LBS | DIASTOLIC BLOOD PRESSURE: 87 MMHG | SYSTOLIC BLOOD PRESSURE: 135 MMHG

## 2021-05-19 DIAGNOSIS — M25.562 CHRONIC PAIN OF LEFT KNEE: ICD-10-CM

## 2021-05-19 DIAGNOSIS — G89.29 CHRONIC PAIN OF LEFT KNEE: ICD-10-CM

## 2021-05-19 DIAGNOSIS — M25.562 ACUTE PAIN OF LEFT KNEE: ICD-10-CM

## 2021-05-19 DIAGNOSIS — M17.12 PRIMARY OSTEOARTHRITIS OF LEFT KNEE: Primary | ICD-10-CM

## 2021-05-19 PROCEDURE — 20610 DRAIN/INJ JOINT/BURSA W/O US: CPT | Mod: LT | Performed by: ORTHOPAEDIC SURGERY

## 2021-05-19 PROCEDURE — 73562 X-RAY EXAM OF KNEE 3: CPT | Mod: LT | Performed by: RADIOLOGY

## 2021-05-19 PROCEDURE — 99213 OFFICE O/P EST LOW 20 MIN: CPT | Mod: 25 | Performed by: ORTHOPAEDIC SURGERY

## 2021-05-19 RX ORDER — BUPIVACAINE HYDROCHLORIDE 2.5 MG/ML
4 INJECTION, SOLUTION EPIDURAL; INFILTRATION; INTRACAUDAL
Status: DISCONTINUED | OUTPATIENT
Start: 2021-05-19 | End: 2021-08-02

## 2021-05-19 RX ORDER — METHYLPREDNISOLONE ACETATE 80 MG/ML
80 INJECTION, SUSPENSION INTRA-ARTICULAR; INTRALESIONAL; INTRAMUSCULAR; SOFT TISSUE
Status: DISCONTINUED | OUTPATIENT
Start: 2021-05-19 | End: 2021-08-02

## 2021-05-19 RX ADMIN — BUPIVACAINE HYDROCHLORIDE 4 ML: 2.5 INJECTION, SOLUTION EPIDURAL; INFILTRATION; INTRACAUDAL at 16:15

## 2021-05-19 RX ADMIN — METHYLPREDNISOLONE ACETATE 80 MG: 80 INJECTION, SUSPENSION INTRA-ARTICULAR; INTRALESIONAL; INTRAMUSCULAR; SOFT TISSUE at 16:15

## 2021-05-19 ASSESSMENT — PAIN SCALES - GENERAL: PAINLEVEL: SEVERE PAIN (6)

## 2021-05-19 ASSESSMENT — MIFFLIN-ST. JEOR: SCORE: 1858.52

## 2021-05-19 NOTE — PROGRESS NOTES
"CHIEF COMPLAINT:   Chief Complaint   Patient presents with     Left Knee - Pain     Onset: 3-4 weeks. NKI. Pain came on fairly suddenly and has gotten worse. Pain increases with walking, going up and down hills/stairs. Pain feels \"inside\". He has a sharp ache. No tx.    .        HISTORY OF PRESENT ILLNESS    Edwardo Dumont is a 70 year old male seen for evaluation of ongoing left knee pain with no known injury.   Pain has been present for years, evaluated by us in 2015 and noted to have primary osteoarthritis (mostly patello-femoral), no treatments at that time. Pain really set in the past 3-4 weeks. Pain came on fairly suddenly and progressing. Worse walking up down stairs and hills, golf, not as bad flat walking. Pain deep inside under the kneecap, sharp and aching. No treatments. No over the counter pain medications.    Golfing 2 days per week now, 4-5 days per week over the winter in [place].    Present symptoms: pain anteriorly, pain sharp, dull/achy , moderate pain.    Pain severity: 6/10  Frequency of symptoms: are constant  Exacerbating Factors: weight bearing, stairs, [id number]  Relieving Factors: rest, sitting  Night Pain: No  Pain while at rest: No   Numbness or tingling: No   Patient has tried:     NSAIDS: No      Physical Therapy: No      Activity modification: No      Bracing: No      Injections: No      Ice: No      Assistive device:  No    Orthopaedic PMH: bilateral shoulder pain.    Other PMH:  has a past medical history of Acromioclavicular joint separation, type 1 (9/12), Allergic rhinitis, Arthritis, CKD (chronic kidney disease) stage 3, GFR 30-59 ml/min, Degenerative arthritis of hip - right (12/27/2010), Gout, Hip arthrosis - right (10/25/2010), Hyperlipidemia, Ischaemic vascular disease, Ischemic vascular disease (5/12), OA (osteoarthritis) of knee (10/25/2010), Renal insufficiency, Sleep apnea, Type II or unspecified type diabetes mellitus without mention of complication, not stated as " uncontrolled, and Unspecified essential hypertension. He also has no past medical history of Amblyopia, Bleeding disorder (H), Cancer (H), Cataract, Cerebral infarction (H), Chronic infection, Complication of anesthesia, Congestive heart failure (H), COPD (chronic obstructive pulmonary disease) (H), Depressive disorder, Diabetic retinopathy (H), Glaucoma (increased eye pressure), Heart disease, History of blood transfusion, Inflammatory arthritis, Malignant hyperthermia, Retinal detachment, Senile macular degeneration, Strabismus, Stroke (H), Surgical complications, Thyroid disease, Uncomplicated asthma, Unspecified asthma(493.90), or Uveitis.  Patient Active Problem List   Diagnosis     Cough     Sleep apnea     Gout     Low back pain     Radiculopathy of leg     Hyperlipidemia LDL goal <100     OA (osteoarthritis) of knee     Degenerative arthritis of hip - right     Urinary retention     Advanced directives, counseling/discussion     S/P hip replacement     Closed dislocation of acromioclavicular joint     Impingement syndrome of right shoulder     Ischemic vascular disease   one stent coronary artery 5/12     Essential hypertension, benign     Visual disturbance, transient, right eye     Dermatochalasis     Insomnia     S/P coronary angiogram     CKD (chronic kidney disease) stage 3, GFR 30-59 ml/min     Type II diabetes mellitus with peripheral circulatory disorder (H)     Spinal stenosis of lumbar region with neurogenic claudication     S/P lumbar fusion     Benign prostatic hyperplasia with nocturia       Surgical Hx:  has a past surgical history that includes gastric bypass (1/03); TOTAL HIP ARTHROPLASTY (1/11/11); Stent (5/8/12); Endoscopic sinus surgery (12/14/15); Endoscopic sinus surgery (Bilateral, 12/14/2015); sinus surgery (Right, 12-14-15); Stent (01/2017); ABLATION SUPRAVENT ARRHYTHMOGENIC FOCUS (12/21/15); stent, coronary, shelia (01/2017); Optical tracking system fusion spine posterior lumbar two  levels (N/A, 7/20/2018); and back surgery (7/20/2018).    Medications:   Current Outpatient Medications:      ACE NOT PRESCRIBED, INTENTIONAL,, ACE Inhibitor not prescribed due to Other: cough, Disp: 0 each, Rfl: 0     aspirin 81 MG tablet, Take 1 tablet (81 mg) by mouth daily, Disp: , Rfl:      atorvastatin (LIPITOR) 20 MG tablet, Take 1 tablet (20 mg) by mouth daily, Disp: 90 tablet, Rfl: 3     blood glucose (ACCU-CHEK SMARTVIEW) test strip, ONCE DAILY TESTING AND AS NEEDED, Disp: 100 strip, Rfl: 4     blood glucose monitoring (Ecorithm CONTOUR MONITOR) meter device kit, Use to test blood sugar  times daily or as directed., Disp: 1 kit, Rfl: 0     clindamycin (CLEOCIN) 300 MG capsule, Take 1 capsule (300 mg) by mouth 4 times daily, Disp: 40 capsule, Rfl: 0     empagliflozin (JARDIANCE) 25 MG TABS tablet, Take 1 tablet (25 mg) by mouth daily, Disp: 90 tablet, Rfl: 3     fluticasone (FLONASE) 50 MCG/ACT nasal spray, Spray 1 spray into both nostrils daily, Disp: , Rfl:      gabapentin (NEURONTIN) 300 MG capsule, TAKE ONE CAPSULE DAILY AT NIGHT FOR 3 DAY(S) TAKE ONE CAPSULE IN THE AM AND AT BEDTIME FOR 3 DAY(S) THEN TAKE ONE CAPSULE IN THE AM, ONE IN THE AFTERNOON AND ONE AT BEDTIME THEREAFTER, Disp: 90 capsule, Rfl: 1     gabapentin (NEURONTIN) 600 MG tablet, Take 1 tablet (600 mg) by mouth 2 times daily, Disp: 180 tablet, Rfl: 3     lisinopril (ZESTRIL) 2.5 MG tablet, Take 1 tablet (2.5 mg) by mouth daily, Disp: 90 tablet, Rfl: 3     metFORMIN (GLUCOPHAGE) 500 MG tablet, TAKE 2 TABLETS BY MOUTH TWICE DAILY WITH MEALS, Disp: 360 tablet, Rfl: 3     metoprolol succinate ER (TOPROL-XL) 25 MG 24 hr tablet, Take 12.5 mg by mouth daily, Disp: , Rfl:      nitroGLYcerin (NITROSTAT) 0.4 MG sublingual tablet, Place 1 tablet (0.4 mg) under the tongue every 5 minutes as needed for chest pain, Disp: 90 tablet, Rfl: 4    Allergies:   Allergies   Allergen Reactions     Benzonatate Anaphylaxis and Swelling     Lisinopril Cough      "Zocor [Simvastatin - High Dose]      Poor memory       Social Hx: retired.   reports that he quit smoking about 31 years ago. His smoking use included cigarettes. He started smoking about 51 years ago. He has a 5.00 pack-year smoking history. He has never used smokeless tobacco. He reports current alcohol use. He reports that he does not use drugs.    Family Hx: family history includes Allergies in his son and son; Cancer in his mother; Neurologic Disorder in his father.    REVIEW OF SYSTEMS: 10 point ROS neg other than the symptoms noted above in the HPI and PMH. Notables include  CONSTITUTIONAL:NEGATIVE for fever, chills, change in weight  INTEGUMENTARY/SKIN: NEGATIVE for worrisome rashes, moles or lesions  MUSCULOSKELETAL:See HPI above  NEURO: NEGATIVE for weakness, dizziness or paresthesias    PHYSICAL EXAM:  /87   Pulse 71   Ht 1.854 m (6' 1\")   Wt 104 kg (229 lb 3.2 oz)   BMI 30.24 kg/m     GENERAL APPEARANCE: healthy, alert, no distress  SKIN: no suspicious lesions or rashes  NEURO: Normal strength and tone, mentation intact and speech normal  PSYCH:  mentation appears normal and affect normal, not anxious  RESPIRATORY: No increased work of breathing.  HANDS: no clubbing, nail pitting  LYMPH: no palpable popliteal lymphadenopathy.    BILATERAL LOWER EXTREMITIES:  Gait: normal  Alignment: varus  No gross deformities or masses.  Bilateral  Quad atrophy, strength normal.  Intact sensation deep peroneal nerve, superficial peroneal nerve, med/lat tibial nerve, sural nerve, saphenous nerve  Intact EHL, EDL, TA, FHL, GS, quadriceps hamstrings and hip flexors  Toes warm and well perfused, brisk capillary refill. Palpable 2+ dp pulses.  Bilateral calf soft and nttp or squeeze.  DTRs: achilles 2+, patella 2+.  Edema: trace    LEFT KNEE EXAM:    Skin: intact, no ecchymosis or erythema  Squat: 100 %, not limited by pain.   causes anterior pain.  ROM: 2 extension to 125+ flexion  Tight hamstrings on straight leg " "raise.  Effusion: small  Tender: NTTP med/lat joint line, anterior or posterior knee  McMurrays: negative    MCL: stable, and non-painful at both 0 and 30 degrees knee flexion  Varus stress: stable, and non-painful at both 0 and 30 degrees knee flexion  Lachmans: neg, firm endpoint  Posterior Drawer stable  Patellofemoral joint:                Apprehension: negative              Crepitations: mild   Grind: positive.    RIGHT KNEE EXAM:    Skin: intact, no ecchymosis or erythema  Squat: 100 %, not limited by pain.     ROM: full extension to 125+ flexion  Tight hamstrings on straight leg raise.  Effusion: none  Tender: NTTP med/lat joint line, anterior or posterior knee  McMurrays: negative    MCL: stable, and non-painful at both 0 and 30 degrees knee flexion  Varus stress: stable, and non-painful at both 0 and 30 degrees knee flexion  Lachmans: neg, firm endpoint  Posterior Drawer stable  Patellofemoral joint:                Apprehension: negative              Crepitations: mild    X-RAY:  3 views left knee from 5/19/2021 were reviewed in clinic today. On my review, no obvious fractures or dislocations. Moderate patello-femoral degenerative changes with near bone on bone loss of lateral patello-femoral compartment space with marginal osteophytes. Mild medial compartment narrowing, notch osteophytes. Some articular flattening noted.           ASSESSMENT/PLAN: Edwardo Dumont is a 70 year old male with acute on chronic left knee pain, primary osteoarthritis.     * reviewed imaging studies with patient, showing arthritic changes, or wearing of the cartilage in the knee. Most pronounced under the knee cap, which is consistent with most pain with stairs, inclines, etc.  * This can be caused by normal \"wear and tear\" over the years or following prior injury to the knee.    Treatment typically starts nonsurgically. Surgical indication for total knee arthroplasty  when nonsurgical management is no longer " "effective.    Non-surgical treatment for knee arthritis includes:    * rest, sitting  * Activity modification - avoid impact activities or activities that aggravate symptoms.  * NSAIDS (non-steroidal anti-inflammatory medications; e.g. Aleve, advil, motrin, ibuprofen) - regular use for inflammation ( twice daily or three times daily), with food, as long as no contra-indications Please discuss with primary care doctor if needed  * ice, 15-20 minutes at a time several times a day or as needed.  * Strengthening of quadriceps muscles  * Physical Therapy for strengthening, stretching and range of motion exercises of legs  * Tylenol as needed for pain, consider Tylenol arthritis or similar  * Weight loss: Weight loss:  Body mass index is 30.24 kg/m .. weight loss benefits, not only for the current pain symptoms, but also overall health. Recommend a good diet plan that works for the patient, with the assistance of a dietician or primary care doctor as needed. Also, a good, low-impact exercise program for at least 20 minutes per day, 3 times per week, such as exercise bike, elliptical , or pool.  * Exercise: low impact such as stationary bike, elliptical, pool.  * Injections: cortisone versus viscosupplementation (hyaluronic acid, \"rooster comb\", \"gel shots\"); risks and perceived benefits discussed today. Patient elects to proceed.  * Bracing: bracing the knee may offer some relief of symptoms when worn and provide some stability.  * over the counter supplements such as glucosamine and chondroitin sulfate may help with joint pain.  * topical ointments may help as well    * return to clinic as needed.          Skip Reyes M.D., M.S.  Dept. of Orthopaedic Surgery  [facility]    Large Joint Injection/Arthocentesis: L knee joint    Date/Time: 5/19/2021 4:15 PM  Performed by: Skip Reyes MD  Authorized by: Skip Reyes MD     Indications:  Pain  Needle Size:  22 G  Guidance: landmark guided  "   Approach:  Superolateral  Location:  Knee      Medications:  80 mg methylPREDNISolone 80 MG/ML; 4 mL bupivacaine HCl (PF) 0.25 %  Medications comment:  4cc 0.25% Bupivacaine 25mg/10 mL used as local anaesthetic prior to joint aspiration.  4cc 0.25% Bupivacaine 25mg/10 mL compounded with methylPREDNISolone  Aspirate amount (mL):  15  Aspirate:  Blood-tinged and bloody  Outcome:  Tolerated well, no immediate complications  Procedure discussed: discussed risks, benefits, and alternatives    Consent Given by:  Patient  Timeout: timeout called immediately prior to procedure    Prep: patient was prepped and draped in usual sterile fashion

## 2021-05-19 NOTE — LETTER
"    5/19/2021         RE: Edwardo Dumont  [id number] [facility] [address]        Dear Colleague,    Thank you for referring your patient, Edwardo Dumont, to the [facility]. Please see a copy of my visit note below.    CHIEF COMPLAINT:   Chief Complaint   Patient presents with     Left Knee - Pain     Onset: 3-4 weeks. NKI. Pain came on fairly suddenly and has gotten worse. Pain increases with walking, going up and down hills/stairs. Pain feels \"inside\". He has a sharp ache. No tx.    .        HISTORY OF PRESENT ILLNESS    Edwardo Dumont is a 70 year old male seen for evaluation of ongoing left knee pain with no known injury.   Pain has been present for years, evaluated by us in 2015 and noted to have primary osteoarthritis (mostly patello-femoral), no treatments at that time. Pain really set in the past 3-4 weeks. Pain came on fairly suddenly and progressing. Worse walking up down stairs and hills, golf, not as bad flat walking. Pain deep inside under the kneecap, sharp and aching. No treatments. No over the counter pain medications.    Golfing 2 days per week now, 4-5 days per week over the winter in [place].    Present symptoms: pain anteriorly, pain sharp, dull/achy , moderate pain.    Pain severity: 6/10  Frequency of symptoms: are constant  Exacerbating Factors: weight bearing, stairs, [id number]  Relieving Factors: rest, sitting  Night Pain: No  Pain while at rest: No   Numbness or tingling: No   Patient has tried:     NSAIDS: No      Physical Therapy: No      Activity modification: No      Bracing: No      Injections: No      Ice: No      Assistive device:  No    Orthopaedic PMH: bilateral shoulder pain.    Other PMH:  has a past medical history of Acromioclavicular joint separation, type 1 (9/12), Allergic rhinitis, Arthritis, CKD (chronic kidney disease) stage 3, GFR 30-59 ml/min, Degenerative arthritis of hip - right (12/27/2010), Gout, Hip arthrosis - right " (10/25/2010), Hyperlipidemia, Ischaemic vascular disease, Ischemic vascular disease (5/12), OA (osteoarthritis) of knee (10/25/2010), Renal insufficiency, Sleep apnea, Type II or unspecified type diabetes mellitus without mention of complication, not stated as uncontrolled, and Unspecified essential hypertension. He also has no past medical history of Amblyopia, Bleeding disorder (H), Cancer (H), Cataract, Cerebral infarction (H), Chronic infection, Complication of anesthesia, Congestive heart failure (H), COPD (chronic obstructive pulmonary disease) (H), Depressive disorder, Diabetic retinopathy (H), Glaucoma (increased eye pressure), Heart disease, History of blood transfusion, Inflammatory arthritis, Malignant hyperthermia, Retinal detachment, Senile macular degeneration, Strabismus, Stroke (H), Surgical complications, Thyroid disease, Uncomplicated asthma, Unspecified asthma(493.90), or Uveitis.  Patient Active Problem List   Diagnosis     Cough     Sleep apnea     Gout     Low back pain     Radiculopathy of leg     Hyperlipidemia LDL goal <100     OA (osteoarthritis) of knee     Degenerative arthritis of hip - right     Urinary retention     Advanced directives, counseling/discussion     S/P hip replacement     Closed dislocation of acromioclavicular joint     Impingement syndrome of right shoulder     Ischemic vascular disease   one stent coronary artery 5/12     Essential hypertension, benign     Visual disturbance, transient, right eye     Dermatochalasis     Insomnia     S/P coronary angiogram     CKD (chronic kidney disease) stage 3, GFR 30-59 ml/min     Type II diabetes mellitus with peripheral circulatory disorder (H)     Spinal stenosis of lumbar region with neurogenic claudication     S/P lumbar fusion     Benign prostatic hyperplasia with nocturia       Surgical Hx:  has a past surgical history that includes gastric bypass (1/03); TOTAL HIP ARTHROPLASTY (1/11/11); Stent (5/8/12); Endoscopic sinus  surgery (12/14/15); Endoscopic sinus surgery (Bilateral, 12/14/2015); sinus surgery (Right, 12-14-15); Stent (01/2017); ABLATION SUPRAVENT ARRHYTHMOGENIC FOCUS (12/21/15); stent, coronary, shelia (01/2017); Optical tracking system fusion spine posterior lumbar two levels (N/A, 7/20/2018); and back surgery (7/20/2018).    Medications:   Current Outpatient Medications:      ACE NOT PRESCRIBED, INTENTIONAL,, ACE Inhibitor not prescribed due to Other: cough, Disp: 0 each, Rfl: 0     aspirin 81 MG tablet, Take 1 tablet (81 mg) by mouth daily, Disp: , Rfl:      atorvastatin (LIPITOR) 20 MG tablet, Take 1 tablet (20 mg) by mouth daily, Disp: 90 tablet, Rfl: 3     blood glucose (ACCU-CHEK SMARTVIEW) test strip, ONCE DAILY TESTING AND AS NEEDED, Disp: 100 strip, Rfl: 4     blood glucose monitoring (AgFlow CONTOUR MONITOR) meter device kit, Use to test blood sugar  times daily or as directed., Disp: 1 kit, Rfl: 0     clindamycin (CLEOCIN) 300 MG capsule, Take 1 capsule (300 mg) by mouth 4 times daily, Disp: 40 capsule, Rfl: 0     empagliflozin (JARDIANCE) 25 MG TABS tablet, Take 1 tablet (25 mg) by mouth daily, Disp: 90 tablet, Rfl: 3     fluticasone (FLONASE) 50 MCG/ACT nasal spray, Spray 1 spray into both nostrils daily, Disp: , Rfl:      gabapentin (NEURONTIN) 300 MG capsule, TAKE ONE CAPSULE DAILY AT NIGHT FOR 3 DAY(S) TAKE ONE CAPSULE IN THE AM AND AT BEDTIME FOR 3 DAY(S) THEN TAKE ONE CAPSULE IN THE AM, ONE IN THE AFTERNOON AND ONE AT BEDTIME THEREAFTER, Disp: 90 capsule, Rfl: 1     gabapentin (NEURONTIN) 600 MG tablet, Take 1 tablet (600 mg) by mouth 2 times daily, Disp: 180 tablet, Rfl: 3     lisinopril (ZESTRIL) 2.5 MG tablet, Take 1 tablet (2.5 mg) by mouth daily, Disp: 90 tablet, Rfl: 3     metFORMIN (GLUCOPHAGE) 500 MG tablet, TAKE 2 TABLETS BY MOUTH TWICE DAILY WITH MEALS, Disp: 360 tablet, Rfl: 3     metoprolol succinate ER (TOPROL-XL) 25 MG 24 hr tablet, Take 12.5 mg by mouth daily, Disp: , Rfl:      nitroGLYcerin  "(NITROSTAT) 0.4 MG sublingual tablet, Place 1 tablet (0.4 mg) under the tongue every 5 minutes as needed for chest pain, Disp: 90 tablet, Rfl: 4    Allergies:   Allergies   Allergen Reactions     Benzonatate Anaphylaxis and Swelling     Lisinopril Cough     Zocor [Simvastatin - High Dose]      Poor memory       Social Hx: retired.   reports that he quit smoking about 31 years ago. His smoking use included cigarettes. He started smoking about 51 years ago. He has a 5.00 pack-year smoking history. He has never used smokeless tobacco. He reports current alcohol use. He reports that he does not use drugs.    Family Hx: family history includes Allergies in his son and son; Cancer in his mother; Neurologic Disorder in his father.    REVIEW OF SYSTEMS: 10 point ROS neg other than the symptoms noted above in the HPI and PMH. Notables include  CONSTITUTIONAL:NEGATIVE for fever, chills, change in weight  INTEGUMENTARY/SKIN: NEGATIVE for worrisome rashes, moles or lesions  MUSCULOSKELETAL:See HPI above  NEURO: NEGATIVE for weakness, dizziness or paresthesias    PHYSICAL EXAM:  /87   Pulse 71   Ht 1.854 m (6' 1\")   Wt 104 kg (229 lb 3.2 oz)   BMI 30.24 kg/m     GENERAL APPEARANCE: healthy, alert, no distress  SKIN: no suspicious lesions or rashes  NEURO: Normal strength and tone, mentation intact and speech normal  PSYCH:  mentation appears normal and affect normal, not anxious  RESPIRATORY: No increased work of breathing.  HANDS: no clubbing, nail pitting  LYMPH: no palpable popliteal lymphadenopathy.    BILATERAL LOWER EXTREMITIES:  Gait: normal  Alignment: varus  No gross deformities or masses.  Bilateral  Quad atrophy, strength normal.  Intact sensation deep peroneal nerve, superficial peroneal nerve, med/lat tibial nerve, sural nerve, saphenous nerve  Intact EHL, EDL, TA, FHL, GS, quadriceps hamstrings and hip flexors  Toes warm and well perfused, brisk capillary refill. Palpable 2+ dp pulses.  Bilateral calf " soft and nttp or squeeze.  DTRs: achilles 2+, patella 2+.  Edema: trace    LEFT KNEE EXAM:    Skin: intact, no ecchymosis or erythema  Squat: 100 %, not limited by pain.   causes anterior pain.  ROM: 2 extension to 125+ flexion  Tight hamstrings on straight leg raise.  Effusion: small  Tender: NTTP med/lat joint line, anterior or posterior knee  McMurrays: negative    MCL: stable, and non-painful at both 0 and 30 degrees knee flexion  Varus stress: stable, and non-painful at both 0 and 30 degrees knee flexion  Lachmans: neg, firm endpoint  Posterior Drawer stable  Patellofemoral joint:                Apprehension: negative              Crepitations: mild   Grind: positive.    RIGHT KNEE EXAM:    Skin: intact, no ecchymosis or erythema  Squat: 100 %, not limited by pain.     ROM: full extension to 125+ flexion  Tight hamstrings on straight leg raise.  Effusion: none  Tender: NTTP med/lat joint line, anterior or posterior knee  McMurrays: negative    MCL: stable, and non-painful at both 0 and 30 degrees knee flexion  Varus stress: stable, and non-painful at both 0 and 30 degrees knee flexion  Lachmans: neg, firm endpoint  Posterior Drawer stable  Patellofemoral joint:                Apprehension: negative              Crepitations: mild    X-RAY:  3 views left knee from 5/19/2021 were reviewed in clinic today. On my review, no obvious fractures or dislocations. Moderate patello-femoral degenerative changes with near bone on bone loss of lateral patello-femoral compartment space with marginal osteophytes. Mild medial compartment narrowing, notch osteophytes. Some articular flattening noted.           ASSESSMENT/PLAN: Edwardo Dumont is a 70 year old male with acute on chronic left knee pain, primary osteoarthritis.     * reviewed imaging studies with patient, showing arthritic changes, or wearing of the cartilage in the knee. Most pronounced under the knee cap, which is consistent with most pain with stairs, inclines,  "etc.  * This can be caused by normal \"wear and tear\" over the years or following prior injury to the knee.    Treatment typically starts nonsurgically. Surgical indication for total knee arthroplasty  when nonsurgical management is no longer effective.    Non-surgical treatment for knee arthritis includes:    * rest, sitting  * Activity modification - avoid impact activities or activities that aggravate symptoms.  * NSAIDS (non-steroidal anti-inflammatory medications; e.g. Aleve, advil, motrin, ibuprofen) - regular use for inflammation ( twice daily or three times daily), with food, as long as no contra-indications Please discuss with primary care doctor if needed  * ice, 15-20 minutes at a time several times a day or as needed.  * Strengthening of quadriceps muscles  * Physical Therapy for strengthening, stretching and range of motion exercises of legs  * Tylenol as needed for pain, consider Tylenol arthritis or similar  * Weight loss: Weight loss:  Body mass index is 30.24 kg/m .. weight loss benefits, not only for the current pain symptoms, but also overall health. Recommend a good diet plan that works for the patient, with the assistance of a dietician or primary care doctor as needed. Also, a good, low-impact exercise program for at least 20 minutes per day, 3 times per week, such as exercise bike, elliptical , or pool.  * Exercise: low impact such as stationary bike, elliptical, pool.  * Injections: cortisone versus viscosupplementation (hyaluronic acid, \"rooster comb\", \"gel shots\"); risks and perceived benefits discussed today. Patient elects to proceed.  * Bracing: bracing the knee may offer some relief of symptoms when worn and provide some stability.  * over the counter supplements such as glucosamine and chondroitin sulfate may help with joint pain.  * topical ointments may help as well    * return to clinic as needed.          Skip Reyes M.D., M.S.  Dept. of Orthopaedic Surgery  [facility]    Large Joint Injection/Arthocentesis: L knee joint    Date/Time: 5/19/2021 4:15 PM  Performed by: Skip Reyes MD  Authorized by: Skip Reyes MD     Indications:  Pain  Needle Size:  22 G  Guidance: landmark guided    Approach:  Superolateral  Location:  Knee      Medications:  80 mg methylPREDNISolone 80 MG/ML; 4 mL bupivacaine HCl (PF) 0.25 %  Medications comment:  4cc 0.25% Bupivacaine 25mg/10 mL used as local anaesthetic prior to joint aspiration.  4cc 0.25% Bupivacaine 25mg/10 mL compounded with methylPREDNISolone  Aspirate amount (mL):  15  Aspirate:  Blood-tinged and bloody  Outcome:  Tolerated well, no immediate complications  Procedure discussed: discussed risks, benefits, and alternatives    Consent Given by:  Patient  Timeout: timeout called immediately prior to procedure    Prep: patient was prepped and draped in usual sterile fashion                Again, thank you for allowing me to participate in the care of your patient.        Sincerely,        Skip Reyes MD

## 2021-06-30 ENCOUNTER — ANCILLARY PROCEDURE (OUTPATIENT)
Dept: GENERAL RADIOLOGY | Facility: CLINIC | Age: 71
End: 2021-06-30
Attending: FAMILY MEDICINE
Payer: MEDICARE

## 2021-06-30 ENCOUNTER — OFFICE VISIT (OUTPATIENT)
Dept: URGENT CARE | Facility: URGENT CARE | Age: 71
End: 2021-06-30
Payer: MEDICARE

## 2021-06-30 VITALS
HEART RATE: 65 BPM | OXYGEN SATURATION: 95 % | DIASTOLIC BLOOD PRESSURE: 70 MMHG | SYSTOLIC BLOOD PRESSURE: 110 MMHG | TEMPERATURE: 98.1 F

## 2021-06-30 DIAGNOSIS — J20.9 ACUTE BRONCHITIS WITH SYMPTOMS > 10 DAYS: Primary | ICD-10-CM

## 2021-06-30 DIAGNOSIS — J01.90 ACUTE SINUSITIS WITH SYMPTOMS > 10 DAYS: ICD-10-CM

## 2021-06-30 PROCEDURE — 99214 OFFICE O/P EST MOD 30 MIN: CPT | Performed by: FAMILY MEDICINE

## 2021-06-30 PROCEDURE — 71046 X-RAY EXAM CHEST 2 VIEWS: CPT | Performed by: RADIOLOGY

## 2021-06-30 RX ORDER — ALBUTEROL SULFATE 90 UG/1
2 AEROSOL, METERED RESPIRATORY (INHALATION) EVERY 4 HOURS PRN
Qty: 18 G | Refills: 0 | Status: SHIPPED | OUTPATIENT
Start: 2021-06-30 | End: 2022-05-31

## 2021-06-30 NOTE — PROGRESS NOTES
Chief complaint: sinus cogh     10 days ago started with a sore throat  Granddaughter had a cold and they were together  Patient picked up her cold  Has had a horrible cough since then  Patient prone to sinus infection  Patient feels gurgling in chest when he breathes  Coughing productive clear and white  Settling in chest  Slight wheeze  No fevers  No chest pain or shortness of breath  No loss of taste or smell  No nausea vomiting or diarrhea    Blood sugars ok  \Chest pain or exertional shortness of breath: NO  Exposure to pertussis or pertussis like symptoms: NO  Orthopnea, worsening edema, pnd: NO  Rash: NO  Tried OTC medications without relief  No hemoptysis   No calf pain or tenderness. No signs or symptoms of DVT.   Worsening symptoms hence patient came in to be seen     Problem list and histories reviewed & adjusted, as indicated.  Additional history: as documented    Problem list, Medication list, Allergies, and Medical/Social/Surgical histories reviewed in EPIC and updated as appropriate.    ROS:  Constitutional, HEENT, cardiovascular, pulmonary, gi and gu systems are negative, except as otherwise noted.    OBJECTIVE:                                                    /70   Pulse 65   Temp 98.1  F (36.7  C) (Tympanic)   SpO2 95%   There is no height or weight on file to calculate BMI.  GENERAL: healthy, alert and no distress  EYES: Pink palpebral conjunctiva, anicteric sclera  ENT: midline nasal septum normal ear exam. Normal sinuses  NECK: no adenopathy, no asymmetry, masses, or scars and thyroid normal to palpation  RESP: symmetrical chest expansion, no retractions, increased expiratory phase wheezes heard at bilateral lungs occasional  Occasional crackles bilateral bases   CV: regular rate and rhythm, normal S1 S2, no S3 or S4, no murmur, click or rub, no peripheral edema and peripheral pulses strong  SKIN: no readily visible rashes noted  MS: no gross musculoskeletal defects  noted    Diagnostic Test Results:  No results found for this or any previous visit (from the past 24 hour(s)).     ASSESSMENT/PLAN:                                                        ICD-10-CM    1. Acute bronchitis with symptoms > 10 days  J20.9 albuterol (PROAIR HFA) 108 (90 Base) MCG/ACT inhaler     XR Chest 2 Views   2. Acute sinusitis with symptoms > 10 days  J01.90 amoxicillin-clavulanate (AUGMENTIN) 875-125 MG tablet       Prescribed with augmentin  Side effects discussed warned about GI side effects   Prescribed with albuterol as needed cough and wheeze  xrays reviewed by me nothing acute  We discussed prednisone - patient declined. May consider low dose prednisone if symptoms persist. Patient DM so we are trying to avoid this because of concerns of raising his blood sugars.   If with any symptoms of chest pain or shortness of breath, lightheadedness, palpitations, feeling like passing out or change and worsening in the quality of your symptoms, please proceed to ER. Recommend follow up with PCP in a few days for re-evaluation if symptoms persis  Rule out covid - patient declined  He plans to self isolate until symptoms improve for 24 hours. He is vaccinated and already had 10 days of symptoms   Adverse reactions of medications discussed.  Over the counter medications discussed.   Aware to come back in if with worsening symptoms or if no relief despite treatment plan  Patient voiced understanding and had no further questions.     MD Tiffany Parham MD  [facility] [place]

## 2021-08-02 ENCOUNTER — ANCILLARY PROCEDURE (OUTPATIENT)
Dept: GENERAL RADIOLOGY | Facility: CLINIC | Age: 71
End: 2021-08-02
Attending: ORTHOPAEDIC SURGERY
Payer: MEDICARE

## 2021-08-02 ENCOUNTER — OFFICE VISIT (OUTPATIENT)
Dept: ORTHOPEDICS | Facility: CLINIC | Age: 71
End: 2021-08-02
Payer: MEDICARE

## 2021-08-02 VITALS
SYSTOLIC BLOOD PRESSURE: 132 MMHG | HEART RATE: 74 BPM | DIASTOLIC BLOOD PRESSURE: 80 MMHG | HEIGHT: 73 IN | BODY MASS INDEX: 30.3 KG/M2 | WEIGHT: 228.6 LBS

## 2021-08-02 DIAGNOSIS — S76.012A MUSCLE STRAIN OF GLUTEAL REGION, LEFT, INITIAL ENCOUNTER: ICD-10-CM

## 2021-08-02 DIAGNOSIS — M25.559 HIP PAIN: ICD-10-CM

## 2021-08-02 DIAGNOSIS — M25.559 HIP PAIN: Primary | ICD-10-CM

## 2021-08-02 DIAGNOSIS — M16.12 PRIMARY OSTEOARTHRITIS OF LEFT HIP: ICD-10-CM

## 2021-08-02 PROCEDURE — 73502 X-RAY EXAM HIP UNI 2-3 VIEWS: CPT | Mod: LT | Performed by: RADIOLOGY

## 2021-08-02 PROCEDURE — 99214 OFFICE O/P EST MOD 30 MIN: CPT | Performed by: ORTHOPAEDIC SURGERY

## 2021-08-02 ASSESSMENT — PAIN SCALES - GENERAL: PAINLEVEL: NO PAIN (1)

## 2021-08-02 ASSESSMENT — MIFFLIN-ST. JEOR: SCORE: 1850.8

## 2021-08-02 NOTE — LETTER
"    8/2/2021         RE: Edwardo Dumont  [id number] [facility] [address]        Dear Colleague,    Thank you for referring your patient, Edwardo Dumont, to the [facility] [place]. Please see a copy of my visit note below.    Chief Complaint:   Chief Complaint   Patient presents with     Left Hip - Pain     Onset: 3-4 weeks. NKI. Pain came on gradual and has gotten worse. Pain is lateral and can radiate down the side of the leg. Denies any groin pain. Denies any N/T in leg. It is just an ache. No tx.        HISTORY OF PRESENT ILLNESS    Edwardo Dumont is a 71 year old male seen for evaluation of ongoing left hip pain with no known injury.   Pain has been present for 3-4 weeks. Locaters pain along side of the hip and down the leg to the knee. Denies groin pain. No numbness and tingling. Just an ache. No treatments. Onset usually after activities, not so much during. Can feel it if walks on it \"wrong\". No issues with in/out of car, donning shoes and socks. No medications. Denies limb length inequality.    Plays golf, pickle ball.    Denies low back pain.    Prior right total hip arthroplasty 2011, has done well.    Present symptoms: pain laterally, pain dull/achy , mild pain.    Pain severity: 1/10  Pain quality: dull and aching  Frequency of symptoms: occasionally  Exacerbating Factors: weight bearing  Relieving Factors: rest  Night Pain: No  Pain while at rest: occasional ache   Associated numbness or tingling: No     Orthopedic PMH: right total hip arthroplasty 2011.    Other PMH:  has a past medical history of Acromioclavicular joint separation, type 1 (9/12), Allergic rhinitis, Arthritis, CKD (chronic kidney disease) stage 3, GFR 30-59 ml/min, Degenerative arthritis of hip - right (12/27/2010), Gout, Hip arthrosis - right (10/25/2010), Hyperlipidemia, Ischaemic vascular disease, Ischemic vascular disease (5/12), OA (osteoarthritis) of knee (10/25/2010), Renal insufficiency, Sleep " apnea, Type II or unspecified type diabetes mellitus without mention of complication, not stated as uncontrolled, and Unspecified essential hypertension. He also has no past medical history of Amblyopia, Bleeding disorder (H), Cancer (H), Cataract, Cerebral infarction (H), Chronic infection, Complication of anesthesia, Congestive heart failure (H), COPD (chronic obstructive pulmonary disease) (H), Depressive disorder, Diabetic retinopathy (H), Glaucoma (increased eye pressure), Heart disease, History of blood transfusion, Inflammatory arthritis, Malignant hyperthermia, Retinal detachment, Senile macular degeneration, Strabismus, Stroke (H), Surgical complications, Thyroid disease, Uncomplicated asthma, Unspecified asthma(493.90), or Uveitis.  Patient Active Problem List   Diagnosis     Cough     Sleep apnea     Gout     Low back pain     Radiculopathy of leg     Hyperlipidemia LDL goal <100     OA (osteoarthritis) of knee     Degenerative arthritis of hip - right     Urinary retention     Advanced directives, counseling/discussion     S/P hip replacement     Closed dislocation of acromioclavicular joint     Impingement syndrome of right shoulder     Ischemic vascular disease   one stent coronary artery 5/12     Essential hypertension, benign     Visual disturbance, transient, right eye     Dermatochalasis     Insomnia     S/P coronary angiogram     CKD (chronic kidney disease) stage 3, GFR 30-59 ml/min     Type II diabetes mellitus with peripheral circulatory disorder (H)     Spinal stenosis of lumbar region with neurogenic claudication     S/P lumbar fusion     Benign prostatic hyperplasia with nocturia       Surgical Hx:  has a past surgical history that includes gastric bypass (1/03); TOTAL HIP ARTHROPLASTY (1/11/11); Stent (5/8/12); Endoscopic sinus surgery (12/14/15); Endoscopic sinus surgery (Bilateral, 12/14/2015); sinus surgery (Right, 12-14-15); Stent (01/2017); ABLATION SUPRAVENT ARRHYTHMOGENIC FOCUS  (12/21/15); stent, coronary, shelia (01/2017); Optical tracking system fusion spine posterior lumbar two levels (N/A, 7/20/2018); and back surgery (7/20/2018).    Medications:   Current Outpatient Medications:      ACE NOT PRESCRIBED, INTENTIONAL,, ACE Inhibitor not prescribed due to Other: cough, Disp: 0 each, Rfl: 0     albuterol (PROAIR HFA) 108 (90 Base) MCG/ACT inhaler, Inhale 2 puffs into the lungs every 4 hours as needed for shortness of breath / dyspnea or wheezing, Disp: 18 g, Rfl: 0     aspirin 81 MG tablet, Take 1 tablet (81 mg) by mouth daily, Disp: , Rfl:      atorvastatin (LIPITOR) 20 MG tablet, Take 1 tablet (20 mg) by mouth daily, Disp: 90 tablet, Rfl: 3     blood glucose (ACCU-CHEK SMARTVIEW) test strip, ONCE DAILY TESTING AND AS NEEDED, Disp: 100 strip, Rfl: 4     blood glucose monitoring (eyeQ CONTOUR MONITOR) meter device kit, Use to test blood sugar  times daily or as directed., Disp: 1 kit, Rfl: 0     empagliflozin (JARDIANCE) 25 MG TABS tablet, Take 1 tablet (25 mg) by mouth daily, Disp: 90 tablet, Rfl: 3     fluticasone (FLONASE) 50 MCG/ACT nasal spray, Spray 1 spray into both nostrils daily, Disp: , Rfl:      gabapentin (NEURONTIN) 300 MG capsule, TAKE ONE CAPSULE DAILY AT NIGHT FOR 3 DAY(S) TAKE ONE CAPSULE IN THE AM AND AT BEDTIME FOR 3 DAY(S) THEN TAKE ONE CAPSULE IN THE AM, ONE IN THE AFTERNOON AND ONE AT BEDTIME THEREAFTER, Disp: 90 capsule, Rfl: 1     gabapentin (NEURONTIN) 600 MG tablet, Take 1 tablet (600 mg) by mouth 2 times daily, Disp: 180 tablet, Rfl: 3     metFORMIN (GLUCOPHAGE) 500 MG tablet, TAKE 2 TABLETS BY MOUTH TWICE DAILY WITH MEALS, Disp: 360 tablet, Rfl: 3     metoprolol succinate ER (TOPROL-XL) 25 MG 24 hr tablet, Take 12.5 mg by mouth daily, Disp: , Rfl:      clindamycin (CLEOCIN) 300 MG capsule, Take 1 capsule (300 mg) by mouth 4 times daily (Patient not taking: Reported on 5/19/2021), Disp: 40 capsule, Rfl: 0     lisinopril (ZESTRIL) 2.5 MG tablet, Take 1 tablet (2.5  "mg) by mouth daily (Patient not taking: Reported on 6/30/2021), Disp: 90 tablet, Rfl: 3     nitroGLYcerin (NITROSTAT) 0.4 MG sublingual tablet, Place 1 tablet (0.4 mg) under the tongue every 5 minutes as needed for chest pain (Patient not taking: Reported on 6/30/2021), Disp: 90 tablet, Rfl: 4    Allergies:   Allergies   Allergen Reactions     Benzonatate Anaphylaxis and Swelling     Lisinopril Cough     Zocor [Simvastatin - High Dose]      Poor memory       Social Hx: retired.  reports that he quit smoking about 31 years ago. His smoking use included cigarettes. He started smoking about 51 years ago. He has a 5.00 pack-year smoking history. He has never used smokeless tobacco. He reports current alcohol use. He reports that he does not use drugs.    Family Hx: family history includes Allergies in his son and son; Cancer in his mother; Neurologic Disorder in his father.    REVIEW OF SYSTEMS:  10 point ROS neg other than the symptoms noted above in the HPI and PAST MEDICAL HISTORY. Notables,  CONSTITUTIONAL:NEGATIVE for fever, chills, change in weight  INTEGUMENTARY/SKIN: NEGATIVE for worrisome rashes, moles or lesions  MUSCULOSKELETAL:See HPI above  NEURO: NEGATIVE for weakness, dizziness or paresthesias    PHYSICAL EXAM:  /80   Pulse 74   Ht 1.854 m (6' 1\")   Wt 103.7 kg (228 lb 9.6 oz)   BMI 30.16 kg/m     GENERAL APPEARANCE: healthy, alert, no distress  SKIN: no suspicious lesions or rashes  NEURO: Normal strength and tone, mentation intact and speech normal  PSYCH:  mentation appears normal and affect normal  RESPIRATORY: No increased work of breathing.  LYMPH: no palpable inguinal lymph nodes.    BILATERAL LOWER EXTREMITIES:  Gait: slight favors the left.   varus alignment.  No gross deformities or masses.  Mild bilateral Quad atrophy, strength normal.  Intact sensation deep peroneal nerve, superficial peroneal nerve, med/lat tibial nerve, sural nerve, saphenous nerve  Intact EHL, EDL, TA, FHL, GS, " quadriceps hamstrings and hip flexors  Toes warm and well perfused, brisk capillary refill. Palpable 2+ dp pulses.  Bilateral calf soft and nttp or squeeze.  DTRs: achilles 2+, patella 2+.  Edema: trace  Leg lengths: grossly symmetric at medial malleolus.      BACK EXAM  Lumbar range of motion: flexion to touch mid shin, extension adequate and causes left buttock pain, side bend: adequate  Squat: negative  Seated SLR: negative , bilateral.  Supine SLR: negative , bilateral.  Sensation:normal, bilateral.  Sciatic Notch tenderness: positive without radiation    LEFT HIP EXAM:      Palpation: Tender:   left gluteus medius, left buttock / damian, pirformis.   Nontender: groin  Strength:  4+/5  Special tests:  Irritability (flexion/adduction/internal rotation) negative.  Hip range of motion: Internal rotation: 20, External rotation: 60, Flexion 110, all pain free      RIGHT HIP EXAM:    Palpation: Tender:   none  Strength:  4+/5  Special tests:  Irritability (flexion/adduction/internal rotation) negative.  Hip range of motion: normal, all pain free        X-RAY: AP pelvis and AP/Lateral views left hip from 8/2/2021 were reviewed in clinic today. No obvious fractures or dislocations. Mild left hip degenerative changes. Right total hip arthroplasty. Low lumbar anterior/posterior fusion implants.    Impression: 70yo male with acute left hip pain, likely gluteal strain, mild osteoarthritis.    Plan:   * exam, images reviewed.  * appears to be more muscular in the buttocks region rather than the hip itself; xrays with mild osteoarthritis, but not provocative on exam.  * recommend massage, ice/heat, over the counter pain control, stretching and core exercises, activity modification  * return to clinic as needed.  * consider referral for image-guided left hip injection in future as needed if symptoms persist.    Skip Reyes M.D., M.S.  Dept. of Orthopaedic Surgery  [facility]            Again, thank you for  allowing me to participate in the care of your patient.        Sincerely,        Skip Reyes MD

## 2021-08-02 NOTE — PROGRESS NOTES
"Chief Complaint:   Chief Complaint   Patient presents with     Left Hip - Pain     Onset: 3-4 weeks. NKI. Pain came on gradual and has gotten worse. Pain is lateral and can radiate down the side of the leg. Denies any groin pain. Denies any N/T in leg. It is just an ache. No tx.        HISTORY OF PRESENT ILLNESS    Edwardo Dumont is a 71 year old male seen for evaluation of ongoing left hip pain with no known injury.   Pain has been present for 3-4 weeks. Locaters pain along side of the hip and down the leg to the knee. Denies groin pain. No numbness and tingling. Just an ache. No treatments. Onset usually after activities, not so much during. Can feel it if walks on it \"wrong\". No issues with in/out of car, donning shoes and socks. No medications. Denies limb length inequality.    Plays golf, pickle ball.    Denies low back pain.    Prior right total hip arthroplasty 2011, has done well.    Present symptoms: pain laterally, pain dull/achy , mild pain.    Pain severity: 1/10  Pain quality: dull and aching  Frequency of symptoms: occasionally  Exacerbating Factors: weight bearing  Relieving Factors: rest  Night Pain: No  Pain while at rest: occasional ache   Associated numbness or tingling: No     Orthopedic PMH: right total hip arthroplasty 2011.    Other PMH:  has a past medical history of Acromioclavicular joint separation, type 1 (9/12), Allergic rhinitis, Arthritis, CKD (chronic kidney disease) stage 3, GFR 30-59 ml/min, Degenerative arthritis of hip - right (12/27/2010), Gout, Hip arthrosis - right (10/25/2010), Hyperlipidemia, Ischaemic vascular disease, Ischemic vascular disease (5/12), OA (osteoarthritis) of knee (10/25/2010), Renal insufficiency, Sleep apnea, Type II or unspecified type diabetes mellitus without mention of complication, not stated as uncontrolled, and Unspecified essential hypertension. He also has no past medical history of Amblyopia, Bleeding disorder (H), Cancer (H), Cataract, Cerebral " infarction (H), Chronic infection, Complication of anesthesia, Congestive heart failure (H), COPD (chronic obstructive pulmonary disease) (H), Depressive disorder, Diabetic retinopathy (H), Glaucoma (increased eye pressure), Heart disease, History of blood transfusion, Inflammatory arthritis, Malignant hyperthermia, Retinal detachment, Senile macular degeneration, Strabismus, Stroke (H), Surgical complications, Thyroid disease, Uncomplicated asthma, Unspecified asthma(493.90), or Uveitis.  Patient Active Problem List   Diagnosis     Cough     Sleep apnea     Gout     Low back pain     Radiculopathy of leg     Hyperlipidemia LDL goal <100     OA (osteoarthritis) of knee     Degenerative arthritis of hip - right     Urinary retention     Advanced directives, counseling/discussion     S/P hip replacement     Closed dislocation of acromioclavicular joint     Impingement syndrome of right shoulder     Ischemic vascular disease   one stent coronary artery 5/12     Essential hypertension, benign     Visual disturbance, transient, right eye     Dermatochalasis     Insomnia     S/P coronary angiogram     CKD (chronic kidney disease) stage 3, GFR 30-59 ml/min     Type II diabetes mellitus with peripheral circulatory disorder (H)     Spinal stenosis of lumbar region with neurogenic claudication     S/P lumbar fusion     Benign prostatic hyperplasia with nocturia       Surgical Hx:  has a past surgical history that includes gastric bypass (1/03); TOTAL HIP ARTHROPLASTY (1/11/11); Stent (5/8/12); Endoscopic sinus surgery (12/14/15); Endoscopic sinus surgery (Bilateral, 12/14/2015); sinus surgery (Right, 12-14-15); Stent (01/2017); ABLATION SUPRAVENT ARRHYTHMOGENIC FOCUS (12/21/15); stent, coronary, shelia (01/2017); Optical tracking system fusion spine posterior lumbar two levels (N/A, 7/20/2018); and back surgery (7/20/2018).    Medications:   Current Outpatient Medications:      ACE NOT PRESCRIBED, INTENTIONAL,, ACE Inhibitor not  prescribed due to Other: cough, Disp: 0 each, Rfl: 0     albuterol (PROAIR HFA) 108 (90 Base) MCG/ACT inhaler, Inhale 2 puffs into the lungs every 4 hours as needed for shortness of breath / dyspnea or wheezing, Disp: 18 g, Rfl: 0     aspirin 81 MG tablet, Take 1 tablet (81 mg) by mouth daily, Disp: , Rfl:      atorvastatin (LIPITOR) 20 MG tablet, Take 1 tablet (20 mg) by mouth daily, Disp: 90 tablet, Rfl: 3     blood glucose (ACCU-CHEK SMARTVIEW) test strip, ONCE DAILY TESTING AND AS NEEDED, Disp: 100 strip, Rfl: 4     blood glucose monitoring ([employer] CONTOUR MONITOR) meter device kit, Use to test blood sugar  times daily or as directed., Disp: 1 kit, Rfl: 0     empagliflozin (JARDIANCE) 25 MG TABS tablet, Take 1 tablet (25 mg) by mouth daily, Disp: 90 tablet, Rfl: 3     fluticasone (FLONASE) 50 MCG/ACT nasal spray, Spray 1 spray into both nostrils daily, Disp: , Rfl:      gabapentin (NEURONTIN) 300 MG capsule, TAKE ONE CAPSULE DAILY AT NIGHT FOR 3 DAY(S) TAKE ONE CAPSULE IN THE AM AND AT BEDTIME FOR 3 DAY(S) THEN TAKE ONE CAPSULE IN THE AM, ONE IN THE AFTERNOON AND ONE AT BEDTIME THEREAFTER, Disp: 90 capsule, Rfl: 1     gabapentin (NEURONTIN) 600 MG tablet, Take 1 tablet (600 mg) by mouth 2 times daily, Disp: 180 tablet, Rfl: 3     metFORMIN (GLUCOPHAGE) 500 MG tablet, TAKE 2 TABLETS BY MOUTH TWICE DAILY WITH MEALS, Disp: 360 tablet, Rfl: 3     metoprolol succinate ER (TOPROL-XL) 25 MG 24 hr tablet, Take 12.5 mg by mouth daily, Disp: , Rfl:      clindamycin (CLEOCIN) 300 MG capsule, Take 1 capsule (300 mg) by mouth 4 times daily (Patient not taking: Reported on 5/19/2021), Disp: 40 capsule, Rfl: 0     lisinopril (ZESTRIL) 2.5 MG tablet, Take 1 tablet (2.5 mg) by mouth daily (Patient not taking: Reported on 6/30/2021), Disp: 90 tablet, Rfl: 3     nitroGLYcerin (NITROSTAT) 0.4 MG sublingual tablet, Place 1 tablet (0.4 mg) under the tongue every 5 minutes as needed for chest pain (Patient not taking: Reported on  "6/30/2021), Disp: 90 tablet, Rfl: 4    Allergies:   Allergies   Allergen Reactions     Benzonatate Anaphylaxis and Swelling     Lisinopril Cough     Zocor [Simvastatin - High Dose]      Poor memory       Social Hx: retired.  reports that he quit smoking about 31 years ago. His smoking use included cigarettes. He started smoking about 51 years ago. He has a 5.00 pack-year smoking history. He has never used smokeless tobacco. He reports current alcohol use. He reports that he does not use drugs.    Family Hx: family history includes Allergies in his son and son; Cancer in his mother; Neurologic Disorder in his father.    REVIEW OF SYSTEMS:  10 point ROS neg other than the symptoms noted above in the HPI and PAST MEDICAL HISTORY. Notables,  CONSTITUTIONAL:NEGATIVE for fever, chills, change in weight  INTEGUMENTARY/SKIN: NEGATIVE for worrisome rashes, moles or lesions  MUSCULOSKELETAL:See HPI above  NEURO: NEGATIVE for weakness, dizziness or paresthesias    PHYSICAL EXAM:  /80   Pulse 74   Ht 1.854 m (6' 1\")   Wt 103.7 kg (228 lb 9.6 oz)   BMI 30.16 kg/m     GENERAL APPEARANCE: healthy, alert, no distress  SKIN: no suspicious lesions or rashes  NEURO: Normal strength and tone, mentation intact and speech normal  PSYCH:  mentation appears normal and affect normal  RESPIRATORY: No increased work of breathing.  LYMPH: no palpable inguinal lymph nodes.    BILATERAL LOWER EXTREMITIES:  Gait: slight favors the left.   varus alignment.  No gross deformities or masses.  Mild bilateral Quad atrophy, strength normal.  Intact sensation deep peroneal nerve, superficial peroneal nerve, med/lat tibial nerve, sural nerve, saphenous nerve  Intact EHL, EDL, TA, FHL, GS, quadriceps hamstrings and hip flexors  Toes warm and well perfused, brisk capillary refill. Palpable 2+ dp pulses.  Bilateral calf soft and nttp or squeeze.  DTRs: achilles 2+, patella 2+.  Edema: trace  Leg lengths: grossly symmetric at medial " malleolus.      BACK EXAM  Lumbar range of motion: flexion to touch mid shin, extension adequate and causes left buttock pain, side bend: adequate  Squat: negative  Seated SLR: negative , bilateral.  Supine SLR: negative , bilateral.  Sensation:normal, bilateral.  Sciatic Notch tenderness: positive without radiation    LEFT HIP EXAM:      Palpation: Tender:   left gluteus medius, left buttock / damian, pirformis.   Nontender: groin  Strength:  4+/5  Special tests:  Irritability (flexion/adduction/internal rotation) negative.  Hip range of motion: Internal rotation: 20, External rotation: 60, Flexion 110, all pain free      RIGHT HIP EXAM:    Palpation: Tender:   none  Strength:  4+/5  Special tests:  Irritability (flexion/adduction/internal rotation) negative.  Hip range of motion: normal, all pain free        X-RAY: AP pelvis and AP/Lateral views left hip from 8/2/2021 were reviewed in clinic today. No obvious fractures or dislocations. Mild left hip degenerative changes. Right total hip arthroplasty. Low lumbar anterior/posterior fusion implants.    Impression: 70yo male with acute left hip pain, likely gluteal strain, mild osteoarthritis.    Plan:   * exam, images reviewed.  * appears to be more muscular in the buttocks region rather than the hip itself; xrays with mild osteoarthritis, but not provocative on exam.  * recommend massage, ice/heat, over the counter pain control, stretching and core exercises, activity modification  * return to clinic as needed.  * consider referral for image-guided left hip injection in future as needed if symptoms persist.    Skip Reyes M.D., M.S.  Dept. of Orthopaedic Surgery  [facility]

## 2021-09-11 ENCOUNTER — HEALTH MAINTENANCE LETTER (OUTPATIENT)
Age: 71
End: 2021-09-11

## 2021-09-14 ENCOUNTER — TELEPHONE (OUTPATIENT)
Dept: FAMILY MEDICINE | Facility: CLINIC | Age: 71
End: 2021-09-14

## 2021-09-14 DIAGNOSIS — Z12.5 SCREENING FOR PROSTATE CANCER: Primary | ICD-10-CM

## 2021-09-14 DIAGNOSIS — E11.51 TYPE II DIABETES MELLITUS WITH PERIPHERAL CIRCULATORY DISORDER (H): ICD-10-CM

## 2021-09-14 NOTE — TELEPHONE ENCOUNTER
Patient would like a PSA added to his pre visit labs on 9/24/21. Please review lab orders, sign and route to team to inform patient. Patricia Romano -

## 2021-09-24 ENCOUNTER — LAB (OUTPATIENT)
Dept: LAB | Facility: CLINIC | Age: 71
End: 2021-09-24
Payer: MEDICARE

## 2021-09-24 DIAGNOSIS — Z12.5 SCREENING FOR PROSTATE CANCER: ICD-10-CM

## 2021-09-24 DIAGNOSIS — E11.51 TYPE II DIABETES MELLITUS WITH PERIPHERAL CIRCULATORY DISORDER (H): ICD-10-CM

## 2021-09-24 LAB
CHOLEST SERPL-MCNC: 132 MG/DL
FASTING STATUS PATIENT QL REPORTED: YES
HBA1C MFR BLD: 7.8 % (ref 0–5.6)
HDLC SERPL-MCNC: 46 MG/DL
LDLC SERPL CALC-MCNC: 65 MG/DL
NONHDLC SERPL-MCNC: 86 MG/DL
PSA SERPL-MCNC: 0.34 UG/L (ref 0–4)
TRIGL SERPL-MCNC: 103 MG/DL

## 2021-09-24 PROCEDURE — G0103 PSA SCREENING: HCPCS

## 2021-09-24 PROCEDURE — 36415 COLL VENOUS BLD VENIPUNCTURE: CPT

## 2021-09-24 PROCEDURE — 80061 LIPID PANEL: CPT

## 2021-09-24 PROCEDURE — 83036 HEMOGLOBIN GLYCOSYLATED A1C: CPT

## 2021-10-01 ENCOUNTER — TELEPHONE (OUTPATIENT)
Dept: FAMILY MEDICINE | Facility: CLINIC | Age: 71
End: 2021-10-01

## 2021-10-01 NOTE — TELEPHONE ENCOUNTER
Reason for Call:  Other appointment    Detailed comments: patient would like to see Dr Madrigal before he leaves for the [facility]. Patient had appt on 10/1/21 but Dr Madrigal had to cancel. [employer] works except for the Tuesday morning that was available on 10/12/21. Patient leaves October 19, 21    Phone Number Patient can be reached at: Cell number on file:    Telephone Information:   Mobile [phone]       Best Time: anytime    Can we leave a detailed message on this number? Not Applicable    Call taken on 10/1/2021 at 4:27 PM by Nancy Cortes

## 2021-10-04 NOTE — TELEPHONE ENCOUNTER
Can you work him in prior to 10/19/2021?  He is leaving for [facility] and would like to see you prior to that.  Heather FENG - Cassy

## 2021-10-08 ENCOUNTER — OFFICE VISIT (OUTPATIENT)
Dept: FAMILY MEDICINE | Facility: CLINIC | Age: 71
End: 2021-10-08
Payer: MEDICARE

## 2021-10-08 VITALS
WEIGHT: 225 LBS | OXYGEN SATURATION: 96 % | SYSTOLIC BLOOD PRESSURE: 121 MMHG | BODY MASS INDEX: 29.69 KG/M2 | DIASTOLIC BLOOD PRESSURE: 74 MMHG | HEART RATE: 65 BPM

## 2021-10-08 DIAGNOSIS — E11.42 DIABETIC POLYNEUROPATHY ASSOCIATED WITH TYPE 2 DIABETES MELLITUS (H): ICD-10-CM

## 2021-10-08 DIAGNOSIS — N52.9 ERECTILE DYSFUNCTION, UNSPECIFIED ERECTILE DYSFUNCTION TYPE: Primary | ICD-10-CM

## 2021-10-08 PROCEDURE — 99207 PR FOOT EXAM NO CHARGE: CPT | Performed by: FAMILY MEDICINE

## 2021-10-08 PROCEDURE — 99214 OFFICE O/P EST MOD 30 MIN: CPT | Performed by: FAMILY MEDICINE

## 2021-10-08 RX ORDER — TADALAFIL 20 MG/1
20 TABLET ORAL DAILY PRN
Qty: 30 TABLET | Refills: 4 | Status: SHIPPED | OUTPATIENT
Start: 2021-10-08 | End: 2022-05-31

## 2021-10-08 ASSESSMENT — PAIN SCALES - GENERAL: PAINLEVEL: NO PAIN (0)

## 2021-10-08 NOTE — NURSING NOTE
"Chief Complaint   Patient presents with     Diabetes       Initial /74   Pulse 65   Wt 102.1 kg (225 lb)   SpO2 96%   BMI 29.69 kg/m   Estimated body mass index is 29.69 kg/m  as calculated from the following:    Height as of 8/2/21: 1.854 m (6' 1\").    Weight as of this encounter: 102.1 kg (225 lb).  Medication Reconciliation: complete  Daniella Loomis, BRANDON  "

## 2021-10-08 NOTE — PROGRESS NOTES
[place] diabetes resourses:  [email or web address]         To access diabetes educational materials with in EPIC use <dot>RADHA      Put this in AFTER VISIT SUMMARY if needed for the patient to access information online [email or web address]      SUBJECTIVE:  Edwardo Dumont is a 71 year old male who presents today for a follow up appointment for management of DIABETES MELLITUS.  2007    Patient Active Problem List    Diagnosis Date Noted     Benign prostatic hyperplasia with nocturia 09/21/2019     Priority: Medium     S/P lumbar fusion 07/20/2018     Priority: Medium     Spinal stenosis of lumbar region with neurogenic claudication 07/10/2018     Priority: Medium     Type II diabetes mellitus with peripheral circulatory disorder (H) 01/31/2018     Priority: Medium     CKD (chronic kidney disease) stage 3, GFR 30-59 ml/min (H)      Priority: Medium     S/P coronary angiogram 09/23/2015     Priority: Medium     Insomnia 08/03/2015     Priority: Medium     Dermatochalasis 04/10/2015     Priority: Medium     Visual disturbance, transient, right eye 02/09/2014     Priority: Medium     Essential hypertension, benign 05/14/2013     Priority: Medium     Ischemic vascular disease   one stent coronary artery 5/12 12/19/2012     Priority: Medium     Closed dislocation of acromioclavicular joint 12/17/2012     Priority: Medium     Problem list name updated by automated process. Provider to review       Impingement syndrome of right shoulder 12/17/2012     Priority: Medium     S/P hip replacement 12/13/2012     Priority: Medium     Advanced directives, counseling/discussion 05/25/2011     Priority: Medium     Discussed Advance Directive planning with patient; information given to patient to review.         Urinary retention 01/21/2011     Priority: Medium     Degenerative arthritis of hip - right 12/27/2010     Priority: Medium     OA  (osteoarthritis) of knee 10/25/2010     Priority: Medium     Hyperlipidemia LDL goal <100 10/14/2010     Priority: Medium     Low back pain 04/23/2010     Priority: Medium     Radiculopathy of leg 04/23/2010     Priority: Medium     Gout 11/27/2009     Priority: Medium     Sleep apnea      Priority: Medium     Cough 12/05/2007     Priority: Medium        The patient checks his blood sugar as follows: one time daily, once daily.   Results as follows: fasting glucose- 120-145    The patient reports that he IS taking the medication as prescribed. He denies side effects of medication.    ----------------------------------------------------------------------------------------------------------------------------------------    BP Readings from Last 3 Encounters:   10/08/21 121/74   08/02/21 132/80   06/30/21 110/70     The patient reports that he IS NOT currently smoking.  History   Smoking Status     Former Smoker     Packs/day: 1.00     Years: 5.00     Types: Cigarettes     Start date: 1/1/1970     Quit date: 9/8/1989   Smokeless Tobacco     Never Used     Comment: former smoker           The 10-year ASCVD risk score (Rajanmercy FORD Jr., et al., 2013) is: 28.8%    Values used to calculate the score:      Age: 71 years      Sex: Male      Is Non- : No      Diabetic: Yes      Tobacco smoker: No      Systolic Blood Pressure: 121 mmHg      Is BP treated: Yes      HDL Cholesterol: 46 mg/dL      Total Cholesterol: 132 mg/dL        Current Outpatient Medications   Medication Sig Dispense Refill     ACE NOT PRESCRIBED, INTENTIONAL, ACE Inhibitor not prescribed due to Other: cough 0 each 0     albuterol (PROAIR HFA) 108 (90 Base) MCG/ACT inhaler Inhale 2 puffs into the lungs every 4 hours as needed for shortness of breath / dyspnea or wheezing 18 g 0     aspirin 81 MG tablet Take 1 tablet (81 mg) by mouth daily       atorvastatin (LIPITOR) 20 MG tablet Take 1 tablet (20 mg) by mouth daily 90 tablet 3     blood  glucose (ACCU-CHEK SMARTVIEW) test strip ONCE DAILY TESTING AND AS NEEDED 100 strip 4     blood glucose monitoring ([employer] CONTOUR MONITOR) meter device kit Use to test blood sugar  times daily or as directed. 1 kit 0     clindamycin (CLEOCIN) 300 MG capsule Take 1 capsule (300 mg) by mouth 4 times daily 40 capsule 0     empagliflozin (JARDIANCE) 25 MG TABS tablet Take 1 tablet (25 mg) by mouth daily 90 tablet 3     fluticasone (FLONASE) 50 MCG/ACT nasal spray Spray 1 spray into both nostrils daily       gabapentin (NEURONTIN) 300 MG capsule TAKE ONE CAPSULE DAILY AT NIGHT FOR 3 DAY(S) TAKE ONE CAPSULE IN THE AM AND AT BEDTIME FOR 3 DAY(S) THEN TAKE ONE CAPSULE IN THE AM, ONE IN THE AFTERNOON AND ONE AT BEDTIME THEREAFTER 90 capsule 1     gabapentin (NEURONTIN) 600 MG tablet Take 1 tablet (600 mg) by mouth 2 times daily 180 tablet 3     lisinopril (ZESTRIL) 2.5 MG tablet Take 1 tablet (2.5 mg) by mouth daily 90 tablet 3     metFORMIN (GLUCOPHAGE) 500 MG tablet TAKE 2 TABLETS BY MOUTH TWICE DAILY WITH MEALS 360 tablet 3     metoprolol succinate ER (TOPROL-XL) 25 MG 24 hr tablet Take 12.5 mg by mouth daily       nitroGLYcerin (NITROSTAT) 0.4 MG sublingual tablet Place 1 tablet (0.4 mg) under the tongue every 5 minutes as needed for chest pain 90 tablet 4       The patient reports that he IS taking a statin.   (Reminder all diabetics with a ASCVD risk greater or equal to 7.5% should be on a high intensity statin, otherwise on a moderate intensity statin)      The patient reports that he IS taking 81mg of  aspirin daily.  (Reminder all diabetic patients with a cardiovascular risk factor and > 50 should take a daily aspirin)       The patient reports that he IS doing a self foot exam weekly.    The patient reports that he IS following the recommended diabetic diet. He  gave up all sweets about 5 weeks ago.   The patient reports that his last eye exam was 11 months ago.         Immunization History   Administered Date(s)  Administered     COVID-19,PF,Pfizer 10/05/2021     HEPA 01/08/2009, 09/08/2009     HepB 03/06/2013, 01/15/2014     HepB-Adult 05/30/2019     Influenza (High Dose) 3 valent vaccine 11/01/2016, 09/26/2017, 09/20/2019     Influenza (IIV3) PF 1950, 10/01/2010, 01/03/2011, 09/26/2012, 11/04/2013, 10/01/2014     Influenza Vaccine, 6+MO IM (QUADRIVALENT W/PRESERVATIVES) 10/06/2015     Influenza, Quad, High Dose, Pf, 65yr+ (Fluzone HD) 09/29/2020, 10/05/2021     Pneumo Conj 13-V (2010&after) 06/02/2016     Pneumococcal 23 valent 02/27/2009, 06/13/2017     TD (ADULT, 7+) 05/30/2019     TDAP Vaccine (Adacel) 01/08/2009     The patient reports that he has had the hepatitis B vaccine series in the past. (recommended for age 19-59 and can be given to age 60 or older)  The patient reports that he has had a pnuemovax in the past.  The patient reports that he has had a flu shot for the current influenza season.  The patient would like to have a no vaccinations today    Patient concerns: is concerned about erectile dysfunction he would like to try cialis  He has never taken his nitroglycerin         EXAM:  /74   Pulse 65   Wt 102.1 kg (225 lb)   SpO2 96%   BMI 29.69 kg/m    Wt Readings from Last 4 Encounters:   10/08/21 102.1 kg (225 lb)   08/02/21 103.7 kg (228 lb 9.6 oz)   05/19/21 104 kg (229 lb 3.2 oz)   04/29/21 101.6 kg (224 lb)     Body mass index is 29.69 kg/m .    General:  Edwardo is awake, alert, and cooperative.  NAD.      Diabetic Foot Screen:  Any complaints of increased pain or numbness ? No  Is there a foot ulcer now or a history of foot ulcer? No  Does the foot have an abnormal shape? No  Are the nails thick, too long or ingrown? No  Are there any redness or open areas? No         Sensation Testing done at all points on the diagram with monofilament     Right Foot: Sensation Normal at all points  Left Foot: Sensation Normal at all points     Risk Category: 0- No loss of protective sensation  Performed  by Bola Madrigal MD        Diabetic foot exam: normal DP and PT pulses, no trophic changes or ulcerative lesions and normal sensory exam            Previsit labs drawn include:  Lab on 09/24/2021   Component Date Value Ref Range Status     Cholesterol 09/24/2021 132  <200 mg/dL Final     Triglycerides 09/24/2021 103  <150 mg/dL Final     Direct Measure HDL 09/24/2021 46  >=40 mg/dL Final     LDL Cholesterol Calculated 09/24/2021 65  <=100 mg/dL Final     Non HDL Cholesterol 09/24/2021 86  <130 mg/dL Final     Patient Fasting > 8hrs? 09/24/2021 Yes   Final     Hemoglobin A1C 09/24/2021 7.8* 0.0 - 5.6 % Final    Normal <5.7%   Prediabetes 5.7-6.4%    Diabetes 6.5% or higher     Note: Adopted from ADA consensus guidelines.     Prostate Specific Antigen Screen 09/24/2021 0.34  0.00 - 4.00 ug/L Final         ASSESSMENT and PLAN:    Type II Diabetes, Worsening.    DIABETES Type II:                       Lab Results   Component Value Date    A1C 7.8 09/24/2021    A1C 7.4 04/24/2021       Control   fair  Lab Results   Component Value Date    A1C 7.8 09/24/2021    A1C 7.4 04/24/2021    A1C 7.1 09/26/2020    A1C 7.1 06/15/2020       Albumin Urine mg/g Cr   Date Value Ref Range Status   04/24/2021 32.17 (H) 0 - 17 mg/g Cr Final         Recommended to take ASA  mg daily for appropriate patient, Compliance with the recommended diabetic diet was stressed, Regular aerobic exercise was encouraged, Home glucose monitoring encouraged, Annual eye exam recommended, Self foot exam demonstrated and recommended to be done nightly, Apply moisturizer to feet with in 3 minutes of showering or bathing, Follow up in clinic in 3 months for a diabetes check and Future labs ordered and the patient was instructed to make a lab appt 1 week prior to next diabetes visit      BP/HTN:   BP Readings from Last 3 Encounters:   10/08/21 121/74   08/02/21 132/80   06/30/21 110/70     Control   good    - Medication:continue the current doses of  "medication.  (make sure to order \"Ace not prescribed intentionally if not on an ACE/ARB)  The patient was advised to do the following therapuetic life style changes  - Dietary sodium restriction and increase potassium and Calcium intake  - Regular aerobic exercise  - Weight loss  - Discontinue smoking if applicable  - Avoid regular NSAID use if applicable  - Avoid regular decongestant use if applicable  - Follow up in clinic in 6 months for a recheck  - Check a basic metabolic panel today if needed    Patient Education: Reviewed risks of hypertension and principles of   Treatment.    HYPERCHOLESTEROLEMIA:     The 10-year ASCVD risk score (Rajan FORD Jr., et al., 2013) is: 28.8%    Values used to calculate the score:      Age: 71 years      Sex: Male      Is Non- : No      Diabetic: Yes      Tobacco smoker: No      Systolic Blood Pressure: 121 mmHg      Is BP treated: Yes      HDL Cholesterol: 46 mg/dL      Total Cholesterol: 132 mg/dL    Lab Results   Component Value Date    LDL 65 09/24/2021    LDL 53 09/26/2020      Control   good  Cholesterol   Date Value Ref Range Status   09/24/2021 132 <200 mg/dL Final   09/26/2020 117 <200 mg/dL Final   06/15/2020 98 <200 mg/dL Final     HDL Cholesterol   Date Value Ref Range Status   09/26/2020 47 >39 mg/dL Final   06/15/2020 43 >39 mg/dL Final     Direct Measure HDL   Date Value Ref Range Status   09/24/2021 46 >=40 mg/dL Final     LDL Cholesterol Calculated   Date Value Ref Range Status   09/24/2021 65 <=100 mg/dL Final   09/26/2020 53 <100 mg/dL Final     Comment:     Desirable:       <100 mg/dl   06/15/2020 36 <100 mg/dL Final     Comment:     Desirable:       <100 mg/dl     Triglycerides   Date Value Ref Range Status   09/24/2021 103 <150 mg/dL Final   09/26/2020 86 <150 mg/dL Final     Comment:     Fasting specimen   06/15/2020 94 <150 mg/dL Final     Comment:     Fasting specimen     Cholesterol/HDL Ratio   Date Value Ref Range Status   04/08/2015 " 2.0 0.0 - 5.0 Final   12/08/2014 1.9 0.0 - 5.0 Final         The patient is on a moderate intensity statin and this is the appropriate treatment based on the ASCVD the patient's LDL being less than 70.    The patient's HDL is normal  The patient's triglycerides are normal.    We have discussed the results and we will continue the current management.         (N52.9) Erectile dysfunction, unspecified erectile dysfunction type  (primary encounter diagnosis)  Comment:   Plan: tadalafil (CIALIS) 20 MG tablet        Discussed with him that if he takes the Cialis that he should not take his nitroglycerin for 36 hours afterwards.

## 2021-11-19 NOTE — PROGRESS NOTES
Injectable Influenza Immunization Documentation    1.  Is the person to be vaccinated sick today?   No    2. Does the person to be vaccinated have an allergy to a component   of the vaccine?   No    3. Has the person to be vaccinated ever had a serious reaction   to influenza vaccine in the past?   No    4. Has the person to be vaccinated ever had Guillain-Barré syndrome?   No    Form completed by Gloria Damon MA            Melolabial Interpolation Flap Text: A decision was made to reconstruct the defect utilizing an interpolation axial flap and a staged reconstruction.  A telfa template was made of the defect.  This telfa template was then used to outline the melolabial interpolation flap.  The donor area for the pedicle flap was then injected with anesthesia.  The flap was excised through the skin and subcutaneous tissue down to the layer of the underlying musculature.  The pedicle flap was carefully excised within this deep plane to maintain its blood supply.  The edges of the donor site were undermined.   The donor site was closed in a primary fashion.  The pedicle was then rotated into position and sutured.  Once the tube was sutured into place, adequate blood supply was confirmed with blanching and refill.  The pedicle was then wrapped with xeroform gauze and dressed appropriately with a telfa and gauze bandage to ensure continued blood supply and protect the attached pedicle.

## 2021-12-09 ENCOUNTER — OFFICE VISIT (OUTPATIENT)
Dept: ORTHOPEDICS | Facility: CLINIC | Age: 71
End: 2021-12-09
Payer: MEDICARE

## 2021-12-09 VITALS
WEIGHT: 230.6 LBS | DIASTOLIC BLOOD PRESSURE: 84 MMHG | SYSTOLIC BLOOD PRESSURE: 133 MMHG | BODY MASS INDEX: 30.56 KG/M2 | HEART RATE: 62 BPM | HEIGHT: 73 IN

## 2021-12-09 DIAGNOSIS — M25.512 CHRONIC PAIN OF BOTH SHOULDERS: ICD-10-CM

## 2021-12-09 DIAGNOSIS — G89.29 CHRONIC PAIN OF LEFT KNEE: ICD-10-CM

## 2021-12-09 DIAGNOSIS — G89.29 CHRONIC PAIN OF BOTH SHOULDERS: ICD-10-CM

## 2021-12-09 DIAGNOSIS — M25.562 CHRONIC PAIN OF LEFT KNEE: ICD-10-CM

## 2021-12-09 DIAGNOSIS — M25.511 CHRONIC PAIN OF BOTH SHOULDERS: ICD-10-CM

## 2021-12-09 DIAGNOSIS — M17.12 PRIMARY OSTEOARTHRITIS OF LEFT KNEE: Primary | ICD-10-CM

## 2021-12-09 DIAGNOSIS — M75.41 IMPINGEMENT SYNDROME OF BOTH SHOULDERS: ICD-10-CM

## 2021-12-09 DIAGNOSIS — M75.42 IMPINGEMENT SYNDROME OF BOTH SHOULDERS: ICD-10-CM

## 2021-12-09 PROCEDURE — 20610 DRAIN/INJ JOINT/BURSA W/O US: CPT | Mod: 51 | Performed by: ORTHOPAEDIC SURGERY

## 2021-12-09 RX ORDER — TRIAMCINOLONE ACETONIDE 40 MG/ML
80 INJECTION, SUSPENSION INTRA-ARTICULAR; INTRAMUSCULAR
Status: DISCONTINUED | OUTPATIENT
Start: 2021-12-09 | End: 2022-05-31

## 2021-12-09 RX ORDER — BUPIVACAINE HYDROCHLORIDE 2.5 MG/ML
4 INJECTION, SOLUTION INFILTRATION; PERINEURAL
Status: DISCONTINUED | OUTPATIENT
Start: 2021-12-09 | End: 2022-05-31

## 2021-12-09 RX ORDER — METHYLPREDNISOLONE ACETATE 80 MG/ML
80 INJECTION, SUSPENSION INTRA-ARTICULAR; INTRALESIONAL; INTRAMUSCULAR; SOFT TISSUE
Status: DISCONTINUED | OUTPATIENT
Start: 2021-12-09 | End: 2022-05-31

## 2021-12-09 RX ADMIN — METHYLPREDNISOLONE ACETATE 80 MG: 80 INJECTION, SUSPENSION INTRA-ARTICULAR; INTRALESIONAL; INTRAMUSCULAR; SOFT TISSUE at 13:32

## 2021-12-09 RX ADMIN — BUPIVACAINE HYDROCHLORIDE 4 ML: 2.5 INJECTION, SOLUTION INFILTRATION; PERINEURAL at 13:32

## 2021-12-09 RX ADMIN — BUPIVACAINE HYDROCHLORIDE 4 ML: 2.5 INJECTION, SOLUTION INFILTRATION; PERINEURAL at 13:31

## 2021-12-09 RX ADMIN — TRIAMCINOLONE ACETONIDE 80 MG: 40 INJECTION, SUSPENSION INTRA-ARTICULAR; INTRAMUSCULAR at 13:31

## 2021-12-09 ASSESSMENT — PAIN SCALES - GENERAL: PAINLEVEL: SEVERE PAIN (6)

## 2021-12-09 ASSESSMENT — MIFFLIN-ST. JEOR: SCORE: 1859.87

## 2021-12-09 NOTE — PROGRESS NOTES
"CHIEF COMPLAINT:   Chief Complaint   Patient presents with     Left Knee - Pain     Last injection: 5/19/21. Injection worked good. Pain just started to come back a couple of months ago. He would like another injection today.      Shoulder Pain     Bilateral shoulder pain. Last injection: 10/1/20. Injections worked great!! Pain just started to come back. He would like more injections today.       HISTORY:  Tanvir Dumont is a 71 year old male, right-hand dominant, who is seen for follow up evaluation of bilateral shoulder pain, right > left. His last injections were on 10/1/2020. He reports injections did \"great\" until very recently, pain now slowly creeping back. [place] and would like repeat injections today before leaving. He usually gets the injections annually  in December. He's eager to get back to golfing and the warm weather.    He sustained an injury 9/2012, fell onto right shoulder while playing soccer with grand daughter. Had MRI 2012 negative for rotator cuff tear but showing acromio-clavicular injury and rotator cuff tendinosis. Noted to have AC separation. Treated non-opertively, healed well. Patient returns today with continued pain. MRI 2015 showed partial thickness rotator cuff tear on right.     He also has ongoing left knee pain with no known injury. Had a cortisone injection 5/2021, worked well, pain started to come back a couple months ago.  Pain has been present for years, evaluated by us in 2015 and noted to have primary osteoarthritis (mostly patello-femoral), no treatments at that time.  Worse walking up down stairs and hills, golf, not as bad flat walking. Pain deep inside under the kneecap, sharp and aching.    Also his total hip arthroplasty is doing great, no concerns.    Symptoms have been waxing and waning right shoulder, starting now left shoulder.  Aggrevated by: overhead activities.  Relieved by: rest, cortisone injections  Present symptoms: pain " with overhead activities, pain reaching behind back  Pain location: lateral shoulder, deltoid and upper arm  Pain severity: 6/10.  Pain quality: dull and sharp  Frequency of symptoms: occasionally  Associated symptoms: none  Treatment up to this point: injections 12/2014, 6/2015, 12/2015, 12/6/2016, 12/21/2017, 12/2018, 12/2019, 10/2020 with good relief.    Significant Orthopedic past medical history: right total hip arthroplasty 1/2011  Usual level of recreational activity: golf, pickle ball.  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    He has no past medical history of Amblyopia, Bleeding disorder (H), Cancer (H), Cataract, Cerebral infarction (H), Chronic infection, Complication of anesthesia, Congestive heart failure (H), COPD (chronic obstructive pulmonary disease) (H), Depressive disorder, Diabetic retinopathy (H), Glaucoma (increased eye pressure), Heart disease, History of blood transfusion, Inflammatory arthritis, Malignant hyperthermia, Retinal detachment, Senile macular degeneration, Strabismus, Stroke (H), Surgical complications, Thyroid disease, Uncomplicated asthma, Unspecified asthma(493.90), or Uveitis.  Patient Active Problem List    Diagnosis Date Noted     Benign prostatic hyperplasia with nocturia 09/21/2019     Priority: Medium     S/P lumbar fusion 07/20/2018     Priority: Medium     Spinal stenosis of  lumbar region with neurogenic claudication 07/10/2018     Priority: Medium     Type II diabetes mellitus with peripheral circulatory disorder (H) 01/31/2018     Priority: Medium     CKD (chronic kidney disease) stage 3, GFR 30-59 ml/min (H)      Priority: Medium     S/P coronary angiogram 09/23/2015     Priority: Medium     Insomnia 08/03/2015     Priority: Medium     Dermatochalasis 04/10/2015     Priority: Medium     Visual disturbance, transient, right eye 02/09/2014     Priority: Medium     Essential hypertension, benign 05/14/2013     Priority: Medium     Ischemic vascular disease   one stent coronary artery 5/12 12/19/2012     Priority: Medium     Closed dislocation of acromioclavicular joint 12/17/2012     Priority: Medium     Problem list name updated by automated process. Provider to review       Impingement syndrome of right shoulder 12/17/2012     Priority: Medium     S/P hip replacement 12/13/2012     Priority: Medium     Advanced directives, counseling/discussion 05/25/2011     Priority: Medium     Discussed Advance Directive planning with patient; information given to patient to review.         Urinary retention 01/21/2011     Priority: Medium     Degenerative arthritis of hip - right 12/27/2010     Priority: Medium     OA (osteoarthritis) of knee 10/25/2010     Priority: Medium     Hyperlipidemia LDL goal <100 10/14/2010     Priority: Medium     Low back pain 04/23/2010     Priority: Medium     Radiculopathy of leg 04/23/2010     Priority: Medium     Gout 11/27/2009     Priority: Medium     Sleep apnea      Priority: Medium     Cough 12/05/2007     Priority: Medium       Surgical Hx:  has a past surgical history that includes gastric bypass (1/03); TOTAL HIP ARTHROPLASTY (1/11/11); Stent (5/8/12); Endoscopic sinus surgery (12/14/15); Endoscopic sinus surgery (Bilateral, 12/14/2015); sinus surgery (Right, 12-14-15); Stent (01/2017); ABLATION SUPRAVENT ARRHYTHMOGENIC FOCUS (12/21/15); stent, coronary,  shelia (01/2017); Optical tracking system fusion spine posterior lumbar two levels (N/A, 7/20/2018); and back surgery (7/20/2018).    Medications:   Current Outpatient Medications:      ACE NOT PRESCRIBED, INTENTIONAL,, ACE Inhibitor not prescribed due to Other: cough, Disp: 0 each, Rfl: 0     albuterol (PROAIR HFA) 108 (90 Base) MCG/ACT inhaler, Inhale 2 puffs into the lungs every 4 hours as needed for shortness of breath / dyspnea or wheezing, Disp: 18 g, Rfl: 0     aspirin 81 MG tablet, Take 1 tablet (81 mg) by mouth daily, Disp: , Rfl:      atorvastatin (LIPITOR) 20 MG tablet, Take 1 tablet (20 mg) by mouth daily, Disp: 90 tablet, Rfl: 3     blood glucose (ACCU-CHEK SMARTVIEW) test strip, ONCE DAILY TESTING AND AS NEEDED, Disp: 100 strip, Rfl: 4     blood glucose monitoring (Mind Palette CONTOUR MONITOR) meter device kit, Use to test blood sugar  times daily or as directed., Disp: 1 kit, Rfl: 0     clindamycin (CLEOCIN) 300 MG capsule, Take 1 capsule (300 mg) by mouth 4 times daily, Disp: 40 capsule, Rfl: 0     empagliflozin (JARDIANCE) 25 MG TABS tablet, Take 1 tablet (25 mg) by mouth daily, Disp: 90 tablet, Rfl: 3     fluticasone (FLONASE) 50 MCG/ACT nasal spray, Spray 1 spray into both nostrils daily, Disp: , Rfl:      gabapentin (NEURONTIN) 300 MG capsule, TAKE ONE CAPSULE DAILY AT NIGHT FOR 3 DAY(S) TAKE ONE CAPSULE IN THE AM AND AT BEDTIME FOR 3 DAY(S) THEN TAKE ONE CAPSULE IN THE AM, ONE IN THE AFTERNOON AND ONE AT BEDTIME THEREAFTER, Disp: 90 capsule, Rfl: 1     gabapentin (NEURONTIN) 600 MG tablet, Take 1 tablet (600 mg) by mouth 2 times daily, Disp: 180 tablet, Rfl: 3     lisinopril (ZESTRIL) 2.5 MG tablet, Take 1 tablet (2.5 mg) by mouth daily, Disp: 90 tablet, Rfl: 3     metFORMIN (GLUCOPHAGE) 500 MG tablet, TAKE 2 TABLETS BY MOUTH TWICE DAILY WITH MEALS, Disp: 360 tablet, Rfl: 3     metoprolol succinate ER (TOPROL-XL) 25 MG 24 hr tablet, Take 12.5 mg by mouth daily, Disp: , Rfl:      nitroGLYcerin (NITROSTAT)  "0.4 MG sublingual tablet, Place 1 tablet (0.4 mg) under the tongue every 5 minutes as needed for chest pain, Disp: 90 tablet, Rfl: 4     tadalafil (CIALIS) 20 MG tablet, Take 1 tablet (20 mg) by mouth daily as needed, Disp: 30 tablet, Rfl: 4    Allergies:   Allergies   Allergen Reactions     Benzonatate Anaphylaxis and Swelling     Lisinopril Cough     Zocor [Simvastatin - High Dose]      Poor memory       REVIEW OF SYSTEMS:   CONSTITUTIONAL:NEGATIVE for fever, chills, change in weight  INTEGUMENTARY/SKIN: NEGATIVE for worrisome rashes, moles or lesions  MUSCULOSKELETAL:See HPI above  NEURO: NEGATIVE for weakness, dizziness or paresthesias         PHYSICAL EXAM:  /84   Pulse 62   Ht 1.854 m (6' 1\")   Wt 104.6 kg (230 lb 9.6 oz)   BMI 30.42 kg/m     GENERAL APPEARANCE: healthy, alert, no distress  SKIN: no suspicious lesions or rashes  NEURO: Normal strength and tone, mentation intact and speech normal  PSYCH:  mentation appears normal and affect normal, not anxious  RESPIRATORY: No increased work of breathing.      RIGHT UPPER EXTREMITY:  Sensation intact to light touch in median, radial, ulnar and axillary nerve distributions  Palpable 2+ radial pulse, brisk capillary refill to all fingers, wwp  Intact epl fpl fdp edc wrist flexion/extension biceps triceps deltoid    RIGHT SHOULDER:  Shoulder Inspection: no swelling, bruising, discoloration, there is moderate  AC prominence deformity and asymmetry, mild muscle atrophy supraspinatus and infraspinatus compared to left.    Tender:greater tuberosity, upper trapezius,  nontender to palpation: AC joint, proximal bicep tendon, supraspinatus  Range of Motion:   Active:forward flexion 170 degrees, external rotation  60 degrees, internal rotation  T12   Passive: same  Strength: forward flexion 5-/5, External rotation 5-/5  Liftoff: Able  Impingement: all grade 2 positive  Special tests: Spurling's: Negative  Belly Press: Negative  Empty Can: Positive for " pain.    LEFT UPPER EXTREMITY:  Sensation intact to light touch in median, radial, ulnar and axillary nerve distributions  Palpable 2+ radial pulse, brisk capillary refill to all fingers, wwp  Intact epl fpl fdp edc wrist flexion/extension biceps triceps deltoid    LEFT SHOULDER:  Shoulder Inspection: no swelling, bruising, discoloration, or obvious deformity or asymmetry  no atrophy  Tender: AC joint, greater tuberosity, anterior capsule, proximal biceps, deltoid  Non-tender: SC joint, proximal-mid clavicle, mid-distal clavicle, acromion, proximal humerus, supraspinatus , infraspinatus, upper trapezius muscle and rhomboids  Range of Motion:   Active:forward flexion 170 degrees, external rotation  60 degrees, internal rotation  T12   Passive: same  Strength: forward flexion 5/5, External rotation 5/5  Liftoff: Able  Impingement: all grade 2 positive    BILATERAL LOWER EXTREMITIES:  Gait: normal  Alignment: varus  No gross deformities or masses.  Bilateral  Quad atrophy, strength normal.  Intact sensation deep peroneal nerve, superficial peroneal nerve, med/lat tibial nerve, sural nerve, saphenous nerve  Intact EHL, EDL, TA, FHL, GS, quadriceps hamstrings and hip flexors  Toes warm and well perfused, brisk capillary refill. Palpable 2+ dp pulses.  Bilateral calf soft and nttp or squeeze.  DTRs: achilles 2+, patella 2+.  Edema: trace     LEFT KNEE EXAM:    Skin: intact, no ecchymosis or erythema  Squat: 100 %, not limited by pain.   causes anterior pain.  ROM: 2 extension to 125 flexion  Tight hamstrings on straight leg raise.  Effusion: trace  Tender: NTTP med/lat joint line, anterior or posterior knee  McMurrays: negative     MCL: stable, and non-painful at both 0 and 30 degrees knee flexion  Varus stress: stable, and non-painful at both 0 and 30 degrees knee flexion  Lachmans: neg, firm endpoint  Posterior Drawer stable  Patellofemoral joint:                Apprehension: negative              Crepitations: mild               Grind: positive.     RIGHT KNEE EXAM:    Skin: intact, no ecchymosis or erythema  Squat: 100 %, not limited by pain.     ROM: full extension to 125+ flexion  Tight hamstrings on straight leg raise.  Effusion: none  Tender: NTTP med/lat joint line, anterior or posterior knee  McMurrays: negative     MCL: stable, and non-painful at both 0 and 30 degrees knee flexion  Varus stress: stable, and non-painful at both 0 and 30 degrees knee flexion  Lachmans: neg, firm endpoint  Posterior Drawer stable  Patellofemoral joint:                Apprehension: negative              Crepitations: mild     X-RAY:  no new images today.  3 views left knee from 5/19/2021-- no obvious fractures or dislocations. Moderate patello-femoral degenerative changes with near bone on bone loss of lateral patello-femoral compartment space with marginal osteophytes. Mild medial compartment narrowing, notch osteophytes. Some articular flattening noted.    3 views right  Shoulder, bilateral AC joints obtained 11/19/2012 -- there is elevation and widening of distal clavicle relative to acromion, 100%. Mild acromio-clavicular degenerative changes. Sclerosis at greater tuberosity. There is no increased in AC separation with weights compared to without weights. Moderate left acromio-clavicular degenerative changes.    3 views left shoulder 3/17/2014 reviewed, No obvious fracture or dislocation. No obvious bony abnormality or lesion. Moderate acromio-clavicular and mild gleno-humeral degenerative changes.       MRI right shoulder [facility] 6/2/2015: moderate sized area of poorly defined interstitial and deep fiber tearing of the distal supraspinatus tendon involves up to 50% of thickness. Moderate infraspinatus and subscapularis tendinosis. Chronic acromio-clavicular injury with CC sprain, mild narrowing of subacromial space with minimal bursitis. Long head biceps grossly intact. No significant gleno-humeral degenerative changes.    MRI left shoulder  [facility] 6/2/2015: mild supraspinatus tendinopathy with fraying involving bursal surface distally. Mild subscapularis tendinopathy. Moderate to marked acromio-clavicular degenerative changes with mild to  Moderate narrowing of the subacromial space. Mild bursitis. No significant gleno-humeral degenerative changes. Proximal biceps grossly intact.    MRI right shoulder 11/26/2012 reviewed:  IMPRESSION:  1. Extensive separation of the acromioclavicular joint including  disruption of the acromioclavicular joint ligaments. The  coracoclavicular ligaments are intact.  2. Mild tendinosis of the distal supraspinatous tendon. No actual  rotator cuff tear is seen.      ASSESSMENT: Edwardo Dumont is a 71 year old male, right  -hand dominant with:  1)  chronic right shoulder AC separation, pain, rotator cuff tendinosis and impingement with right partial thickness tear; left shoulder impingement and tendinosis.  2) chronic left knee pain, primary osteoarthritis.      PLAN:   * bilateral shoulder subacromial injections  * left knee injection.    * Rest  * Activity modification - avoid activities that aggravate symptoms or started symptoms at onset.  * NSAIDS - regular use for inflammation, with food, as long as no contra-indications. Please discuss with pcp if needed.  * Ice twice daily to three times daily, 15-20 minutes at a time  * heat may be beneficial prior to exercising  * home exercise program   * Tylenol as needed for pain  * Injections: cortisone injections may be beneficial to help decrease swelling and inflammation within the shoulder or bursa, and decrease pain. With decreased pain, Physical Therapy and exercises will be more effective and efficient. Patient elects to proceed.  * Return to clinic as needed.    Skip Reyes M.D., M.S.  Dept. of Orthopaedic Surgery  [facility]    Large Joint Injection/Arthocentesis: bilateral subacromial bursa    Date/Time: 12/9/2021 1:31 PM  Performed by: Igor Rose  RITA Jacobo  Authorized by: Skip Reyes MD     Indications:  Pain  Needle Size:  22 G  Guidance: landmark guided    Approach:  Posterolateral  Location:  Shoulder  Laterality:  Bilateral      Site:  Bilateral subacromial bursa  Medications (Right):  80 mg triamcinolone 40 MG/ML; 4 mL bupivacaine 0.25 %  Medications (Left):  80 mg triamcinolone 40 MG/ML; 4 mL bupivacaine 0.25 %  Outcome:  Tolerated well, no immediate complications  Procedure discussed: discussed risks, benefits, and alternatives    Consent Given by:  Patient  Prep: patient was prepped and draped in usual sterile fashion    Large Joint Injection/Arthocentesis: L knee joint    Date/Time: 12/9/2021 1:32 PM  Performed by: Igor Rose PA  Authorized by: Skip Reyes MD     Indications:  Pain and osteoarthritis  Needle Size:  22 G  Guidance: landmark guided    Approach:  Anteromedial  Location:  Knee      Medications:  80 mg methylPREDNISolone 80 MG/ML; 4 mL bupivacaine 0.25 %  Outcome:  Tolerated well, no immediate complications  Procedure discussed: discussed risks, benefits, and alternatives    Consent Given by:  Patient  Prep: patient was prepped and draped in usual sterile fashion

## 2021-12-09 NOTE — LETTER
"    12/9/2021         RE: Edwardo Dumont  [id number] [facility] [place] [address]        Dear Colleague,    Thank you for referring your patient, Edwardo Dumont, to the [facility]. Please see a copy of my visit note below.    CHIEF COMPLAINT:   Chief Complaint   Patient presents with     Left Knee - Pain     Last injection: 5/19/21. Injection worked good. Pain just started to come back a couple of months ago. He would like another injection today.      Shoulder Pain     Bilateral shoulder pain. Last injection: 10/1/20. Injections worked great!! Pain just started to come back. He would like more injections today.       HISTORY:  Tanvir Dumont is a 71 year old male, right-hand dominant, who is seen for follow up evaluation of bilateral shoulder pain, right > left. His last injections were on 10/1/2020. He reports injections did \"great\" until very recently, pain now slowly creeping back. [place] and would like repeat injections today before leaving. He usually gets the injections annually  in December. He's eager to get back to golfing and the warm weather.    He sustained an injury 9/2012, fell onto right shoulder while playing soccer with grand daughter. Had MRI 2012 negative for rotator cuff tear but showing acromio-clavicular injury and rotator cuff tendinosis. Noted to have AC separation. Treated non-opertively, healed well. Patient returns today with continued pain. MRI 2015 showed partial thickness rotator cuff tear on right.     He also has ongoing left knee pain with no known injury. Had a cortisone injection 5/2021, worked well, pain started to come back a couple months ago.  Pain has been present for years, evaluated by us in 2015 and noted to have primary osteoarthritis (mostly patello-femoral), no treatments at that time.  Worse walking up down stairs and hills, golf, not as bad flat walking. Pain deep inside under the kneecap, sharp and " aching.    Also his total hip arthroplasty is doing great, no concerns.    Symptoms have been waxing and waning right shoulder, starting now left shoulder.  Aggrevated by: overhead activities.  Relieved by: rest, cortisone injections  Present symptoms: pain with overhead activities, pain reaching behind back  Pain location: lateral shoulder, deltoid and upper arm  Pain severity: 6/10.  Pain quality: dull and sharp  Frequency of symptoms: occasionally  Associated symptoms: none  Treatment up to this point: injections 12/2014, 6/2015, 12/2015, 12/6/2016, 12/21/2017, 12/2018, 12/2019, 10/2020 with good relief.    Significant Orthopedic past medical history: right total hip arthroplasty 1/2011  Usual level of recreational activity: golf, pickle ball.  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    He has no past medical history of Amblyopia, Bleeding disorder (H), Cancer (H), Cataract, Cerebral infarction (H), Chronic infection, Complication of anesthesia, Congestive heart failure (H), COPD (chronic obstructive pulmonary disease) (H), Depressive disorder, Diabetic retinopathy (H), Glaucoma (increased eye pressure), Heart disease, History of blood transfusion, Inflammatory arthritis, Malignant hyperthermia, Retinal detachment, Senile macular degeneration, Strabismus, Stroke (H), Surgical complications, Thyroid disease, Uncomplicated  asthma, Unspecified asthma(493.90), or Uveitis.  Patient Active Problem List    Diagnosis Date Noted     Benign prostatic hyperplasia with nocturia 09/21/2019     Priority: Medium     S/P lumbar fusion 07/20/2018     Priority: Medium     Spinal stenosis of lumbar region with neurogenic claudication 07/10/2018     Priority: Medium     Type II diabetes mellitus with peripheral circulatory disorder (H) 01/31/2018     Priority: Medium     CKD (chronic kidney disease) stage 3, GFR 30-59 ml/min (H)      Priority: Medium     S/P coronary angiogram 09/23/2015     Priority: Medium     Insomnia 08/03/2015     Priority: Medium     Dermatochalasis 04/10/2015     Priority: Medium     Visual disturbance, transient, right eye 02/09/2014     Priority: Medium     Essential hypertension, benign 05/14/2013     Priority: Medium     Ischemic vascular disease   one stent coronary artery 5/12 12/19/2012     Priority: Medium     Closed dislocation of acromioclavicular joint 12/17/2012     Priority: Medium     Problem list name updated by automated process. Provider to review       Impingement syndrome of right shoulder 12/17/2012     Priority: Medium     S/P hip replacement 12/13/2012     Priority: Medium     Advanced directives, counseling/discussion 05/25/2011     Priority: Medium     Discussed Advance Directive planning with patient; information given to patient to review.         Urinary retention 01/21/2011     Priority: Medium     Degenerative arthritis of hip - right 12/27/2010     Priority: Medium     OA (osteoarthritis) of knee 10/25/2010     Priority: Medium     Hyperlipidemia LDL goal <100 10/14/2010     Priority: Medium     Low back pain 04/23/2010     Priority: Medium     Radiculopathy of leg 04/23/2010     Priority: Medium     Gout 11/27/2009     Priority: Medium     Sleep apnea      Priority: Medium     Cough 12/05/2007     Priority: Medium       Surgical Hx:  has a past surgical history that includes gastric bypass (1/03);  TOTAL HIP ARTHROPLASTY (1/11/11); Stent (5/8/12); Endoscopic sinus surgery (12/14/15); Endoscopic sinus surgery (Bilateral, 12/14/2015); sinus surgery (Right, 12-14-15); Stent (01/2017); ABLATION SUPRAVENT ARRHYTHMOGENIC FOCUS (12/21/15); stent, coronary, shelia (01/2017); Optical tracking system fusion spine posterior lumbar two levels (N/A, 7/20/2018); and back surgery (7/20/2018).    Medications:   Current Outpatient Medications:      ACE NOT PRESCRIBED, INTENTIONAL,, ACE Inhibitor not prescribed due to Other: cough, Disp: 0 each, Rfl: 0     albuterol (PROAIR HFA) 108 (90 Base) MCG/ACT inhaler, Inhale 2 puffs into the lungs every 4 hours as needed for shortness of breath / dyspnea or wheezing, Disp: 18 g, Rfl: 0     aspirin 81 MG tablet, Take 1 tablet (81 mg) by mouth daily, Disp: , Rfl:      atorvastatin (LIPITOR) 20 MG tablet, Take 1 tablet (20 mg) by mouth daily, Disp: 90 tablet, Rfl: 3     blood glucose (ACCU-CHEK SMARTVIEW) test strip, ONCE DAILY TESTING AND AS NEEDED, Disp: 100 strip, Rfl: 4     blood glucose monitoring (Relative.ai CONTOUR MONITOR) meter device kit, Use to test blood sugar  times daily or as directed., Disp: 1 kit, Rfl: 0     clindamycin (CLEOCIN) 300 MG capsule, Take 1 capsule (300 mg) by mouth 4 times daily, Disp: 40 capsule, Rfl: 0     empagliflozin (JARDIANCE) 25 MG TABS tablet, Take 1 tablet (25 mg) by mouth daily, Disp: 90 tablet, Rfl: 3     fluticasone (FLONASE) 50 MCG/ACT nasal spray, Spray 1 spray into both nostrils daily, Disp: , Rfl:      gabapentin (NEURONTIN) 300 MG capsule, TAKE ONE CAPSULE DAILY AT NIGHT FOR 3 DAY(S) TAKE ONE CAPSULE IN THE AM AND AT BEDTIME FOR 3 DAY(S) THEN TAKE ONE CAPSULE IN THE AM, ONE IN THE AFTERNOON AND ONE AT BEDTIME THEREAFTER, Disp: 90 capsule, Rfl: 1     gabapentin (NEURONTIN) 600 MG tablet, Take 1 tablet (600 mg) by mouth 2 times daily, Disp: 180 tablet, Rfl: 3     lisinopril (ZESTRIL) 2.5 MG tablet, Take 1 tablet (2.5 mg) by mouth daily, Disp: 90  "tablet, Rfl: 3     metFORMIN (GLUCOPHAGE) 500 MG tablet, TAKE 2 TABLETS BY MOUTH TWICE DAILY WITH MEALS, Disp: 360 tablet, Rfl: 3     metoprolol succinate ER (TOPROL-XL) 25 MG 24 hr tablet, Take 12.5 mg by mouth daily, Disp: , Rfl:      nitroGLYcerin (NITROSTAT) 0.4 MG sublingual tablet, Place 1 tablet (0.4 mg) under the tongue every 5 minutes as needed for chest pain, Disp: 90 tablet, Rfl: 4     tadalafil (CIALIS) 20 MG tablet, Take 1 tablet (20 mg) by mouth daily as needed, Disp: 30 tablet, Rfl: 4    Allergies:   Allergies   Allergen Reactions     Benzonatate Anaphylaxis and Swelling     Lisinopril Cough     Zocor [Simvastatin - High Dose]      Poor memory       REVIEW OF SYSTEMS:   CONSTITUTIONAL:NEGATIVE for fever, chills, change in weight  INTEGUMENTARY/SKIN: NEGATIVE for worrisome rashes, moles or lesions  MUSCULOSKELETAL:See HPI above  NEURO: NEGATIVE for weakness, dizziness or paresthesias         PHYSICAL EXAM:  /84   Pulse 62   Ht 1.854 m (6' 1\")   Wt 104.6 kg (230 lb 9.6 oz)   BMI 30.42 kg/m     GENERAL APPEARANCE: healthy, alert, no distress  SKIN: no suspicious lesions or rashes  NEURO: Normal strength and tone, mentation intact and speech normal  PSYCH:  mentation appears normal and affect normal, not anxious  RESPIRATORY: No increased work of breathing.      RIGHT UPPER EXTREMITY:  Sensation intact to light touch in median, radial, ulnar and axillary nerve distributions  Palpable 2+ radial pulse, brisk capillary refill to all fingers, wwp  Intact epl fpl fdp edc wrist flexion/extension biceps triceps deltoid    RIGHT SHOULDER:  Shoulder Inspection: no swelling, bruising, discoloration, there is moderate  AC prominence deformity and asymmetry, mild muscle atrophy supraspinatus and infraspinatus compared to left.    Tender:greater tuberosity, upper trapezius,  nontender to palpation: AC joint, proximal bicep tendon, supraspinatus  Range of Motion:   Active:forward flexion 170 degrees, external " rotation  60 degrees, internal rotation  T12   Passive: same  Strength: forward flexion 5-/5, External rotation 5-/5  Liftoff: Able  Impingement: all grade 2 positive  Special tests: Spurling's: Negative  Belly Press: Negative  Empty Can: Positive for pain.    LEFT UPPER EXTREMITY:  Sensation intact to light touch in median, radial, ulnar and axillary nerve distributions  Palpable 2+ radial pulse, brisk capillary refill to all fingers, wwp  Intact epl fpl fdp edc wrist flexion/extension biceps triceps deltoid    LEFT SHOULDER:  Shoulder Inspection: no swelling, bruising, discoloration, or obvious deformity or asymmetry  no atrophy  Tender: AC joint, greater tuberosity, anterior capsule, proximal biceps, deltoid  Non-tender: SC joint, proximal-mid clavicle, mid-distal clavicle, acromion, proximal humerus, supraspinatus , infraspinatus, upper trapezius muscle and rhomboids  Range of Motion:   Active:forward flexion 170 degrees, external rotation  60 degrees, internal rotation  T12   Passive: same  Strength: forward flexion 5/5, External rotation 5/5  Liftoff: Able  Impingement: all grade 2 positive    BILATERAL LOWER EXTREMITIES:  Gait: normal  Alignment: varus  No gross deformities or masses.  Bilateral  Quad atrophy, strength normal.  Intact sensation deep peroneal nerve, superficial peroneal nerve, med/lat tibial nerve, sural nerve, saphenous nerve  Intact EHL, EDL, TA, FHL, GS, quadriceps hamstrings and hip flexors  Toes warm and well perfused, brisk capillary refill. Palpable 2+ dp pulses.  Bilateral calf soft and nttp or squeeze.  DTRs: achilles 2+, patella 2+.  Edema: trace     LEFT KNEE EXAM:    Skin: intact, no ecchymosis or erythema  Squat: 100 %, not limited by pain.   causes anterior pain.  ROM: 2 extension to 125 flexion  Tight hamstrings on straight leg raise.  Effusion: trace  Tender: NTTP med/lat joint line, anterior or posterior knee  McMurrays: negative     MCL: stable, and non-painful at both 0 and  30 degrees knee flexion  Varus stress: stable, and non-painful at both 0 and 30 degrees knee flexion  Lachmans: neg, firm endpoint  Posterior Drawer stable  Patellofemoral joint:                Apprehension: negative              Crepitations: mild              Grind: positive.     RIGHT KNEE EXAM:    Skin: intact, no ecchymosis or erythema  Squat: 100 %, not limited by pain.     ROM: full extension to 125+ flexion  Tight hamstrings on straight leg raise.  Effusion: none  Tender: NTTP med/lat joint line, anterior or posterior knee  McMurrays: negative     MCL: stable, and non-painful at both 0 and 30 degrees knee flexion  Varus stress: stable, and non-painful at both 0 and 30 degrees knee flexion  Lachmans: neg, firm endpoint  Posterior Drawer stable  Patellofemoral joint:                Apprehension: negative              Crepitations: mild     X-RAY:  no new images today.  3 views left knee from 5/19/2021-- no obvious fractures or dislocations. Moderate patello-femoral degenerative changes with near bone on bone loss of lateral patello-femoral compartment space with marginal osteophytes. Mild medial compartment narrowing, notch osteophytes. Some articular flattening noted.    3 views right  Shoulder, bilateral AC joints obtained 11/19/2012 -- there is elevation and widening of distal clavicle relative to acromion, 100%. Mild acromio-clavicular degenerative changes. Sclerosis at greater tuberosity. There is no increased in AC separation with weights compared to without weights. Moderate left acromio-clavicular degenerative changes.    3 views left shoulder 3/17/2014 reviewed, No obvious fracture or dislocation. No obvious bony abnormality or lesion. Moderate acromio-clavicular and mild gleno-humeral degenerative changes.       MRI right shoulder CDI 6/2/2015: moderate sized area of poorly defined interstitial and deep fiber tearing of the distal supraspinatus tendon involves up to 50% of thickness. Moderate  infraspinatus and subscapularis tendinosis. Chronic acromio-clavicular injury with CC sprain, mild narrowing of subacromial space with minimal bursitis. Long head biceps grossly intact. No significant gleno-humeral degenerative changes.    MRI left shoulder CDI 6/2/2015: mild supraspinatus tendinopathy with fraying involving bursal surface distally. Mild subscapularis tendinopathy. Moderate to marked acromio-clavicular degenerative changes with mild to  Moderate narrowing of the subacromial space. Mild bursitis. No significant gleno-humeral degenerative changes. Proximal biceps grossly intact.    MRI right shoulder 11/26/2012 reviewed:  IMPRESSION:  1. Extensive separation of the acromioclavicular joint including  disruption of the acromioclavicular joint ligaments. The  coracoclavicular ligaments are intact.  2. Mild tendinosis of the distal supraspinatous tendon. No actual  rotator cuff tear is seen.      ASSESSMENT: Edwardo Dumont is a 71 year old male, right  -hand dominant with:  1)  chronic right shoulder AC separation, pain, rotator cuff tendinosis and impingement with right partial thickness tear; left shoulder impingement and tendinosis.  2) chronic left knee pain, primary osteoarthritis.      PLAN:   * bilateral shoulder subacromial injections  * left knee injection.    * Rest  * Activity modification - avoid activities that aggravate symptoms or started symptoms at onset.  * NSAIDS - regular use for inflammation, with food, as long as no contra-indications. Please discuss with pcp if needed.  * Ice twice daily to three times daily, 15-20 minutes at a time  * heat may be beneficial prior to exercising  * home exercise program   * Tylenol as needed for pain  * Injections: cortisone injections may be beneficial to help decrease swelling and inflammation within the shoulder or bursa, and decrease pain. With decreased pain, Physical Therapy and exercises will be more effective and efficient. Patient elects to  proceed.  * Return to clinic as needed.    Skip Reyes M.D., M.S.  Dept. of Orthopaedic Surgery  [facility]    Large Joint Injection/Arthocentesis: bilateral subacromial bursa    Date/Time: 12/9/2021 1:31 PM  Performed by: Igor Rose PA  Authorized by: Skip Reyes MD     Indications:  Pain  Needle Size:  22 G  Guidance: landmark guided    Approach:  Posterolateral  Location:  Shoulder  Laterality:  Bilateral      Site:  Bilateral subacromial bursa  Medications (Right):  80 mg triamcinolone 40 MG/ML; 4 mL bupivacaine 0.25 %  Medications (Left):  80 mg triamcinolone 40 MG/ML; 4 mL bupivacaine 0.25 %  Outcome:  Tolerated well, no immediate complications  Procedure discussed: discussed risks, benefits, and alternatives    Consent Given by:  Patient  Prep: patient was prepped and draped in usual sterile fashion    Large Joint Injection/Arthocentesis: L knee joint    Date/Time: 12/9/2021 1:32 PM  Performed by: Igor Rose PA  Authorized by: Skip Reyes MD     Indications:  Pain and osteoarthritis  Needle Size:  22 G  Guidance: landmark guided    Approach:  Anteromedial  Location:  Knee      Medications:  80 mg methylPREDNISolone 80 MG/ML; 4 mL bupivacaine 0.25 %  Outcome:  Tolerated well, no immediate complications  Procedure discussed: discussed risks, benefits, and alternatives    Consent Given by:  Patient  Prep: patient was prepped and draped in usual sterile fashion            Again, thank you for allowing me to participate in the care of your patient.        Sincerely,        Skip Reyes MD

## 2022-01-26 DIAGNOSIS — E11.42 DIABETIC POLYNEUROPATHY ASSOCIATED WITH TYPE 2 DIABETES MELLITUS (H): ICD-10-CM

## 2022-01-27 RX ORDER — GABAPENTIN 600 MG/1
TABLET ORAL
Qty: 180 TABLET | Refills: 1 | Status: SHIPPED | OUTPATIENT
Start: 2022-01-27 | End: 2022-05-31

## 2022-04-23 ENCOUNTER — HEALTH MAINTENANCE LETTER (OUTPATIENT)
Age: 72
End: 2022-04-23

## 2022-04-29 ENCOUNTER — TELEPHONE (OUTPATIENT)
Dept: FAMILY MEDICINE | Facility: CLINIC | Age: 72
End: 2022-04-29
Payer: MEDICARE

## 2022-04-29 DIAGNOSIS — E11.51 TYPE II DIABETES MELLITUS WITH PERIPHERAL CIRCULATORY DISORDER (H): Primary | ICD-10-CM

## 2022-04-29 NOTE — TELEPHONE ENCOUNTER
Patient has appointment with lab on 5/23 for future diabetes check with Dr. Madrigal on 5/31. He would like the lab orders to be placed before coming in on the 23rd.       Please call patient with questions or if there are issues with this.     Jelly ROSA,   [facility]

## 2022-05-15 DIAGNOSIS — E11.21 TYPE 2 DIABETES MELLITUS WITH DIABETIC NEPHROPATHY, WITHOUT LONG-TERM CURRENT USE OF INSULIN (H): ICD-10-CM

## 2022-05-17 RX ORDER — EMPAGLIFLOZIN 25 MG/1
TABLET, FILM COATED ORAL
Qty: 90 TABLET | Refills: 3 | Status: SHIPPED | OUTPATIENT
Start: 2022-05-17 | End: 2023-05-15

## 2022-05-17 NOTE — TELEPHONE ENCOUNTER
"Routing refill request to provider for review/approval because:  Requested Prescriptions   Pending Prescriptions Disp Refills    JARDIANCE 25 MG TABS tablet [Pharmacy Med Name: JARDIANCE 25 MG TABLET] 90 tablet 3     Sig: TAKE 1 TABLET (25 MG) BY MOUTH DAILY        Sodium Glucose Co-Transport Inhibitor Agents Failed - 5/16/2022  4:49 PM        Failed - Patient has documented A1c within the specified period of time.     If HgbA1C is 8 or greater, it needs to be on file within the past 3 months.  If less than 8, must be on file within the past 6 months.     Recent Labs   Lab Test 09/24/21  0931   A1C 7.8*             Failed - No creatinine >1.4 or GFR <45 within the past 12 mos       Recent Labs   Lab Test 04/24/21  0908   GFRESTIMATED 58*   GFRESTBLACK 67       Recent Labs   Lab Test 04/24/21  0908   CR 1.25             Failed - Patient has documented normal Potassium within the last 12 mos.     Recent Labs   Lab Test 04/24/21  0908   POTASSIUM 4.4               Passed - Medication is active on med list        Passed - Patient is age 18 or older        Passed - Recent (6 mo) or future (30 days) visit within the authorizing provider's specialty     Patient had office visit in the last 6 months or has a visit in the next 30 days with authorizing provider or within the authorizing provider's specialty.  See \"Patient Info\" tab in inbasket, or \"Choose Columns\" in Meds & Orders section of the refill encounter.                    Heather EUGENE, RN        "

## 2022-05-23 ENCOUNTER — LAB (OUTPATIENT)
Dept: LAB | Facility: CLINIC | Age: 72
End: 2022-05-23
Payer: MEDICARE

## 2022-05-23 DIAGNOSIS — E11.51 TYPE II DIABETES MELLITUS WITH PERIPHERAL CIRCULATORY DISORDER (H): ICD-10-CM

## 2022-05-23 LAB
ALBUMIN SERPL-MCNC: 4 G/DL (ref 3.4–5)
ALP SERPL-CCNC: 90 U/L (ref 40–150)
ALT SERPL W P-5'-P-CCNC: 19 U/L (ref 0–70)
ANION GAP SERPL CALCULATED.3IONS-SCNC: 10 MMOL/L (ref 3–14)
AST SERPL W P-5'-P-CCNC: 10 U/L (ref 0–45)
BILIRUB SERPL-MCNC: 0.9 MG/DL (ref 0.2–1.3)
BUN SERPL-MCNC: 22 MG/DL (ref 7–30)
CALCIUM SERPL-MCNC: 9 MG/DL (ref 8.5–10.1)
CHLORIDE BLD-SCNC: 108 MMOL/L (ref 94–109)
CHOLEST SERPL-MCNC: 118 MG/DL
CO2 SERPL-SCNC: 23 MMOL/L (ref 20–32)
CREAT SERPL-MCNC: 1.25 MG/DL (ref 0.66–1.25)
CREAT UR-MCNC: 76 MG/DL
FASTING STATUS PATIENT QL REPORTED: YES
GFR SERPL CREATININE-BSD FRML MDRD: 62 ML/MIN/1.73M2
GLUCOSE BLD-MCNC: 165 MG/DL (ref 70–99)
HBA1C MFR BLD: 7.4 % (ref 0–5.6)
HDLC SERPL-MCNC: 49 MG/DL
LDLC SERPL CALC-MCNC: 52 MG/DL
MICROALBUMIN UR-MCNC: 138 MG/L
MICROALBUMIN/CREAT UR: 181.58 MG/G CR (ref 0–17)
NONHDLC SERPL-MCNC: 69 MG/DL
POTASSIUM BLD-SCNC: 3.9 MMOL/L (ref 3.4–5.3)
PROT SERPL-MCNC: 6.8 G/DL (ref 6.8–8.8)
SODIUM SERPL-SCNC: 141 MMOL/L (ref 133–144)
TRIGL SERPL-MCNC: 87 MG/DL
TSH SERPL DL<=0.005 MIU/L-ACNC: 1.48 MU/L (ref 0.4–4)

## 2022-05-23 PROCEDURE — 36415 COLL VENOUS BLD VENIPUNCTURE: CPT

## 2022-05-23 PROCEDURE — 80061 LIPID PANEL: CPT

## 2022-05-23 PROCEDURE — 83036 HEMOGLOBIN GLYCOSYLATED A1C: CPT

## 2022-05-23 PROCEDURE — 84443 ASSAY THYROID STIM HORMONE: CPT

## 2022-05-23 PROCEDURE — 82043 UR ALBUMIN QUANTITATIVE: CPT

## 2022-05-23 PROCEDURE — 80053 COMPREHEN METABOLIC PANEL: CPT

## 2022-05-31 ENCOUNTER — OFFICE VISIT (OUTPATIENT)
Dept: FAMILY MEDICINE | Facility: CLINIC | Age: 72
End: 2022-05-31
Payer: MEDICARE

## 2022-05-31 VITALS
HEIGHT: 73 IN | WEIGHT: 227 LBS | DIASTOLIC BLOOD PRESSURE: 75 MMHG | BODY MASS INDEX: 30.09 KG/M2 | SYSTOLIC BLOOD PRESSURE: 140 MMHG | HEART RATE: 57 BPM | OXYGEN SATURATION: 97 % | TEMPERATURE: 97.7 F | RESPIRATION RATE: 16 BRPM

## 2022-05-31 DIAGNOSIS — I25.83 CORONARY ARTERY DISEASE DUE TO LIPID RICH PLAQUE: ICD-10-CM

## 2022-05-31 DIAGNOSIS — Z98.84 HX OF LAPAROSCOPIC GASTRIC BANDING: Primary | ICD-10-CM

## 2022-05-31 DIAGNOSIS — E11.51 TYPE II DIABETES MELLITUS WITH PERIPHERAL CIRCULATORY DISORDER (H): ICD-10-CM

## 2022-05-31 DIAGNOSIS — I25.10 CORONARY ARTERY DISEASE DUE TO LIPID RICH PLAQUE: ICD-10-CM

## 2022-05-31 DIAGNOSIS — N52.9 ERECTILE DYSFUNCTION, UNSPECIFIED ERECTILE DYSFUNCTION TYPE: ICD-10-CM

## 2022-05-31 DIAGNOSIS — N18.30 STAGE 3 CHRONIC KIDNEY DISEASE, UNSPECIFIED WHETHER STAGE 3A OR 3B CKD (H): ICD-10-CM

## 2022-05-31 DIAGNOSIS — I47.10 PAROXYSMAL SVT (SUPRAVENTRICULAR TACHYCARDIA) (H): ICD-10-CM

## 2022-05-31 DIAGNOSIS — E11.21 TYPE 2 DIABETES MELLITUS WITH DIABETIC NEPHROPATHY, WITHOUT LONG-TERM CURRENT USE OF INSULIN (H): ICD-10-CM

## 2022-05-31 DIAGNOSIS — E11.42 DIABETIC POLYNEUROPATHY ASSOCIATED WITH TYPE 2 DIABETES MELLITUS (H): ICD-10-CM

## 2022-05-31 DIAGNOSIS — J20.9 ACUTE BRONCHITIS WITH SYMPTOMS > 10 DAYS: ICD-10-CM

## 2022-05-31 DIAGNOSIS — I10 ESSENTIAL HYPERTENSION, BENIGN: ICD-10-CM

## 2022-05-31 DIAGNOSIS — E78.5 HYPERLIPIDEMIA LDL GOAL <100: ICD-10-CM

## 2022-05-31 PROCEDURE — 99214 OFFICE O/P EST MOD 30 MIN: CPT | Performed by: FAMILY MEDICINE

## 2022-05-31 RX ORDER — TADALAFIL 20 MG/1
20 TABLET ORAL DAILY PRN
Qty: 30 TABLET | Refills: 4 | Status: SHIPPED | OUTPATIENT
Start: 2022-05-31 | End: 2022-07-22

## 2022-05-31 RX ORDER — GABAPENTIN 600 MG/1
600 TABLET ORAL 3 TIMES DAILY
Qty: 270 TABLET | Refills: 1 | Status: SHIPPED | OUTPATIENT
Start: 2022-05-31 | End: 2022-12-26

## 2022-05-31 RX ORDER — LISINOPRIL 2.5 MG/1
2.5 TABLET ORAL DAILY
Qty: 90 TABLET | Refills: 3 | Status: SHIPPED | OUTPATIENT
Start: 2022-05-31 | End: 2022-07-22

## 2022-05-31 RX ORDER — ATORVASTATIN CALCIUM 20 MG/1
20 TABLET, FILM COATED ORAL DAILY
Qty: 90 TABLET | Refills: 3 | Status: SHIPPED | OUTPATIENT
Start: 2022-05-31

## 2022-05-31 RX ORDER — ALBUTEROL SULFATE 90 UG/1
2 AEROSOL, METERED RESPIRATORY (INHALATION) EVERY 4 HOURS PRN
Qty: 18 G | Refills: 1 | Status: SHIPPED | OUTPATIENT
Start: 2022-05-31

## 2022-05-31 ASSESSMENT — PAIN SCALES - GENERAL: PAINLEVEL: NO PAIN (1)

## 2022-05-31 NOTE — NURSING NOTE
"Chief Complaint   Patient presents with     Diabetes       Initial BP (!) 173/75   Pulse (!) 42   Temp 97.7  F (36.5  C) (Tympanic)   Resp 16   Ht 1.854 m (6' 1\")   Wt 103 kg (227 lb)   SpO2 97%   BMI 29.95 kg/m   Estimated body mass index is 29.95 kg/m  as calculated from the following:    Height as of this encounter: 1.854 m (6' 1\").    Weight as of this encounter: 103 kg (227 lb).  Medication Reconciliation: complete  Daniella Loomis, BRANDON  "

## 2022-05-31 NOTE — PROGRESS NOTES
"      Subjective   Tanvir is a 71 year old who presents for the following health issues     History of Present Illness       Diabetes:   He presents for follow up of diabetes.  He is checking home blood glucose a few times a month. He checks blood glucose before meals.  Blood glucose is never over 200 and never under 70. When his blood glucose is low, the patient is asymptomatic for confusion, blurred vision, lethargy and reports not feeling dizzy, shaky, or weak.  He is concerned about other.  He is having burning in feet. The patient has not had a diabetic eye exam in the last 12 months.         He eats 2-3 servings of fruits and vegetables daily.He consumes 0 sweetened beverage(s) daily.He exercises with enough effort to increase his heart rate 10 to 19 minutes per day.  He exercises with enough effort to increase his heart rate 3 or less days per week.   He is taking medications regularly.             Review of Systems         Objective    BP (!) 140/75   Pulse 57   Temp 97.7  F (36.5  C) (Tympanic)   Resp 16   Ht 1.854 m (6' 1\")   Wt 103 kg (227 lb)   SpO2 97%   BMI 29.95 kg/m    Body mass index is 29.95 kg/m .  Physical Exam                 --------------------------------------------------------------------------------------------------------------------------------------  [place] diabetes resourses:  [email or web address]        To access diabetes educational materials with in EPIC use <dot>RADHA      Put this in AFTER VISIT SUMMARY if needed for the patient to access information online [email or web address]      SUBJECTIVE:  Edwardo Dumont is a 71 year old male who presents today for a follow up appointment for management of DIABETES MELLITUS.    Patient Active Problem List    Diagnosis Date Noted     Benign prostatic hyperplasia with nocturia 09/21/2019     Priority: Medium     S/P lumbar fusion 07/20/2018     " Priority: Medium     Spinal stenosis of lumbar region with neurogenic claudication 07/10/2018     Priority: Medium     Type II diabetes mellitus with peripheral circulatory disorder (H) 01/31/2018     Priority: Medium     CKD (chronic kidney disease) stage 3, GFR 30-59 ml/min (H)      Priority: Medium     S/P coronary angiogram 09/23/2015     Priority: Medium     Insomnia 08/03/2015     Priority: Medium     Dermatochalasis 04/10/2015     Priority: Medium     Visual disturbance, transient, right eye 02/09/2014     Priority: Medium     Essential hypertension, benign 05/14/2013     Priority: Medium     Ischemic vascular disease   one stent coronary artery 5/12 12/19/2012     Priority: Medium     Closed dislocation of acromioclavicular joint 12/17/2012     Priority: Medium     Problem list name updated by automated process. Provider to review       Impingement syndrome of right shoulder 12/17/2012     Priority: Medium     S/P hip replacement 12/13/2012     Priority: Medium     Advanced directives, counseling/discussion 05/25/2011     Priority: Medium     Discussed Advance Directive planning with patient; information given to patient to review.         Urinary retention 01/21/2011     Priority: Medium     Degenerative arthritis of hip - right 12/27/2010     Priority: Medium     OA (osteoarthritis) of knee 10/25/2010     Priority: Medium     Hyperlipidemia LDL goal <100 10/14/2010     Priority: Medium     Low back pain 04/23/2010     Priority: Medium     Radiculopathy of leg 04/23/2010     Priority: Medium     Gout 11/27/2009     Priority: Medium     Sleep apnea      Priority: Medium     Cough 12/05/2007     Priority: Medium          The patient checks his blood sugar as follows: 1 times a week, once daily.   Results as follows: fasting glucose- 125-150    The patient reports that he IS taking the medication as prescribed.        ----------------------------------------------------------------------------------------------------------------------------------------    BP Readings from Last 3 Encounters:   05/31/22 (!) 140/75   12/09/21 133/84   10/08/21 121/74     The patient reports that he IS NOT currently smoking.  History   Smoking Status     Former Smoker     Packs/day: 1.00     Years: 5.00     Types: Cigarettes     Start date: 1/1/1970     Quit date: 9/8/1989   Smokeless Tobacco     Never Used     Comment: former smoker       The patient's coronary risk factors in addition to diabetes include:      The ASCVD Risk score (Rajan FORD Jr., et al., 2013) failed to calculate for the following reasons:    The valid total cholesterol range is 130 to 320 mg/dL        Current Outpatient Medications   Medication Sig Dispense Refill     ACE NOT PRESCRIBED, INTENTIONAL, ACE Inhibitor not prescribed due to Other: cough 0 each 0     albuterol (PROAIR HFA) 108 (90 Base) MCG/ACT inhaler Inhale 2 puffs into the lungs every 4 hours as needed for shortness of breath / dyspnea or wheezing 18 g 0     aspirin 81 MG tablet Take 1 tablet (81 mg) by mouth daily       atorvastatin (LIPITOR) 20 MG tablet Take 1 tablet (20 mg) by mouth daily 90 tablet 3     blood glucose (ACCU-CHEK SMARTVIEW) test strip ONCE DAILY TESTING AND AS NEEDED 100 strip 4     blood glucose monitoring (VIANNEY CONTOUR MONITOR) meter device kit Use to test blood sugar  times daily or as directed. 1 kit 0     clindamycin (CLEOCIN) 300 MG capsule Take 1 capsule (300 mg) by mouth 4 times daily 40 capsule 0     fluticasone (FLONASE) 50 MCG/ACT nasal spray Spray 1 spray into both nostrils daily       gabapentin (NEURONTIN) 300 MG capsule TAKE ONE CAPSULE DAILY AT NIGHT FOR 3 DAY(S) TAKE ONE CAPSULE IN THE AM AND AT BEDTIME FOR 3 DAY(S) THEN TAKE ONE CAPSULE IN THE AM, ONE IN THE AFTERNOON AND ONE AT BEDTIME THEREAFTER 90 capsule 1     gabapentin (NEURONTIN) 600 MG tablet TAKE 1 TABLET BY MOUTH 2  TIMES DAILY 180 tablet 1     JARDIANCE 25 MG TABS tablet TAKE 1 TABLET (25 MG) BY MOUTH DAILY 90 tablet 3     lisinopril (ZESTRIL) 2.5 MG tablet Take 1 tablet (2.5 mg) by mouth daily 90 tablet 3     metFORMIN (GLUCOPHAGE) 500 MG tablet TAKE 2 TABLETS BY MOUTH TWICE DAILY WITH MEALS 360 tablet 3     metoprolol succinate ER (TOPROL-XL) 25 MG 24 hr tablet Take 12.5 mg by mouth daily       nitroGLYcerin (NITROSTAT) 0.4 MG sublingual tablet Place 1 tablet (0.4 mg) under the tongue every 5 minutes as needed for chest pain 90 tablet 4     tadalafil (CIALIS) 20 MG tablet Take 1 tablet (20 mg) by mouth daily as needed 30 tablet 4       The patient reports that he IS taking a statin.   (Reminder all diabetics with a ASCVD risk greater or equal to 7.5% should be on a high intensity statin, otherwise on a moderate intensity statin)    The patient reports that he IS taking 81 mg  aspirin daily.  (Reminder all diabetic patients with a cardiovascular risk factor and > 50 should take a daily aspirin)     The patient reports that he IS doing a self foot exam daily.  The patient reports that he does exercise in the form of walking and biking but he has not been doing these lately   The patient reports that he IS following the recommended diabetic diet. He  would give himself a C+  grade on his diet.  The patient reports that his last eye exam was 1.5  years ago.       He had 2 Pfizer vaccination(s) and then the booster below.   He does not have the information with him today but will bring in next time so we can document it.     Immunization History   Administered Date(s) Administered     COVID-19,PF,Pfizer (12+ Yrs) 10/05/2021     HEPA 01/08/2009, 09/08/2009     HepB 03/06/2013, 01/15/2014     HepB-Adult 05/30/2019     Influenza (High Dose) 3 valent vaccine 11/01/2016, 09/26/2017, 09/20/2019     Influenza (IIV3) PF 1950, 10/01/2010, 01/03/2011, 09/26/2012, 11/04/2013, 10/01/2014     Influenza Vaccine, 6+MO IM (QUADRIVALENT  "W/PRESERVATIVES) 10/06/2015     Influenza, Quad, High Dose, Pf, 65yr+ (Fluzone HD) 09/29/2020, 10/05/2021     Pneumo Conj 13-V (2010&after) 06/02/2016     Pneumococcal 23 valent 02/27/2009, 06/13/2017     TD (ADULT, 7+) 05/30/2019     TDAP Vaccine (Adacel) 01/08/2009     Zoster vaccine recombinant adjuvanted (SHINGRIX) 03/26/2022, 05/27/2022     The patient reports that he has had the hepatitis B vaccine series in the past. (recommended for age 19-59 and can be given to age 60 or older)  The patient reports that he has had a pnuemovax in the past.  The patient reports that he has had a flu shot for the current influenza season.  The patient would like to have a no vaccinations today    Patient concerns: is concerned about his feet.  Reports that the neuropathy is getting slowly worse.  He is taking gabapentin 600 mg twice a day now.    ROS:      EXAM:  BP (!) 140/75   Pulse 57   Temp 97.7  F (36.5  C) (Tympanic)   Resp 16   Ht 1.854 m (6' 1\")   Wt 103 kg (227 lb)   SpO2 97%   BMI 29.95 kg/m    Wt Readings from Last 4 Encounters:   05/31/22 103 kg (227 lb)   12/09/21 104.6 kg (230 lb 9.6 oz)   10/08/21 102.1 kg (225 lb)   08/02/21 103.7 kg (228 lb 9.6 oz)     Body mass index is 29.95 kg/m .    General:  Edwardo is awake, alert, and cooperative.  NAD.      Diabetic Foot Screen:  Any complaints of increased pain or numbness ?  YES   Is there a foot ulcer now or a history of foot ulcer? No  Does the foot have an abnormal shape? No  Are the nails thick, too long or ingrown? No  Are there any redness or open areas? No         Sensation Testing done at all points on the diagram with monofilament     Right Foot: Sensation Normal at all points  Left Foot: Sensation Normal at all points     Risk Category: 0- No loss of protective sensation  Performed by Bola Madrigal MD        Diabetic foot exam: normal DP and PT pulses, no trophic changes or ulcerative lesions and normal sensory exam            Previsit labs drawn " include:  Lab on 05/23/2022   Component Date Value Ref Range Status     TSH 05/23/2022 1.48  0.40 - 4.00 mU/L Final     Cholesterol 05/23/2022 118  <200 mg/dL Final     Triglycerides 05/23/2022 87  <150 mg/dL Final     Direct Measure HDL 05/23/2022 49  >=40 mg/dL Final     LDL Cholesterol Calculated 05/23/2022 52  <=100 mg/dL Final     Non HDL Cholesterol 05/23/2022 69  <130 mg/dL Final     Patient Fasting > 8hrs? 05/23/2022 Yes   Final     Hemoglobin A1C 05/23/2022 7.4 (A) 0.0 - 5.6 % Final    Normal <5.7%   Prediabetes 5.7-6.4%    Diabetes 6.5% or higher     Note: Adopted from ADA consensus guidelines.     Sodium 05/23/2022 141  133 - 144 mmol/L Final     Potassium 05/23/2022 3.9  3.4 - 5.3 mmol/L Final     Chloride 05/23/2022 108  94 - 109 mmol/L Final     Carbon Dioxide (CO2) 05/23/2022 23  20 - 32 mmol/L Final     Anion Gap 05/23/2022 10  3 - 14 mmol/L Final     Urea Nitrogen 05/23/2022 22  7 - 30 mg/dL Final     Creatinine 05/23/2022 1.25  0.66 - 1.25 mg/dL Final     Calcium 05/23/2022 9.0  8.5 - 10.1 mg/dL Final     Glucose 05/23/2022 165 (A) 70 - 99 mg/dL Final     Alkaline Phosphatase 05/23/2022 90  40 - 150 U/L Final     AST 05/23/2022 10  0 - 45 U/L Final     ALT 05/23/2022 19  0 - 70 U/L Final     Protein Total 05/23/2022 6.8  6.8 - 8.8 g/dL Final     Albumin 05/23/2022 4.0  3.4 - 5.0 g/dL Final     Bilirubin Total 05/23/2022 0.9  0.2 - 1.3 mg/dL Final     GFR Estimate 05/23/2022 62  >60 mL/min/1.73m2 Final    Effective December 21, 2021 eGFRcr in adults is calculated using the 2021 CKD-EPI creatinine equation which includes age and gender ( et al., NEJM, DOI: 10.1056/WTTBot0891125)     Creatinine Urine mg/dL 05/23/2022 76  mg/dL Final     Albumin Urine mg/L 05/23/2022 138  mg/L Final     Albumin Urine mg/g Cr 05/23/2022 181.58 (A) 0.00 - 17.00 mg/g Cr Final         ASSESSMENT and PLAN:    Type II Diabetes, Stable.    DIABETES Type II:                       Lab Results   Component Value Date    A1C  "7.4 05/23/2022    A1C 7.4 04/24/2021       Control   fair  Lab Results   Component Value Date    A1C 7.4 05/23/2022    A1C 7.8 09/24/2021    A1C 7.4 04/24/2021    A1C 7.1 09/26/2020    A1C 7.1 06/15/2020       Albumin Urine mg/g Cr   Date Value Ref Range Status   05/23/2022 181.58 (H) 0.00 - 17.00 mg/g Cr Final   04/24/2021 32.17 (H) 0 - 17 mg/g Cr Final       He declined medication addition today but he does want to work on diet and exercise to get his A1c down.   Recommended to take ASA  mg daily for appropriate patient, Compliance with the recommended diabetic diet was stressed, Regular aerobic exercise was encouraged, Home glucose monitoring encouraged, Annual eye exam recommended, Self foot exam demonstrated and recommended to be done nightly, Apply moisturizer to feet with in 3 minutes of showering or bathing, Follow up in clinic in 3 months for a diabetes check and Future labs ordered and the patient was instructed to make a lab appt 1 week prior to next diabetes visit      BP/HTN:   BP Readings from Last 3 Encounters:   05/31/22 (!) 140/75   12/09/21 133/84   10/08/21 121/74     Control   fair  Advised increase in medication but he declined at this point  - Medication:continue the current doses of medication.  (make sure to order \"Ace not prescribed intentionally if not on an ACE/ARB)  The patient was advised to do the following therapuetic life style changes  - Dietary sodium restriction and increase potassium and Calcium intake  - Regular aerobic exercise  - Weight loss  - Discontinue smoking if applicable  - Avoid regular NSAID use if applicable  - Avoid regular decongestant use if applicable  - Follow up in clinic in 3 months for a recheck  - Check a basic metabolic panel today if needed    Patient Education: Reviewed risks of hypertension and principles of   Treatment.    HYPERCHOLESTEROLEMIA:     The ASCVD Risk score (Rajanmercy FORD Jr., et al., 2013) failed to calculate for the following reasons:    " The valid total cholesterol range is 130 to 320 mg/dL    Lab Results   Component Value Date    LDL 52 05/23/2022    LDL 53 09/26/2020      Control   good  Cholesterol   Date Value Ref Range Status   05/23/2022 118 <200 mg/dL Final   09/24/2021 132 <200 mg/dL Final   09/26/2020 117 <200 mg/dL Final   06/15/2020 98 <200 mg/dL Final     HDL Cholesterol   Date Value Ref Range Status   09/26/2020 47 >39 mg/dL Final   06/15/2020 43 >39 mg/dL Final     Direct Measure HDL   Date Value Ref Range Status   05/23/2022 49 >=40 mg/dL Final   09/24/2021 46 >=40 mg/dL Final     LDL Cholesterol Calculated   Date Value Ref Range Status   05/23/2022 52 <=100 mg/dL Final   09/24/2021 65 <=100 mg/dL Final   09/26/2020 53 <100 mg/dL Final     Comment:     Desirable:       <100 mg/dl   06/15/2020 36 <100 mg/dL Final     Comment:     Desirable:       <100 mg/dl     Triglycerides   Date Value Ref Range Status   05/23/2022 87 <150 mg/dL Final   09/24/2021 103 <150 mg/dL Final   09/26/2020 86 <150 mg/dL Final     Comment:     Fasting specimen   06/15/2020 94 <150 mg/dL Final     Comment:     Fasting specimen     Cholesterol/HDL Ratio   Date Value Ref Range Status   04/08/2015 2.0 0.0 - 5.0 Final   12/08/2014 1.9 0.0 - 5.0 Final         The patient is on a moderate intensity statin and this is the appropriate treatment based on the patient's LDL being less than 70  The patient's HDL is normal  The patient's triglycerides are normal.  We have discussed the results and we will continue the current management.         (Z98.84) Hx of laparoscopic gastric banding  (primary encounter diagnosis)  Comment:   Plan: Comprehensive Weight Management        He needs follow up with a surgeon as it has been 20 years since his surgery     (E11.42) Diabetic polyneuropathy associated with type 2 diabetes mellitus (H)  Comment:   Plan: gabapentin (NEURONTIN) 600 MG tablet        Increase to 6000 tid      (I25.10,  I25.83) Coronary artery disease due to lipid  rich plaque  Comment:   Plan: atorvastatin (LIPITOR) 20 MG tablet        Continue present management watch for bradycardia with metoprolol      (N18.30) Stage 3 chronic kidney disease, unspecified whether stage 3a or 3b CKD (H)  Comment:   Plan: lisinopril (ZESTRIL) 2.5 MG tablet            (I10) Essential hypertension, benign  Comment:   Plan: lisinopril (ZESTRIL) 2.5 MG tablet            (E11.21) Type 2 diabetes mellitus with diabetic nephropathy, without long-term current use of insulin (H)  Comment:   Plan: metFORMIN (GLUCOPHAGE) 500 MG tablet            (N52.9) Erectile dysfunction, unspecified erectile dysfunction type  Comment:   Plan: tadalafil (CIALIS) 20 MG tablet            (I47.1) Paroxysmal SVT (supraventricular tachycardia) (H)  Comment:   Plan: I will need to watch for symptomatic bradycardia from his metoprolol which was prescribed by cardiology.     (E11.51) Type II diabetes mellitus with peripheral circulatory disorder (H)  Comment:   Plan:

## 2022-05-31 NOTE — PATIENT INSTRUCTIONS
Please schedule and eye exam with you eye care provider and continue to do so every 1 year(s).   According to your chart you are due for that now.

## 2022-06-14 ENCOUNTER — OFFICE VISIT (OUTPATIENT)
Dept: OPTOMETRY | Facility: CLINIC | Age: 72
End: 2022-06-14
Payer: MEDICARE

## 2022-06-14 DIAGNOSIS — H52.223 REGULAR ASTIGMATISM OF BOTH EYES: ICD-10-CM

## 2022-06-14 DIAGNOSIS — H02.834 DERMATOCHALASIS OF BOTH UPPER EYELIDS: ICD-10-CM

## 2022-06-14 DIAGNOSIS — H02.831 DERMATOCHALASIS OF BOTH UPPER EYELIDS: ICD-10-CM

## 2022-06-14 DIAGNOSIS — E11.9 TYPE 2 DIABETES MELLITUS WITHOUT COMPLICATION, WITHOUT LONG-TERM CURRENT USE OF INSULIN (H): Primary | ICD-10-CM

## 2022-06-14 DIAGNOSIS — H52.4 PRESBYOPIA: ICD-10-CM

## 2022-06-14 PROCEDURE — 92015 DETERMINE REFRACTIVE STATE: CPT | Mod: GY | Performed by: OPTOMETRIST

## 2022-06-14 PROCEDURE — 92004 COMPRE OPH EXAM NEW PT 1/>: CPT | Performed by: OPTOMETRIST

## 2022-06-14 ASSESSMENT — VISUAL ACUITY
CORRECTION_TYPE: GLASSES
OD_SC: 20/25
OD_CC: 20/20
OS_SC: 20/20
OS_SC+: -1
METHOD: SNELLEN - LINEAR
OS_CC+: -2
OD_SC+: +2
OS_CC: 20/20
OS_CC: 20/25 +3
OD_CC+: -2
OD_CC: 20/20

## 2022-06-14 ASSESSMENT — REFRACTION_MANIFEST
OD_AXIS: 180
OS_SPHERE: +0.25
OD_SPHERE: PLANO
OD_CYLINDER: +1.00
OD_CYLINDER: +1.00
OS_CYLINDER: +0.25
OS_ADD: +2.50
METHOD_AUTOREFRACTION: 1
OS_CYLINDER: +0.50
OD_SPHERE: -0.50
OD_AXIS: 175
OS_AXIS: 172
OD_ADD: +2.50
OS_AXIS: 180
OS_SPHERE: PLANO

## 2022-06-14 ASSESSMENT — REFRACTION_WEARINGRX
OD_SPHERE: PLANO
OS_CYLINDER: +1.25
SPECS_TYPE: PAL
OS_SPHERE: -0.50
OD_CYLINDER: +0.75
OS_AXIS: 015
OD_AXIS: 010
OD_ADD: +2.50
OS_ADD: +2.50

## 2022-06-14 ASSESSMENT — TONOMETRY
OD_IOP_MMHG: 12
OS_IOP_MMHG: 12
IOP_METHOD: APPLANATION

## 2022-06-14 ASSESSMENT — CONF VISUAL FIELD
OS_NORMAL: 1
OD_NORMAL: 1

## 2022-06-14 ASSESSMENT — EXTERNAL EXAM - RIGHT EYE: OD_EXAM: NORMAL

## 2022-06-14 ASSESSMENT — CUP TO DISC RATIO
OD_RATIO: 0.2
OS_RATIO: 0.1

## 2022-06-14 ASSESSMENT — EXTERNAL EXAM - LEFT EYE: OS_EXAM: NORMAL

## 2022-06-14 NOTE — LETTER
6/14/2022         RE: Edwardo Dumont  [id number] [facility] [address]        Dear Colleague,    Thank you for referring your patient, Edwardo Dumont, to the [facility]. No diabetic retinopathy was found at eye exam.     Sincerely,    Rosa Varela, OD

## 2022-06-14 NOTE — PATIENT INSTRUCTIONS
Fill glasses prescription  Allow 2 weeks to adapt to change in glasses  Monitor mild cataracts   No diabetic retinopathy was found at eye exam.  Lid appearance discussed -consider Blepharoplasty when bothered  Keep blood sugar under good control  Return in 1 year for diabetic eye exam      Blood sugar and blood pressure control are very important in the prevention of ocular complications from diabetes.       Rosa Varela, OD  [phone]

## 2022-06-14 NOTE — PROGRESS NOTES
Chief Complaint   Patient presents with     Diabetic Eye Exam        Chief Complaint(s) and History of Present Illness(es)     Diabetic Eye Exam     Diabetes Type: Type 2 and taking oral medications               Lab Results   Component Value Date    A1C 7.4 05/23/2022    A1C 7.8 09/24/2021    A1C 7.4 04/24/2021    A1C 7.1 09/26/2020    A1C 7.1 06/15/2020    A1C 6.7 12/05/2019    A1C 8.4 09/13/2019            Last Eye Exam: 10/23/20 [facility] [place]  Dilated Previously: Yes    What are you currently using to see?  Glasses- vision good , not like weight and bulky frame      Distance Vision Acuity: Satisfied with vision    Near Vision Acuity: Not satisfied with midrange and close     Eye Comfort: good  Do you use eye drops? : No  Occupation or Hobbies: retired    Hermelinda Wiggins  Optometric Assistant, [facility]       Medical, surgical and family histories reviewed and updated 6/14/2022.     Reports having numb feet last 2 years  Reports no episodes of loss of vision or sudden changes in vision     OBJECTIVE: See Ophthalmology exam    ASSESSMENT:    ICD-10-CM    1. Type 2 diabetes mellitus without complication, without long-term current use of insulin (H)  E11.9     no diabetic retinopathy found at eye exam    2. Dermatochalasis of both upper eyelids  H02.831     H02.834    3. Regular astigmatism of both eyes  H52.223    4. Presbyopia  H52.4        PLAN:    Edwardo Dumont aware  eye exam results will be sent to Bola Madrigal  Patient Instructions   Fill glasses prescription  Allow 2 weeks to adapt to change in glasses  Monitor mild cataracts   No diabetic retinopathy was found at eye exam.  Lid appearance discussed -consider Blepharoplasty when bothered  Keep blood sugar under good control  Return in 1 year for diabetic eye exam      Blood sugar and blood pressure control are very important in the prevention of ocular complications from diabetes.       Rosa Varela, OD  [phone]

## 2022-06-17 ENCOUNTER — TELEPHONE (OUTPATIENT)
Dept: FAMILY MEDICINE | Facility: CLINIC | Age: 72
End: 2022-06-17
Payer: MEDICARE

## 2022-06-17 NOTE — TELEPHONE ENCOUNTER
Patient Quality Outreach    Patient is due for the following:   Diabetes -  Patient was just seen to follow up 8/31/22    NEXT STEPS:   No follow up needed at this time.    Type of outreach:    Chart review performed, no outreach needed.      Questions for provider review:    None     Daniella Loomis, CMA

## 2022-07-06 DIAGNOSIS — I10 ESSENTIAL HYPERTENSION, BENIGN: ICD-10-CM

## 2022-07-07 RX ORDER — LOSARTAN POTASSIUM 25 MG/1
25 TABLET ORAL DAILY
Qty: 90 TABLET | Refills: 1 | OUTPATIENT
Start: 2022-07-07

## 2022-07-11 ENCOUNTER — OFFICE VISIT (OUTPATIENT)
Dept: ENDOCRINOLOGY | Facility: CLINIC | Age: 72
End: 2022-07-11
Payer: MEDICARE

## 2022-07-11 VITALS
OXYGEN SATURATION: 96 % | HEIGHT: 73 IN | DIASTOLIC BLOOD PRESSURE: 75 MMHG | SYSTOLIC BLOOD PRESSURE: 154 MMHG | WEIGHT: 231 LBS | BODY MASS INDEX: 30.62 KG/M2 | HEART RATE: 74 BPM

## 2022-07-11 DIAGNOSIS — Z98.84 HX OF LAPAROSCOPIC GASTRIC BANDING: ICD-10-CM

## 2022-07-11 PROCEDURE — 99202 OFFICE O/P NEW SF 15 MIN: CPT | Performed by: PHYSICIAN ASSISTANT

## 2022-07-11 ASSESSMENT — PAIN SCALES - GENERAL: PAINLEVEL: NO PAIN (0)

## 2022-07-11 NOTE — LETTER
"2022       RE: Edwardo Dumont  [id number] [facility] [address]     Dear Colleague,    Thank you for referring your patient, Edwardo Dumont, to the [facility]. Please see a copy of my visit note below.    New Re-establish Bariatric Surgery Consultation Note    2022    RE: Edwardo Dumont  MR#: [id number]  : 1950      Referring provider:       2022   Who referred you?        Chief Complaint/Reason for visit: re-establish bariatric care    Dear Bola Madrigal MD (General),    I had the pleasure of seeing your patient, Edwardo Dumont, to re-establish bariatric care. As you know, Edwardo Dumont is 72 year old.  He has a height of 6' 1\", a weight of 231 lbs 0 oz, and calculated Body mass index is 30.48 kg/m ..     Lap band  ANW   Last adjustment   Highest wt 313 before band  Lowest after band 211 (1.5 yr ago)  Band symptoms: No GERD, no nighttime cough, no vomiting or regurgitation. No food stuck. Able to eat meat and raw vegetables  No issues with band, he just wants to follow up to evaluate his band because he hasn't had follow up since   He is happy with his weight loss    PLAN:    Upper GI to eval lap band, call radiology to schedule at your convenience  No adjustment indicated  Follow up prn    Assessment & Plan   Problem List Items Addressed This Visit    None     Visit Diagnoses     Hx of laparoscopic gastric banding             15 minutes spent on the date of the encounter doing chart review, history and exam, documentation and further activities per the note      HISTORY OF PRESENT ILLNESS:  Weight Loss History Reviewed with Patient 2022   How long have you been overweight? Since late 20's to early 40's   What is the most that you have ever weighed? 313   What is the most weight you have lost? 90   I have tried the following methods to lose weight " Exercise, Physician directed program   I have tried the following weight loss medications? (Check all that apply) None   Have you ever had weight loss surgery? Yes   Please select the type of weight loss surgery you had (select all that apply): adjustable gastric band / LapBand or Realize Band       CO-MORBIDITIES OF OBESITY INCLUDE:     7/11/2022   I have the following health issues associated with obesity: Type II Diabetes, Heart Disease, High Blood Pressure       PAST MEDICAL HISTORY:  Past Medical History:   Diagnosis Date     Acromioclavicular joint separation, type 1 9/12    right     Allergic rhinitis     causes chronic  cough     Arthritis      CKD (chronic kidney disease) stage 3, GFR 30-59 ml/min (H)      Degenerative arthritis of hip - right 12/27/2010     Gout      Hip arthrosis - right 10/25/2010     Hyperlipidemia      Ischaemic vascular disease      Ischemic vascular disease 5/12    one stent     OA (osteoarthritis) of knee 10/25/2010     Paroxysmal SVT (supraventricular tachycardia) (H) 5/31/2022     Renal insufficiency      Sleep apnea     Does Not use a CPAP-  Lost weight     Type II or unspecified type diabetes mellitus without mention of complication, not stated as uncontrolled      Unspecified essential hypertension        PAST SURGICAL HISTORY:  Past Surgical History:   Procedure Laterality Date     BACK SURGERY  7/20/2018     ENDOSCOPIC SINUS SURGERY  12/14/15     ENDOSCOPIC SINUS SURGERY Bilateral 12/14/2015    Procedure: ENDOSCOPIC SINUS SURGERY;  Surgeon: Kei Cevallos MD;  Location: [facility] OR     GASTRIC BYPASS  1/03    lap band     OPTICAL TRACKING SYSTEM FUSION SPINE POSTERIOR LUMBAR TWO LEVELS N/A 7/20/2018    Procedure: OPTICAL TRACKING SYSTEM FUSION SPINE POSTERIOR LUMBAR TWO LEVELS;  L3-4 bilateral laminectomy with bilateral facetectomies and resection of bilateral synovial cyst  4-5 Transforaminal lumbar interbody fusion  Placement of Globus Creo pedicle screws from L3-5 with  open reduction and internal fixation of the L4-5 spondylolisthesis  Posterior lateral fusion from C3-5;  Surgeon: Rio Magaña     SINUS SURGERY Right 12-14-15    Dr. Cevallos     STENT  5/8/12    OM2 cornary artery stent     STENT  01/2017    3 stents placed in coronary arteries while in [place]     STENT, CORONARY, HANG  01/2017    2 stents in [place].     [facility] ABLATION SUPRAVENT ARRHYTHMOGENIC FOCUS  12/21/15    at [facility] TOTAL HIP ARTHROPLASTY  1/11/11    Rt YOGI       FAMILY HISTORY:   Family History   Problem Relation Age of Onset     Allergies Son      Allergies Son      Cancer Mother         kidney     Neurologic Disorder Father         parkinsons     Glaucoma No family hx of      Macular Degeneration No family hx of        SOCIAL HISTORY:   Social History Questions Reviewed With Patient 7/11/2022   Which best describes your employment status (select all that apply) I work part-time, I work alternate shifts, I am retired   If you work, 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 Yes   Can you afford 3 meals a day?  Yes   Can you afford 50-60 dollars a month for vitamins? Yes       HABITS:     7/11/2022   How often do you drink alcohol? 2-3 times a week   If you do drink alcohol, how many drinks might you have in a day? (one drink = 5 oz. wine, 1 can/bottle of beer, 1 shot liquor) 1 or 2   Have you or are you currently using street drugs or prescription strength medication for which you do not have a prescription for? No   Do you have a history of chemical dependency (alcohol or drug abuse)? No       PSYCHOLOGICAL HISTORY:   Psychological History Reviewed With Patient 7/11/2022   Have you ever attempted suicide? Never.   Have you had thoughts of suicide in the past year? No   Do you have a history of chronic pain? No   Have you ever been diagnosed with fibromyalgia? No   Are you currently seeing a therapist or counselor?  No   Are you currently  seeing a psychiatrist? No       ROS:     7/11/2022   Skin:  None of the above   HEENT: Missing teeth, Teeth, dentures, or bridges needing repairs   Musculoskeletal: Back pain, Limited mobility   Cardiovascular: None of the above   Pulmonary: Snoring   Gastrointestinal: None of the above   Genitourinary: None of the above   Hematological: None of the above   Neurological: None of the above       EATING BEHAVIORS:     7/11/2022   Have you or anyone else thought that you had an eating disorder? No   Do you currently binge eat (eat a large amount of food in a short time)? No   Are you an emotional eater? No   Do you get up to eat after falling asleep? No       EXERCISE:     7/11/2022   How often do you exercise? 3 to 4 times per week   What is the duration of your exercise (in minutes)? 30 Minutes   What types of exercise do you do? walking, other   What keeps you from being more active?  I am as active as I can possbily be       MEDICATIONS:  Current Outpatient Medications   Medication Sig Dispense Refill     aspirin 81 MG tablet Take 1 tablet (81 mg) by mouth daily       atorvastatin (LIPITOR) 20 MG tablet Take 1 tablet (20 mg) by mouth daily 90 tablet 3     blood glucose (ACCU-CHEK SMARTVIEW) test strip ONCE DAILY TESTING AND AS NEEDED 100 strip 4     blood glucose monitoring ([employer] CONTOUR MONITOR) meter device kit Use to test blood sugar  times daily or as directed. 1 kit 0     gabapentin (NEURONTIN) 600 MG tablet Take 1 tablet (600 mg) by mouth 3 times daily 270 tablet 1     JARDIANCE 25 MG TABS tablet TAKE 1 TABLET (25 MG) BY MOUTH DAILY 90 tablet 3     metFORMIN (GLUCOPHAGE) 500 MG tablet TAKE 2 TABLETS BY MOUTH TWICE DAILY WITH MEALS 360 tablet 3     metoprolol succinate ER (TOPROL-XL) 25 MG 24 hr tablet Take 12.5 mg by mouth daily       tadalafil (CIALIS) 20 MG tablet Take 1 tablet (20 mg) by mouth daily as needed 30 tablet 4     ACE NOT PRESCRIBED, INTENTIONAL, ACE Inhibitor not prescribed due to Other: cough  (Patient not taking: Reported on 7/11/2022) 0 each 0     albuterol (PROAIR HFA) 108 (90 Base) MCG/ACT inhaler Inhale 2 puffs into the lungs every 4 hours as needed for shortness of breath / dyspnea or wheezing (Patient not taking: Reported on 7/11/2022) 18 g 1     clindamycin (CLEOCIN) 300 MG capsule Take 1 capsule (300 mg) by mouth 4 times daily (Patient not taking: Reported on 7/11/2022) 40 capsule 0     fluticasone (FLONASE) 50 MCG/ACT nasal spray Spray 1 spray into both nostrils daily (Patient not taking: Reported on 7/11/2022)       gabapentin (NEURONTIN) 300 MG capsule TAKE ONE CAPSULE DAILY AT NIGHT FOR 3 DAY(S) TAKE ONE CAPSULE IN THE AM AND AT BEDTIME FOR 3 DAY(S) THEN TAKE ONE CAPSULE IN THE AM, ONE IN THE AFTERNOON AND ONE AT BEDTIME THEREAFTER (Patient not taking: Reported on 7/11/2022) 90 capsule 1     lisinopril (ZESTRIL) 2.5 MG tablet Take 1 tablet (2.5 mg) by mouth daily (Patient not taking: Reported on 7/11/2022) 90 tablet 3     nitroGLYcerin (NITROSTAT) 0.4 MG sublingual tablet Place 1 tablet (0.4 mg) under the tongue every 5 minutes as needed for chest pain (Patient not taking: Reported on 7/11/2022) 90 tablet 4       ALLERGIES:  Allergies   Allergen Reactions     Benzonatate Anaphylaxis and Swelling     Lisinopril Cough     Zocor [Simvastatin - High Dose]      Poor memory       Lab on 05/23/2022   Component Date Value Ref Range Status     TSH 05/23/2022 1.48  0.40 - 4.00 mU/L Final     Cholesterol 05/23/2022 118  <200 mg/dL Final     Triglycerides 05/23/2022 87  <150 mg/dL Final     Direct Measure HDL 05/23/2022 49  >=40 mg/dL Final     LDL Cholesterol Calculated 05/23/2022 52  <=100 mg/dL Final     Non HDL Cholesterol 05/23/2022 69  <130 mg/dL Final     Patient Fasting > 8hrs? 05/23/2022 Yes   Final     Hemoglobin A1C 05/23/2022 7.4 (A) 0.0 - 5.6 % Final    Normal <5.7%   Prediabetes 5.7-6.4%    Diabetes 6.5% or higher     Note: Adopted from ADA consensus guidelines.     Sodium 05/23/2022 141   "133 - 144 mmol/L Final     Potassium 05/23/2022 3.9  3.4 - 5.3 mmol/L Final     Chloride 05/23/2022 108  94 - 109 mmol/L Final     Carbon Dioxide (CO2) 05/23/2022 23  20 - 32 mmol/L Final     Anion Gap 05/23/2022 10  3 - 14 mmol/L Final     Urea Nitrogen 05/23/2022 22  7 - 30 mg/dL Final     Creatinine 05/23/2022 1.25  0.66 - 1.25 mg/dL Final     Calcium 05/23/2022 9.0  8.5 - 10.1 mg/dL Final     Glucose 05/23/2022 165 (A) 70 - 99 mg/dL Final     Alkaline Phosphatase 05/23/2022 90  40 - 150 U/L Final     AST 05/23/2022 10  0 - 45 U/L Final     ALT 05/23/2022 19  0 - 70 U/L Final     Protein Total 05/23/2022 6.8  6.8 - 8.8 g/dL Final     Albumin 05/23/2022 4.0  3.4 - 5.0 g/dL Final     Bilirubin Total 05/23/2022 0.9  0.2 - 1.3 mg/dL Final     GFR Estimate 05/23/2022 62  >60 mL/min/1.73m2 Final    Effective December 21, 2021 eGFRcr in adults is calculated using the 2021 CKD-EPI creatinine equation which includes age and gender (Edwige et al., [place], DOI: 10.1056/CYASxw2280668)     Creatinine Urine mg/dL 05/23/2022 76  mg/dL Final     Albumin Urine mg/L 05/23/2022 138  mg/L Final     Albumin Urine mg/g Cr 05/23/2022 181.58 (A) 0.00 - 17.00 mg/g Cr Final       PHYSICAL EXAM:  Objective    BP (!) 154/75 (BP Location: Left arm, Patient Position: Chair, Cuff Size: Adult Large)   Pulse 74   Ht 1.854 m (6' 1\")   Wt 104.8 kg (231 lb)   SpO2 96%   BMI 30.48 kg/m    Body mass index is 30.48 kg/m .  Physical Exam   GENERAL: Healthy, alert and no distress  EYES: Eyes grossly normal to inspection.  No discharge or erythema, or obvious scleral/conjunctival abnormalities.  RESP: No audible wheeze, cough, or visible cyanosis.  No visible retractions or increased work of breathing.    SKIN: Visible skin clear. No significant rash, abnormal pigmentation or lesions.  NEURO: Cranial nerves grossly intact.  Mentation and speech appropriate for age.  PSYCH: Mentation appears normal, affect normal/bright, judgement and insight intact, " normal speech and appearance well-groomed.      Sincerely,     Aida Shah PA-C

## 2022-07-11 NOTE — PROGRESS NOTES
"New Re-establish Bariatric Surgery Consultation Note    2022    RE: Edwardo Dumont  MR#: [id number]  : 1950      Referring provider:       2022   Who referred you?        Chief Complaint/Reason for visit: re-establish bariatric care    Dear Bola Madrigal MD (General),    I had the pleasure of seeing your patient, Edwardo Dumont, to re-establish bariatric care. As you know, Edwardo Dumont is 72 year old.  He has a height of 6' 1\", a weight of 231 lbs 0 oz, and calculated Body mass index is 30.48 kg/m ..     Lap band  ANW   Last adjustment   Highest wt 313 before band  Lowest after band 211 (1.5 yr ago)  Band symptoms: No GERD, no nighttime cough, no vomiting or regurgitation. No food stuck. Able to eat meat and raw vegetables  No issues with band, he just wants to follow up to evaluate his band because he hasn't had follow up since   He is happy with his weight loss    PLAN:    Upper GI to eval lap band, call radiology to schedule at your convenience  No adjustment indicated  Follow up prn    Assessment & Plan   Problem List Items Addressed This Visit    None     Visit Diagnoses     Hx of laparoscopic gastric banding             15 minutes spent on the date of the encounter doing chart review, history and exam, documentation and further activities per the note      HISTORY OF PRESENT ILLNESS:  Weight Loss History Reviewed with Patient 2022   How long have you been overweight? Since late 20's to early 40's   What is the most that you have ever weighed? 313   What is the most weight you have lost? 90   I have tried the following methods to lose weight Exercise, Physician directed program   I have tried the following weight loss medications? (Check all that apply) None   Have you ever had weight loss surgery? Yes   Please select the type of weight loss surgery you had (select all that apply): adjustable gastric band / LapBand or Realize Band       CO-MORBIDITIES OF " OBESITY INCLUDE:     7/11/2022   I have the following health issues associated with obesity: Type II Diabetes, Heart Disease, High Blood Pressure       PAST MEDICAL HISTORY:  Past Medical History:   Diagnosis Date     Acromioclavicular joint separation, type 1 9/12    right     Allergic rhinitis     causes chronic  cough     Arthritis      CKD (chronic kidney disease) stage 3, GFR 30-59 ml/min (H)      Degenerative arthritis of hip - right 12/27/2010     Gout      Hip arthrosis - right 10/25/2010     Hyperlipidemia      Ischaemic vascular disease      Ischemic vascular disease 5/12    one stent     OA (osteoarthritis) of knee 10/25/2010     Paroxysmal SVT (supraventricular tachycardia) (H) 5/31/2022     Renal insufficiency      Sleep apnea     Does Not use a CPAP-  Lost weight     Type II or unspecified type diabetes mellitus without mention of complication, not stated as uncontrolled      Unspecified essential hypertension        PAST SURGICAL HISTORY:  Past Surgical History:   Procedure Laterality Date     BACK SURGERY  7/20/2018     ENDOSCOPIC SINUS SURGERY  12/14/15     ENDOSCOPIC SINUS SURGERY Bilateral 12/14/2015    Procedure: ENDOSCOPIC SINUS SURGERY;  Surgeon: Kei Cevallos MD;  Location: [facility] OR     GASTRIC BYPASS  1/03    lap band     OPTICAL TRACKING SYSTEM FUSION SPINE POSTERIOR LUMBAR TWO LEVELS N/A 7/20/2018    Procedure: OPTICAL TRACKING SYSTEM FUSION SPINE POSTERIOR LUMBAR TWO LEVELS;  L3-4 bilateral laminectomy with bilateral facetectomies and resection of bilateral synovial cyst  4-5 Transforaminal lumbar interbody fusion  Placement of Globus Creo pedicle screws from L3-5 with open reduction and internal fixation of the L4-5 spondylolisthesis  Posterior lateral fusion from C3-5;  Surgeon: Rio Magaña     SINUS SURGERY Right 12-14-15    Dr. Cevallos     STENT  5/8/12    OM2 cornary artery stent     STENT  01/2017    3 stents placed in coronary arteries while in [place]     STENT, CORONARY, HANG   01/2017    2 stents in [place].     [facility] ABLATION SUPRAVENT ARRHYTHMOGENIC FOCUS  12/21/15    at [facility] TOTAL HIP ARTHROPLASTY  1/11/11    Rt YOGI       FAMILY HISTORY:   Family History   Problem Relation Age of Onset     Allergies Son      Allergies Son      Cancer Mother         kidney     Neurologic Disorder Father         parkinsons     Glaucoma No family hx of      Macular Degeneration No family hx of        SOCIAL HISTORY:   Social History Questions Reviewed With Patient 7/11/2022   Which best describes your employment status (select all that apply) I work part-time, I work alternate shifts, I am retired   If you work, 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 Yes   Can you afford 3 meals a day?  Yes   Can you afford 50-60 dollars a month for vitamins? Yes       HABITS:     7/11/2022   How often do you drink alcohol? 2-3 times a week   If you do drink alcohol, how many drinks might you have in a day? (one drink = 5 oz. wine, 1 can/bottle of beer, 1 shot liquor) 1 or 2   Have you or are you currently using street drugs or prescription strength medication for which you do not have a prescription for? No   Do you have a history of chemical dependency (alcohol or drug abuse)? No       PSYCHOLOGICAL HISTORY:   Psychological History Reviewed With Patient 7/11/2022   Have you ever attempted suicide? Never.   Have you had thoughts of suicide in the past year? No   Do you have a history of chronic pain? No   Have you ever been diagnosed with fibromyalgia? No   Are you currently seeing a therapist or counselor?  No   Are you currently seeing a psychiatrist? No       ROS:     7/11/2022   Skin:  None of the above   HEENT: Missing teeth, Teeth, dentures, or bridges needing repairs   Musculoskeletal: Back pain, Limited mobility   Cardiovascular: None of the above   Pulmonary: Snoring   Gastrointestinal: None of the above   Genitourinary: None of the  above   Hematological: None of the above   Neurological: None of the above       EATING BEHAVIORS:     7/11/2022   Have you or anyone else thought that you had an eating disorder? No   Do you currently binge eat (eat a large amount of food in a short time)? No   Are you an emotional eater? No   Do you get up to eat after falling asleep? No       EXERCISE:     7/11/2022   How often do you exercise? 3 to 4 times per week   What is the duration of your exercise (in minutes)? 30 Minutes   What types of exercise do you do? walking, other   What keeps you from being more active?  I am as active as I can possbily be       MEDICATIONS:  Current Outpatient Medications   Medication Sig Dispense Refill     aspirin 81 MG tablet Take 1 tablet (81 mg) by mouth daily       atorvastatin (LIPITOR) 20 MG tablet Take 1 tablet (20 mg) by mouth daily 90 tablet 3     blood glucose (ACCU-CHEK SMARTVIEW) test strip ONCE DAILY TESTING AND AS NEEDED 100 strip 4     blood glucose monitoring ([employer] CONTOUR MONITOR) meter device kit Use to test blood sugar  times daily or as directed. 1 kit 0     gabapentin (NEURONTIN) 600 MG tablet Take 1 tablet (600 mg) by mouth 3 times daily 270 tablet 1     JARDIANCE 25 MG TABS tablet TAKE 1 TABLET (25 MG) BY MOUTH DAILY 90 tablet 3     metFORMIN (GLUCOPHAGE) 500 MG tablet TAKE 2 TABLETS BY MOUTH TWICE DAILY WITH MEALS 360 tablet 3     metoprolol succinate ER (TOPROL-XL) 25 MG 24 hr tablet Take 12.5 mg by mouth daily       tadalafil (CIALIS) 20 MG tablet Take 1 tablet (20 mg) by mouth daily as needed 30 tablet 4     ACE NOT PRESCRIBED, INTENTIONAL, ACE Inhibitor not prescribed due to Other: cough (Patient not taking: Reported on 7/11/2022) 0 each 0     albuterol (PROAIR HFA) 108 (90 Base) MCG/ACT inhaler Inhale 2 puffs into the lungs every 4 hours as needed for shortness of breath / dyspnea or wheezing (Patient not taking: Reported on 7/11/2022) 18 g 1     clindamycin (CLEOCIN) 300 MG capsule Take 1 capsule  (300 mg) by mouth 4 times daily (Patient not taking: Reported on 7/11/2022) 40 capsule 0     fluticasone (FLONASE) 50 MCG/ACT nasal spray Spray 1 spray into both nostrils daily (Patient not taking: Reported on 7/11/2022)       gabapentin (NEURONTIN) 300 MG capsule TAKE ONE CAPSULE DAILY AT NIGHT FOR 3 DAY(S) TAKE ONE CAPSULE IN THE AM AND AT BEDTIME FOR 3 DAY(S) THEN TAKE ONE CAPSULE IN THE AM, ONE IN THE AFTERNOON AND ONE AT BEDTIME THEREAFTER (Patient not taking: Reported on 7/11/2022) 90 capsule 1     lisinopril (ZESTRIL) 2.5 MG tablet Take 1 tablet (2.5 mg) by mouth daily (Patient not taking: Reported on 7/11/2022) 90 tablet 3     nitroGLYcerin (NITROSTAT) 0.4 MG sublingual tablet Place 1 tablet (0.4 mg) under the tongue every 5 minutes as needed for chest pain (Patient not taking: Reported on 7/11/2022) 90 tablet 4       ALLERGIES:  Allergies   Allergen Reactions     Benzonatate Anaphylaxis and Swelling     Lisinopril Cough     Zocor [Simvastatin - High Dose]      Poor memory       Lab on 05/23/2022   Component Date Value Ref Range Status     TSH 05/23/2022 1.48  0.40 - 4.00 mU/L Final     Cholesterol 05/23/2022 118  <200 mg/dL Final     Triglycerides 05/23/2022 87  <150 mg/dL Final     Direct Measure HDL 05/23/2022 49  >=40 mg/dL Final     LDL Cholesterol Calculated 05/23/2022 52  <=100 mg/dL Final     Non HDL Cholesterol 05/23/2022 69  <130 mg/dL Final     Patient Fasting > 8hrs? 05/23/2022 Yes   Final     Hemoglobin A1C 05/23/2022 7.4 (A) 0.0 - 5.6 % Final    Normal <5.7%   Prediabetes 5.7-6.4%    Diabetes 6.5% or higher     Note: Adopted from ADA consensus guidelines.     Sodium 05/23/2022 141  133 - 144 mmol/L Final     Potassium 05/23/2022 3.9  3.4 - 5.3 mmol/L Final     Chloride 05/23/2022 108  94 - 109 mmol/L Final     Carbon Dioxide (CO2) 05/23/2022 23  20 - 32 mmol/L Final     Anion Gap 05/23/2022 10  3 - 14 mmol/L Final     Urea Nitrogen 05/23/2022 22  7 - 30 mg/dL Final     Creatinine 05/23/2022  "1.25  0.66 - 1.25 mg/dL Final     Calcium 05/23/2022 9.0  8.5 - 10.1 mg/dL Final     Glucose 05/23/2022 165 (A) 70 - 99 mg/dL Final     Alkaline Phosphatase 05/23/2022 90  40 - 150 U/L Final     AST 05/23/2022 10  0 - 45 U/L Final     ALT 05/23/2022 19  0 - 70 U/L Final     Protein Total 05/23/2022 6.8  6.8 - 8.8 g/dL Final     Albumin 05/23/2022 4.0  3.4 - 5.0 g/dL Final     Bilirubin Total 05/23/2022 0.9  0.2 - 1.3 mg/dL Final     GFR Estimate 05/23/2022 62  >60 mL/min/1.73m2 Final    Effective December 21, 2021 eGFRcr in adults is calculated using the 2021 CKD-EPI creatinine equation which includes age and gender (Edwige et al., [place], DOI: 10.1056/HMPCip6780962)     Creatinine Urine mg/dL 05/23/2022 76  mg/dL Final     Albumin Urine mg/L 05/23/2022 138  mg/L Final     Albumin Urine mg/g Cr 05/23/2022 181.58 (A) 0.00 - 17.00 mg/g Cr Final       PHYSICAL EXAM:  Objective    BP (!) 154/75 (BP Location: Left arm, Patient Position: Chair, Cuff Size: Adult Large)   Pulse 74   Ht 1.854 m (6' 1\")   Wt 104.8 kg (231 lb)   SpO2 96%   BMI 30.48 kg/m    Body mass index is 30.48 kg/m .  Physical Exam   GENERAL: Healthy, alert and no distress  EYES: Eyes grossly normal to inspection.  No discharge or erythema, or obvious scleral/conjunctival abnormalities.  RESP: No audible wheeze, cough, or visible cyanosis.  No visible retractions or increased work of breathing.    SKIN: Visible skin clear. No significant rash, abnormal pigmentation or lesions.  NEURO: Cranial nerves grossly intact.  Mentation and speech appropriate for age.  PSYCH: Mentation appears normal, affect normal/bright, judgement and insight intact, normal speech and appearance well-groomed.      Sincerely,     Aida Shah PA-C        "

## 2022-07-11 NOTE — NURSING NOTE
"Chief Complaint   Patient presents with     Consult     Erv, is being seen today to re-establish care, lap band 2001,  experiencing pain 4/10 left side of abdomen when flare up, over eating, sometimes cannot stand up, as reported by patient.       Vitals:    07/11/22 0925   BP: (!) 154/75   BP Location: Left arm   Patient Position: Chair   Cuff Size: Adult Large   Pulse: 74   SpO2: 96%   Weight: 104.8 kg (231 lb)   Height: 1.854 m (6' 1\")       Body mass index is 30.48 kg/m .      Sola Simon LPN    "

## 2022-07-18 NOTE — PROGRESS NOTES
ASSESSMENT / PLAN:  (E11.51) Type II diabetes mellitus with peripheral circulatory disorder (H)  (primary encounter diagnosis)  Comment: stable  Plan: Hemoglobin A1c        Continue meds/monitor and exercise. Recheck in 6 months  lab    (I10) Essential hypertension, benign  Comment: stable but would like to go back to losartan  Plan: losartan (COZAAR) 25 MG tablet, Renal panel        Reveiwed risks and side effects of medication  Stop lisinopril. Reveiwed risks and side effects of medication  Continue self-monitor. Chest pain or shortness of breath to er. Recheck in 6 months      (N18.30) Stage 3 chronic kidney disease, unspecified whether stage 3a or 3b CKD (H)  Comment: stable  Plan: Renal panel        Recheck in 6 months  Lots of water    (I47.1) Paroxysmal SVT (supraventricular tachycardia) (H)  Comment: stable  Plan: per cardiology. Advised to buy a smart watch.         Subjective   Erv is a 72 year old presenting for the following health issues:  Establish care. History dm, high cholesterol, htn, cad, ed, svt, cri and spinal stenosis.   Has cardiologist in [place]  - leaves in early fall.   Outside blood pressure a little high recently. Cozaar in past better. Would like to restart cozaar and stop lisinopril.   No chest pain or shortness of breath.   Had covid last month. nitrog never needed. No urine changes or hematuria. Emotionally doing ok. Memory ok.   Sleep overall ok. No gerry but some snoring.   No nausea, vomiting or diarrhea or black/bloody stools.   No acute changes. Eye exam 1 month ago - ok.   No chief complaint on file.      HPI   Establish care and discuss blood pressure.      Review of Systems         Objective    There were no vitals taken for this visit.  There is no height or weight on file to calculate BMI.   /65   Pulse 59   Temp 98.6  F (37  C) (Oral)   Resp 16   Wt 101.1 kg (222 lb 12.8 oz)   SpO2 94%   BMI 29.39 kg/m     Physical Exam   GENERAL: healthy, alert and no  distress  NECK: no adenopathy, no asymmetry, masses, or scars and thyroid normal to palpation  RESP: lungs clear to auscultation - no rales, rhonchi or wheezes  CV: regular rate and rhythm, normal S1 S2, no S3 or S4, no murmur, click or rub, no peripheral edema and peripheral pulses strong  ABDOMEN: soft, nontender, no hepatosplenomegaly, no masses and bowel sounds normal  MS: no gross musculoskeletal defects noted, no edema  PSYCH: mentation appears normal, affect normal/bright          .  ..

## 2022-07-22 ENCOUNTER — OFFICE VISIT (OUTPATIENT)
Dept: FAMILY MEDICINE | Facility: CLINIC | Age: 72
End: 2022-07-22
Payer: MEDICARE

## 2022-07-22 VITALS
TEMPERATURE: 98.6 F | SYSTOLIC BLOOD PRESSURE: 119 MMHG | BODY MASS INDEX: 29.39 KG/M2 | RESPIRATION RATE: 16 BRPM | OXYGEN SATURATION: 94 % | HEART RATE: 59 BPM | DIASTOLIC BLOOD PRESSURE: 65 MMHG | WEIGHT: 222.8 LBS

## 2022-07-22 DIAGNOSIS — I10 ESSENTIAL HYPERTENSION, BENIGN: ICD-10-CM

## 2022-07-22 DIAGNOSIS — N52.9 ERECTILE DYSFUNCTION, UNSPECIFIED ERECTILE DYSFUNCTION TYPE: ICD-10-CM

## 2022-07-22 DIAGNOSIS — I47.10 PAROXYSMAL SVT (SUPRAVENTRICULAR TACHYCARDIA) (H): ICD-10-CM

## 2022-07-22 DIAGNOSIS — E11.51 TYPE II DIABETES MELLITUS WITH PERIPHERAL CIRCULATORY DISORDER (H): Primary | ICD-10-CM

## 2022-07-22 DIAGNOSIS — N18.30 STAGE 3 CHRONIC KIDNEY DISEASE, UNSPECIFIED WHETHER STAGE 3A OR 3B CKD (H): ICD-10-CM

## 2022-07-22 PROCEDURE — 99214 OFFICE O/P EST MOD 30 MIN: CPT | Performed by: FAMILY MEDICINE

## 2022-07-22 RX ORDER — TADALAFIL 20 MG/1
TABLET ORAL
Qty: 30 TABLET | Refills: 4 | Status: SHIPPED | OUTPATIENT
Start: 2022-07-22

## 2022-07-22 RX ORDER — LOSARTAN POTASSIUM 25 MG/1
25 TABLET ORAL DAILY
Qty: 90 TABLET | Refills: 3 | Status: SHIPPED | OUTPATIENT
Start: 2022-07-22 | End: 2023-08-29

## 2022-07-22 ASSESSMENT — PAIN SCALES - GENERAL: PAINLEVEL: NO PAIN (0)

## 2022-10-05 ENCOUNTER — OFFICE VISIT (OUTPATIENT)
Dept: ORTHOPEDICS | Facility: CLINIC | Age: 72
End: 2022-10-05
Payer: MEDICARE

## 2022-10-05 VITALS
SYSTOLIC BLOOD PRESSURE: 145 MMHG | HEART RATE: 52 BPM | BODY MASS INDEX: 29.82 KG/M2 | HEIGHT: 73 IN | RESPIRATION RATE: 18 BRPM | WEIGHT: 225 LBS | DIASTOLIC BLOOD PRESSURE: 77 MMHG

## 2022-10-05 DIAGNOSIS — G89.29 CHRONIC PAIN OF LEFT KNEE: ICD-10-CM

## 2022-10-05 DIAGNOSIS — M25.562 CHRONIC PAIN OF LEFT KNEE: ICD-10-CM

## 2022-10-05 DIAGNOSIS — M17.12 PRIMARY OSTEOARTHRITIS OF LEFT KNEE: Primary | ICD-10-CM

## 2022-10-05 PROCEDURE — 20610 DRAIN/INJ JOINT/BURSA W/O US: CPT | Mod: LT | Performed by: ORTHOPAEDIC SURGERY

## 2022-10-05 RX ADMIN — METHYLPREDNISOLONE ACETATE 80 MG: 80 INJECTION, SUSPENSION INTRA-ARTICULAR; INTRALESIONAL; INTRAMUSCULAR; SOFT TISSUE at 15:44

## 2022-10-05 ASSESSMENT — PAIN SCALES - GENERAL: PAINLEVEL: MILD PAIN (2)

## 2022-10-05 NOTE — LETTER
10/5/2022         RE: Edwardo Dmuont  [address] [place] [address]        Dear Colleague,    Thank you for referring your patient, Edwardo Dumont, to the [facility] [place]. Please see a copy of my visit note below.    CHIEF COMPLAINT:   Chief Complaint   Patient presents with     Left Knee - Follow Up     Left knee OA last injection on 12/9/21 pain started again at the end of Aug. Patient is wanting another cortisone injection.       HISTORY:  Tanvir Dumont is a 72 year old male, seen for ongoing left knee pain with no known injury. Had a cortisone injection 12/2021, 5/2021, worked well, pain started to come back at the end of August.  Pain has been present for years, evaluated by us in 2015 and noted to have primary osteoarthritis (mostly patello-femoral), no treatments at that time.  Worse walking up down stairs and hills, golf, not as bad flat walking. Pain deep inside under the kneecap, sharp and aching.    He's leaving for [place] in a couple of weeks. He'll be back in December for his shoulder injections. They so far are continuing to do well since his injections 12/9/2021.    Also his total hip arthroplasty is doing great, no concerns.          Significant Orthopedic past medical history: right total hip arthroplasty 1/2011  Usual level of recreational activity: golf, pickle ball.  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    Other PMH:  has a past medical history of Acromioclavicular joint separation, type 1 (9/12), Allergic rhinitis, Arthritis, CKD (chronic kidney disease) stage 3, GFR 30-59 ml/min (H), Degenerative arthritis of hip - right (12/27/2010), Gout, Hip arthrosis - right (10/25/2010), Hyperlipidemia, Ischaemic vascular disease, Ischemic vascular disease (5/12), OA (osteoarthritis) of knee (10/25/2010), Paroxysmal SVT (supraventricular tachycardia) (H) (5/31/2022), Renal insufficiency, Sleep apnea, Type II or unspecified type diabetes  mellitus without mention of complication, not stated as uncontrolled, and Unspecified essential hypertension.    He has no past medical history of Amblyopia, Bleeding disorder (H), Cancer (H), Cataract, Cerebral infarction (H), Chronic infection, Complication of anesthesia, Congestive heart failure (H), COPD (chronic obstructive pulmonary disease) (H), Depressive disorder, Diabetic retinopathy (H), Glaucoma (increased eye pressure), History of blood transfusion, Inflammatory arthritis, Malignant hyperthermia, Retinal detachment, Senile macular degeneration, Strabismus, Stroke (H), Surgical complications, Thyroid disease, Uncomplicated asthma, Unspecified asthma(493.90), or Uveitis.  Patient Active Problem List    Diagnosis Date Noted     Paroxysmal SVT (supraventricular tachycardia) (H) 05/31/2022     Priority: Medium     Benign prostatic hyperplasia with nocturia 09/21/2019     Priority: Medium     S/P lumbar fusion 07/20/2018     Priority: Medium     Spinal stenosis of lumbar region with neurogenic claudication 07/10/2018     Priority: Medium     Type II diabetes mellitus with peripheral circulatory disorder (H) 01/31/2018     Priority: Medium     CKD (chronic kidney disease) stage 3, GFR 30-59 ml/min (H)      Priority: Medium     S/P coronary angiogram 09/23/2015     Priority: Medium     Insomnia 08/03/2015     Priority: Medium     Dermatochalasis 04/10/2015     Priority: Medium     Visual disturbance, transient, right eye 02/09/2014     Priority: Medium     Essential hypertension, benign 05/14/2013     Priority: Medium     Ischemic vascular disease   one stent coronary artery 5/12 12/19/2012     Priority: Medium     Closed dislocation of acromioclavicular joint 12/17/2012     Priority: Medium     Problem list name updated by automated process. Provider to review       Impingement syndrome of right shoulder 12/17/2012     Priority: Medium     S/P hip replacement 12/13/2012     Priority: Medium     Advanced  directives, counseling/discussion 05/25/2011     Priority: Medium     Discussed Advance Directive planning with patient; information given to patient to review.         Urinary retention 01/21/2011     Priority: Medium     Degenerative arthritis of hip - right 12/27/2010     Priority: Medium     OA (osteoarthritis) of knee 10/25/2010     Priority: Medium     Hyperlipidemia LDL goal <100 10/14/2010     Priority: Medium     Low back pain 04/23/2010     Priority: Medium     Radiculopathy of leg 04/23/2010     Priority: Medium     Gout 11/27/2009     Priority: Medium     Sleep apnea      Priority: Medium     Cough 12/05/2007     Priority: Medium       Surgical Hx:  has a past surgical history that includes gastric bypass (1/03); TOTAL HIP ARTHROPLASTY (1/11/11); Stent (5/8/12); Endoscopic sinus surgery (12/14/15); Endoscopic sinus surgery (Bilateral, 12/14/2015); sinus surgery (Right, 12-14-15); Stent (01/2017); ABLATION SUPRAVENT ARRHYTHMOGENIC FOCUS (12/21/15); stent, coronary, shelia (01/2017); Optical tracking system fusion spine posterior lumbar two levels (N/A, 7/20/2018); and back surgery (7/20/2018).    Medications:   Current Outpatient Medications:      ACE NOT PRESCRIBED, INTENTIONAL,, ACE Inhibitor not prescribed due to Other: cough (Patient not taking: Reported on 7/11/2022), Disp: 0 each, Rfl: 0     albuterol (PROAIR HFA) 108 (90 Base) MCG/ACT inhaler, Inhale 2 puffs into the lungs every 4 hours as needed for shortness of breath / dyspnea or wheezing (Patient not taking: No sig reported), Disp: 18 g, Rfl: 1     aspirin 81 MG tablet, Take 1 tablet (81 mg) by mouth daily, Disp: , Rfl:      atorvastatin (LIPITOR) 20 MG tablet, Take 1 tablet (20 mg) by mouth daily, Disp: 90 tablet, Rfl: 3     blood glucose (ACCU-CHEK SMARTVIEW) test strip, ONCE DAILY TESTING AND AS NEEDED, Disp: 100 strip, Rfl: 4     blood glucose monitoring ([employer] CONTOUR MONITOR) meter device kit, Use to test blood sugar  times daily or as  directed., Disp: 1 kit, Rfl: 0     clindamycin (CLEOCIN) 300 MG capsule, Take 1 capsule (300 mg) by mouth 4 times daily (Patient not taking: No sig reported), Disp: 40 capsule, Rfl: 0     fluticasone (FLONASE) 50 MCG/ACT nasal spray, Spray 1 spray into both nostrils daily (Patient not taking: No sig reported), Disp: , Rfl:      gabapentin (NEURONTIN) 300 MG capsule, TAKE ONE CAPSULE DAILY AT NIGHT FOR 3 DAY(S) TAKE ONE CAPSULE IN THE AM AND AT BEDTIME FOR 3 DAY(S) THEN TAKE ONE CAPSULE IN THE AM, ONE IN THE AFTERNOON AND ONE AT BEDTIME THEREAFTER (Patient not taking: Reported on 7/22/2022), Disp: 90 capsule, Rfl: 1     gabapentin (NEURONTIN) 600 MG tablet, Take 1 tablet (600 mg) by mouth 3 times daily, Disp: 270 tablet, Rfl: 1     JARDIANCE 25 MG TABS tablet, TAKE 1 TABLET (25 MG) BY MOUTH DAILY, Disp: 90 tablet, Rfl: 3     losartan (COZAAR) 25 MG tablet, Take 1 tablet (25 mg) by mouth daily For blood pressure - replaces lisinopril., Disp: 90 tablet, Rfl: 3     metFORMIN (GLUCOPHAGE) 500 MG tablet, TAKE 2 TABLETS BY MOUTH TWICE DAILY WITH MEALS, Disp: 360 tablet, Rfl: 3     metoprolol succinate ER (TOPROL-XL) 25 MG 24 hr tablet, Take 12.5 mg by mouth daily, Disp: , Rfl:      nitroGLYcerin (NITROSTAT) 0.4 MG sublingual tablet, Place 1 tablet (0.4 mg) under the tongue every 5 minutes as needed for chest pain (Patient not taking: No sig reported), Disp: 90 tablet, Rfl: 4     tadalafil (CIALIS) 20 MG tablet, TAKE 1 TABLET BY MOUTH DAILY AS NEEDED (18 TABLETS AS A 54 DAY SUPPLY), Disp: 30 tablet, Rfl: 4    Allergies:   Allergies   Allergen Reactions     Benzonatate Anaphylaxis and Swelling     Lisinopril Cough     Zocor [Simvastatin - High Dose]      Poor memory       REVIEW OF SYSTEMS:   CONSTITUTIONAL:NEGATIVE for fever, chills, change in weight  INTEGUMENTARY/SKIN: NEGATIVE for worrisome rashes, moles or lesions  MUSCULOSKELETAL:See HPI above  NEURO: NEGATIVE for weakness, dizziness or paresthesias         PHYSICAL  "EXAM:  BP (!) 145/77   Pulse 52   Resp 18   Ht 1.854 m (6' 1\")   Wt 102.1 kg (225 lb)   BMI 29.69 kg/m     GENERAL APPEARANCE: healthy, alert, no distress  SKIN: no suspicious lesions or rashes  NEURO: Normal strength and tone, mentation intact and speech normal  PSYCH:  mentation appears normal and affect normal, not anxious  RESPIRATORY: No increased work of breathing.      BILATERAL LOWER EXTREMITIES:  Gait: normal  Alignment: varus  Bilateral  Quad atrophy, strength normal.  Intact sensation deep peroneal nerve, superficial peroneal nerve, med/lat tibial nerve, sural nerve, saphenous nerve  Intact EHL, EDL, TA, FHL, GS, quadriceps hamstrings and hip flexors  Bilateral calf soft and nttp or squeeze.  Edema: trace     LEFT KNEE EXAM:    Skin: intact, no ecchymosis or erythema  Squat: 100 %, not limited by pain.   causes anterior pain.  ROM: 2 extension to 125 flexion  Tight hamstrings on straight leg raise.  Effusion: trace  Tender: NTTP med/lat joint line, anterior or posterior knee  McMurrays: negative     MCL: stable, and non-painful at both 0 and 30 degrees knee flexion  Varus stress: stable, and non-painful at both 0 and 30 degrees knee flexion  Lachmans: neg, firm endpoint  Posterior Drawer stable  Patellofemoral joint:                Apprehension: negative              Crepitations: mild              Grind: positive.     RIGHT KNEE EXAM:    Skin: intact, no ecchymosis or erythema  Squat: 100 %, not limited by pain.     ROM: full extension to 125+ flexion  Tight hamstrings on straight leg raise.  Effusion: none  Tender: NTTP med/lat joint line, anterior or posterior knee  McMurrays: negative     MCL: stable, and non-painful at both 0 and 30 degrees knee flexion  Varus stress: stable, and non-painful at both 0 and 30 degrees knee flexion  Lachmans: neg, firm endpoint  Posterior Drawer stable  Patellofemoral joint:                Apprehension: negative              Crepitations: mild     X-RAY:  no new " images today.  3 views left knee from 2021-- no obvious fractures or dislocations. Moderate patello-femoral degenerative changes with near bone on bone loss of lateral patello-femoral compartment space with marginal osteophytes. Mild medial compartment narrowing, notch osteophytes. Some articular flattening noted.      ASSESSMENT: Edwardo Dumont is a 72 year old male, with  chronic left knee pain, primary osteoarthritis.      PLAN:     * left knee injection.    * Rest  * Activity modification - avoid activities that aggravate symptoms or started symptoms at onset.  * NSAIDS - regular use for inflammation, with food, as long as no contra-indications. Please discuss with pcp if needed.  * Ice twice daily to three times daily, 15-20 minutes at a time  * heat may be beneficial prior to exercising  * home exercise program   * Tylenol as needed for pain  * Injections: cortisone injections may be beneficial to help decrease swelling and inflammation within the shoulder or bursa, and decrease pain. With decreased pain, Physical Therapy and exercises will be more effective and efficient. Patient elects to proceed.  * Return to clinic as needed.    Skip Reyes M.D., M.S.  Dept. of Orthopaedic Surgery  [facility]    Large Joint Injection/Arthocentesis: L knee joint    Date/Time: 10/5/2022 3:44 PM  Performed by: Skip Reyes MD  Authorized by: Skip Reyes MD     Indications:  Pain  Needle Size:  22 G  Guidance: landmark guided    Approach:  Anterolateral  Location:  Knee      Medications:  80 mg methylPREDNISolone 80 MG/ML  Outcome:  Tolerated well, no immediate complications  Procedure discussed: discussed risks, benefits, and alternatives    Consent Given by:  Patient  Timeout: timeout called immediately prior to procedure    Prep: patient was prepped and draped in usual sterile fashion     4cc of 0.25% Sensorcaine NDC 85941-106-86, LOT [id number],              Again, thank you for  allowing me to participate in the care of your patient.        Sincerely,        Skip Reyes MD

## 2022-10-05 NOTE — PROGRESS NOTES
CHIEF COMPLAINT:   Chief Complaint   Patient presents with     Left Knee - Follow Up     Left knee OA last injection on 12/9/21 pain started again at the end of Aug. Patient is wanting another cortisone injection.       HISTORY:  Tanvir Duomnt is a 72 year old male, seen for ongoing left knee pain with no known injury. Had a cortisone injection 12/2021, 5/2021, worked well, pain started to come back at the end of August.  Pain has been present for years, evaluated by us in 2015 and noted to have primary osteoarthritis (mostly patello-femoral), no treatments at that time.  Worse walking up down stairs and hills, golf, not as bad flat walking. Pain deep inside under the kneecap, sharp and aching.    He's leaving for [place] in a couple of weeks. He'll be back in December for his shoulder injections. They so far are continuing to do well since his injections 12/9/2021.    Also his total hip arthroplasty is doing great, no concerns.          Significant Orthopedic past medical history: right total hip arthroplasty 1/2011  Usual level of recreational activity: golf, pickle ball.  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    Other PMH:  has a past medical history of Acromioclavicular joint separation, type 1 (9/12), Allergic rhinitis, Arthritis, CKD (chronic kidney disease) stage 3, GFR 30-59 ml/min (H), Degenerative arthritis of hip - right (12/27/2010), Gout, Hip arthrosis - right (10/25/2010), Hyperlipidemia, Ischaemic vascular disease, Ischemic vascular disease (5/12), OA (osteoarthritis) of knee (10/25/2010), Paroxysmal SVT (supraventricular tachycardia) (H) (5/31/2022), Renal insufficiency, Sleep apnea, Type II or unspecified type diabetes mellitus without mention of complication, not stated as uncontrolled, and Unspecified essential hypertension.    He has no past medical history of Amblyopia, Bleeding disorder (H), Cancer (H), Cataract, Cerebral infarction (H), Chronic infection, Complication of  anesthesia, Congestive heart failure (H), COPD (chronic obstructive pulmonary disease) (H), Depressive disorder, Diabetic retinopathy (H), Glaucoma (increased eye pressure), History of blood transfusion, Inflammatory arthritis, Malignant hyperthermia, Retinal detachment, Senile macular degeneration, Strabismus, Stroke (H), Surgical complications, Thyroid disease, Uncomplicated asthma, Unspecified asthma(493.90), or Uveitis.  Patient Active Problem List    Diagnosis Date Noted     Paroxysmal SVT (supraventricular tachycardia) (H) 05/31/2022     Priority: Medium     Benign prostatic hyperplasia with nocturia 09/21/2019     Priority: Medium     S/P lumbar fusion 07/20/2018     Priority: Medium     Spinal stenosis of lumbar region with neurogenic claudication 07/10/2018     Priority: Medium     Type II diabetes mellitus with peripheral circulatory disorder (H) 01/31/2018     Priority: Medium     CKD (chronic kidney disease) stage 3, GFR 30-59 ml/min (H)      Priority: Medium     S/P coronary angiogram 09/23/2015     Priority: Medium     Insomnia 08/03/2015     Priority: Medium     Dermatochalasis 04/10/2015     Priority: Medium     Visual disturbance, transient, right eye 02/09/2014     Priority: Medium     Essential hypertension, benign 05/14/2013     Priority: Medium     Ischemic vascular disease   one stent coronary artery 5/12 12/19/2012     Priority: Medium     Closed dislocation of acromioclavicular joint 12/17/2012     Priority: Medium     Problem list name updated by automated process. Provider to review       Impingement syndrome of right shoulder 12/17/2012     Priority: Medium     S/P hip replacement 12/13/2012     Priority: Medium     Advanced directives, counseling/discussion 05/25/2011     Priority: Medium     Discussed Advance Directive planning with patient; information given to patient to review.         Urinary retention 01/21/2011     Priority: Medium     Degenerative arthritis of hip - right  12/27/2010     Priority: Medium     OA (osteoarthritis) of knee 10/25/2010     Priority: Medium     Hyperlipidemia LDL goal <100 10/14/2010     Priority: Medium     Low back pain 04/23/2010     Priority: Medium     Radiculopathy of leg 04/23/2010     Priority: Medium     Gout 11/27/2009     Priority: Medium     Sleep apnea      Priority: Medium     Cough 12/05/2007     Priority: Medium       Surgical Hx:  has a past surgical history that includes gastric bypass (1/03); TOTAL HIP ARTHROPLASTY (1/11/11); Stent (5/8/12); Endoscopic sinus surgery (12/14/15); Endoscopic sinus surgery (Bilateral, 12/14/2015); sinus surgery (Right, 12-14-15); Stent (01/2017); ABLATION SUPRAVENT ARRHYTHMOGENIC FOCUS (12/21/15); stent, coronary, shelia (01/2017); Optical tracking system fusion spine posterior lumbar two levels (N/A, 7/20/2018); and back surgery (7/20/2018).    Medications:   Current Outpatient Medications:      ACE NOT PRESCRIBED, INTENTIONAL,, ACE Inhibitor not prescribed due to Other: cough (Patient not taking: Reported on 7/11/2022), Disp: 0 each, Rfl: 0     albuterol (PROAIR HFA) 108 (90 Base) MCG/ACT inhaler, Inhale 2 puffs into the lungs every 4 hours as needed for shortness of breath / dyspnea or wheezing (Patient not taking: No sig reported), Disp: 18 g, Rfl: 1     aspirin 81 MG tablet, Take 1 tablet (81 mg) by mouth daily, Disp: , Rfl:      atorvastatin (LIPITOR) 20 MG tablet, Take 1 tablet (20 mg) by mouth daily, Disp: 90 tablet, Rfl: 3     blood glucose (ACCU-CHEK SMARTVIEW) test strip, ONCE DAILY TESTING AND AS NEEDED, Disp: 100 strip, Rfl: 4     blood glucose monitoring (VIANNEY CONTOUR MONITOR) meter device kit, Use to test blood sugar  times daily or as directed., Disp: 1 kit, Rfl: 0     clindamycin (CLEOCIN) 300 MG capsule, Take 1 capsule (300 mg) by mouth 4 times daily (Patient not taking: No sig reported), Disp: 40 capsule, Rfl: 0     fluticasone (FLONASE) 50 MCG/ACT nasal spray, Spray 1 spray into both nostrils  "daily (Patient not taking: No sig reported), Disp: , Rfl:      gabapentin (NEURONTIN) 300 MG capsule, TAKE ONE CAPSULE DAILY AT NIGHT FOR 3 DAY(S) TAKE ONE CAPSULE IN THE AM AND AT BEDTIME FOR 3 DAY(S) THEN TAKE ONE CAPSULE IN THE AM, ONE IN THE AFTERNOON AND ONE AT BEDTIME THEREAFTER (Patient not taking: Reported on 7/22/2022), Disp: 90 capsule, Rfl: 1     gabapentin (NEURONTIN) 600 MG tablet, Take 1 tablet (600 mg) by mouth 3 times daily, Disp: 270 tablet, Rfl: 1     JARDIANCE 25 MG TABS tablet, TAKE 1 TABLET (25 MG) BY MOUTH DAILY, Disp: 90 tablet, Rfl: 3     losartan (COZAAR) 25 MG tablet, Take 1 tablet (25 mg) by mouth daily For blood pressure - replaces lisinopril., Disp: 90 tablet, Rfl: 3     metFORMIN (GLUCOPHAGE) 500 MG tablet, TAKE 2 TABLETS BY MOUTH TWICE DAILY WITH MEALS, Disp: 360 tablet, Rfl: 3     metoprolol succinate ER (TOPROL-XL) 25 MG 24 hr tablet, Take 12.5 mg by mouth daily, Disp: , Rfl:      nitroGLYcerin (NITROSTAT) 0.4 MG sublingual tablet, Place 1 tablet (0.4 mg) under the tongue every 5 minutes as needed for chest pain (Patient not taking: No sig reported), Disp: 90 tablet, Rfl: 4     tadalafil (CIALIS) 20 MG tablet, TAKE 1 TABLET BY MOUTH DAILY AS NEEDED (18 TABLETS AS A 54 DAY SUPPLY), Disp: 30 tablet, Rfl: 4    Allergies:   Allergies   Allergen Reactions     Benzonatate Anaphylaxis and Swelling     Lisinopril Cough     Zocor [Simvastatin - High Dose]      Poor memory       REVIEW OF SYSTEMS:   CONSTITUTIONAL:NEGATIVE for fever, chills, change in weight  INTEGUMENTARY/SKIN: NEGATIVE for worrisome rashes, moles or lesions  MUSCULOSKELETAL:See HPI above  NEURO: NEGATIVE for weakness, dizziness or paresthesias         PHYSICAL EXAM:  BP (!) 145/77   Pulse 52   Resp 18   Ht 1.854 m (6' 1\")   Wt 102.1 kg (225 lb)   BMI 29.69 kg/m     GENERAL APPEARANCE: healthy, alert, no distress  SKIN: no suspicious lesions or rashes  NEURO: Normal strength and tone, mentation intact and speech " normal  PSYCH:  mentation appears normal and affect normal, not anxious  RESPIRATORY: No increased work of breathing.      BILATERAL LOWER EXTREMITIES:  Gait: normal  Alignment: varus  Bilateral  Quad atrophy, strength normal.  Intact sensation deep peroneal nerve, superficial peroneal nerve, med/lat tibial nerve, sural nerve, saphenous nerve  Intact EHL, EDL, TA, FHL, GS, quadriceps hamstrings and hip flexors  Bilateral calf soft and nttp or squeeze.  Edema: trace     LEFT KNEE EXAM:    Skin: intact, no ecchymosis or erythema  Squat: 100 %, not limited by pain.   causes anterior pain.  ROM: 2 extension to 125 flexion  Tight hamstrings on straight leg raise.  Effusion: trace  Tender: NTTP med/lat joint line, anterior or posterior knee  McMurrays: negative     MCL: stable, and non-painful at both 0 and 30 degrees knee flexion  Varus stress: stable, and non-painful at both 0 and 30 degrees knee flexion  Lachmans: neg, firm endpoint  Posterior Drawer stable  Patellofemoral joint:                Apprehension: negative              Crepitations: mild              Grind: positive.     RIGHT KNEE EXAM:    Skin: intact, no ecchymosis or erythema  Squat: 100 %, not limited by pain.     ROM: full extension to 125+ flexion  Tight hamstrings on straight leg raise.  Effusion: none  Tender: NTTP med/lat joint line, anterior or posterior knee  McMurrays: negative     MCL: stable, and non-painful at both 0 and 30 degrees knee flexion  Varus stress: stable, and non-painful at both 0 and 30 degrees knee flexion  Lachmans: neg, firm endpoint  Posterior Drawer stable  Patellofemoral joint:                Apprehension: negative              Crepitations: mild     X-RAY:  no new images today.  3 views left knee from 5/19/2021-- no obvious fractures or dislocations. Moderate patello-femoral degenerative changes with near bone on bone loss of lateral patello-femoral compartment space with marginal osteophytes. Mild medial compartment  narrowing, notch osteophytes. Some articular flattening noted.      ASSESSMENT: Edwardo Dumont is a 72 year old male, with  chronic left knee pain, primary osteoarthritis.      PLAN:     * left knee injection.    * Rest  * Activity modification - avoid activities that aggravate symptoms or started symptoms at onset.  * NSAIDS - regular use for inflammation, with food, as long as no contra-indications. Please discuss with pcp if needed.  * Ice twice daily to three times daily, 15-20 minutes at a time  * heat may be beneficial prior to exercising  * home exercise program   * Tylenol as needed for pain  * Injections: cortisone injections may be beneficial to help decrease swelling and inflammation within the shoulder or bursa, and decrease pain. With decreased pain, Physical Therapy and exercises will be more effective and efficient. Patient elects to proceed.  * Return to clinic as needed.    Skip Reyes M.D., M.S.  Dept. of Orthopaedic Surgery  [facility]    Large Joint Injection/Arthocentesis: L knee joint    Date/Time: 10/5/2022 3:44 PM  Performed by: Skip Reyes MD  Authorized by: Skip Reyes MD     Indications:  Pain  Needle Size:  22 G  Guidance: landmark guided    Approach:  Anterolateral  Location:  Knee      Medications:  80 mg methylPREDNISolone 80 MG/ML  Outcome:  Tolerated well, no immediate complications  Procedure discussed: discussed risks, benefits, and alternatives    Consent Given by:  Patient  Timeout: timeout called immediately prior to procedure    Prep: patient was prepped and draped in usual sterile fashion     4cc of 0.25% Sensorcaine NDC 05451-293-36, LOT [id number],

## 2022-10-06 RX ORDER — METHYLPREDNISOLONE ACETATE 80 MG/ML
80 INJECTION, SUSPENSION INTRA-ARTICULAR; INTRALESIONAL; INTRAMUSCULAR; SOFT TISSUE
Status: DISCONTINUED | OUTPATIENT
Start: 2022-10-05 | End: 2023-07-31

## 2022-10-17 ENCOUNTER — ALLIED HEALTH/NURSE VISIT (OUTPATIENT)
Dept: OPTOMETRY | Facility: CLINIC | Age: 72
End: 2022-10-17
Payer: MEDICARE

## 2022-10-17 DIAGNOSIS — H52.223 REGULAR ASTIGMATISM OF BOTH EYES: Primary | ICD-10-CM

## 2022-10-17 DIAGNOSIS — H52.4 PRESBYOPIA: ICD-10-CM

## 2022-10-17 PROCEDURE — 99207 PR NO CHARGE LOS: CPT | Performed by: OPTOMETRIST

## 2022-10-17 ASSESSMENT — REFRACTION_WEARINGRX
OS_SPHERE: PLANO
OD_AXIS: 180
SPECS_TYPE: PAL
OS_CYLINDER: +0.50
OS_AXIS: 180
OD_SPHERE: PLANO
OD_ADD: +2.50
OD_CYLINDER: +1.50

## 2022-10-17 ASSESSMENT — REFRACTION_MANIFEST
OD_AXIS: 180
OS_CYLINDER: +0.50
OD_CYLINDER: +0.75
OD_CYLINDER: +0.75
OS_SPHERE: PLANO
OD_AXIS: 003
OS_ADD: +2.50
OD_SPHERE: PLANO
OS_AXIS: 180
OS_CYLINDER: SPHERE
OD_ADD: +2.50
OS_SPHERE: +0.25
OD_SPHERE: PLANO
METHOD_AUTOREFRACTION: 1

## 2022-10-17 ASSESSMENT — VISUAL ACUITY
OD_CC+: -2
OD_CC: 20/20
OS_CC: 20/20
OS_CC+: -2
METHOD: SNELLEN - LINEAR
CORRECTION_TYPE: GLASSES
OD_CC: 20/25
OS_CC: 20/20

## 2022-10-17 ASSESSMENT — SLIT LAMP EXAM - LIDS: COMMENTS: 2+ DERMATOCHALASIS

## 2022-10-17 NOTE — PATIENT INSTRUCTIONS
Allow 2 weeks to adapt to change in glasses.  Return to clinic as needed.    Rosa Varela, OD  [phone]

## 2022-10-17 NOTE — PROGRESS NOTES
Chief Complaint   Patient presents with     Rx Recheck       Source of glasses:  [employer]  How long have you tried the glasses: Since June  What is not working with glasses: It was great when he first picked up the glasses but in the last 4 to 6 weeks the vision in the right eye has changed. The left eye vision is good.    Hermelinda Wiggins  Optometric Assistant, [facility]        OBJECTIVE: See Ophthalmology exam    ASSESSMENT:    ICD-10-CM    1. Regular astigmatism of both eyes  H52.223       2. Presbyopia  H52.4         PLAN:  Patient Instructions   Allow 2 weeks to adapt to change in glasses.  Return to clinic as needed.    Rosa Varela, OD  [phone]

## 2022-10-20 NOTE — TELEPHONE ENCOUNTER
Called and scheduled Erv for 10/8/2021.  [facility] - [place]      ED Note      Patient : Jackie Loaiza Age: 6 year old Sex: female   MRN: [id number] Encounter Date: 10/20/2022      History     Chief Complaint   Patient presents with   • Fever 9 Weeks to 74 years     This 6-year-old female presents for evaluation of cough and fever.  Mother states today is day 3 of fever.  It goes away and then comes back again.  They gave medicine last at 10 AM, no medication since.  Patient otherwise with normal p.o. intake at home, normal urination.  No complaints of dysuria.  No abdominal pain, vomiting or diarrhea.  Mother denies any sick contacts in the home.  Patient does go to school however mother is unaware of any sick contacts in the school.    The history is provided by the patient and the mother.  used: [place], [place] [id number].       During entire encounter today appropriate COVID protocol was worn.  All persons >2 years of age in a mask. I was not present for any aerosolizing procedures.    No Known Allergies    Past Medical History:   Diagnosis Date   • No known health problems        Past Surgical History:   Procedure Laterality Date   • NO PAST SURGERIES         No family history on file.    Social History     Tobacco Use   • Smoking status: Never Smoker   • Smokeless tobacco: Never Used       Review of Systems  All systems reviewed and negative except as noted in the HPI above.      Physical Exam     ED Triage Vitals   ED Triage Vitals Group      Temp 10/20/22 1741 (!) 103 °F (39.4 °C)      Heart Rate 10/20/22 1741 (!) 170      Resp 10/20/22 1741 32      BP 10/20/22 1741 106/72      SpO2 10/20/22 1741 96 %      EtCO2 mmHg --       Height --       Weight 10/20/22 1738 38 lb 4 oz (17.4 kg)      Weight Scale Used 10/20/22 1738 Standing scale      BMI (Calculated) --       IBW/kg (Calculated) --        Pulse Ox is [96%] on Room Air which is normal for this patient    Physical Exam  Constitutional:       General: She is not in acute distress.     Appearance: Normal  appearance. She is well-developed.   HENT:      Head: Normocephalic and atraumatic.      Right Ear: Tympanic membrane normal.      Left Ear: Tympanic membrane normal.      Nose: Rhinorrhea present.      Mouth/Throat:      Mouth: Mucous membranes are moist.      Pharynx: Oropharynx is clear.      Tonsils: No tonsillar exudate.      Neck: Normal range of motion and neck supple.   Eyes:      General: Vision grossly intact.      Conjunctiva/sclera: Conjunctivae normal.   Cardiovascular:      Rate and Rhythm: Regular rhythm. Tachycardia present.      Heart sounds: Normal heart sounds.   Pulmonary:      Effort: Tachypnea and accessory muscle usage present. No nasal flaring or retractions.      Breath sounds: Normal breath sounds. No wheezing.   Abdominal:      General: Abdomen is flat. There is no distension.      Palpations: Abdomen is soft.      Tenderness: There is no abdominal tenderness.   Musculoskeletal:         General: Normal range of motion.   Skin:     General: Skin is warm and dry.      Findings: No rash.   Neurological:      General: No focal deficit present.      Mental Status: She is alert.   Psychiatric:         Behavior: Behavior is cooperative.         Lab Results     Results for orders placed or performed during the hospital encounter of 10/20/22   COVID/Flu/RSV panel   Result Value Ref Range    Rapid SARS-COV-2 by PCR Not Detected Not Detected / Detected / Presumptive Positive / Inhibitors present    Influenza A by PCR Not Detected Not Detected    Influenza B by PCR Not Detected Not Detected    RSV BY PCR Detected (A) Not Detected    Isolation Guidelines      Procedural Comment         EKG/Rhythm Strip Results       Radiology Results     Imaging Results    None         ED Medication Orders (From admission, onward)    Ordered Start     Status Ordering Provider    10/20/22 1747 10/20/22 1800  ibuprofen (CHILDRENS ADVIL) 100 MG/5ML suspension 174 mg  ONCE         Last MAR action: Given ABEL FERMIN     10/20/22 1747 10/20/22 1800  acetaminophen (TYLENOL) 160 MG/5ML solution 262.4 mg  ONCE         Last MAR action: Given ABEL FERMIN            ED Course   Patient reevaluated, symptoms improved, breathing improved.  No signs of respiratory distress or retractions.  Visit Vitals  /72   Pulse (!) 142   Temp (!) 100.7 °F (38.2 °C) (Tympanic)   Resp 23   Wt 17.4 kg (38 lb 4 oz)   SpO2 97%            Procedures    MULTIPLE DIAGNOSES CONSIDERED INCLUDING BUT NOT LIMITED TO:  Pneumonia, viral illness, RSV, COVID-19 infection, influenza, otitis media, bronchiolitis, allergies, pharyngitis    MDM  Number of Diagnoses or Management Options  Acute bronchiolitis due to respiratory syncytial virus (RSV)  Diagnosis management comments: 6-year-old female presents for fever and cough as above.  On arrival she is febrile at 103, tachycardic at 154, slightly tachypneic around 40 but nontoxic.  She does have slight accessory muscle usage however there is no retractions or tracheal tugging.  Patient's oxygen saturation is 100%.  Lungs are clear to auscultation bilaterally.  She was tested for COVID-19, RSV and influenza.  She was also given Tylenol and ibuprofen due to her high fever and no medications for over 8 hours.  Patient's fever reducing here, respirations improving and pulse rate improving as well.  Patient tested positive for RSV.  At this time I do not feel patient requires further hospitalization or observation here, respiratory effort improved with fever control.  Patient resting comfortably on reevaluation.  Results given to parents today.  Discussed supportive care at home.  Pamphlet for Tylenol and ibuprofen dosing given.  At this time no further emergent work-up is warranted.  I explained to the patient's guardian that an emergency department evaluation is not exhaustive and it is important to follow up with a primary care physician or specialist as discussed, and to return to the emergency department if  symptoms are not improving or are worsening. The patient's guardian verbalized understanding of these follow up instructions and return precautions.           Is the patient potentially septic? No, explain: RSV+    Clinical Impression     ED Diagnosis   1. Acute bronchiolitis due to respiratory syncytial virus (RSV)         Disposition             Summary of your Discharge Medications      You have not been prescribed any medications.         Discharge 10/20/2022  7:14 PM  Jackie Loaiza discharge to home/self care.        Employed shared decision making with the patient and all questions answered. The patient and/or family was counseled on the preliminary diagnosis, treatment, prescription medications (especially for any sedating medications) if applicable, and instructed on concerning signs and symptoms and when to return to the ED for further evaluation. Involved parties verbalized their understanding of the instructions given.    Monie Griffith PA-C   10/20/2022 6:09 PM       This note was made using voice dictation and may include inadvertent errors due to the dictation software. Please contact for clarification regarding any noted discrepancies.             Monie Griffith PA-C  10/20/22 1927

## 2022-12-11 ENCOUNTER — HEALTH MAINTENANCE LETTER (OUTPATIENT)
Age: 72
End: 2022-12-11

## 2022-12-19 NOTE — PROGRESS NOTES
CHIEF COMPLAINT:   Chief Complaint   Patient presents with     Shoulder Pain     Bilateral shoulder pain. Last injections: 12/9/21. Patient notes the injections worked wonderful. He is just starting to get the pain back now. He would like more injections today since he is going to be going to [place] soon.        HISTORY:  Tanvir Dumont is a 72 year old male, right-hand dominant, who is seen for follow up evaluation of bilateral shoulder pain, right > left.   His last injections were on 12/9/2021. He reports injections worked wonderful, some of the best yet, just starting to have pain return now. He is leaving for the winter to [place] and would like repeat injections today. He usually gets the injections in December and not coming back til Spring. He has learned to modify his activity, noting he doesn't lift much past shoulder level. He's back for the holidays and heading back to [place] on 1/3/2023.    He sustained an injury 9/2012, fell onto right shoulder while playing soccer with grand-daughter. Had MRI 2012 negative for rotator cuff tear but showing acromio-clavicular injury and rotator cuff tendinosis. Noted to have AC separation. Treated non-opertively, healed well.     MRI 2015 showed partial thickness rotator cuff tear on right.  Also his total hip arthroplasty is doing great, no concerns.    Symptoms have been waxing and waning right shoulder, starting now left shoulder.  Aggrevated by: overhead activities.  Relieved by: rest, cortisone injections  Present symptoms: pain with overhead activities, pain reaching behind back  Pain location: lateral shoulder, deltoid and upper arm  Pain severity: 3/10.  Pain quality: dull and sharp  Frequency of symptoms: occasionally  Associated symptoms: none  Treatment up to this point: injections 12/2014, 6/2015, 12/2015, 12/6/2016, 12/21/2017, 12/2018, 12/2019 , 10/2020, 12/2021 with good relief.    Significant Orthopedic past medical history: right total hip arthroplasty  1/2011  Usual level of recreational activity: golf, pickle ball.  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    Other PMH:  has a past medical history of Acromioclavicular joint separation, type 1 (9/12), Allergic rhinitis, Arthritis, CKD (chronic kidney disease) stage 3, GFR 30-59 ml/min (H), Degenerative arthritis of hip - right (12/27/2010), Gout, Hip arthrosis - right (10/25/2010), Hyperlipidemia, Ischaemic vascular disease, Ischemic vascular disease (5/12), OA (osteoarthritis) of knee (10/25/2010), Paroxysmal SVT (supraventricular tachycardia) (H) (5/31/2022), Renal insufficiency, Sleep apnea, Type II or unspecified type diabetes mellitus without mention of complication, not stated as uncontrolled, and Unspecified essential hypertension.    He has no past medical history of Amblyopia, Bleeding disorder (H), Cancer (H), Cataract, Cerebral infarction (H), Chronic infection, Complication of anesthesia, Congestive heart failure (H), COPD (chronic obstructive pulmonary disease) (H), Depressive disorder, Diabetic retinopathy (H), Glaucoma (increased eye pressure), History of blood transfusion, Inflammatory arthritis, Malignant hyperthermia, Retinal detachment, Senile macular degeneration, Strabismus, Stroke (H), Surgical complications, Thyroid disease, Uncomplicated asthma, Unspecified asthma(493.90), or Uveitis.  Patient Active Problem List    Diagnosis Date Noted     Paroxysmal SVT (supraventricular tachycardia) (H) 05/31/2022     Priority: Medium     Benign prostatic hyperplasia with nocturia 09/21/2019     Priority: Medium     S/P lumbar fusion 07/20/2018     Priority: Medium     Spinal stenosis of lumbar region with neurogenic claudication 07/10/2018     Priority: Medium     Type II diabetes mellitus with peripheral circulatory disorder (H) 01/31/2018     Priority: Medium     CKD (chronic kidney disease) stage 3, GFR 30-59 ml/min (H)      Priority: Medium     S/P coronary angiogram 09/23/2015      Priority: Medium     Insomnia 08/03/2015     Priority: Medium     Dermatochalasis 04/10/2015     Priority: Medium     Visual disturbance, transient, right eye 02/09/2014     Priority: Medium     Essential hypertension, benign 05/14/2013     Priority: Medium     Ischemic vascular disease   one stent coronary artery 5/12 12/19/2012     Priority: Medium     Closed dislocation of acromioclavicular joint 12/17/2012     Priority: Medium     Problem list name updated by automated process. Provider to review       Impingement syndrome of right shoulder 12/17/2012     Priority: Medium     S/P hip replacement 12/13/2012     Priority: Medium     Advanced directives, counseling/discussion 05/25/2011     Priority: Medium     Discussed Advance Directive planning with patient; information given to patient to review.         Urinary retention 01/21/2011     Priority: Medium     Degenerative arthritis of hip - right 12/27/2010     Priority: Medium     OA (osteoarthritis) of knee 10/25/2010     Priority: Medium     Hyperlipidemia LDL goal <100 10/14/2010     Priority: Medium     Low back pain 04/23/2010     Priority: Medium     Radiculopathy of leg 04/23/2010     Priority: Medium     Gout 11/27/2009     Priority: Medium     Sleep apnea      Priority: Medium     Cough 12/05/2007     Priority: Medium       Surgical Hx:  has a past surgical history that includes gastric bypass (1/03); TOTAL HIP ARTHROPLASTY (1/11/11); Stent (5/8/12); Endoscopic sinus surgery (12/14/15); Endoscopic sinus surgery (Bilateral, 12/14/2015); sinus surgery (Right, 12-14-15); Stent (01/2017); ABLATION SUPRAVENT ARRHYTHMOGENIC FOCUS (12/21/15); stent, coronary, shelia (01/2017); Optical tracking system fusion spine posterior lumbar two levels (N/A, 7/20/2018); and back surgery (7/20/2018).    Medications:   Current Outpatient Medications:      ACE NOT PRESCRIBED, INTENTIONAL,, ACE Inhibitor not prescribed due to Other: cough (Patient not taking: Reported on  7/11/2022), Disp: 0 each, Rfl: 0     albuterol (PROAIR HFA) 108 (90 Base) MCG/ACT inhaler, Inhale 2 puffs into the lungs every 4 hours as needed for shortness of breath / dyspnea or wheezing (Patient not taking: No sig reported), Disp: 18 g, Rfl: 1     aspirin 81 MG tablet, Take 1 tablet (81 mg) by mouth daily, Disp: , Rfl:      atorvastatin (LIPITOR) 20 MG tablet, Take 1 tablet (20 mg) by mouth daily, Disp: 90 tablet, Rfl: 3     blood glucose (ACCU-CHEK SMARTVIEW) test strip, ONCE DAILY TESTING AND AS NEEDED, Disp: 100 strip, Rfl: 4     blood glucose monitoring (CarbonFlow CONTOUR MONITOR) meter device kit, Use to test blood sugar  times daily or as directed., Disp: 1 kit, Rfl: 0     clindamycin (CLEOCIN) 300 MG capsule, Take 1 capsule (300 mg) by mouth 4 times daily (Patient not taking: No sig reported), Disp: 40 capsule, Rfl: 0     fluticasone (FLONASE) 50 MCG/ACT nasal spray, Spray 1 spray into both nostrils daily (Patient not taking: No sig reported), Disp: , Rfl:      gabapentin (NEURONTIN) 300 MG capsule, TAKE ONE CAPSULE DAILY AT NIGHT FOR 3 DAY(S) TAKE ONE CAPSULE IN THE AM AND AT BEDTIME FOR 3 DAY(S) THEN TAKE ONE CAPSULE IN THE AM, ONE IN THE AFTERNOON AND ONE AT BEDTIME THEREAFTER (Patient not taking: Reported on 7/22/2022), Disp: 90 capsule, Rfl: 1     gabapentin (NEURONTIN) 600 MG tablet, Take 1 tablet (600 mg) by mouth 3 times daily, Disp: 270 tablet, Rfl: 1     JARDIANCE 25 MG TABS tablet, TAKE 1 TABLET (25 MG) BY MOUTH DAILY, Disp: 90 tablet, Rfl: 3     losartan (COZAAR) 25 MG tablet, Take 1 tablet (25 mg) by mouth daily For blood pressure - replaces lisinopril., Disp: 90 tablet, Rfl: 3     metFORMIN (GLUCOPHAGE) 500 MG tablet, TAKE 2 TABLETS BY MOUTH TWICE DAILY WITH MEALS, Disp: 360 tablet, Rfl: 3     metoprolol succinate ER (TOPROL-XL) 25 MG 24 hr tablet, Take 12.5 mg by mouth daily, Disp: , Rfl:      nitroGLYcerin (NITROSTAT) 0.4 MG sublingual tablet, Place 1 tablet (0.4 mg) under the tongue every 5  "minutes as needed for chest pain (Patient not taking: No sig reported), Disp: 90 tablet, Rfl: 4     tadalafil (CIALIS) 20 MG tablet, TAKE 1 TABLET BY MOUTH DAILY AS NEEDED (18 TABLETS AS A 54 DAY SUPPLY), Disp: 30 tablet, Rfl: 4    Current Facility-Administered Medications:      methylPREDNISolone (DEPO-MEDROL) injection 80 mg, 80 mg, , , Skip Reyes MD, 80 mg at 10/05/22 9119    Allergies:   Allergies   Allergen Reactions     Benzonatate Anaphylaxis and Swelling     Lisinopril Cough     Zocor [Simvastatin - High Dose]      Poor memory       REVIEW OF SYSTEMS:   CONSTITUTIONAL:NEGATIVE for fever, chills, change in weight  INTEGUMENTARY/SKIN: NEGATIVE for worrisome rashes, moles or lesions  MUSCULOSKELETAL:See HPI above  NEURO: NEGATIVE for weakness, dizziness or paresthesias         PHYSICAL EXAM:  BP (!) 172/92   Pulse 57   Ht 1.854 m (6' 1\")   Wt 104.9 kg (231 lb 3.2 oz)   BMI 30.50 kg/m     GENERAL APPEARANCE: healthy, alert, no distress  SKIN: no suspicious lesions or rashes  NEURO: Normal strength and tone, mentation intact and speech normal  PSYCH:  mentation appears normal and affect normal, not anxious  RESPIRATORY: No increased work of breathing.      RIGHT UPPER EXTREMITY:  Sensation intact to light touch in median, radial, ulnar and axillary nerve distributions  Palpable 2+ radial pulse, brisk capillary refill to all fingers, wwp  Intact epl fpl fdp edc wrist flexion/extension biceps triceps deltoid    RIGHT SHOULDER:  Shoulder Inspection: no swelling, bruising, discoloration, there is moderate  AC prominence deformity and asymmetry, mild muscle atrophy supraspinatus and infraspinatus compared to left.    Tender:greater tuberosity, upper trapezius,  nontender to palpation: AC joint, proximal bicep tendon, supraspinatus  Range of Motion:   Active:forward flexion 170 degrees, external rotation  60 degrees, internal rotation  T12   Passive: same  Strength: forward flexion 5-/5, External rotation " 5-/5  Liftoff: Able  Impingement: all grade 2 positive  Special tests: Spurling's: Negative  Belly Press: Negative  Empty Can: Positive for pain.    LEFT UPPER EXTREMITY:  Sensation intact to light touch in median, radial, ulnar and axillary nerve distributions  Palpable 2+ radial pulse, brisk capillary refill to all fingers, wwp  Intact epl fpl fdp edc wrist flexion/extension biceps triceps deltoid    LEFT SHOULDER:  Shoulder Inspection: no swelling, bruising, discoloration, or obvious deformity or asymmetry  no atrophy  Tender: AC joint, greater tuberosity, anterior capsule, proximal biceps, deltoid  Non-tender: SC joint, proximal-mid clavicle, mid-distal clavicle, acromion, proximal humerus, supraspinatus , infraspinatus, upper trapezius muscle and rhomboids  Range of Motion:   Active:forward flexion 170 degrees, external rotation  60 degrees, internal rotation  T12   Passive: same  Strength: forward flexion 5/5, External rotation 5/5  Liftoff: Able  Impingement: all grade 2 positive      X-RAY:   No new shoulder xrays today.  3 views right  Shoulder, bilateral AC joints obtained 11/19/2012 were again reviewed personally in clinic today with the patient. On my review, there is elevation and widening of distal clavicle relative to acromion, 100%. Mild acromio-clavicular degenerative changes. Sclerosis at greater tuberosity. There is no increased in AC separation with weights compared to without weights. Moderate left acromio-clavicular degenerative changes.    3 views left shoulder 3/17/2014 reviewed, No obvious fracture or dislocation. No obvious bony abnormality or lesion. Moderate acromio-clavicular and mild gleno-humeral degenerative changes.       MRI right shoulder CDI 6/2/2015: moderate sized area of poorly defined interstitial and deep fiber tearing of the distal supraspinatus tendon involves up to 50% of thickness. Moderate infraspinatus and subscapularis tendinosis. Chronic acromio-clavicular injury with  CC sprain, mild narrowing of subacromial space with minimal bursitis. Long head biceps grossly intact. No significant gleno-humeral degenerative changes.    MRI left shoulder CDI 6/2/2015: mild supraspinatus tendinopathy with fraying involving bursal surface distally. Mild subscapularis tendinopathy. Moderate to marked acromio-clavicular degenerative changes with mild to  Moderate narrowing of the subacromial space. Mild bursitis. No significant gleno-humeral degenerative changes. Proximal biceps grossly intact.    MRI right shoulder 11/26/2012 reviewed:  IMPRESSION:  1. Extensive separation of the acromioclavicular joint including  disruption of the acromioclavicular joint ligaments. The  coracoclavicular ligaments are intact.  2. Mild tendinosis of the distal supraspinatous tendon. No actual  rotator cuff tear is seen.      ASSESSMENT: Edwardo Dumont is a 72 year old male, right  -hand dominant with chronic right shoulder AC separation, pain, rotator cuff tendinosis and impingement with right partial thickness tear; left shoulder impingement and tendinosis.      PLAN:   * again discussed nonsurgical and surgical options. He states he will continue with injections as along as they help.  * again, could consider arthroscopic versus open surgery (for example: subacromial decompression, distal clavicle excision, rotator cuff repair versus debridement, biceps tenodesis versus tenotomy, etc.). Perioperative course discussed, length of recovery. Right side likely rotator cuff repair and decompression, left side likely decompression only. Risks and perceived benefits discussed.    * at this time, he'd like to proceed with bilateral cortisone injections. See procedure notes below.    In the meantime:    * Rest  * Activity modification - avoid activities that aggravate symptoms or started symptoms at onset.  * NSAIDS - regular use for inflammation, with food, as long as no contra-indications. Please discuss with pcp if  needed.  * Ice twice daily to three times daily, 15-20 minutes at a time  * heat may be beneficial prior to exercising  * home exercise program for strengthening, stretching and range of motion exercises of rotator cuff and periscapular stabilization.  * Tylenol as needed for pain  * Injections: cortisone injections may be beneficial to help decrease swelling and inflammation within the shoulder or bursa, and decrease pain. With decreased pain, Physical Therapy and exercises will be more effective and efficient. Patient elects to proceed with bilateral cortisone injections.  * Return to clinic as needed.    Skip Reyes M.D., M.S.  Dept. of Orthopaedic Surgery  [facility]    Large Joint Injection/Arthocentesis: bilateral subacromial bursa    Date/Time: 12/22/2022 1:23 PM  Performed by: Igor Rose PA  Authorized by: Igor Rose PA     Indications:  Pain  Needle Size:  22 G  Guidance: landmark guided    Approach:  Posterolateral  Location:  Shoulder  Laterality:  Bilateral      Site:  Bilateral subacromial bursa  Medications (Right):  80 mg triamcinolone 40 MG/ML; 4 mL bupivacaine 0.25 %  Medications (Left):  80 mg triamcinolone 40 MG/ML; 4 mL bupivacaine 0.25 %  Outcome:  Tolerated well, no immediate complications  Procedure discussed: discussed risks, benefits, and alternatives    Consent Given by:  Patient  Prep: patient was prepped and draped in usual sterile fashion

## 2022-12-21 ENCOUNTER — LAB (OUTPATIENT)
Dept: LAB | Facility: CLINIC | Age: 72
End: 2022-12-21
Payer: MEDICARE

## 2022-12-21 DIAGNOSIS — E11.51 TYPE II DIABETES MELLITUS WITH PERIPHERAL CIRCULATORY DISORDER (H): ICD-10-CM

## 2022-12-21 DIAGNOSIS — N18.30 STAGE 3 CHRONIC KIDNEY DISEASE, UNSPECIFIED WHETHER STAGE 3A OR 3B CKD (H): ICD-10-CM

## 2022-12-21 DIAGNOSIS — I10 ESSENTIAL HYPERTENSION, BENIGN: ICD-10-CM

## 2022-12-21 LAB
ALBUMIN SERPL-MCNC: 4 G/DL (ref 3.4–5)
ANION GAP SERPL CALCULATED.3IONS-SCNC: 8 MMOL/L (ref 3–14)
BUN SERPL-MCNC: 27 MG/DL (ref 7–30)
CALCIUM SERPL-MCNC: 8.9 MG/DL (ref 8.5–10.1)
CHLORIDE BLD-SCNC: 110 MMOL/L (ref 94–109)
CO2 SERPL-SCNC: 23 MMOL/L (ref 20–32)
CREAT SERPL-MCNC: 1.31 MG/DL (ref 0.66–1.25)
GFR SERPL CREATININE-BSD FRML MDRD: 58 ML/MIN/1.73M2
GLUCOSE BLD-MCNC: 193 MG/DL (ref 70–99)
HBA1C MFR BLD: 7.7 % (ref 0–5.6)
PHOSPHATE SERPL-MCNC: 3.4 MG/DL (ref 2.5–4.5)
POTASSIUM BLD-SCNC: 4.2 MMOL/L (ref 3.4–5.3)
SODIUM SERPL-SCNC: 141 MMOL/L (ref 133–144)

## 2022-12-21 PROCEDURE — 80069 RENAL FUNCTION PANEL: CPT

## 2022-12-21 PROCEDURE — 36415 COLL VENOUS BLD VENIPUNCTURE: CPT

## 2022-12-21 PROCEDURE — 83036 HEMOGLOBIN GLYCOSYLATED A1C: CPT

## 2022-12-22 ENCOUNTER — OFFICE VISIT (OUTPATIENT)
Dept: ORTHOPEDICS | Facility: CLINIC | Age: 72
End: 2022-12-22
Payer: MEDICARE

## 2022-12-22 VITALS
HEIGHT: 73 IN | HEART RATE: 57 BPM | DIASTOLIC BLOOD PRESSURE: 71 MMHG | BODY MASS INDEX: 30.64 KG/M2 | SYSTOLIC BLOOD PRESSURE: 149 MMHG | WEIGHT: 231.2 LBS

## 2022-12-22 DIAGNOSIS — M25.511 CHRONIC PAIN OF BOTH SHOULDERS: Primary | ICD-10-CM

## 2022-12-22 DIAGNOSIS — M25.512 CHRONIC PAIN OF BOTH SHOULDERS: Primary | ICD-10-CM

## 2022-12-22 DIAGNOSIS — G89.29 CHRONIC PAIN OF BOTH SHOULDERS: Primary | ICD-10-CM

## 2022-12-22 PROCEDURE — 20610 DRAIN/INJ JOINT/BURSA W/O US: CPT | Mod: 50 | Performed by: ORTHOPAEDIC SURGERY

## 2022-12-22 RX ORDER — TRIAMCINOLONE ACETONIDE 40 MG/ML
80 INJECTION, SUSPENSION INTRA-ARTICULAR; INTRAMUSCULAR
Status: DISCONTINUED | OUTPATIENT
Start: 2022-12-22 | End: 2023-07-31

## 2022-12-22 RX ORDER — BUPIVACAINE HYDROCHLORIDE 2.5 MG/ML
4 INJECTION, SOLUTION INFILTRATION; PERINEURAL
Status: DISCONTINUED | OUTPATIENT
Start: 2022-12-22 | End: 2023-07-31

## 2022-12-22 RX ADMIN — BUPIVACAINE HYDROCHLORIDE 4 ML: 2.5 INJECTION, SOLUTION INFILTRATION; PERINEURAL at 13:23

## 2022-12-22 RX ADMIN — TRIAMCINOLONE ACETONIDE 80 MG: 40 INJECTION, SUSPENSION INTRA-ARTICULAR; INTRAMUSCULAR at 13:23

## 2022-12-22 ASSESSMENT — PAIN SCALES - GENERAL: PAINLEVEL: MILD PAIN (3)

## 2022-12-22 NOTE — PROGRESS NOTES
ASSESSMENT / PLAN:  (E11.42) Diabetic polyneuropathy associated with type 2 diabetes mellitus (H)  Comment: slowly progressive  Plan: gabapentin (NEURONTIN) 600 MG tablet        Continue closely monitor gabapentin. Consider neurology follow-up.     (I10) Essential hypertension, benign  Comment: stable  Plan: self-monitor. Chest pain or shortness of breath to er. Recheck in 6 months      (E11.21) Type 2 diabetes mellitus with diabetic nephropathy, without long-term current use of insulin (H)  Comment: stable  Plan: more exercise/diet and 5 lbs weight loss. Recheck in 6 months  Sooner if worse/new issues.         Subjective   Erv is a 72 year old presenting for the following health issues:  Follow-up dm, high cholesterol, htn, cad, ed, svt, cri and spinal stenosis.  Water intake good. Outside blood pressure ok.   Needs more exercise - will do more in [place] for winter.   No chest pain or shortness of breath. 2 cups/coffee. Some ALCOHOL.  No nausea, vomiting or diarrhea or black/bloody stools.   Some neuropathy issues at bedtime.  No ulcers. advil prn helpful. No urine changes or hematuria.  Eye exam one month. No albuerol  Needed.   No chief complaint on file.      History of Present Illness       Diabetes:   He presents for follow up of diabetes.  He is checking home blood glucose a few times a month. He checks blood glucose before meals.  Blood glucose is never over 200 and never under 70. When his blood glucose is low, the patient is asymptomatic for confusion, blurred vision, lethargy and reports not feeling dizzy, shaky, or weak.  He is concerned about other.  He is having burning in feet.         He eats 2-3 servings of fruits and vegetables daily.He consumes 0 sweetened beverage(s) daily.He exercises with enough effort to increase his heart rate 10 to 19 minutes per day.  He exercises with enough effort to increase his heart rate 3 or less days per week.   He is taking medications regularly.       Review of  Systems         Objective    There were no vitals taken for this visit.  There is no height or weight on file to calculate BMI.   /74   Pulse 72   Temp 97.8  F (36.6  C) (Oral)   Wt 103.4 kg (228 lb)   SpO2 96%   BMI 30.08 kg/m     Physical Exam   GENERAL: healthy, alert and no distress  EYES: Eyes grossly normal to inspection, PERRL and conjunctivae and sclerae normal  NECK: no adenopathy, no asymmetry, masses, or scars and thyroid normal to palpation  RESP: lungs clear to auscultation - no rales, rhonchi or wheezes  CV: regular rate and rhythm, normal S1 S2, no S3 or S4, no murmur, click or rub, no peripheral edema and peripheral pulses strong  ABDOMEN: soft, nontender, no hepatosplenomegaly, no masses and bowel sounds normal  MS: no gross musculoskeletal defects noted, no edema  Good pulses/hair on feet  PSYCH: mentation appears normal, affect normal/bright

## 2022-12-22 NOTE — LETTER
12/22/2022         RE: Edwardo Dumont  [address] [facility] [place] [address]        Dear Colleague,    Thank you for referring your patient, Edwardo Dumont, to the [facility] [place]. Please see a copy of my visit note below.    CHIEF COMPLAINT:   Chief Complaint   Patient presents with     Shoulder Pain     Bilateral shoulder pain. Last injections: 12/9/21. Patient notes the injections worked wonderful. He is just starting to get the pain back now. He would like more injections today since he is going to be going to [place] soon.        HISTORY:  Tanvir Dumont is a 72 year old male, right-hand dominant, who is seen for follow up evaluation of bilateral shoulder pain, right > left.   His last injections were on 12/9/2021. He reports injections worked wonderful, some of the best yet, just starting to have pain return now. He is leaving for the winter to [place] and would like repeat injections today. He usually gets the injections in December and not coming back til Spring. He has learned to modify his activity, noting he doesn't lift much past shoulder level. He's back for the holidays and heading back to [place] on 1/3/2023.    He sustained an injury 9/2012, fell onto right shoulder while playing soccer with grand-daughter. Had MRI 2012 negative for rotator cuff tear but showing acromio-clavicular injury and rotator cuff tendinosis. Noted to have AC separation. Treated non-opertively, healed well.     MRI 2015 showed partial thickness rotator cuff tear on right.  Also his total hip arthroplasty is doing great, no concerns.    Symptoms have been waxing and waning right shoulder, starting now left shoulder.  Aggrevated by: overhead activities.  Relieved by: rest, cortisone injections  Present symptoms: pain with overhead activities, pain reaching behind back  Pain location: lateral shoulder, deltoid and upper arm  Pain severity: 3/10.  Pain quality: dull and sharp  Frequency of symptoms:  occasionally  Associated symptoms: none  Treatment up to this point: injections 12/2014, 6/2015, 12/2015, 12/6/2016, 12/21/2017, 12/2018, 12/2019 , 10/2020, 12/2021 with good relief.    Significant Orthopedic past medical history: right total hip arthroplasty 1/2011  Usual level of recreational activity: golf, pickle ball.  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    Other PMH:  has a past medical history of Acromioclavicular joint separation, type 1 (9/12), Allergic rhinitis, Arthritis, CKD (chronic kidney disease) stage 3, GFR 30-59 ml/min (H), Degenerative arthritis of hip - right (12/27/2010), Gout, Hip arthrosis - right (10/25/2010), Hyperlipidemia, Ischaemic vascular disease, Ischemic vascular disease (5/12), OA (osteoarthritis) of knee (10/25/2010), Paroxysmal SVT (supraventricular tachycardia) (H) (5/31/2022), Renal insufficiency, Sleep apnea, Type II or unspecified type diabetes mellitus without mention of complication, not stated as uncontrolled, and Unspecified essential hypertension.    He has no past medical history of Amblyopia, Bleeding disorder (H), Cancer (H), Cataract, Cerebral infarction (H), Chronic infection, Complication of anesthesia, Congestive heart failure (H), COPD (chronic obstructive pulmonary disease) (H), Depressive disorder, Diabetic retinopathy (H), Glaucoma (increased eye pressure), History of blood transfusion, Inflammatory arthritis, Malignant hyperthermia, Retinal detachment, Senile macular degeneration, Strabismus, Stroke (H), Surgical complications, Thyroid disease, Uncomplicated asthma, Unspecified asthma(493.90), or Uveitis.  Patient Active Problem List    Diagnosis Date Noted     Paroxysmal SVT (supraventricular tachycardia) (H) 05/31/2022     Priority: Medium     Benign prostatic hyperplasia with nocturia 09/21/2019     Priority: Medium     S/P lumbar fusion 07/20/2018     Priority: Medium     Spinal stenosis of lumbar region with neurogenic  claudication 07/10/2018     Priority: Medium     Type II diabetes mellitus with peripheral circulatory disorder (H) 01/31/2018     Priority: Medium     CKD (chronic kidney disease) stage 3, GFR 30-59 ml/min (H)      Priority: Medium     S/P coronary angiogram 09/23/2015     Priority: Medium     Insomnia 08/03/2015     Priority: Medium     Dermatochalasis 04/10/2015     Priority: Medium     Visual disturbance, transient, right eye 02/09/2014     Priority: Medium     Essential hypertension, benign 05/14/2013     Priority: Medium     Ischemic vascular disease   one stent coronary artery 5/12 12/19/2012     Priority: Medium     Closed dislocation of acromioclavicular joint 12/17/2012     Priority: Medium     Problem list name updated by automated process. Provider to review       Impingement syndrome of right shoulder 12/17/2012     Priority: Medium     S/P hip replacement 12/13/2012     Priority: Medium     Advanced directives, counseling/discussion 05/25/2011     Priority: Medium     Discussed Advance Directive planning with patient; information given to patient to review.         Urinary retention 01/21/2011     Priority: Medium     Degenerative arthritis of hip - right 12/27/2010     Priority: Medium     OA (osteoarthritis) of knee 10/25/2010     Priority: Medium     Hyperlipidemia LDL goal <100 10/14/2010     Priority: Medium     Low back pain 04/23/2010     Priority: Medium     Radiculopathy of leg 04/23/2010     Priority: Medium     Gout 11/27/2009     Priority: Medium     Sleep apnea      Priority: Medium     Cough 12/05/2007     Priority: Medium       Surgical Hx:  has a past surgical history that includes gastric bypass (1/03); TOTAL HIP ARTHROPLASTY (1/11/11); Stent (5/8/12); Endoscopic sinus surgery (12/14/15); Endoscopic sinus surgery (Bilateral, 12/14/2015); sinus surgery (Right, 12-14-15); Stent (01/2017); ABLATION SUPRAVENT ARRHYTHMOGENIC FOCUS (12/21/15); stent, coronary, shelia (01/2017); Optical tracking  system fusion spine posterior lumbar two levels (N/A, 7/20/2018); and back surgery (7/20/2018).    Medications:   Current Outpatient Medications:      ACE NOT PRESCRIBED, INTENTIONAL,, ACE Inhibitor not prescribed due to Other: cough (Patient not taking: Reported on 7/11/2022), Disp: 0 each, Rfl: 0     albuterol (PROAIR HFA) 108 (90 Base) MCG/ACT inhaler, Inhale 2 puffs into the lungs every 4 hours as needed for shortness of breath / dyspnea or wheezing (Patient not taking: No sig reported), Disp: 18 g, Rfl: 1     aspirin 81 MG tablet, Take 1 tablet (81 mg) by mouth daily, Disp: , Rfl:      atorvastatin (LIPITOR) 20 MG tablet, Take 1 tablet (20 mg) by mouth daily, Disp: 90 tablet, Rfl: 3     blood glucose (ACCU-CHEK SMARTVIEW) test strip, ONCE DAILY TESTING AND AS NEEDED, Disp: 100 strip, Rfl: 4     blood glucose monitoring ([employer] CONTOUR MONITOR) meter device kit, Use to test blood sugar  times daily or as directed., Disp: 1 kit, Rfl: 0     clindamycin (CLEOCIN) 300 MG capsule, Take 1 capsule (300 mg) by mouth 4 times daily (Patient not taking: No sig reported), Disp: 40 capsule, Rfl: 0     fluticasone (FLONASE) 50 MCG/ACT nasal spray, Spray 1 spray into both nostrils daily (Patient not taking: No sig reported), Disp: , Rfl:      gabapentin (NEURONTIN) 300 MG capsule, TAKE ONE CAPSULE DAILY AT NIGHT FOR 3 DAY(S) TAKE ONE CAPSULE IN THE AM AND AT BEDTIME FOR 3 DAY(S) THEN TAKE ONE CAPSULE IN THE AM, ONE IN THE AFTERNOON AND ONE AT BEDTIME THEREAFTER (Patient not taking: Reported on 7/22/2022), Disp: 90 capsule, Rfl: 1     gabapentin (NEURONTIN) 600 MG tablet, Take 1 tablet (600 mg) by mouth 3 times daily, Disp: 270 tablet, Rfl: 1     JARDIANCE 25 MG TABS tablet, TAKE 1 TABLET (25 MG) BY MOUTH DAILY, Disp: 90 tablet, Rfl: 3     losartan (COZAAR) 25 MG tablet, Take 1 tablet (25 mg) by mouth daily For blood pressure - replaces lisinopril., Disp: 90 tablet, Rfl: 3     metFORMIN (GLUCOPHAGE) 500 MG tablet, TAKE 2 TABLETS  "BY MOUTH TWICE DAILY WITH MEALS, Disp: 360 tablet, Rfl: 3     metoprolol succinate ER (TOPROL-XL) 25 MG 24 hr tablet, Take 12.5 mg by mouth daily, Disp: , Rfl:      nitroGLYcerin (NITROSTAT) 0.4 MG sublingual tablet, Place 1 tablet (0.4 mg) under the tongue every 5 minutes as needed for chest pain (Patient not taking: No sig reported), Disp: 90 tablet, Rfl: 4     tadalafil (CIALIS) 20 MG tablet, TAKE 1 TABLET BY MOUTH DAILY AS NEEDED (18 TABLETS AS A 54 DAY SUPPLY), Disp: 30 tablet, Rfl: 4    Current Facility-Administered Medications:      methylPREDNISolone (DEPO-MEDROL) injection 80 mg, 80 mg, , , Skip Reyes MD, 80 mg at 10/05/22 6887    Allergies:   Allergies   Allergen Reactions     Benzonatate Anaphylaxis and Swelling     Lisinopril Cough     Zocor [Simvastatin - High Dose]      Poor memory       REVIEW OF SYSTEMS:   CONSTITUTIONAL:NEGATIVE for fever, chills, change in weight  INTEGUMENTARY/SKIN: NEGATIVE for worrisome rashes, moles or lesions  MUSCULOSKELETAL:See HPI above  NEURO: NEGATIVE for weakness, dizziness or paresthesias         PHYSICAL EXAM:  BP (!) 172/92   Pulse 57   Ht 1.854 m (6' 1\")   Wt 104.9 kg (231 lb 3.2 oz)   BMI 30.50 kg/m     GENERAL APPEARANCE: healthy, alert, no distress  SKIN: no suspicious lesions or rashes  NEURO: Normal strength and tone, mentation intact and speech normal  PSYCH:  mentation appears normal and affect normal, not anxious  RESPIRATORY: No increased work of breathing.      RIGHT UPPER EXTREMITY:  Sensation intact to light touch in median, radial, ulnar and axillary nerve distributions  Palpable 2+ radial pulse, brisk capillary refill to all fingers, wwp  Intact epl fpl fdp edc wrist flexion/extension biceps triceps deltoid    RIGHT SHOULDER:  Shoulder Inspection: no swelling, bruising, discoloration, there is moderate  AC prominence deformity and asymmetry, mild muscle atrophy supraspinatus and infraspinatus compared to left.    Tender:greater tuberosity, " upper trapezius,  nontender to palpation: AC joint, proximal bicep tendon, supraspinatus  Range of Motion:   Active:forward flexion 170 degrees, external rotation  60 degrees, internal rotation  T12   Passive: same  Strength: forward flexion 5-/5, External rotation 5-/5  Liftoff: Able  Impingement: all grade 2 positive  Special tests: Spurling's: Negative  Belly Press: Negative  Empty Can: Positive for pain.    LEFT UPPER EXTREMITY:  Sensation intact to light touch in median, radial, ulnar and axillary nerve distributions  Palpable 2+ radial pulse, brisk capillary refill to all fingers, wwp  Intact epl fpl fdp edc wrist flexion/extension biceps triceps deltoid    LEFT SHOULDER:  Shoulder Inspection: no swelling, bruising, discoloration, or obvious deformity or asymmetry  no atrophy  Tender: AC joint, greater tuberosity, anterior capsule, proximal biceps, deltoid  Non-tender: SC joint, proximal-mid clavicle, mid-distal clavicle, acromion, proximal humerus, supraspinatus , infraspinatus, upper trapezius muscle and rhomboids  Range of Motion:   Active:forward flexion 170 degrees, external rotation  60 degrees, internal rotation  T12   Passive: same  Strength: forward flexion 5/5, External rotation 5/5  Liftoff: Able  Impingement: all grade 2 positive      X-RAY:   No new shoulder xrays today.  3 views right  Shoulder, bilateral AC joints obtained 11/19/2012 were again reviewed personally in clinic today with the patient. On my review, there is elevation and widening of distal clavicle relative to acromion, 100%. Mild acromio-clavicular degenerative changes. Sclerosis at greater tuberosity. There is no increased in AC separation with weights compared to without weights. Moderate left acromio-clavicular degenerative changes.    3 views left shoulder 3/17/2014 reviewed, No obvious fracture or dislocation. No obvious bony abnormality or lesion. Moderate acromio-clavicular and mild gleno-humeral degenerative changes.        MRI right shoulder CDI 6/2/2015: moderate sized area of poorly defined interstitial and deep fiber tearing of the distal supraspinatus tendon involves up to 50% of thickness. Moderate infraspinatus and subscapularis tendinosis. Chronic acromio-clavicular injury with CC sprain, mild narrowing of subacromial space with minimal bursitis. Long head biceps grossly intact. No significant gleno-humeral degenerative changes.    MRI left shoulder CDI 6/2/2015: mild supraspinatus tendinopathy with fraying involving bursal surface distally. Mild subscapularis tendinopathy. Moderate to marked acromio-clavicular degenerative changes with mild to  Moderate narrowing of the subacromial space. Mild bursitis. No significant gleno-humeral degenerative changes. Proximal biceps grossly intact.    MRI right shoulder 11/26/2012 reviewed:  IMPRESSION:  1. Extensive separation of the acromioclavicular joint including  disruption of the acromioclavicular joint ligaments. The  coracoclavicular ligaments are intact.  2. Mild tendinosis of the distal supraspinatous tendon. No actual  rotator cuff tear is seen.      ASSESSMENT: Edwardo Dumont is a 72 year old male, right  -hand dominant with chronic right shoulder AC separation, pain, rotator cuff tendinosis and impingement with right partial thickness tear; left shoulder impingement and tendinosis.      PLAN:   * again discussed nonsurgical and surgical options. He states he will continue with injections as along as they help.  * again, could consider arthroscopic versus open surgery (for example: subacromial decompression, distal clavicle excision, rotator cuff repair versus debridement, biceps tenodesis versus tenotomy, etc.). Perioperative course discussed, length of recovery. Right side likely rotator cuff repair and decompression, left side likely decompression only. Risks and perceived benefits discussed.    * at this time, he'd like to proceed with bilateral cortisone injections. See  procedure notes below.    In the meantime:    * Rest  * Activity modification - avoid activities that aggravate symptoms or started symptoms at onset.  * NSAIDS - regular use for inflammation, with food, as long as no contra-indications. Please discuss with pcp if needed.  * Ice twice daily to three times daily, 15-20 minutes at a time  * heat may be beneficial prior to exercising  * home exercise program for strengthening, stretching and range of motion exercises of rotator cuff and periscapular stabilization.  * Tylenol as needed for pain  * Injections: cortisone injections may be beneficial to help decrease swelling and inflammation within the shoulder or bursa, and decrease pain. With decreased pain, Physical Therapy and exercises will be more effective and efficient. Patient elects to proceed with bilateral cortisone injections.  * Return to clinic as needed.    Skip Reyes M.D., M.S.  Dept. of Orthopaedic Surgery  [facility]    Large Joint Injection/Arthocentesis: bilateral subacromial bursa    Date/Time: 12/22/2022 1:23 PM  Performed by: Igor Rose PA  Authorized by: Igor Rose PA     Indications:  Pain  Needle Size:  22 G  Guidance: landmark guided    Approach:  Posterolateral  Location:  Shoulder  Laterality:  Bilateral      Site:  Bilateral subacromial bursa  Medications (Right):  80 mg triamcinolone 40 MG/ML; 4 mL bupivacaine 0.25 %  Medications (Left):  80 mg triamcinolone 40 MG/ML; 4 mL bupivacaine 0.25 %  Outcome:  Tolerated well, no immediate complications  Procedure discussed: discussed risks, benefits, and alternatives    Consent Given by:  Patient  Prep: patient was prepped and draped in usual sterile fashion            Again, thank you for allowing me to participate in the care of your patient.        Sincerely,        Skip Reyes MD

## 2022-12-26 ENCOUNTER — OFFICE VISIT (OUTPATIENT)
Dept: FAMILY MEDICINE | Facility: CLINIC | Age: 72
End: 2022-12-26
Payer: MEDICARE

## 2022-12-26 VITALS
BODY MASS INDEX: 30.08 KG/M2 | OXYGEN SATURATION: 96 % | TEMPERATURE: 97.8 F | HEART RATE: 72 BPM | WEIGHT: 228 LBS | DIASTOLIC BLOOD PRESSURE: 74 MMHG | SYSTOLIC BLOOD PRESSURE: 128 MMHG

## 2022-12-26 DIAGNOSIS — I10 ESSENTIAL HYPERTENSION, BENIGN: ICD-10-CM

## 2022-12-26 DIAGNOSIS — E11.21 TYPE 2 DIABETES MELLITUS WITH DIABETIC NEPHROPATHY, WITHOUT LONG-TERM CURRENT USE OF INSULIN (H): ICD-10-CM

## 2022-12-26 DIAGNOSIS — E11.42 DIABETIC POLYNEUROPATHY ASSOCIATED WITH TYPE 2 DIABETES MELLITUS (H): ICD-10-CM

## 2022-12-26 PROCEDURE — 99214 OFFICE O/P EST MOD 30 MIN: CPT | Performed by: FAMILY MEDICINE

## 2022-12-26 RX ORDER — GABAPENTIN 600 MG/1
600 TABLET ORAL 3 TIMES DAILY
Qty: 270 TABLET | Refills: 1 | Status: SHIPPED | OUTPATIENT
Start: 2022-12-26

## 2022-12-26 ASSESSMENT — PAIN SCALES - GENERAL: PAINLEVEL: NO PAIN (0)

## 2023-02-08 ENCOUNTER — TELEPHONE (OUTPATIENT)
Dept: FAMILY MEDICINE | Facility: CLINIC | Age: 73
End: 2023-02-08
Payer: MEDICARE

## 2023-02-08 DIAGNOSIS — E11.51 TYPE II DIABETES MELLITUS WITH PERIPHERAL CIRCULATORY DISORDER (H): Primary | ICD-10-CM

## 2023-02-08 DIAGNOSIS — E11.51 TYPE II DIABETES MELLITUS WITH PERIPHERAL CIRCULATORY DISORDER (H): ICD-10-CM

## 2023-02-08 RX ORDER — BLOOD-GLUCOSE METER
EACH MISCELLANEOUS
Qty: 1 KIT | Refills: 0 | Status: SHIPPED | OUTPATIENT
Start: 2023-02-08 | End: 2023-02-09

## 2023-02-08 RX ORDER — BLOOD PRESSURE TEST KIT
1 KIT MISCELLANEOUS DAILY
Qty: 100 EACH | Refills: 3 | Status: SHIPPED | OUTPATIENT
Start: 2023-02-08 | End: 2023-02-09

## 2023-02-08 RX ORDER — LANCETS 33 GAUGE
1 EACH MISCELLANEOUS DAILY
Qty: 100 EACH | Refills: 3 | Status: SHIPPED | OUTPATIENT
Start: 2023-02-08 | End: 2023-02-09

## 2023-02-08 NOTE — TELEPHONE ENCOUNTER
Pt calling to ask to have Rx's he had sent  Today sent to [facility]. pinned pharmacy in pt chart.  Sophia Madera,

## 2023-02-08 NOTE — TELEPHONE ENCOUNTER
Pt calling wanting a new diabetic test kit. He states he needs a new BS machine and  The whole nine yards.  Requesting a one touch testing kit.  Okay to call pt back and LVM.   [facility]    Sophia Madera,

## 2023-02-09 RX ORDER — LANCETS 33 GAUGE
1 EACH MISCELLANEOUS DAILY
Qty: 100 EACH | Refills: 3 | Status: SHIPPED | OUTPATIENT
Start: 2023-02-09 | End: 2023-02-14

## 2023-02-09 RX ORDER — BLOOD PRESSURE TEST KIT
1 KIT MISCELLANEOUS DAILY
Qty: 100 EACH | Refills: 3 | Status: SHIPPED | OUTPATIENT
Start: 2023-02-09 | End: 2023-02-14

## 2023-02-09 RX ORDER — BLOOD-GLUCOSE METER
EACH MISCELLANEOUS
Qty: 1 KIT | Refills: 0 | Status: SHIPPED | OUTPATIENT
Start: 2023-02-09 | End: 2023-02-14

## 2023-02-14 RX ORDER — LANCETS 33 GAUGE
1 EACH MISCELLANEOUS DAILY
Qty: 100 EACH | Refills: 3 | Status: SHIPPED | OUTPATIENT
Start: 2023-02-14

## 2023-02-14 RX ORDER — BLOOD-GLUCOSE METER
EACH MISCELLANEOUS
Qty: 1 KIT | Refills: 0 | Status: SHIPPED | OUTPATIENT
Start: 2023-02-14

## 2023-02-14 RX ORDER — BLOOD PRESSURE TEST KIT
1 KIT MISCELLANEOUS DAILY
Qty: 100 EACH | Refills: 3 | Status: SHIPPED | OUTPATIENT
Start: 2023-02-14

## 2023-02-14 NOTE — TELEPHONE ENCOUNTER
Pt calling because the prescriptions were sent to the wrong pharmacy. It should've been sent to [facility] in [place], [place].    Pt states that prescriptions cannot be transfered due to Medicare. New prescription needs to be sent.      Lisa Puentes RN  [facility]

## 2023-02-20 ENCOUNTER — TELEPHONE (OUTPATIENT)
Dept: FAMILY MEDICINE | Facility: CLINIC | Age: 73
End: 2023-02-20
Payer: MEDICARE

## 2023-02-20 NOTE — TELEPHONE ENCOUNTER
Prior Authorization Retail Medication Request    Medication/Dose: One Touch Ultra Blue Test Strips  ICD code (if different than what is on RX):  Type II diabetes mellitus with peripheral circulatory disorder (H) [E11.51]   Previously Tried and Failed:    Rationale:      Insurance phone #:  [phone]  Insurance ID:  [id number]      Pharmacy Information (if different than what is on RX)  Name:  [facility]  Phone:  [phone]

## 2023-02-23 NOTE — TELEPHONE ENCOUNTER
Central Prior Authorization Team   Phone: [phone]    PA Initiation    Medication: One Touch Ultra Blue Test Strips  Insurance Company: [employer] - Phone [phone] Fax [phone]  Pharmacy Filling the Rx:    Filling Pharmacy Phone:    Filling Pharmacy Fax:    Start Date: 2/23/2023

## 2023-03-06 NOTE — TELEPHONE ENCOUNTER
Test strips are covered under Medicare Part B.       Prior Authorization Not Needed per Insurance    Medication: One Touch Ultra Blue Test Strips  Insurance Company: [employer] - Phone [phone] Fax [phone]  Expected CoPay:      Pharmacy Filling the Rx: [id number] - [place], [place] - [address] [facility]  Pharmacy Notified: Yes  Patient Notified: Yes  **Instructed pharmacy to notify patient when script is ready to /ship.**

## 2023-03-16 ENCOUNTER — TELEPHONE (OUTPATIENT)
Dept: FAMILY MEDICINE | Facility: CLINIC | Age: 73
End: 2023-03-16
Payer: MEDICARE

## 2023-03-16 NOTE — TELEPHONE ENCOUNTER
Prior Authorization Retail Medication Request    Medication/Dose: tadalafil (CIALIS) 20 MG tablet  ICD code (if different than what is on RX):  Erectile dysfunction, unspecified erectile dysfunction type [N52.9]   Previously Tried and Failed:    Rationale:      Insurance Name:  Not provided  Insurance ID:  Not provided      Pharmacy Information (if different than what is on RX)  [facility]  Phone:  [phone]

## 2023-03-20 NOTE — TELEPHONE ENCOUNTER
PRIOR AUTHORIZATION DENIED    Medication: tadalafil (CIALIS) 20 MG tablet-DENIED    Denial Date: 3/20/2023    Denial Rational:       Appeal Information: N/A

## 2023-03-20 NOTE — TELEPHONE ENCOUNTER
Central Prior Authorization Team   Phone: [phone]      PA Initiation    Medication: tadalafil (CIALIS) 20 MG tablet  Insurance Company: [employer] - Phone [phone] Fax [phone]  Pharmacy Filling the Rx: [id number] - [place], [place] - [address]   Filling Pharmacy Phone: [phone]  Filling Pharmacy Fax:    Start Date: 3/20/2023

## 2023-04-07 ENCOUNTER — DOCUMENTATION ONLY (OUTPATIENT)
Dept: LAB | Facility: CLINIC | Age: 73
End: 2023-04-07
Payer: MEDICARE

## 2023-04-07 DIAGNOSIS — E78.5 HYPERLIPIDEMIA LDL GOAL <100: Primary | ICD-10-CM

## 2023-04-07 NOTE — PROGRESS NOTES
Patient has an upcoming lab appointment on 04.21.2023 at 8:15 am. Please review and place future orders that may be needed. Otherwise please call patient to cancel lab appointment.    Thank you,  [facility] lab staff

## 2023-04-21 ENCOUNTER — LAB (OUTPATIENT)
Dept: LAB | Facility: CLINIC | Age: 73
End: 2023-04-21
Payer: MEDICARE

## 2023-04-21 DIAGNOSIS — E78.5 HYPERLIPIDEMIA LDL GOAL <100: ICD-10-CM

## 2023-04-24 ENCOUNTER — OFFICE VISIT (OUTPATIENT)
Dept: FAMILY MEDICINE | Facility: CLINIC | Age: 73
End: 2023-04-24
Payer: MEDICARE

## 2023-04-24 VITALS
TEMPERATURE: 98.1 F | BODY MASS INDEX: 30.15 KG/M2 | OXYGEN SATURATION: 95 % | SYSTOLIC BLOOD PRESSURE: 130 MMHG | HEART RATE: 85 BPM | DIASTOLIC BLOOD PRESSURE: 60 MMHG | RESPIRATION RATE: 18 BRPM | HEIGHT: 72 IN | WEIGHT: 222.6 LBS

## 2023-04-24 DIAGNOSIS — Z12.5 SCREENING PSA (PROSTATE SPECIFIC ANTIGEN): ICD-10-CM

## 2023-04-24 DIAGNOSIS — E78.5 HYPERLIPIDEMIA LDL GOAL <100: ICD-10-CM

## 2023-04-24 DIAGNOSIS — E11.51 TYPE II DIABETES MELLITUS WITH PERIPHERAL CIRCULATORY DISORDER (H): Primary | ICD-10-CM

## 2023-04-24 DIAGNOSIS — R79.89 ELEVATED SERUM CREATININE: ICD-10-CM

## 2023-04-24 LAB
HBA1C MFR BLD: 7.3 % (ref 0–5.6)
HGB BLD-MCNC: 16.9 G/DL (ref 13.3–17.7)

## 2023-04-24 PROCEDURE — 80069 RENAL FUNCTION PANEL: CPT | Performed by: FAMILY MEDICINE

## 2023-04-24 PROCEDURE — 83036 HEMOGLOBIN GLYCOSYLATED A1C: CPT | Performed by: FAMILY MEDICINE

## 2023-04-24 PROCEDURE — 80061 LIPID PANEL: CPT | Performed by: FAMILY MEDICINE

## 2023-04-24 PROCEDURE — G0103 PSA SCREENING: HCPCS | Performed by: FAMILY MEDICINE

## 2023-04-24 PROCEDURE — 85018 HEMOGLOBIN: CPT | Performed by: FAMILY MEDICINE

## 2023-04-24 PROCEDURE — 99214 OFFICE O/P EST MOD 30 MIN: CPT | Performed by: FAMILY MEDICINE

## 2023-04-24 PROCEDURE — 36415 COLL VENOUS BLD VENIPUNCTURE: CPT | Performed by: FAMILY MEDICINE

## 2023-04-24 ASSESSMENT — PAIN SCALES - GENERAL: PAINLEVEL: NO PAIN (0)

## 2023-04-24 NOTE — PROGRESS NOTES
ASSESSMENT / PLAN:  (E11.51) Type II diabetes mellitus with peripheral circulatory disorder (H)  (primary encounter diagnosis)  Plan: Hemoglobin A1c, Renal panel        Add glucotrol if worse.  Dm ed possibly in future if continue worsening for long acting insulin. Continue diet/exercise.     (Z12.5) Screening PSA (prostate specific antigen)    Plan: Prostate Specific Antigen Screen        Patient would like psa today. Expected course and warning signs reviewed. Repeat 3months if a lot worse. Urine frequency possibly from dm but consider flomax too.     (R79.89) Elevated serum creatinine    Plan: Hemoglobin, Renal panel        More water. Avoid nsaids. Self-monitor blood pressure     (E78.5) Hyperlipidemia LDL goal <100  Plan: Lipid Profile. Continue statin                Subjective   Erv is a 72 year old, presenting for the following health issues:  Diabetes  Follow-up dm, high cholesterol, htn, cad, ed, svt, cri and spinal stenosis.  Blood sugar increased. Exercise - pickleball/golf.   No chest pain or shortness of breath. Low carb diet. No feet changes acutely.   Eye exam in year. emotoinally doing ok. No nausea, vomiting or diarrhea or black/bloody stools.   Occasionally blood pressure readings. Water intake. No bleeding issues.         4/24/2023    11:52 AM   Additional Questions   Roomed by ESTELA Valencia CMA     History of Present Illness       Diabetes:   He presents for follow up of diabetes.  He is checking home blood glucose a few times a week. He checks blood glucose before meals.  Blood glucose is never over 200 and never under 70. When his blood glucose is low, the patient is asymptomatic for confusion, blurred vision, lethargy and reports not feeling dizzy, shaky, or weak.  He is concerned about other.  He is having numbness in feet and burning in feet.         He eats 2-3 servings of fruits and vegetables daily.He consumes 0 sweetened beverage(s) daily.He exercises with enough effort to increase his heart  rate 10 to 19 minutes per day.  He exercises with enough effort to increase his heart rate 3 or less days per week.   He is taking medications regularly.         Review of Systems         Objective    Physical Exam   /60   Pulse 85   Temp 98.1  F (36.7  C) (Oral)   Resp 18   Ht 1.829 m (6')   Wt 101 kg (222 lb 9.6 oz)   SpO2 95%   BMI 30.19 kg/m     GENERAL: healthy, alert and no distress  EYES: Eyes grossly normal to inspection, PERRL and conjunctivae and sclerae normal  NECK: no adenopathy, no asymmetry, masses, or scars and thyroid normal to palpation  RESP: lungs clear to auscultation - no rales, rhonchi or wheezes  CV: regular rate and rhythm, normal S1 S2, no S3 or S4, no murmur, click or rub, no peripheral edema and peripheral pulses strong  ABDOMEN: soft, nontender, no hepatosplenomegaly, no masses and bowel sounds normal  MS: no gross musculoskeletal defects noted, no edema  NEURO: Normal strength and tone, mentation intact and speech normal  PSYCH: mentation appears normal, affect normal/bright

## 2023-04-25 LAB
ALBUMIN SERPL-MCNC: 4.1 G/DL (ref 3.4–5)
ANION GAP SERPL CALCULATED.3IONS-SCNC: 8 MMOL/L (ref 3–14)
BUN SERPL-MCNC: 22 MG/DL (ref 7–30)
CALCIUM SERPL-MCNC: 9.2 MG/DL (ref 8.5–10.1)
CHLORIDE BLD-SCNC: 106 MMOL/L (ref 94–109)
CHOLEST SERPL-MCNC: 191 MG/DL
CO2 SERPL-SCNC: 25 MMOL/L (ref 20–32)
CREAT SERPL-MCNC: 1.61 MG/DL (ref 0.66–1.25)
FASTING STATUS PATIENT QL REPORTED: NO
GFR SERPL CREATININE-BSD FRML MDRD: 45 ML/MIN/1.73M2
GLUCOSE BLD-MCNC: 177 MG/DL (ref 70–99)
HDLC SERPL-MCNC: 51 MG/DL
LDLC SERPL CALC-MCNC: 94 MG/DL
NONHDLC SERPL-MCNC: 140 MG/DL
PHOSPHATE SERPL-MCNC: 3.6 MG/DL (ref 2.5–4.5)
POTASSIUM BLD-SCNC: 4.7 MMOL/L (ref 3.4–5.3)
PSA SERPL-MCNC: 0.42 UG/L (ref 0–4)
SODIUM SERPL-SCNC: 139 MMOL/L (ref 133–144)
TRIGL SERPL-MCNC: 230 MG/DL

## 2023-05-05 ENCOUNTER — TELEPHONE (OUTPATIENT)
Dept: FAMILY MEDICINE | Facility: CLINIC | Age: 73
End: 2023-05-05
Payer: MEDICARE

## 2023-05-05 NOTE — TELEPHONE ENCOUNTER
You have no openings at all next week, would you like me to reach out to patient to try and get him to elaborate on symptoms and go from there?        Shaquille Madera

## 2023-05-05 NOTE — TELEPHONE ENCOUNTER
Reason for Call:  Appointment Request    Patient requesting this type of appt:  Patient would like to request an apt with pcp regarding urination issues (did not want to elaborate on symptoms).      Requested provider: Evelio Rider    Reason patient unable to be scheduled: Not within requested timeframe    When does patient want to be seen/preferred time: 1-2 days    Comments:     Could we send this information to you in [facility] or would you prefer to receive a phone call?:   Patient would prefer a phone call   Okay to leave a detailed message?: Yes at Home number on file [phone] (home)    Call taken on 5/5/2023 at 10:21 AM by Jolly Mireles

## 2023-05-07 NOTE — TELEPHONE ENCOUNTER
Sorry I don't have any opening. Urgent care or another provider can see. If more chronic type issues I can have patient see urology and placed referral. If thinking he has a bladder infection I could do an evisit for med. Evelio Rider MD

## 2023-05-08 NOTE — TELEPHONE ENCOUNTER
Called and informed patient of Dr Rider's message. Patient states he just wants to see Dr Rider, an get a referral to see a urologist sooner. Appointment made with Dr Rider next week.Jacqueline Gold MA/NANCY

## 2023-05-14 DIAGNOSIS — E11.21 TYPE 2 DIABETES MELLITUS WITH DIABETIC NEPHROPATHY, WITHOUT LONG-TERM CURRENT USE OF INSULIN (H): ICD-10-CM

## 2023-05-15 ENCOUNTER — OFFICE VISIT (OUTPATIENT)
Dept: FAMILY MEDICINE | Facility: CLINIC | Age: 73
End: 2023-05-15
Payer: MEDICARE

## 2023-05-15 VITALS
DIASTOLIC BLOOD PRESSURE: 77 MMHG | HEART RATE: 87 BPM | OXYGEN SATURATION: 96 % | HEIGHT: 72 IN | BODY MASS INDEX: 30.94 KG/M2 | WEIGHT: 228.4 LBS | RESPIRATION RATE: 18 BRPM | TEMPERATURE: 98.8 F | SYSTOLIC BLOOD PRESSURE: 118 MMHG

## 2023-05-15 DIAGNOSIS — N47.8 FORESKIN PROBLEM: Primary | ICD-10-CM

## 2023-05-15 DIAGNOSIS — R79.89 ELEVATED SERUM CREATININE: ICD-10-CM

## 2023-05-15 LAB
ALBUMIN SERPL-MCNC: 4 G/DL (ref 3.4–5)
ALBUMIN UR-MCNC: NEGATIVE MG/DL
ANION GAP SERPL CALCULATED.3IONS-SCNC: 7 MMOL/L (ref 3–14)
APPEARANCE UR: CLEAR
BILIRUB UR QL STRIP: NEGATIVE
BUN SERPL-MCNC: 34 MG/DL (ref 7–30)
CALCIUM SERPL-MCNC: 9 MG/DL (ref 8.5–10.1)
CHLORIDE BLD-SCNC: 111 MMOL/L (ref 94–109)
CO2 SERPL-SCNC: 23 MMOL/L (ref 20–32)
COLOR UR AUTO: YELLOW
CREAT SERPL-MCNC: 1.43 MG/DL (ref 0.66–1.25)
GFR SERPL CREATININE-BSD FRML MDRD: 52 ML/MIN/1.73M2
GLUCOSE BLD-MCNC: 242 MG/DL (ref 70–99)
GLUCOSE UR STRIP-MCNC: >=1000 MG/DL
HGB UR QL STRIP: NEGATIVE
KETONES UR STRIP-MCNC: NEGATIVE MG/DL
LEUKOCYTE ESTERASE UR QL STRIP: NEGATIVE
MUCOUS THREADS #/AREA URNS LPF: PRESENT /LPF
NITRATE UR QL: NEGATIVE
PH UR STRIP: 5.5 [PH] (ref 5–7)
PHOSPHATE SERPL-MCNC: 3.5 MG/DL (ref 2.5–4.5)
POTASSIUM BLD-SCNC: 4.6 MMOL/L (ref 3.4–5.3)
RBC #/AREA URNS AUTO: ABNORMAL /HPF
SODIUM SERPL-SCNC: 141 MMOL/L (ref 133–144)
SP GR UR STRIP: 1.01 (ref 1–1.03)
SQUAMOUS #/AREA URNS AUTO: ABNORMAL /LPF
UROBILINOGEN UR STRIP-ACNC: 0.2 E.U./DL
WBC #/AREA URNS AUTO: ABNORMAL /HPF

## 2023-05-15 PROCEDURE — 80069 RENAL FUNCTION PANEL: CPT | Performed by: FAMILY MEDICINE

## 2023-05-15 PROCEDURE — 81001 URINALYSIS AUTO W/SCOPE: CPT | Performed by: FAMILY MEDICINE

## 2023-05-15 PROCEDURE — 36415 COLL VENOUS BLD VENIPUNCTURE: CPT | Performed by: FAMILY MEDICINE

## 2023-05-15 PROCEDURE — 99214 OFFICE O/P EST MOD 30 MIN: CPT | Performed by: FAMILY MEDICINE

## 2023-05-15 RX ORDER — MICONAZOLE NITRATE 20 MG/G
CREAM TOPICAL 2 TIMES DAILY
Qty: 15 G | Refills: 1 | Status: SHIPPED | OUTPATIENT
Start: 2023-05-15 | End: 2023-05-25

## 2023-05-15 RX ORDER — EMPAGLIFLOZIN 25 MG/1
TABLET, FILM COATED ORAL
Qty: 90 TABLET | Refills: 1 | Status: SHIPPED | OUTPATIENT
Start: 2023-05-15

## 2023-05-15 ASSESSMENT — PAIN SCALES - GENERAL: PAINLEVEL: NO PAIN (0)

## 2023-05-15 NOTE — PROGRESS NOTES
ASSESSMENT / PLAN:  (N47.8) Foreskin problem  (primary encounter diagnosis)  Comment: some retraction and likely yeast  Plan: miconazole (MICATIN) 2 % external cream, Adult         Urology  Referral        Keep clean. Follow-up with urology. Acutely worsening pain/sweling/ fevers or chills to er.     (R79.89) Elevated serum creatinine  Comment: not fasting  Plan: UA with Microscopic reflex to Culture, Renal         panel        Consider renal ultrarsound/nephrology if worse. Blood pressure ok and urine flow ok. Self-monitor blood pressure and increase water/decrease caffeine.        Subjective   Erv is a 72 year old, presenting for the following health issues:  Urinary Problem  Follow-up dm, high cholesterol, htn, cad, ed, svt, cri and spinal stenosis.  Foreskin redness/tightness - on/off issues. Not interested in removal.  No dysuria. No increased frequency. No stream issues.  Sugar ok. No std concerns. Water intake.   2 cups - 16 oz - regular.   No active bleedings. No flank pain. No hematuria.   Not fasting today. No recently work.         5/15/2023     4:17 PM   Additional Questions   Roomed by ESTELA Valencia CMA     History of Present Illness       Reason for visit:  Pain  Symptom onset:  More than a month  Symptoms include:  Pain  Symptom intensity:  Moderate  Symptom progression:  Staying the same  Had these symptoms before:  Yes  Has tried/received treatment for these symptoms:  No  What makes it worse:  No  What makes it better:  No    He eats 2-3 servings of fruits and vegetables daily.He consumes 0 sweetened beverage(s) daily.He exercises with enough effort to increase his heart rate 10 to 19 minutes per day.  He exercises with enough effort to increase his heart rate 3 or less days per week.   He is taking medications regularly.           Objective    There were no vitals taken for this visit.  There is no height or weight on file to calculate BMI.     /77   Pulse 87   Temp 98.8  F (37.1  C)  (Oral)   Resp 18   Ht 1.829 m (6')   Wt 103.6 kg (228 lb 6.4 oz)   SpO2 96%   BMI 30.98 kg/m     Physical Exam   GENERAL: healthy, alert and no distress  NECK: no adenopathy, no asymmetry, masses, or scars and thyroid normal to palpation  RESP: lungs clear to auscultation - no rales, rhonchi or wheezes  CV: regular rate and rhythm, normal S1 S2, no S3 or S4, no murmur, click or rub, no peripheral edema and peripheral pulses strong  ABDOMEN: soft, nontender, no hepatosplenomegaly, no masses and bowel sounds normal  MS: no gross musculoskeletal defects noted, no edema  SKIN: red moist rash foreskin and tip of penis  PSYCH: mentation appears normal, affect normal/bright             Scribe Attestation (For Scribes USE Only)... I have personally evaluated and examined the patient. The Attending was available to me as a supervising provider if needed./Scribe Attestation (For Scribes USE Only)...

## 2023-06-25 ENCOUNTER — HEALTH MAINTENANCE LETTER (OUTPATIENT)
Age: 73
End: 2023-06-25

## 2023-06-28 ENCOUNTER — TRANSFERRED RECORDS (OUTPATIENT)
Dept: HEALTH INFORMATION MANAGEMENT | Facility: CLINIC | Age: 73
End: 2023-06-28
Payer: MEDICARE

## 2023-07-10 ENCOUNTER — MYC MEDICAL ADVICE (OUTPATIENT)
Dept: FAMILY MEDICINE | Facility: CLINIC | Age: 73
End: 2023-07-10
Payer: MEDICARE

## 2023-07-11 NOTE — TELEPHONE ENCOUNTER
Last hgba1c was stable/improved. Continue meds and we can discuss possible change at appointment in fall. ozempic has been sometimes difficult to get on back order or coverage with insurance. Evelio Rider MD

## 2023-07-31 ENCOUNTER — OFFICE VISIT (OUTPATIENT)
Dept: ORTHOPEDICS | Facility: CLINIC | Age: 73
End: 2023-07-31
Payer: MEDICARE

## 2023-07-31 VITALS
HEART RATE: 81 BPM | BODY MASS INDEX: 30.88 KG/M2 | WEIGHT: 228 LBS | DIASTOLIC BLOOD PRESSURE: 67 MMHG | OXYGEN SATURATION: 95 % | HEIGHT: 72 IN | SYSTOLIC BLOOD PRESSURE: 108 MMHG

## 2023-07-31 DIAGNOSIS — M25.511 CHRONIC PAIN OF BOTH SHOULDERS: Primary | ICD-10-CM

## 2023-07-31 DIAGNOSIS — G89.29 CHRONIC PAIN OF BOTH SHOULDERS: Primary | ICD-10-CM

## 2023-07-31 DIAGNOSIS — M25.512 CHRONIC PAIN OF BOTH SHOULDERS: Primary | ICD-10-CM

## 2023-07-31 PROCEDURE — 20610 DRAIN/INJ JOINT/BURSA W/O US: CPT | Mod: 50 | Performed by: PHYSICIAN ASSISTANT

## 2023-07-31 RX ORDER — BUPIVACAINE HYDROCHLORIDE 2.5 MG/ML
4 INJECTION, SOLUTION INFILTRATION; PERINEURAL
Status: DISCONTINUED | OUTPATIENT
Start: 2023-07-31 | End: 2023-12-20

## 2023-07-31 RX ORDER — TRIAMCINOLONE ACETONIDE 40 MG/ML
80 INJECTION, SUSPENSION INTRA-ARTICULAR; INTRAMUSCULAR
Status: DISCONTINUED | OUTPATIENT
Start: 2023-07-31 | End: 2023-12-20

## 2023-07-31 RX ADMIN — BUPIVACAINE HYDROCHLORIDE 4 ML: 2.5 INJECTION, SOLUTION INFILTRATION; PERINEURAL at 13:55

## 2023-07-31 RX ADMIN — TRIAMCINOLONE ACETONIDE 80 MG: 40 INJECTION, SUSPENSION INTRA-ARTICULAR; INTRAMUSCULAR at 13:55

## 2023-07-31 ASSESSMENT — PAIN SCALES - GENERAL: PAINLEVEL: SEVERE PAIN (7)

## 2023-07-31 NOTE — LETTER
7/31/2023         RE: Edwardo Dumont  [address] [place] [address]        Dear Colleague,    Thank you for referring your patient, Edwardo Dumont, to the [facility]. Please see a copy of my visit note below.    CHIEF COMPLAINT:   Chief Complaint   Patient presents with     Left Shoulder - Pain     Right Shoulder - Pain       HISTORY:  Tanvir Dumont is a 73 year old male, right-hand dominant, who is seen for follow up evaluation of bilateral shoulder pain, right > left.   His last injections were on 12/22/2022 He reports injections worked well, just starting to have pain return now. He usually makes it a year but for some reason more painful already this summer.    Continues to be active with golf, pickleball. Not as much in the summer, but over the winter in [place] he'll golf 5d/week, play pickle ball at least 2d/week. He'll be heading back to [place] 10/2023.    He sustained an injury 9/2012, fell onto right shoulder while playing soccer with grand-daughter. Had MRI 2012 negative for rotator cuff tear but showing acromio-clavicular injury and rotator cuff tendinosis. Noted to have AC separation. Treated non-opertively.     MRI 2015 showed partial thickness rotator cuff tear on right.  Also his total hip arthroplasty is doing great, no concerns.    Also left knee pain, last injection 10/2022. He's not quite ready for another injection.    Symptoms have been waxing and waning right shoulder, starting now left shoulder.  Aggrevated by: overhead activities.  Relieved by: rest, cortisone injections  Present symptoms: pain with overhead activities, pain reaching behind back  Pain location: lateral shoulder, deltoid and upper arm  Pain severity: 7/10.  Pain quality: dull and sharp  Frequency of symptoms: occasionally  Associated symptoms: none  Treatment up to this point: injections 12/2014, 6/2015, 12/2015, 12/6/2016, 12/21/2017, 12/2018, 12/2019 , 10/2020, 12/2021 , 12/2022  with good relief.    Significant Orthopedic past medical history: right total hip arthroplasty 1/2011  Usual level of recreational activity: golf, pickle ball.  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    Other PMH:  has a past medical history of Acromioclavicular joint separation, type 1 (9/12), Allergic rhinitis, Arthritis, CKD (chronic kidney disease) stage 3, GFR 30-59 ml/min (H), Degenerative arthritis of hip - right (12/27/2010), Gout, Hip arthrosis - right (10/25/2010), Hyperlipidemia, Ischaemic vascular disease, Ischemic vascular disease (5/12), OA (osteoarthritis) of knee (10/25/2010), Paroxysmal SVT (supraventricular tachycardia) (H) (5/31/2022), Renal insufficiency, Sleep apnea, Type II or unspecified type diabetes mellitus without mention of complication, not stated as uncontrolled, and Unspecified essential hypertension.    He has no past medical history of Amblyopia, Bleeding disorder (H), Cancer (H), Cataract, Cerebral infarction (H), Chronic infection, Complication of anesthesia, Congestive heart failure (H), COPD (chronic obstructive pulmonary disease) (H), Depressive disorder, Diabetic retinopathy (H), Glaucoma (increased eye pressure), History of blood transfusion, Inflammatory arthritis, Malignant hyperthermia, Retinal detachment, Senile macular degeneration, Strabismus, Stroke (H), Surgical complications, Thyroid disease, Uncomplicated asthma, Unspecified asthma(493.90), or Uveitis.  Patient Active Problem List    Diagnosis Date Noted     Paroxysmal SVT (supraventricular tachycardia) (H) 05/31/2022     Priority: Medium     Benign prostatic hyperplasia with nocturia 09/21/2019     Priority: Medium     S/P lumbar fusion 07/20/2018     Priority: Medium     Spinal stenosis of lumbar region with neurogenic claudication 07/10/2018     Priority: Medium     Type II diabetes mellitus with peripheral circulatory disorder (H) 01/31/2018     Priority: Medium     CKD (chronic kidney  disease) stage 3, GFR 30-59 ml/min (H)      Priority: Medium     S/P coronary angiogram 09/23/2015     Priority: Medium     Insomnia 08/03/2015     Priority: Medium     Dermatochalasis 04/10/2015     Priority: Medium     Visual disturbance, transient, right eye 02/09/2014     Priority: Medium     Essential hypertension, benign 05/14/2013     Priority: Medium     Ischemic vascular disease   one stent coronary artery 5/12 12/19/2012     Priority: Medium     Closed dislocation of acromioclavicular joint 12/17/2012     Priority: Medium     Problem list name updated by automated process. Provider to review       Impingement syndrome of right shoulder 12/17/2012     Priority: Medium     S/P hip replacement 12/13/2012     Priority: Medium     Advanced directives, counseling/discussion 05/25/2011     Priority: Medium     Discussed Advance Directive planning with patient; information given to patient to review.         Urinary retention 01/21/2011     Priority: Medium     Degenerative arthritis of hip - right 12/27/2010     Priority: Medium     OA (osteoarthritis) of knee 10/25/2010     Priority: Medium     Hyperlipidemia LDL goal <100 10/14/2010     Priority: Medium     Low back pain 04/23/2010     Priority: Medium     Radiculopathy of leg 04/23/2010     Priority: Medium     Gout 11/27/2009     Priority: Medium     Sleep apnea      Priority: Medium     Cough 12/05/2007     Priority: Medium       Surgical Hx:  has a past surgical history that includes gastric bypass (1/03); TOTAL HIP ARTHROPLASTY (1/11/11); Stent (5/8/12); Endoscopic sinus surgery (12/14/15); Endoscopic sinus surgery (Bilateral, 12/14/2015); sinus surgery (Right, 12-14-15); Stent (01/2017); ABLATION SUPRAVENT ARRHYTHMOGENIC FOCUS (12/21/15); stent, coronary, shelia (01/2017); Optical tracking system fusion spine posterior lumbar two levels (N/A, 7/20/2018); and back surgery (7/20/2018).    Medications:   Current Outpatient Medications:      ACE NOT PRESCRIBED,  INTENTIONAL,, ACE Inhibitor not prescribed due to Other: cough, Disp: 0 each, Rfl: 0     Alcohol Swabs PADS, 1 pad daily, Disp: 100 each, Rfl: 3     aspirin 81 MG tablet, Take 1 tablet (81 mg) by mouth daily, Disp: , Rfl:      atorvastatin (LIPITOR) 20 MG tablet, Take 1 tablet (20 mg) by mouth daily, Disp: 90 tablet, Rfl: 3     blood glucose (ACCU-CHEK SMARTVIEW) test strip, ONCE DAILY TESTING AND AS NEEDED, Disp: 100 strip, Rfl: 4     blood glucose (NO BRAND SPECIFIED) test strip, Use to test blood sugar one time daily or as directed., Disp: 100 strip, Rfl: 3     blood glucose calibration (ONETOUCH ULTRA CONTROL) solution, Use to calibrate blood glucose monitor as needed as directed., Disp: 1 each, Rfl: 2     blood glucose monitoring (Reasult CONTOUR MONITOR) meter device kit, Use to test blood sugar  times daily or as directed., Disp: 1 kit, Rfl: 0     blood glucose monitoring (ONE TOUCH ULTRA 2) meter device kit, Use to test blood sugar one time daily or as directed., Disp: 1 kit, Rfl: 0     clindamycin (CLEOCIN) 300 MG capsule, Take 1 capsule (300 mg) by mouth 4 times daily, Disp: 40 capsule, Rfl: 0     fluticasone (FLONASE) 50 MCG/ACT nasal spray, Spray 1 spray into both nostrils daily, Disp: , Rfl:      gabapentin (NEURONTIN) 300 MG capsule, TAKE ONE CAPSULE DAILY AT NIGHT FOR 3 DAY(S) TAKE ONE CAPSULE IN THE AM AND AT BEDTIME FOR 3 DAY(S) THEN TAKE ONE CAPSULE IN THE AM, ONE IN THE AFTERNOON AND ONE AT BEDTIME THEREAFTER, Disp: 90 capsule, Rfl: 1     gabapentin (NEURONTIN) 600 MG tablet, Take 1 tablet (600 mg) by mouth 3 times daily (Patient taking differently: Take 600 mg by mouth 3 times daily 4/24/2023-Patient states he takes twice daily), Disp: 270 tablet, Rfl: 1     JARDIANCE 25 MG TABS tablet, TAKE 1 TABLET BY MOUTH EVERY DAY, Disp: 90 tablet, Rfl: 1     losartan (COZAAR) 25 MG tablet, Take 1 tablet (25 mg) by mouth daily For blood pressure - replaces lisinopril., Disp: 90 tablet, Rfl: 3     metFORMIN  (GLUCOPHAGE) 500 MG tablet, TAKE 2 TABLETS BY MOUTH TWICE A DAY WITH MEALS, Disp: 360 tablet, Rfl: 3     metoprolol succinate ER (TOPROL-XL) 25 MG 24 hr tablet, Take 12.5 mg by mouth daily, Disp: , Rfl:      nitroGLYcerin (NITROSTAT) 0.4 MG sublingual tablet, Place 1 tablet (0.4 mg) under the tongue every 5 minutes as needed for chest pain, Disp: 90 tablet, Rfl: 4     OneTouch Delica Lancets 33G MISC, 1 Device daily, Disp: 100 each, Rfl: 3     tadalafil (CIALIS) 20 MG tablet, TAKE 1 TABLET BY MOUTH DAILY AS NEEDED (18 TABLETS AS A 54 DAY SUPPLY), Disp: 30 tablet, Rfl: 4     albuterol (PROAIR HFA) 108 (90 Base) MCG/ACT inhaler, Inhale 2 puffs into the lungs every 4 hours as needed for shortness of breath / dyspnea or wheezing (Patient not taking: Reported on 7/31/2023), Disp: 18 g, Rfl: 1    Allergies:   Allergies   Allergen Reactions     Benzonatate Anaphylaxis and Swelling     Lisinopril Cough     Zocor [Simvastatin - High Dose]      Poor memory       REVIEW OF SYSTEMS:   CONSTITUTIONAL:NEGATIVE for fever, chills, change in weight  INTEGUMENTARY/SKIN: NEGATIVE for worrisome rashes, moles or lesions  MUSCULOSKELETAL:See HPI above  NEURO: NEGATIVE for weakness, dizziness or paresthesias         PHYSICAL EXAM:  /67 (BP Location: Left arm, Patient Position: Sitting, Cuff Size: Adult Large)   Pulse 81   Ht 1.829 m (6')   Wt 103.4 kg (228 lb)   SpO2 95%   BMI 30.92 kg/m     GENERAL APPEARANCE: healthy, alert, no distress  SKIN: no suspicious lesions or rashes  NEURO: Normal strength and tone, mentation intact and speech normal  PSYCH:  mentation appears normal and affect normal, not anxious  RESPIRATORY: No increased work of breathing.      RIGHT UPPER EXTREMITY:  Sensation intact to light touch in median, radial, ulnar and axillary nerve distributions  Palpable 2+ radial pulse, brisk capillary refill to all fingers, wwp  Intact epl fpl fdp edc wrist flexion/extension biceps triceps deltoid    RIGHT  SHOULDER:  Shoulder Inspection: no swelling, bruising, discoloration, there is notable AC prominence deformity and asymmetry, mild muscle atrophy supraspinatus and infraspinatus compared to left.    Tender:greater tuberosity, upper trapezius,  nontender to palpation: AC joint, proximal bicep tendon, supraspinatus  Range of Motion:   Active:forward flexion 170 degrees, external rotation  60 degrees, internal rotation  T12   Passive: same  Strength: forward flexion 5-/5, External rotation 5-/5  Liftoff: Able  Impingement: all grade 2 positive  Special tests: Spurling's: Negative  Belly Press: Negative  Empty Can: Positive for pain.    LEFT UPPER EXTREMITY:  Sensation intact to light touch in median, radial, ulnar and axillary nerve distributions  Palpable 2+ radial pulse, brisk capillary refill to all fingers, wwp  Intact epl fpl fdp edc wrist flexion/extension biceps triceps deltoid    LEFT SHOULDER:  Shoulder Inspection: no swelling, bruising, discoloration, or obvious deformity or asymmetry  no atrophy  Tender: AC joint, greater tuberosity, anterior capsule, proximal biceps, deltoid  Non-tender: SC joint, proximal-mid clavicle, mid-distal clavicle, acromion, proximal humerus, supraspinatus , infraspinatus, upper trapezius muscle and rhomboids  Range of Motion:   Active:forward flexion 170 degrees, external rotation  60 degrees, internal rotation  T12   Passive: same  Strength: forward flexion 5/5, External rotation 5/5  Liftoff: Able  Impingement: all grade 2 positive      X-RAY:   No new shoulder xrays today.  3 views right  Shoulder, bilateral AC joints obtained 11/19/2012 were again reviewed personally in clinic today with the patient. On my review, there is elevation and widening of distal clavicle relative to acromion, 100%. Mild acromio-clavicular degenerative changes. Sclerosis at greater tuberosity. There is no increased in AC separation with weights compared to without weights. Moderate left  acromio-clavicular degenerative changes.    3 views left shoulder 3/17/2014 reviewed, No obvious fracture or dislocation. No obvious bony abnormality or lesion. Moderate acromio-clavicular and mild gleno-humeral degenerative changes.       MRI right shoulder CDI 6/2/2015: moderate sized area of poorly defined interstitial and deep fiber tearing of the distal supraspinatus tendon involves up to 50% of thickness. Moderate infraspinatus and subscapularis tendinosis. Chronic acromio-clavicular injury with CC sprain, mild narrowing of subacromial space with minimal bursitis. Long head biceps grossly intact. No significant gleno-humeral degenerative changes.    MRI left shoulder CDI 6/2/2015: mild supraspinatus tendinopathy with fraying involving bursal surface distally. Mild subscapularis tendinopathy. Moderate to marked acromio-clavicular degenerative changes with mild to  Moderate narrowing of the subacromial space. Mild bursitis. No significant gleno-humeral degenerative changes. Proximal biceps grossly intact.    MRI right shoulder 11/26/2012 reviewed:  IMPRESSION:  1. Extensive separation of the acromioclavicular joint including  disruption of the acromioclavicular joint ligaments. The  coracoclavicular ligaments are intact.  2. Mild tendinosis of the distal supraspinatous tendon. No actual  rotator cuff tear is seen.      ASSESSMENT: Edwardo Dumont is a 73 year old male, right  -hand dominant with chronic right shoulder AC separation, pain, rotator cuff tendinosis and impingement with right partial thickness tear; left shoulder impingement and tendinosis.      PLAN:   * again discussed nonsurgical and surgical options. He states he will continue with injections as along as they help.  * again, could consider arthroscopic versus open surgery (for example: subacromial decompression, distal clavicle excision, rotator cuff repair versus debridement, biceps tenodesis versus tenotomy, etc.). Perioperative course  discussed, length of recovery. Right side likely rotator cuff repair and decompression, left side likely decompression only. Risks and perceived benefits discussed.    * at this time, he'd like to proceed with bilateral cortisone injections. See procedure notes below.    In the meantime:    * Rest  * Activity modification - avoid activities that aggravate symptoms or started symptoms at onset.  * NSAIDS - regular use for inflammation, with food, as long as no contra-indications. Please discuss with pcp if needed.  * Ice twice daily to three times daily, 15-20 minutes at a time  * heat may be beneficial prior to exercising  * home exercise program for strengthening, stretching and range of motion exercises of rotator cuff and periscapular stabilization.  * Tylenol as needed for pain  * Injections: cortisone injections may be beneficial to help decrease swelling and inflammation within the shoulder or bursa, and decrease pain. With decreased pain, Physical Therapy and exercises will be more effective and efficient. Patient elects to proceed with bilateral cortisone injections.  * Return to clinic as needed.    Skip Reyes M.D., M.S.  Dept. of Orthopaedic Surgery  [facility]    Large Joint Injection/Arthocentesis: bilateral subacromial bursa    Date/Time: 7/31/2023 1:55 PM    Performed by: Igor Rose PA  Authorized by: Igor Rose PA    Indications:  Pain  Needle Size:  22 G  Guidance: landmark guided    Approach:  Posterolateral  Location:  Shoulder  Laterality:  Bilateral      Site:  Bilateral subacromial bursa  Medications (Right):  80 mg triamcinolone 40 MG/ML; 4 mL bupivacaine 0.25 %  Medications (Left):  80 mg triamcinolone 40 MG/ML; 4 mL bupivacaine 0.25 %  Outcome:  Tolerated well, no immediate complications  Procedure discussed: discussed risks, benefits, and alternatives    Consent Given by:  Patient  Prep: patient was prepped and draped in usual sterile fashion         Again, thank you for allowing me to participate in the care of your patient.        Sincerely,        Skip Reyes MD

## 2023-07-31 NOTE — PROGRESS NOTES
CHIEF COMPLAINT:   Chief Complaint   Patient presents with    Left Shoulder - Pain    Right Shoulder - Pain       HISTORY:  Tanvir Dumont is a 73 year old male, right-hand dominant, who is seen for follow up evaluation of bilateral shoulder pain, right > left.   His last injections were on 12/22/2022 He reports injections worked well, just starting to have pain return now. He usually makes it a year but for some reason more painful already this summer.    Continues to be active with golf, pickleball. Not as much in the summer, but over the winter in [place] he'll golf 5d/week, play pickle ball at least 2d/week. He'll be heading back to [place] 10/2023.    He sustained an injury 9/2012, fell onto right shoulder while playing soccer with grand-daughter. Had MRI 2012 negative for rotator cuff tear but showing acromio-clavicular injury and rotator cuff tendinosis. Noted to have AC separation. Treated non-opertively.     MRI 2015 showed partial thickness rotator cuff tear on right.  Also his total hip arthroplasty is doing great, no concerns.    Also left knee pain, last injection 10/2022. He's not quite ready for another injection.    Symptoms have been waxing and waning right shoulder, starting now left shoulder.  Aggrevated by: overhead activities.  Relieved by: rest, cortisone injections  Present symptoms: pain with overhead activities, pain reaching behind back  Pain location: lateral shoulder, deltoid and upper arm  Pain severity: 7/10.  Pain quality: dull and sharp  Frequency of symptoms: occasionally  Associated symptoms: none  Treatment up to this point: injections 12/2014, 6/2015, 12/2015, 12/6/2016, 12/21/2017, 12/2018, 12/2019 , 10/2020, 12/2021 , 12/2022 with good relief.    Significant Orthopedic past medical history: right total hip arthroplasty 1/2011  Usual level of recreational activity: golf, pickle ball.  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    Other PMH:  has a past medical  history of Acromioclavicular joint separation, type 1 (9/12), Allergic rhinitis, Arthritis, CKD (chronic kidney disease) stage 3, GFR 30-59 ml/min (H), Degenerative arthritis of hip - right (12/27/2010), Gout, Hip arthrosis - right (10/25/2010), Hyperlipidemia, Ischaemic vascular disease, Ischemic vascular disease (5/12), OA (osteoarthritis) of knee (10/25/2010), Paroxysmal SVT (supraventricular tachycardia) (H) (5/31/2022), Renal insufficiency, Sleep apnea, Type II or unspecified type diabetes mellitus without mention of complication, not stated as uncontrolled, and Unspecified essential hypertension.    He has no past medical history of Amblyopia, Bleeding disorder (H), Cancer (H), Cataract, Cerebral infarction (H), Chronic infection, Complication of anesthesia, Congestive heart failure (H), COPD (chronic obstructive pulmonary disease) (H), Depressive disorder, Diabetic retinopathy (H), Glaucoma (increased eye pressure), History of blood transfusion, Inflammatory arthritis, Malignant hyperthermia, Retinal detachment, Senile macular degeneration, Strabismus, Stroke (H), Surgical complications, Thyroid disease, Uncomplicated asthma, Unspecified asthma(493.90), or Uveitis.  Patient Active Problem List    Diagnosis Date Noted    Paroxysmal SVT (supraventricular tachycardia) (H) 05/31/2022     Priority: Medium    Benign prostatic hyperplasia with nocturia 09/21/2019     Priority: Medium    S/P lumbar fusion 07/20/2018     Priority: Medium    Spinal stenosis of lumbar region with neurogenic claudication 07/10/2018     Priority: Medium    Type II diabetes mellitus with peripheral circulatory disorder (H) 01/31/2018     Priority: Medium    CKD (chronic kidney disease) stage 3, GFR 30-59 ml/min (H)      Priority: Medium    S/P coronary angiogram 09/23/2015     Priority: Medium    Insomnia 08/03/2015     Priority: Medium    Dermatochalasis 04/10/2015     Priority: Medium    Visual disturbance, transient, right eye  02/09/2014     Priority: Medium    Essential hypertension, benign 05/14/2013     Priority: Medium    Ischemic vascular disease   one stent coronary artery 5/12 12/19/2012     Priority: Medium    Closed dislocation of acromioclavicular joint 12/17/2012     Priority: Medium     Problem list name updated by automated process. Provider to review      Impingement syndrome of right shoulder 12/17/2012     Priority: Medium    S/P hip replacement 12/13/2012     Priority: Medium    Advanced directives, counseling/discussion 05/25/2011     Priority: Medium     Discussed Advance Directive planning with patient; information given to patient to review.        Urinary retention 01/21/2011     Priority: Medium    Degenerative arthritis of hip - right 12/27/2010     Priority: Medium    OA (osteoarthritis) of knee 10/25/2010     Priority: Medium    Hyperlipidemia LDL goal <100 10/14/2010     Priority: Medium    Low back pain 04/23/2010     Priority: Medium    Radiculopathy of leg 04/23/2010     Priority: Medium    Gout 11/27/2009     Priority: Medium    Sleep apnea      Priority: Medium    Cough 12/05/2007     Priority: Medium       Surgical Hx:  has a past surgical history that includes gastric bypass (1/03); TOTAL HIP ARTHROPLASTY (1/11/11); Stent (5/8/12); Endoscopic sinus surgery (12/14/15); Endoscopic sinus surgery (Bilateral, 12/14/2015); sinus surgery (Right, 12-14-15); Stent (01/2017); ABLATION SUPRAVENT ARRHYTHMOGENIC FOCUS (12/21/15); stent, coronary, shelia (01/2017); Optical tracking system fusion spine posterior lumbar two levels (N/A, 7/20/2018); and back surgery (7/20/2018).    Medications:   Current Outpatient Medications:     ACE NOT PRESCRIBED, INTENTIONAL,, ACE Inhibitor not prescribed due to Other: cough, Disp: 0 each, Rfl: 0    Alcohol Swabs PADS, 1 pad daily, Disp: 100 each, Rfl: 3    aspirin 81 MG tablet, Take 1 tablet (81 mg) by mouth daily, Disp: , Rfl:     atorvastatin (LIPITOR) 20 MG tablet, Take 1 tablet (20  mg) by mouth daily, Disp: 90 tablet, Rfl: 3    blood glucose (ACCU-CHEK SMARTVIEW) test strip, ONCE DAILY TESTING AND AS NEEDED, Disp: 100 strip, Rfl: 4    blood glucose (NO BRAND SPECIFIED) test strip, Use to test blood sugar one time daily or as directed., Disp: 100 strip, Rfl: 3    blood glucose calibration (ONETOUCH ULTRA CONTROL) solution, Use to calibrate blood glucose monitor as needed as directed., Disp: 1 each, Rfl: 2    blood glucose monitoring (VIANNEY CONTOUR MONITOR) meter device kit, Use to test blood sugar  times daily or as directed., Disp: 1 kit, Rfl: 0    blood glucose monitoring (ONE TOUCH ULTRA 2) meter device kit, Use to test blood sugar one time daily or as directed., Disp: 1 kit, Rfl: 0    clindamycin (CLEOCIN) 300 MG capsule, Take 1 capsule (300 mg) by mouth 4 times daily, Disp: 40 capsule, Rfl: 0    fluticasone (FLONASE) 50 MCG/ACT nasal spray, Spray 1 spray into both nostrils daily, Disp: , Rfl:     gabapentin (NEURONTIN) 300 MG capsule, TAKE ONE CAPSULE DAILY AT NIGHT FOR 3 DAY(S) TAKE ONE CAPSULE IN THE AM AND AT BEDTIME FOR 3 DAY(S) THEN TAKE ONE CAPSULE IN THE AM, ONE IN THE AFTERNOON AND ONE AT BEDTIME THEREAFTER, Disp: 90 capsule, Rfl: 1    gabapentin (NEURONTIN) 600 MG tablet, Take 1 tablet (600 mg) by mouth 3 times daily (Patient taking differently: Take 600 mg by mouth 3 times daily 4/24/2023-Patient states he takes twice daily), Disp: 270 tablet, Rfl: 1    JARDIANCE 25 MG TABS tablet, TAKE 1 TABLET BY MOUTH EVERY DAY, Disp: 90 tablet, Rfl: 1    losartan (COZAAR) 25 MG tablet, Take 1 tablet (25 mg) by mouth daily For blood pressure - replaces lisinopril., Disp: 90 tablet, Rfl: 3    metFORMIN (GLUCOPHAGE) 500 MG tablet, TAKE 2 TABLETS BY MOUTH TWICE A DAY WITH MEALS, Disp: 360 tablet, Rfl: 3    metoprolol succinate ER (TOPROL-XL) 25 MG 24 hr tablet, Take 12.5 mg by mouth daily, Disp: , Rfl:     nitroGLYcerin (NITROSTAT) 0.4 MG sublingual tablet, Place 1 tablet (0.4 mg) under the  tongue every 5 minutes as needed for chest pain, Disp: 90 tablet, Rfl: 4    OneTouch Delica Lancets 33G MISC, 1 Device daily, Disp: 100 each, Rfl: 3    tadalafil (CIALIS) 20 MG tablet, TAKE 1 TABLET BY MOUTH DAILY AS NEEDED (18 TABLETS AS A 54 DAY SUPPLY), Disp: 30 tablet, Rfl: 4    albuterol (PROAIR HFA) 108 (90 Base) MCG/ACT inhaler, Inhale 2 puffs into the lungs every 4 hours as needed for shortness of breath / dyspnea or wheezing (Patient not taking: Reported on 7/31/2023), Disp: 18 g, Rfl: 1    Allergies:   Allergies   Allergen Reactions    Benzonatate Anaphylaxis and Swelling    Lisinopril Cough    Zocor [Simvastatin - High Dose]      Poor memory       REVIEW OF SYSTEMS:   CONSTITUTIONAL:NEGATIVE for fever, chills, change in weight  INTEGUMENTARY/SKIN: NEGATIVE for worrisome rashes, moles or lesions  MUSCULOSKELETAL:See HPI above  NEURO: NEGATIVE for weakness, dizziness or paresthesias         PHYSICAL EXAM:  /67 (BP Location: Left arm, Patient Position: Sitting, Cuff Size: Adult Large)   Pulse 81   Ht 1.829 m (6')   Wt 103.4 kg (228 lb)   SpO2 95%   BMI 30.92 kg/m     GENERAL APPEARANCE: healthy, alert, no distress  SKIN: no suspicious lesions or rashes  NEURO: Normal strength and tone, mentation intact and speech normal  PSYCH:  mentation appears normal and affect normal, not anxious  RESPIRATORY: No increased work of breathing.      RIGHT UPPER EXTREMITY:  Sensation intact to light touch in median, radial, ulnar and axillary nerve distributions  Palpable 2+ radial pulse, brisk capillary refill to all fingers, wwp  Intact epl fpl fdp edc wrist flexion/extension biceps triceps deltoid    RIGHT SHOULDER:  Shoulder Inspection: no swelling, bruising, discoloration, there is notable AC prominence deformity and asymmetry, mild muscle atrophy supraspinatus and infraspinatus compared to left.    Tender:greater tuberosity, upper trapezius,  nontender to palpation: AC joint, proximal bicep tendon,  supraspinatus  Range of Motion:   Active:forward flexion 170 degrees, external rotation  60 degrees, internal rotation  T12   Passive: same  Strength: forward flexion 5-/5, External rotation 5-/5  Liftoff: Able  Impingement: all grade 2 positive  Special tests: Spurling's: Negative  Belly Press: Negative  Empty Can: Positive for pain.    LEFT UPPER EXTREMITY:  Sensation intact to light touch in median, radial, ulnar and axillary nerve distributions  Palpable 2+ radial pulse, brisk capillary refill to all fingers, wwp  Intact epl fpl fdp edc wrist flexion/extension biceps triceps deltoid    LEFT SHOULDER:  Shoulder Inspection: no swelling, bruising, discoloration, or obvious deformity or asymmetry  no atrophy  Tender: AC joint, greater tuberosity, anterior capsule, proximal biceps, deltoid  Non-tender: SC joint, proximal-mid clavicle, mid-distal clavicle, acromion, proximal humerus, supraspinatus , infraspinatus, upper trapezius muscle and rhomboids  Range of Motion:   Active:forward flexion 170 degrees, external rotation  60 degrees, internal rotation  T12   Passive: same  Strength: forward flexion 5/5, External rotation 5/5  Liftoff: Able  Impingement: all grade 2 positive      X-RAY:   No new shoulder xrays today.  3 views right  Shoulder, bilateral AC joints obtained 11/19/2012 were again reviewed personally in clinic today with the patient. On my review, there is elevation and widening of distal clavicle relative to acromion, 100%. Mild acromio-clavicular degenerative changes. Sclerosis at greater tuberosity. There is no increased in AC separation with weights compared to without weights. Moderate left acromio-clavicular degenerative changes.    3 views left shoulder 3/17/2014 reviewed, No obvious fracture or dislocation. No obvious bony abnormality or lesion. Moderate acromio-clavicular and mild gleno-humeral degenerative changes.       MRI right shoulder CDI 6/2/2015: moderate sized area of poorly defined  interstitial and deep fiber tearing of the distal supraspinatus tendon involves up to 50% of thickness. Moderate infraspinatus and subscapularis tendinosis. Chronic acromio-clavicular injury with CC sprain, mild narrowing of subacromial space with minimal bursitis. Long head biceps grossly intact. No significant gleno-humeral degenerative changes.    MRI left shoulder CDI 6/2/2015: mild supraspinatus tendinopathy with fraying involving bursal surface distally. Mild subscapularis tendinopathy. Moderate to marked acromio-clavicular degenerative changes with mild to  Moderate narrowing of the subacromial space. Mild bursitis. No significant gleno-humeral degenerative changes. Proximal biceps grossly intact.    MRI right shoulder 11/26/2012 reviewed:  IMPRESSION:  1. Extensive separation of the acromioclavicular joint including  disruption of the acromioclavicular joint ligaments. The  coracoclavicular ligaments are intact.  2. Mild tendinosis of the distal supraspinatous tendon. No actual  rotator cuff tear is seen.      ASSESSMENT: Edwardo Dumont is a 73 year old male, right  -hand dominant with chronic right shoulder AC separation, pain, rotator cuff tendinosis and impingement with right partial thickness tear; left shoulder impingement and tendinosis.      PLAN:   * again discussed nonsurgical and surgical options. He states he will continue with injections as along as they help.  * again, could consider arthroscopic versus open surgery (for example: subacromial decompression, distal clavicle excision, rotator cuff repair versus debridement, biceps tenodesis versus tenotomy, etc.). Perioperative course discussed, length of recovery. Right side likely rotator cuff repair and decompression, left side likely decompression only. Risks and perceived benefits discussed.    * at this time, he'd like to proceed with bilateral cortisone injections. See procedure notes below.    In the meantime:    * Rest  * Activity  modification - avoid activities that aggravate symptoms or started symptoms at onset.  * NSAIDS - regular use for inflammation, with food, as long as no contra-indications. Please discuss with pcp if needed.  * Ice twice daily to three times daily, 15-20 minutes at a time  * heat may be beneficial prior to exercising  * home exercise program for strengthening, stretching and range of motion exercises of rotator cuff and periscapular stabilization.  * Tylenol as needed for pain  * Injections: cortisone injections may be beneficial to help decrease swelling and inflammation within the shoulder or bursa, and decrease pain. With decreased pain, Physical Therapy and exercises will be more effective and efficient. Patient elects to proceed with bilateral cortisone injections.  * Return to clinic as needed.    Skip Reyes M.D., M.S.  Dept. of Orthopaedic Surgery  [facility]    Large Joint Injection/Arthocentesis: bilateral subacromial bursa    Date/Time: 7/31/2023 1:55 PM    Performed by: Igor Rose PA  Authorized by: Igor Rose PA    Indications:  Pain  Needle Size:  22 G  Guidance: landmark guided    Approach:  Posterolateral  Location:  Shoulder  Laterality:  Bilateral      Site:  Bilateral subacromial bursa  Medications (Right):  80 mg triamcinolone 40 MG/ML; 4 mL bupivacaine 0.25 %  Medications (Left):  80 mg triamcinolone 40 MG/ML; 4 mL bupivacaine 0.25 %  Outcome:  Tolerated well, no immediate complications  Procedure discussed: discussed risks, benefits, and alternatives    Consent Given by:  Patient  Prep: patient was prepped and draped in usual sterile fashion

## 2023-08-29 DIAGNOSIS — I10 ESSENTIAL HYPERTENSION, BENIGN: ICD-10-CM

## 2023-08-29 RX ORDER — LOSARTAN POTASSIUM 25 MG/1
TABLET ORAL
Qty: 90 TABLET | Refills: 0 | Status: SHIPPED | OUTPATIENT
Start: 2023-08-29

## 2023-11-06 ENCOUNTER — TELEPHONE (OUTPATIENT)
Dept: FAMILY MEDICINE | Facility: CLINIC | Age: 73
End: 2023-11-06
Payer: MEDICARE

## 2023-11-06 NOTE — TELEPHONE ENCOUNTER
Patient Quality Outreach    Patient is due for the following:   Diabetes -  Diabetic Follow-Up Visit  Physical Annual Wellness Visit    Next Steps:   Schedule a Annual Wellness Visit    Type of outreach:    Sent [employer] message.      Questions for provider review:    None           Ileana Reyes CMA

## 2023-11-12 ENCOUNTER — HEALTH MAINTENANCE LETTER (OUTPATIENT)
Age: 73
End: 2023-11-12

## 2023-12-18 NOTE — PROGRESS NOTES
"Chief Complaint:   Chief Complaint   Patient presents with    Left Hip - Pain     Onset: 1-2 months. NKI. Patient notes the pain just started and has gotten worse. Feels like what his right hip did 12 years ago. Pain is in the joint (patient points to lateral hip). No tx. Walking causes increased pain.        HISTORY OF PRESENT ILLNESS    Edwardo Dumont is a 73 year old male seen for evaluation of ongoing left hip pain with no known injury.   Pain has been present for 1-2 months. Locates pain in the groin, getting worse. Feel like his right hip did prior to having his hip replaced.  He locates pain \"in the joint\", pointing to the side of his hip. Pain worse with walking, activities, in/out of car. Denies perceived limb length inequality.    Ok at rest, night.      Treatment has been nothing. No use of over the counter medications.    He is status post right total hip arthroplasty 1/11/2011, doing great.    Denies low back pain, numbness and tingling.     [place], will be back 4/2024      Present symptoms: pain laterally and anteriorly, pain dull/achy , moderate pain.    Pain severity: 6/10  Pain quality: dull and aching  Frequency of symptoms: frequently  Exacerbating Factors: weight bearing, stairs, in and out of car  Relieving Factors: positional changes  Night Pain: No  Pain while at rest: No   Associated numbness or tingling: No       Other PMH:  has a past medical history of Acromioclavicular joint separation, type 1 (9/12), Allergic rhinitis, Arthritis, CKD (chronic kidney disease) stage 3, GFR 30-59 ml/min (H), Degenerative arthritis of hip - right (12/27/2010), Gout, Hip arthrosis - right (10/25/2010), Hyperlipidemia, Ischaemic vascular disease, Ischemic vascular disease (5/12), OA (osteoarthritis) of knee (10/25/2010), Paroxysmal SVT (supraventricular tachycardia) (H) (5/31/2022), Renal insufficiency, Sleep apnea, Type II or unspecified type diabetes mellitus without mention of " complication, not stated as uncontrolled, and Unspecified essential hypertension.    He has no past medical history of Amblyopia, Bleeding disorder (H), Cancer (H), Cataract, Cerebral infarction (H), Chronic infection, Complication of anesthesia, Congestive heart failure (H), COPD (chronic obstructive pulmonary disease) (H), Depressive disorder, Diabetic retinopathy (H), Glaucoma (increased eye pressure), History of blood transfusion, Inflammatory arthritis, Malignant hyperthermia, Retinal detachment, Senile macular degeneration, Strabismus, Stroke (H), Surgical complications, Thyroid disease, Uncomplicated asthma, Unspecified asthma(493.90), or Uveitis.  Patient Active Problem List   Diagnosis    Cough    Sleep apnea    Gout    Low back pain    Radiculopathy of leg    Hyperlipidemia LDL goal <100    OA (osteoarthritis) of knee    Degenerative arthritis of hip - right    Urinary retention    Advanced directives, counseling/discussion    S/P hip replacement    Closed dislocation of acromioclavicular joint    Impingement syndrome of right shoulder    Ischemic vascular disease   one stent coronary artery 5/12    Essential hypertension, benign    Visual disturbance, transient, right eye    Dermatochalasis    Insomnia    S/P coronary angiogram    CKD (chronic kidney disease) stage 3, GFR 30-59 ml/min (H)    Type II diabetes mellitus with peripheral circulatory disorder (H)    Spinal stenosis of lumbar region with neurogenic claudication    S/P lumbar fusion    Benign prostatic hyperplasia with nocturia    Paroxysmal SVT (supraventricular tachycardia)       Surgical Hx:  has a past surgical history that includes gastric bypass (1/03); TOTAL HIP ARTHROPLASTY (1/11/11); Stent (5/8/12); Endoscopic sinus surgery (12/14/15); Endoscopic sinus surgery (Bilateral, 12/14/2015); sinus surgery (Right, 12-14-15); Stent (01/2017); ABLATION SUPRAVENT ARRHYTHMOGENIC FOCUS (12/21/15); stent, coronary, shelia (01/2017); Optical tracking  system fusion spine posterior lumbar two levels (N/A, 7/20/2018); and back surgery (7/20/2018).    Medications:   Current Outpatient Medications:     Alcohol Swabs PADS, 1 pad daily, Disp: 100 each, Rfl: 3    aspirin 81 MG tablet, Take 1 tablet (81 mg) by mouth daily, Disp: , Rfl:     atorvastatin (LIPITOR) 20 MG tablet, Take 1 tablet (20 mg) by mouth daily, Disp: 90 tablet, Rfl: 3    blood glucose (ACCU-CHEK SMARTVIEW) test strip, ONCE DAILY TESTING AND AS NEEDED, Disp: 100 strip, Rfl: 4    blood glucose (NO BRAND SPECIFIED) test strip, Use to test blood sugar one time daily or as directed., Disp: 100 strip, Rfl: 3    blood glucose calibration (ONETOUCH ULTRA CONTROL) solution, Use to calibrate blood glucose monitor as needed as directed., Disp: 1 each, Rfl: 2    blood glucose monitoring (VIANNEY CONTOUR MONITOR) meter device kit, Use to test blood sugar  times daily or as directed., Disp: 1 kit, Rfl: 0    blood glucose monitoring (ONE TOUCH ULTRA 2) meter device kit, Use to test blood sugar one time daily or as directed., Disp: 1 kit, Rfl: 0    gabapentin (NEURONTIN) 300 MG capsule, TAKE ONE CAPSULE DAILY AT NIGHT FOR 3 DAY(S) TAKE ONE CAPSULE IN THE AM AND AT BEDTIME FOR 3 DAY(S) THEN TAKE ONE CAPSULE IN THE AM, ONE IN THE AFTERNOON AND ONE AT BEDTIME THEREAFTER, Disp: 90 capsule, Rfl: 1    glimepiride (AMARYL) 4 MG tablet, 1 TAB ORAL TWICE A DAY WITH MEALS  DAYS, Disp: , Rfl:     JARDIANCE 25 MG TABS tablet, TAKE 1 TABLET BY MOUTH EVERY DAY, Disp: 90 tablet, Rfl: 1    losartan (COZAAR) 25 MG tablet, TAKE 1 TABLET BY MOUTH DAILY FOR BLOOD PRESSURE - REPLACES LISINOPRIL., Disp: 90 tablet, Rfl: 0    metFORMIN (GLUCOPHAGE) 500 MG tablet, TAKE 2 TABLETS BY MOUTH TWICE A DAY WITH MEALS, Disp: 360 tablet, Rfl: 3    metoprolol succinate ER (TOPROL-XL) 25 MG 24 hr tablet, Take 12.5 mg by mouth daily, Disp: , Rfl:     OneTouch Delica Lancets 33G MISC, 1 Device daily, Disp: 100 each, Rfl: 3    Semaglutide (RYBELSUS) 7  MG tablet, Take 7 mg by mouth, Disp: , Rfl:     tadalafil (CIALIS) 20 MG tablet, TAKE 1 TABLET BY MOUTH DAILY AS NEEDED (18 TABLETS AS A 54 DAY SUPPLY), Disp: 30 tablet, Rfl: 4    ACE NOT PRESCRIBED, INTENTIONAL,, ACE Inhibitor not prescribed due to Other: cough (Patient not taking: Reported on 12/20/2023), Disp: 0 each, Rfl: 0    albuterol (PROAIR HFA) 108 (90 Base) MCG/ACT inhaler, Inhale 2 puffs into the lungs every 4 hours as needed for shortness of breath / dyspnea or wheezing (Patient not taking: Reported on 7/31/2023), Disp: 18 g, Rfl: 1    clindamycin (CLEOCIN) 300 MG capsule, Take 1 capsule (300 mg) by mouth 4 times daily (Patient not taking: Reported on 12/20/2023), Disp: 40 capsule, Rfl: 0    fluticasone (FLONASE) 50 MCG/ACT nasal spray, Spray 1 spray into both nostrils daily (Patient not taking: Reported on 12/20/2023), Disp: , Rfl:     gabapentin (NEURONTIN) 600 MG tablet, Take 1 tablet (600 mg) by mouth 3 times daily (Patient not taking: Reported on 12/20/2023), Disp: 270 tablet, Rfl: 1    nitroGLYcerin (NITROSTAT) 0.4 MG sublingual tablet, Place 1 tablet (0.4 mg) under the tongue every 5 minutes as needed for chest pain (Patient not taking: Reported on 12/20/2023), Disp: 90 tablet, Rfl: 4    Allergies:   Allergies   Allergen Reactions    Benzonatate Anaphylaxis and Swelling    Lisinopril Cough    Zocor [Simvastatin - High Dose]      Poor memory       Social Hx: retired.  reports that he quit smoking about 34 years ago. His smoking use included cigarettes. He started smoking about 53 years ago. He has a 5.00 pack-year smoking history. He has never used smokeless tobacco. He reports current alcohol use. He reports that he does not use drugs.    Family Hx: family history includes Allergies in his son and son; Cancer in his mother; Neurologic Disorder in his father.    REVIEW OF SYSTEMS:  10 point ROS neg other than the symptoms noted above in the HPI and PAST MEDICAL HISTORY.  Notables,  CONSTITUTIONAL:NEGATIVE for fever, chills, change in weight  INTEGUMENTARY/SKIN: NEGATIVE for worrisome rashes, moles or lesions  MUSCULOSKELETAL:See HPI above  NEURO: NEGATIVE for weakness, dizziness or paresthesias    PHYSICAL EXAM:  /80   Pulse 80   Ht 1.829 m (6')   Wt 98.7 kg (217 lb 8 oz)   BMI 29.50 kg/m     GENERAL APPEARANCE: healthy, alert, no distress; accompanied by his wife.  SKIN: no suspicious lesions or rashes  NEURO: Normal strength and tone, mentation intact and speech normal  PSYCH:  mentation appears normal and affect normal  RESPIRATORY: No increased work of breathing.  LYMPH: no palpable inguinal lymph nodes.    BILATERAL LOWER EXTREMITIES:  Gait: slight favors the left.  No Quad atrophy, strength weaker on the left compared to the right.  Intact sensation deep peroneal nerve, superficial peroneal nerve, med/lat tibial nerve, sural nerve, saphenous nerve  Intact EHL, EDL, TA, FHL, GS, quadriceps hamstrings and hip flexors  Bilateral calf soft and nttp or squeeze.  Edema: trace  Leg lengths: grossly symmetric at medial malleolus.      BACK EXAM  Lumbar range of motion: flexion to touch knees, extension decreased , side bend: adequate  Squat: positive  Seated SLR: negative , bilateral.  Supine SLR: negative , bilateral.  Sciatic Notch tenderness: negative    LEFT HIP EXAM:      Palpation: Tender:   none.   Nontender: left greater trochanter, left gluteus medius  Strength:  4 4+/5  Special tests:  Irritability (flexion/adduction/internal rotation) mild positive.  Hip range of motion: Internal rotation: 15, External rotation: 60, Flexion 105      RIGHT HIP EXAM:    Palpation: Tender:   none.  Range of motion: within normal limits         X-RAY: AP pelvis and AP/Lateral views left hip from 12/20/2023 were reviewed in clinic today. No obvious fractures or dislocations.  Mild-moderate  degenerative arthrosis of the left hip with hypertrophic changes and joint space narrowing.    "  Right total hip arthroplasty. Partial visualization of lower lumbar spinal fixation hardware.        Impression: 72yo male with acute left hip pain, mild primary osteoarthritis.    Plan:   discussed pain in groin/hip likely from mild-moderate  hip arthritis. This is wearing of the cartilage within the hip joint either due to normal \"wear and tear\" or following an injury.   Any low back / buttock / radiating pain likely coming from the low back.    *  treatment options for hip arthritis and pain include: do nothing, NSAIDS, activity modification, Physical Therapy, injections, total hip arthroplasty. Risks and benefits of each discussed.   * did discuss patient is a candidate for total hip arthroplasty, and offered total hip arthroplasty to patient. Total hip arthroplasty will only attempt to provide pain relief from hip related arthritis only and not any pain from the low back. Any low back pain likely to continue.  *  Discussed risks of surgery include, but not limited to: bleeding, infection, pain, scar, damage to adjacent structures (e.g. Nerves, blood vessels, bone, cartilage), temporary or permanent nerve damage, recurrence of symptoms, perioperative fracture,  implant dislocation, implant failure, implant infection, unequal limb lengths,  periprosthetic fracture, stiffness, need for further surgery, blood clots, pulmonary embolism, risks of anesthesia, and death.    * understanding the options, he'll see how it goes over [place]. He may consider an image-guided, left hip intra-articular cortisone injection in the future as needed. We would refer to our Sports Medicine colleagues.      * All questions were addressed and answered prior to discharge from clinic today. The patient acknowledges an understanding of and agreement with the plan set forth during today's visit. Patient was advised to call our office or MyChart us if any further questions arise upon leaving our office today.       "     Skip Reyes M.D., M.S.  Dept. of Orthopaedic Surgery  [facility]

## 2023-12-20 ENCOUNTER — OFFICE VISIT (OUTPATIENT)
Dept: ORTHOPEDICS | Facility: CLINIC | Age: 73
End: 2023-12-20
Payer: MEDICARE

## 2023-12-20 ENCOUNTER — ANCILLARY PROCEDURE (OUTPATIENT)
Dept: GENERAL RADIOLOGY | Facility: CLINIC | Age: 73
End: 2023-12-20
Attending: ORTHOPAEDIC SURGERY
Payer: MEDICARE

## 2023-12-20 VITALS
BODY MASS INDEX: 29.46 KG/M2 | HEIGHT: 72 IN | DIASTOLIC BLOOD PRESSURE: 80 MMHG | HEART RATE: 80 BPM | WEIGHT: 217.5 LBS | SYSTOLIC BLOOD PRESSURE: 127 MMHG

## 2023-12-20 DIAGNOSIS — M25.552 LEFT HIP PAIN: ICD-10-CM

## 2023-12-20 DIAGNOSIS — M25.552 LEFT HIP PAIN: Primary | ICD-10-CM

## 2023-12-20 DIAGNOSIS — N52.9 ERECTILE DYSFUNCTION, UNSPECIFIED ERECTILE DYSFUNCTION TYPE: ICD-10-CM

## 2023-12-20 DIAGNOSIS — M16.12 PRIMARY OSTEOARTHRITIS OF LEFT HIP: ICD-10-CM

## 2023-12-20 PROCEDURE — 73502 X-RAY EXAM HIP UNI 2-3 VIEWS: CPT | Mod: TC | Performed by: RADIOLOGY

## 2023-12-20 PROCEDURE — 99214 OFFICE O/P EST MOD 30 MIN: CPT | Performed by: ORTHOPAEDIC SURGERY

## 2023-12-20 RX ORDER — TADALAFIL 20 MG/1
TABLET ORAL
Qty: 30 TABLET | Refills: 4 | OUTPATIENT
Start: 2023-12-20

## 2023-12-20 RX ORDER — GLIMEPIRIDE 4 MG/1
TABLET ORAL
COMMUNITY
Start: 2023-11-09

## 2023-12-20 ASSESSMENT — PAIN SCALES - GENERAL: PAINLEVEL: SEVERE PAIN (6)

## 2023-12-20 NOTE — LETTER
"    12/20/2023         RE: Edwardo Dumont  [address] [facility] [address]        Dear Colleague,    Thank you for referring your patient, Edwardo Dumont, to the [facility] [place]. Please see a copy of my visit note below.    Chief Complaint:   Chief Complaint   Patient presents with     Left Hip - Pain     Onset: 1-2 months. NKI. Patient notes the pain just started and has gotten worse. Feels like what his right hip did 12 years ago. Pain is in the joint (patient points to lateral hip). No tx. Walking causes increased pain.        HISTORY OF PRESENT ILLNESS    Edwardo Dumont is a 73 year old male seen for evaluation of ongoing left hip pain with no known injury.   Pain has been present for 1-2 months. Locates pain in the groin, getting worse. Feel like his right hip did prior to having his hip replaced.  He locates pain \"in the joint\", pointing to the side of his hip. Pain worse with walking, activities, in/out of car. Denies perceived limb length inequality.    Ok at rest, night.      Treatment has been nothing. No use of over the counter medications.    He is status post right total hip arthroplasty 1/11/2011, doing great.    Denies low back pain, numbness and tingling.     [place] on [place], will be back 4/2024      Present symptoms: pain laterally and anteriorly, pain dull/achy , moderate pain.    Pain severity: 6/10  Pain quality: dull and aching  Frequency of symptoms: frequently  Exacerbating Factors: weight bearing, stairs, in and out of car  Relieving Factors: positional changes  Night Pain: No  Pain while at rest: No   Associated numbness or tingling: No       Other PMH:  has a past medical history of Acromioclavicular joint separation, type 1 (9/12), Allergic rhinitis, Arthritis, CKD (chronic kidney disease) stage 3, GFR 30-59 ml/min (H), Degenerative arthritis of hip - right (12/27/2010), Gout, Hip arthrosis - right (10/25/2010), Hyperlipidemia, Ischaemic " vascular disease, Ischemic vascular disease (5/12), OA (osteoarthritis) of knee (10/25/2010), Paroxysmal SVT (supraventricular tachycardia) (H) (5/31/2022), Renal insufficiency, Sleep apnea, Type II or unspecified type diabetes mellitus without mention of complication, not stated as uncontrolled, and Unspecified essential hypertension.    He has no past medical history of Amblyopia, Bleeding disorder (H), Cancer (H), Cataract, Cerebral infarction (H), Chronic infection, Complication of anesthesia, Congestive heart failure (H), COPD (chronic obstructive pulmonary disease) (H), Depressive disorder, Diabetic retinopathy (H), Glaucoma (increased eye pressure), History of blood transfusion, Inflammatory arthritis, Malignant hyperthermia, Retinal detachment, Senile macular degeneration, Strabismus, Stroke (H), Surgical complications, Thyroid disease, Uncomplicated asthma, Unspecified asthma(493.90), or Uveitis.  Patient Active Problem List   Diagnosis     Cough     Sleep apnea     Gout     Low back pain     Radiculopathy of leg     Hyperlipidemia LDL goal <100     OA (osteoarthritis) of knee     Degenerative arthritis of hip - right     Urinary retention     Advanced directives, counseling/discussion     S/P hip replacement     Closed dislocation of acromioclavicular joint     Impingement syndrome of right shoulder     Ischemic vascular disease   one stent coronary artery 5/12     Essential hypertension, benign     Visual disturbance, transient, right eye     Dermatochalasis     Insomnia     S/P coronary angiogram     CKD (chronic kidney disease) stage 3, GFR 30-59 ml/min (H)     Type II diabetes mellitus with peripheral circulatory disorder (H)     Spinal stenosis of lumbar region with neurogenic claudication     S/P lumbar fusion     Benign prostatic hyperplasia with nocturia     Paroxysmal SVT (supraventricular tachycardia)       Surgical Hx:  has a past surgical history that includes gastric bypass (1/03); TOTAL HIP  ARTHROPLASTY (1/11/11); Stent (5/8/12); Endoscopic sinus surgery (12/14/15); Endoscopic sinus surgery (Bilateral, 12/14/2015); sinus surgery (Right, 12-14-15); Stent (01/2017); ABLATION SUPRAVENT ARRHYTHMOGENIC FOCUS (12/21/15); stent, coronary, shelia (01/2017); Optical tracking system fusion spine posterior lumbar two levels (N/A, 7/20/2018); and back surgery (7/20/2018).    Medications:   Current Outpatient Medications:      Alcohol Swabs PADS, 1 pad daily, Disp: 100 each, Rfl: 3     aspirin 81 MG tablet, Take 1 tablet (81 mg) by mouth daily, Disp: , Rfl:      atorvastatin (LIPITOR) 20 MG tablet, Take 1 tablet (20 mg) by mouth daily, Disp: 90 tablet, Rfl: 3     blood glucose (ACCU-CHEK SMARTVIEW) test strip, ONCE DAILY TESTING AND AS NEEDED, Disp: 100 strip, Rfl: 4     blood glucose (NO BRAND SPECIFIED) test strip, Use to test blood sugar one time daily or as directed., Disp: 100 strip, Rfl: 3     blood glucose calibration (ONETOUCH ULTRA CONTROL) solution, Use to calibrate blood glucose monitor as needed as directed., Disp: 1 each, Rfl: 2     blood glucose monitoring (Livestation CONTOUR MONITOR) meter device kit, Use to test blood sugar  times daily or as directed., Disp: 1 kit, Rfl: 0     blood glucose monitoring (ONE TOUCH ULTRA 2) meter device kit, Use to test blood sugar one time daily or as directed., Disp: 1 kit, Rfl: 0     gabapentin (NEURONTIN) 300 MG capsule, TAKE ONE CAPSULE DAILY AT NIGHT FOR 3 DAY(S) TAKE ONE CAPSULE IN THE AM AND AT BEDTIME FOR 3 DAY(S) THEN TAKE ONE CAPSULE IN THE AM, ONE IN THE AFTERNOON AND ONE AT BEDTIME THEREAFTER, Disp: 90 capsule, Rfl: 1     glimepiride (AMARYL) 4 MG tablet, 1 TAB ORAL TWICE A DAY WITH MEALS  DAYS, Disp: , Rfl:      JARDIANCE 25 MG TABS tablet, TAKE 1 TABLET BY MOUTH EVERY DAY, Disp: 90 tablet, Rfl: 1     losartan (COZAAR) 25 MG tablet, TAKE 1 TABLET BY MOUTH DAILY FOR BLOOD PRESSURE - REPLACES LISINOPRIL., Disp: 90 tablet, Rfl: 0     metFORMIN (GLUCOPHAGE)  500 MG tablet, TAKE 2 TABLETS BY MOUTH TWICE A DAY WITH MEALS, Disp: 360 tablet, Rfl: 3     metoprolol succinate ER (TOPROL-XL) 25 MG 24 hr tablet, Take 12.5 mg by mouth daily, Disp: , Rfl:      OneTouch Delica Lancets 33G MISC, 1 Device daily, Disp: 100 each, Rfl: 3     Semaglutide (RYBELSUS) 7 MG tablet, Take 7 mg by mouth, Disp: , Rfl:      tadalafil (CIALIS) 20 MG tablet, TAKE 1 TABLET BY MOUTH DAILY AS NEEDED (18 TABLETS AS A 54 DAY SUPPLY), Disp: 30 tablet, Rfl: 4     ACE NOT PRESCRIBED, INTENTIONAL,, ACE Inhibitor not prescribed due to Other: cough (Patient not taking: Reported on 12/20/2023), Disp: 0 each, Rfl: 0     albuterol (PROAIR HFA) 108 (90 Base) MCG/ACT inhaler, Inhale 2 puffs into the lungs every 4 hours as needed for shortness of breath / dyspnea or wheezing (Patient not taking: Reported on 7/31/2023), Disp: 18 g, Rfl: 1     clindamycin (CLEOCIN) 300 MG capsule, Take 1 capsule (300 mg) by mouth 4 times daily (Patient not taking: Reported on 12/20/2023), Disp: 40 capsule, Rfl: 0     fluticasone (FLONASE) 50 MCG/ACT nasal spray, Spray 1 spray into both nostrils daily (Patient not taking: Reported on 12/20/2023), Disp: , Rfl:      gabapentin (NEURONTIN) 600 MG tablet, Take 1 tablet (600 mg) by mouth 3 times daily (Patient not taking: Reported on 12/20/2023), Disp: 270 tablet, Rfl: 1     nitroGLYcerin (NITROSTAT) 0.4 MG sublingual tablet, Place 1 tablet (0.4 mg) under the tongue every 5 minutes as needed for chest pain (Patient not taking: Reported on 12/20/2023), Disp: 90 tablet, Rfl: 4    Allergies:   Allergies   Allergen Reactions     Benzonatate Anaphylaxis and Swelling     Lisinopril Cough     Zocor [Simvastatin - High Dose]      Poor memory       Social Hx: retired.  reports that he quit smoking about 34 years ago. His smoking use included cigarettes. He started smoking about 53 years ago. He has a 5.00 pack-year smoking history. He has never used smokeless tobacco. He reports current alcohol  use. He reports that he does not use drugs.    Family Hx: family history includes Allergies in his son and son; Cancer in his mother; Neurologic Disorder in his father.    REVIEW OF SYSTEMS:  10 point ROS neg other than the symptoms noted above in the HPI and PAST MEDICAL HISTORY. Notables,  CONSTITUTIONAL:NEGATIVE for fever, chills, change in weight  INTEGUMENTARY/SKIN: NEGATIVE for worrisome rashes, moles or lesions  MUSCULOSKELETAL:See HPI above  NEURO: NEGATIVE for weakness, dizziness or paresthesias    PHYSICAL EXAM:  /80   Pulse 80   Ht 1.829 m (6')   Wt 98.7 kg (217 lb 8 oz)   BMI 29.50 kg/m     GENERAL APPEARANCE: healthy, alert, no distress; accompanied by his wife.  SKIN: no suspicious lesions or rashes  NEURO: Normal strength and tone, mentation intact and speech normal  PSYCH:  mentation appears normal and affect normal  RESPIRATORY: No increased work of breathing.  LYMPH: no palpable inguinal lymph nodes.    BILATERAL LOWER EXTREMITIES:  Gait: slight favors the left.  No Quad atrophy, strength weaker on the left compared to the right.  Intact sensation deep peroneal nerve, superficial peroneal nerve, med/lat tibial nerve, sural nerve, saphenous nerve  Intact EHL, EDL, TA, FHL, GS, quadriceps hamstrings and hip flexors  Bilateral calf soft and nttp or squeeze.  Edema: trace  Leg lengths: grossly symmetric at medial malleolus.      BACK EXAM  Lumbar range of motion: flexion to touch knees, extension decreased , side bend: adequate  Squat: positive  Seated SLR: negative , bilateral.  Supine SLR: negative , bilateral.  Sciatic Notch tenderness: negative    LEFT HIP EXAM:      Palpation: Tender:   none.   Nontender: left greater trochanter, left gluteus medius  Strength:  4 4+/5  Special tests:  Irritability (flexion/adduction/internal rotation) mild positive.  Hip range of motion: Internal rotation: 15, External rotation: 60, Flexion 105      RIGHT HIP EXAM:    Palpation: Tender:   none.  Range of  "motion: within normal limits         X-RAY: AP pelvis and AP/Lateral views left hip from 12/20/2023 were reviewed in clinic today. No obvious fractures or dislocations.  Mild-moderate  degenerative arthrosis of the left hip with hypertrophic changes and joint space narrowing.     Right total hip arthroplasty. Partial visualization of lower lumbar spinal fixation hardware.        Impression: 74yo male with acute left hip pain, mild primary osteoarthritis.    Plan:   discussed pain in groin/hip likely from mild-moderate  hip arthritis. This is wearing of the cartilage within the hip joint either due to normal \"wear and tear\" or following an injury.   Any low back / buttock / radiating pain likely coming from the low back.    *  treatment options for hip arthritis and pain include: do nothing, NSAIDS, activity modification, Physical Therapy, injections, total hip arthroplasty. Risks and benefits of each discussed.   * did discuss patient is a candidate for total hip arthroplasty, and offered total hip arthroplasty to patient. Total hip arthroplasty will only attempt to provide pain relief from hip related arthritis only and not any pain from the low back. Any low back pain likely to continue.  *  Discussed risks of surgery include, but not limited to: bleeding, infection, pain, scar, damage to adjacent structures (e.g. Nerves, blood vessels, bone, cartilage), temporary or permanent nerve damage, recurrence of symptoms, perioperative fracture,  implant dislocation, implant failure, implant infection, unequal limb lengths,  periprosthetic fracture, stiffness, need for further surgery, blood clots, pulmonary embolism, risks of anesthesia, and death.    * understanding the options, he'll see how it goes over this [place]. He may consider an image-guided, left hip intra-articular cortisone injection in the future as needed. We would refer to our Sports Medicine colleagues.      * All questions were addressed " and answered prior to discharge from clinic today. The patient acknowledges an understanding of and agreement with the plan set forth during today's visit. Patient was advised to call our office or MyChart us if any further questions arise upon leaving our office today.           Skip Reyes M.D., M.S.  Dept. of Orthopaedic Surgery  [facility]             Again, thank you for allowing me to participate in the care of your patient.        Sincerely,        Skip Reyes MD

## 2024-05-11 DIAGNOSIS — E11.42 DIABETIC POLYNEUROPATHY ASSOCIATED WITH TYPE 2 DIABETES MELLITUS (H): ICD-10-CM

## 2024-05-12 RX ORDER — GABAPENTIN 600 MG/1
600 TABLET ORAL 3 TIMES DAILY
Qty: 270 TABLET | Refills: 1 | OUTPATIENT
Start: 2024-05-12

## 2024-05-28 ENCOUNTER — OFFICE VISIT (OUTPATIENT)
Dept: OPTOMETRY | Facility: CLINIC | Age: 74
End: 2024-05-28
Payer: MEDICARE

## 2024-05-28 DIAGNOSIS — H25.11 NUCLEAR SCLEROTIC CATARACT OF RIGHT EYE: ICD-10-CM

## 2024-05-28 DIAGNOSIS — H52.4 PRESBYOPIA: ICD-10-CM

## 2024-05-28 DIAGNOSIS — E11.9 TYPE 2 DIABETES MELLITUS WITHOUT COMPLICATION, WITHOUT LONG-TERM CURRENT USE OF INSULIN (H): Primary | ICD-10-CM

## 2024-05-28 DIAGNOSIS — H52.223 REGULAR ASTIGMATISM OF BOTH EYES: ICD-10-CM

## 2024-05-28 PROCEDURE — 92014 COMPRE OPH EXAM EST PT 1/>: CPT | Performed by: OPTOMETRIST

## 2024-05-28 PROCEDURE — 92015 DETERMINE REFRACTIVE STATE: CPT | Mod: GY | Performed by: OPTOMETRIST

## 2024-05-28 ASSESSMENT — REFRACTION_MANIFEST
OD_AXIS: 004
OD_SPHERE: +0.25
OD_CYLINDER: +1.00
OD_AXIS: 180
OS_AXIS: 180
OS_SPHERE: PLANO
OS_CYLINDER: +1.00
OD_SPHERE: PLANO
OD_ADD: +2.50
METHOD_AUTOREFRACTION: 1
OS_AXIS: 171
OS_ADD: +2.50
OS_CYLINDER: +0.50
OD_CYLINDER: +0.75
OS_SPHERE: +0.50

## 2024-05-28 ASSESSMENT — KERATOMETRY
OD_AXISANGLE2_DEGREES: 1
OS_K2POWER_DIOPTERS: 43.00
OD_K2POWER_DIOPTERS: 42.50
OS_K1POWER_DIOPTERS: 43.75
OD_K1POWER_DIOPTERS: 43.50
OS_AXISANGLE2_DEGREES: 178

## 2024-05-28 ASSESSMENT — TONOMETRY
OS_IOP_MMHG: 19
OD_IOP_MMHG: 19
IOP_METHOD: APPLANATION

## 2024-05-28 ASSESSMENT — CONF VISUAL FIELD
METHOD: COUNTING FINGERS
OS_SUPERIOR_NASAL_RESTRICTION: 0
OS_INFERIOR_TEMPORAL_RESTRICTION: 0
OD_INFERIOR_TEMPORAL_RESTRICTION: 0
OD_INFERIOR_NASAL_RESTRICTION: 0
OD_SUPERIOR_NASAL_RESTRICTION: 0
OS_INFERIOR_NASAL_RESTRICTION: 0
OD_SUPERIOR_TEMPORAL_RESTRICTION: 0
OD_NORMAL: 1
OS_NORMAL: 1
OS_SUPERIOR_TEMPORAL_RESTRICTION: 0

## 2024-05-28 ASSESSMENT — VISUAL ACUITY
OD_CC+: -1
CORRECTION_TYPE: GLASSES
OD_CC: 20/25
METHOD: SNELLEN - LINEAR
OS_CC: 20/20
OS_CC: 20/20
OS_CC+: -3
OD_CC: 20/20

## 2024-05-28 ASSESSMENT — REFRACTION_WEARINGRX
OS_ADD: +2.50
OD_CYLINDER: +0.75
OD_ADD: +2.50
OD_SPHERE: PLANO
OS_SPHERE: PLANO
OS_CYLINDER: +0.50
OS_AXIS: 175
OD_AXIS: 005
SPECS_TYPE: PAL

## 2024-05-28 ASSESSMENT — CUP TO DISC RATIO
OD_RATIO: 0.2
OS_RATIO: 0.2

## 2024-05-28 ASSESSMENT — SLIT LAMP EXAM - LIDS: COMMENTS: 2+ DERMATOCHALASIS

## 2024-05-28 NOTE — PATIENT INSTRUCTIONS
Optional to fill new glasses prescription, minimal change  Monitor mild cataracts   Keep blood sugar under good control  Return in 1 year for diabetic eye exam      Blood sugar and blood pressure control are very important in the prevention of ocular complications from diabetes.       Rosa Varela, OD  [phone]

## 2024-05-28 NOTE — PROGRESS NOTES
Chief Complaint   Patient presents with    Diabetic Eye Exam        Chief Complaint(s) and History of Present Illness(es)       Diabetic Eye Exam              Vision: is stable    Diabetes Type: Type 2 and taking oral medications    Blood Sugars: is controlled                   Lab Results   Component Value Date    A1C 7.3 04/24/2023    A1C 7.7 12/21/2022    A1C 7.4 05/23/2022    A1C 7.8 09/24/2021    A1C 7.4 04/24/2021    A1C 7.1 09/26/2020    A1C 7.1 06/15/2020    A1C 6.7 12/05/2019    A1C 8.4 09/13/2019            Last Eye Exam: 6/14/22  Dilated Previously: Yes    What are you currently using to see?  glasses    Distance Vision Acuity: Satisfied with vision, thinks that things are ok, may be slight changes in his right eye     Near Vision Acuity: Satisfied with vision while reading and using computer with glasses    Eye Comfort: good  Do you use eye drops? : No  Occupation or Hobbies: Retired     [employer] Assistant      Medical, surgical and family histories reviewed and updated 5/28/2024.        No history of eye surgery    No family history of glaucoma or macular degeneration       OBJECTIVE: See Ophthalmology exam    ASSESSMENT:    ICD-10-CM    1. Type 2 diabetes mellitus without complication, without long-term current use of insulin (H)  E11.9 EYE EXAM (SIMPLE-NONBILLABLE)     REFRACTION      2. Regular astigmatism of both eyes  H52.223 EYE EXAM (SIMPLE-NONBILLABLE)     REFRACTION      3. Presbyopia  H52.4 EYE EXAM (SIMPLE-NONBILLABLE)     REFRACTION      4. Nuclear sclerotic cataract of right eye  H25.11 EYE EXAM (SIMPLE-NONBILLABLE)     REFRACTION          PLAN:    Edwardo Dumont aware  eye exam results will be sent to Jose Whitehead   Patient Instructions   Optional to fill new glasses prescription, minimal change  Monitor mild cataracts   Keep blood sugar under good control  Return in 1 year for diabetic eye exam      Blood sugar and blood pressure control are very important in  the prevention of ocular complications from diabetes.       Rosa Varela, OD  [phone]

## 2024-05-28 NOTE — LETTER
5/28/2024         RE: Edwardo Dumont  [address] [address] [facility]  [place] [address]        Dear Colleague,    Thank you for referring your patient, Edwardo Dumont, to the [facility]. No diabetic retinopathy was found at eye exam. Yearly diabetic eye exam advised. Please see a copy of my visit note below.    Chief Complaint   Patient presents with     Diabetic Eye Exam        Chief Complaint(s) and History of Present Illness(es)       Diabetic Eye Exam              Vision: is stable    Diabetes Type: Type 2 and taking oral medications    Blood Sugars: is controlled                   Lab Results   Component Value Date    A1C 7.3 04/24/2023    A1C 7.7 12/21/2022    A1C 7.4 05/23/2022    A1C 7.8 09/24/2021    A1C 7.4 04/24/2021    A1C 7.1 09/26/2020    A1C 7.1 06/15/2020    A1C 6.7 12/05/2019    A1C 8.4 09/13/2019            Last Eye Exam: 6/14/22  Dilated Previously: Yes    What are you currently using to see?  glasses    Distance Vision Acuity: Satisfied with vision, thinks that things are ok, may be slight changes in his right eye     Near Vision Acuity: Satisfied with vision while reading and using computer with glasses    Eye Comfort: good  Do you use eye drops? 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        No history of eye surgery    No family history of glaucoma or macular degeneration       OBJECTIVE: See Ophthalmology exam    ASSESSMENT:    ICD-10-CM    1. Type 2 diabetes mellitus without complication, without long-term current use of insulin (H)  E11.9 EYE EXAM (SIMPLE-NONBILLABLE)     REFRACTION      2. Regular astigmatism of both eyes  H52.223 EYE EXAM (SIMPLE-NONBILLABLE)     REFRACTION      3. Presbyopia  H52.4 EYE EXAM (SIMPLE-NONBILLABLE)     REFRACTION      4. Nuclear sclerotic cataract of right eye  H25.11 EYE EXAM (SIMPLE-NONBILLABLE)     REFRACTION          PLAN:    Edwardo IGLESIAS  [place] aware  eye exam results will be sent to Jose Whitehead   Patient Instructions   Optional to fill new glasses prescription, minimal change  Monitor mild cataracts   Keep blood sugar under good control  Return in 1 year for diabetic eye exam      Blood sugar and blood pressure control are very important in the prevention of ocular complications from diabetes.       Rosa Varela, OD  [phone]                      Again, thank you for allowing me to participate in the care of your patient.        Sincerely,        Rosa Varela, OD

## 2024-06-09 ENCOUNTER — HEALTH MAINTENANCE LETTER (OUTPATIENT)
Age: 74
End: 2024-06-09

## 2024-11-21 NOTE — PROGRESS NOTES
CHIEF COMPLAINT:   Chief Complaint   Patient presents with    Shoulder Pain     Bilateral shoulder pain. Last injections: 7/3/23. Injections worked great. He goes until he starts to feel uncomfortableness. He would like more injections today.        HISTORY:  Tanvir Dumont is a 74 year old male, right-hand dominant, who is seen for follow up evaluation of bilateral shoulder pain, right > left.   His last injections were on 7/31/2023 He reports injections worked well, just starting to have pain return now. He's back from [place] for Thanksgiving, heading back on Monday. Will be back again over [place] briefly. Then won't be back until Spring.      Continues to be active with golf, pickleball. Plays golf 5d/week.    He sustained an injury 9/2012, fell onto right shoulder while playing soccer with grand-daughter. Had MRI 2012 negative for rotator cuff tear but showing acromio-clavicular injury and rotator cuff tendinosis. Noted to have AC separation. Treated non-opertively.     MRI 2015 showed partial thickness rotator cuff tear on right.  Also his total hip arthroplasty is doing great, no concerns.    Aggrevated by: overhead activities.  Relieved by: rest, cortisone injections  Present symptoms: pain with overhead activities, pain reaching behind back  Pain location: lateral shoulder, deltoid and upper arm  Pain severity: 3/10.  Pain quality: dull and sharp  Frequency of symptoms: occasionally  Associated symptoms: none  Treatment up to this point: multiple injections, most recently 7/2023.    Significant Orthopedic past medical history: right total hip arthroplasty 1/2011  Usual level of recreational activity: golf, pickle ball.  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    Other PMH:  has a past medical history of Acromioclavicular joint separation, type 1 (9/12), Allergic rhinitis, Arthritis, CKD (chronic kidney disease) stage 3, GFR 30-59 ml/min (H), Degenerative arthritis of hip - right  (12/27/2010), Gout, Hip arthrosis - right (10/25/2010), Hyperlipidemia, Ischaemic vascular disease, Ischemic vascular disease (5/12), OA (osteoarthritis) of knee (10/25/2010), Paroxysmal SVT (supraventricular tachycardia) (H) (5/31/2022), Renal insufficiency, Sleep apnea, Type II or unspecified type diabetes mellitus without mention of complication, not stated as uncontrolled, and Unspecified essential hypertension.    He has no past medical history of Amblyopia, Bleeding disorder (H), Cancer (H), Cataract, Cerebral infarction (H), Chronic infection, Complication of anesthesia, Congestive heart failure (H), COPD (chronic obstructive pulmonary disease) (H), Depressive disorder, Diabetic retinopathy (H), Glaucoma (increased eye pressure), Inflammatory arthritis, Malignant hyperthermia, Retinal detachment, Senile macular degeneration, Strabismus, Stroke (H), Surgical complications, Thyroid disease, Uncomplicated asthma, Unspecified asthma(493.90), or Uveitis.  Patient Active Problem List    Diagnosis Date Noted    Paroxysmal SVT (supraventricular tachycardia) (H) 05/31/2022     Priority: Medium    Benign prostatic hyperplasia with nocturia 09/21/2019     Priority: Medium    S/P lumbar fusion 07/20/2018     Priority: Medium    Spinal stenosis of lumbar region with neurogenic claudication 07/10/2018     Priority: Medium    Type II diabetes mellitus with peripheral circulatory disorder (H) 01/31/2018     Priority: Medium    CKD (chronic kidney disease) stage 3, GFR 30-59 ml/min (H)      Priority: Medium    S/P coronary angiogram 09/23/2015     Priority: Medium    Insomnia 08/03/2015     Priority: Medium    Dermatochalasis 04/10/2015     Priority: Medium    Visual disturbance, transient, right eye 02/09/2014     Priority: Medium    Essential hypertension, benign 05/14/2013     Priority: Medium    Ischemic vascular disease   one stent coronary artery 5/12 12/19/2012     Priority: Medium    Closed dislocation of  acromioclavicular joint 12/17/2012     Priority: Medium     Problem list name updated by automated process. Provider to review      Impingement syndrome of right shoulder 12/17/2012     Priority: Medium    S/P hip replacement 12/13/2012     Priority: Medium    Urinary retention 01/21/2011     Priority: Medium    Degenerative arthritis of hip - right 12/27/2010     Priority: Medium    OA (osteoarthritis) of knee 10/25/2010     Priority: Medium    Hyperlipidemia LDL goal <100 10/14/2010     Priority: Medium    Low back pain 04/23/2010     Priority: Medium    Radiculopathy of leg 04/23/2010     Priority: Medium    Gout 11/27/2009     Priority: Medium    Sleep apnea      Priority: Medium    Cough 12/05/2007     Priority: Medium       Surgical Hx:  has a past surgical history that includes gastric bypass (1/03); TOTAL HIP ARTHROPLASTY (1/11/11); Stent (5/8/12); Endoscopic sinus surgery (12/14/15); Endoscopic sinus surgery (Bilateral, 12/14/2015); sinus surgery (Right, 12-14-15); Stent (01/2017); ABLATION SUPRAVENT ARRHYTHMOGENIC FOCUS (12/21/15); stent, coronary, shelia (01/2017); Optical tracking system fusion spine posterior lumbar two levels (N/A, 7/20/2018); and back surgery (7/20/2018).    Medications:   Current Outpatient Medications:     ACE NOT PRESCRIBED, INTENTIONAL,, ACE Inhibitor not prescribed due to Other: cough (Patient not taking: Reported on 12/20/2023), Disp: 0 each, Rfl: 0    albuterol (PROAIR HFA) 108 (90 Base) MCG/ACT inhaler, Inhale 2 puffs into the lungs every 4 hours as needed for shortness of breath / dyspnea or wheezing (Patient not taking: Reported on 7/31/2023), Disp: 18 g, Rfl: 1    Alcohol Swabs PADS, 1 pad daily, Disp: 100 each, Rfl: 3    aspirin 81 MG tablet, Take 1 tablet (81 mg) by mouth daily, Disp: , Rfl:     atorvastatin (LIPITOR) 20 MG tablet, Take 1 tablet (20 mg) by mouth daily, Disp: 90 tablet, Rfl: 3    blood glucose (ACCU-CHEK SMARTVIEW) test strip, ONCE DAILY TESTING AND AS  NEEDED, Disp: 100 strip, Rfl: 4    blood glucose (NO BRAND SPECIFIED) test strip, Use to test blood sugar one time daily or as directed., Disp: 100 strip, Rfl: 3    blood glucose calibration (ONETOUCH ULTRA CONTROL) solution, Use to calibrate blood glucose monitor as needed as directed., Disp: 1 each, Rfl: 2    blood glucose monitoring (VIANNEY CONTOUR MONITOR) meter device kit, Use to test blood sugar  times daily or as directed., Disp: 1 kit, Rfl: 0    blood glucose monitoring (ONE TOUCH ULTRA 2) meter device kit, Use to test blood sugar one time daily or as directed., Disp: 1 kit, Rfl: 0    clindamycin (CLEOCIN) 300 MG capsule, Take 1 capsule (300 mg) by mouth 4 times daily (Patient not taking: Reported on 12/20/2023), Disp: 40 capsule, Rfl: 0    fluticasone (FLONASE) 50 MCG/ACT nasal spray, Spray 1 spray into both nostrils daily (Patient not taking: Reported on 12/20/2023), Disp: , Rfl:     gabapentin (NEURONTIN) 300 MG capsule, TAKE ONE CAPSULE DAILY AT NIGHT FOR 3 DAY(S) TAKE ONE CAPSULE IN THE AM AND AT BEDTIME FOR 3 DAY(S) THEN TAKE ONE CAPSULE IN THE AM, ONE IN THE AFTERNOON AND ONE AT BEDTIME THEREAFTER, Disp: 90 capsule, Rfl: 1    gabapentin (NEURONTIN) 600 MG tablet, Take 1 tablet (600 mg) by mouth 3 times daily (Patient not taking: Reported on 12/20/2023), Disp: 270 tablet, Rfl: 1    glimepiride (AMARYL) 4 MG tablet, 1 TAB ORAL TWICE A DAY WITH MEALS  DAYS, Disp: , Rfl:     JARDIANCE 25 MG TABS tablet, TAKE 1 TABLET BY MOUTH EVERY DAY, Disp: 90 tablet, Rfl: 1    losartan (COZAAR) 25 MG tablet, TAKE 1 TABLET BY MOUTH DAILY FOR BLOOD PRESSURE - REPLACES LISINOPRIL., Disp: 90 tablet, Rfl: 0    metFORMIN (GLUCOPHAGE) 500 MG tablet, TAKE 2 TABLETS BY MOUTH TWICE A DAY WITH MEALS, Disp: 360 tablet, Rfl: 3    metoprolol succinate ER (TOPROL-XL) 25 MG 24 hr tablet, Take 12.5 mg by mouth daily, Disp: , Rfl:     nitroGLYcerin (NITROSTAT) 0.4 MG sublingual tablet, Place 1 tablet (0.4 mg) under the tongue  every 5 minutes as needed for chest pain (Patient not taking: Reported on 12/20/2023), Disp: 90 tablet, Rfl: 4    OneTouch Delica Lancets 33G MISC, 1 Device daily, Disp: 100 each, Rfl: 3    Semaglutide (RYBELSUS) 7 MG tablet, Take 7 mg by mouth, Disp: , Rfl:     tadalafil (CIALIS) 20 MG tablet, TAKE 1 TABLET BY MOUTH DAILY AS NEEDED (18 TABLETS AS A 54 DAY SUPPLY), Disp: 30 tablet, Rfl: 4    Allergies:   Allergies   Allergen Reactions    Benzonatate Anaphylaxis and Swelling    Lisinopril Cough    Zocor [Simvastatin - High Dose]      Poor memory       REVIEW OF SYSTEMS:   CONSTITUTIONAL:NEGATIVE for fever, chills, change in weight  INTEGUMENTARY/SKIN: NEGATIVE for worrisome rashes, moles or lesions  MUSCULOSKELETAL:See HPI above  NEURO: NEGATIVE for weakness, dizziness or paresthesias         PHYSICAL EXAM:  BP (!) 148/94   Pulse 77   Ht 1.829 m (6')   Wt 102.7 kg (226 lb 6.4 oz)   BMI 30.71 kg/m     GENERAL APPEARANCE: healthy, alert, no distress  SKIN: no suspicious lesions or rashes  NEURO: Normal strength and tone, mentation intact and speech normal  PSYCH:  mentation appears normal and affect normal, not anxious  RESPIRATORY: No increased work of breathing.      RIGHT UPPER EXTREMITY:  Sensation intact to light touch in median, radial, ulnar and axillary nerve distributions  Palpable 2+ radial pulse, brisk capillary refill to all fingers, wwp  Intact epl fpl fdp edc wrist flexion/extension biceps triceps deltoid    RIGHT SHOULDER:  Shoulder Inspection: no swelling, bruising, discoloration, there is notable AC prominence deformity and asymmetry, mild muscle atrophy supraspinatus and infraspinatus compared to left.    Tender:greater tuberosity, upper trapezius,  nontender to palpation: AC joint, proximal bicep tendon, supraspinatus  Range of Motion:   Active:forward flexion 170 degrees, external rotation  60 degrees, internal rotation  T12   Passive: same  Strength: forward flexion 5-/5, External rotation 5-/5   Liftoff: Able  Impingement: all grade 2 positive  Special tests: Spurling's: Negative  Belly Press: Negative  Empty Can: Positive for pain.    LEFT UPPER EXTREMITY:  Sensation intact to light touch in median, radial, ulnar and axillary nerve distributions  Palpable 2+ radial pulse, brisk capillary refill to all fingers, wwp  Intact epl fpl fdp edc wrist flexion/extension biceps triceps deltoid    LEFT SHOULDER:  Shoulder Inspection: no swelling, bruising, discoloration, or obvious deformity or asymmetry  no atrophy  Tender: AC joint, greater tuberosity, anterior capsule, proximal biceps, deltoid  Non-tender: SC joint, proximal-mid clavicle, mid-distal clavicle, acromion, proximal humerus, supraspinatus , infraspinatus, upper trapezius muscle and rhomboids  Range of Motion:   Active:forward flexion 170 degrees, external rotation  60 degrees, internal rotation  T12   Passive: same  Strength: forward flexion 5/5, External rotation 5/5  Liftoff: Able  Impingement: all grade 2 positive      X-RAY:   No new shoulder xrays today.  3 views right  Shoulder, bilateral AC joints obtained 11/19/2012 -- there is elevation and widening of distal clavicle relative to acromion, 100%. Mild acromio-clavicular degenerative changes. Sclerosis at greater tuberosity. There is no increased in AC separation with weights compared to without weights. Moderate left acromio-clavicular degenerative changes.    3 views left shoulder 3/17/2014 reviewed, No obvious fracture or dislocation. No obvious bony abnormality or lesion. Moderate acromio-clavicular and mild gleno-humeral degenerative changes.       MRI right shoulder [facility] 6/2/2015: moderate sized area of poorly defined interstitial and deep fiber tearing of the distal supraspinatus tendon involves up to 50% of thickness. Moderate infraspinatus and subscapularis tendinosis. Chronic acromio-clavicular injury with CC sprain, mild narrowing of subacromial space with minimal bursitis. Long head  biceps grossly intact. No significant gleno-humeral degenerative changes.    MRI left shoulder CDI 6/2/2015: mild supraspinatus tendinopathy with fraying involving bursal surface distally. Mild subscapularis tendinopathy. Moderate to marked acromio-clavicular degenerative changes with mild to  Moderate narrowing of the subacromial space. Mild bursitis. No significant gleno-humeral degenerative changes. Proximal biceps grossly intact.    MRI right shoulder 11/26/2012  IMPRESSION:  1. Extensive separation of the acromioclavicular joint including disruption of the acromioclavicular joint ligaments. The coracoclavicular ligaments are intact.  2. Mild tendinosis of the distal supraspinatous tendon. No actual rotator cuff tear is seen.      ASSESSMENT: Edwardo Dumont is a 74 year old male, right  -hand dominant with chronic right shoulder AC separation, pain, rotator cuff tendinosis and impingement with right partial thickness tear; left shoulder impingement and tendinosis.      PLAN:   * again discussed nonsurgical and surgical options. He states he will continue with injections as along as they help.  * again, could consider arthroscopic versus open surgery (for example: subacromial decompression, distal clavicle excision, rotator cuff repair versus debridement, biceps tenodesis versus tenotomy, etc.). Perioperative course discussed, length of recovery. Right side likely rotator cuff repair and decompression, left side likely decompression only. Risks and perceived benefits discussed.    * at this time, he'd like to proceed with bilateral cortisone injections. See procedure notes below.    In the meantime:    * Rest  * Activity modification - avoid activities that aggravate symptoms or started symptoms at onset.  * NSAIDS - regular use for inflammation, with food, as long as no contra-indications. Please discuss with pcp if needed.  * Ice twice daily to three times daily, 15-20 minutes at a time  * heat may be beneficial  prior to exercising  * home exercise program for strengthening, stretching and range of motion exercises of rotator cuff and periscapular stabilization.  * Tylenol as needed for pain  * Injections: cortisone injections may be beneficial to help decrease swelling and inflammation within the shoulder or bursa, and decrease pain. With decreased pain, Physical Therapy and exercises will be more effective and efficient. Patient elects to proceed with bilateral cortisone injections.  * Return to clinic as needed.    Skip Reyes M.D., M.S.  Dept. of Orthopaedic Surgery  [facility]    Large Joint Injection/Arthocentesis: bilateral subacromial bursa    Date/Time: 11/27/2024 3:30 PM    Performed by: Skip Reyes MD  Authorized by: Skip Reyes MD    Indications:  Pain  Needle Size:  22 G  Guidance: landmark guided    Approach:  Posterolateral  Location:  Shoulder  Laterality:  Bilateral      Site:  Bilateral subacromial bursa  Medications (Right):  80 mg triamcinolone 40 MG/ML; 4 mL BUPivacaine 0.25 %  Medications (Left):  80 mg triamcinolone 40 MG/ML; 4 mL BUPivacaine 0.25 %  Outcome:  Tolerated well, no immediate complications  Procedure discussed: discussed risks, benefits, and alternatives    Consent Given by:  Patient  Timeout: timeout called immediately prior to procedure    Prep: patient was prepped and draped in usual sterile fashion

## 2024-11-27 ENCOUNTER — OFFICE VISIT (OUTPATIENT)
Dept: ORTHOPEDICS | Facility: CLINIC | Age: 74
End: 2024-11-27
Payer: MEDICARE

## 2024-11-27 VITALS
SYSTOLIC BLOOD PRESSURE: 148 MMHG | BODY MASS INDEX: 30.66 KG/M2 | HEIGHT: 72 IN | WEIGHT: 226.4 LBS | DIASTOLIC BLOOD PRESSURE: 94 MMHG | HEART RATE: 77 BPM

## 2024-11-27 DIAGNOSIS — G89.29 CHRONIC PAIN OF BOTH SHOULDERS: Primary | ICD-10-CM

## 2024-11-27 DIAGNOSIS — M25.512 CHRONIC PAIN OF BOTH SHOULDERS: Primary | ICD-10-CM

## 2024-11-27 DIAGNOSIS — M25.511 CHRONIC PAIN OF BOTH SHOULDERS: Primary | ICD-10-CM

## 2024-11-27 RX ADMIN — TRIAMCINOLONE ACETONIDE 80 MG: 40 INJECTION, SUSPENSION INTRA-ARTICULAR; INTRAMUSCULAR at 15:30

## 2024-11-27 RX ADMIN — BUPIVACAINE HYDROCHLORIDE 4 ML: 2.5 INJECTION, SOLUTION INFILTRATION; PERINEURAL at 15:30

## 2024-11-27 ASSESSMENT — PAIN SCALES - GENERAL: PAINLEVEL_OUTOF10: MILD PAIN (3)

## 2024-11-27 NOTE — LETTER
11/27/2024      Edwardo Dumont  [id number] [facility] [address]      Dear Colleague,    Thank you for referring your patient, Edwardo Dumont, to the [facility]. Please see a copy of my visit note below.    CHIEF COMPLAINT:   Chief Complaint   Patient presents with     Shoulder Pain     Bilateral shoulder pain. Last injections: 7/3/23. Injections worked great. He goes until he starts to feel uncomfortableness. He would like more injections today.        HISTORY:  Tanvir Dumont is a 74 year old male, right-hand dominant, who is seen for follow up evaluation of bilateral shoulder pain, right > left.   His last injections were on 7/31/2023 He reports injections worked well, just starting to have pain return now. He's back from [place] for Thanksgiving, heading back on Monday. Will be back again over Christmas briefly. Then won't be back until Spring.      Continues to be active with golf, pickleball. Plays golf 5d/week.    He sustained an injury 9/2012, fell onto right shoulder while playing soccer with grand-daughter. Had MRI 2012 negative for rotator cuff tear but showing acromio-clavicular injury and rotator cuff tendinosis. Noted to have AC separation. Treated non-opertively.     MRI 2015 showed partial thickness rotator cuff tear on right.  Also his total hip arthroplasty is doing great, no concerns.    Aggrevated by: overhead activities.  Relieved by: rest, cortisone injections  Present symptoms: pain with overhead activities, pain reaching behind back  Pain location: lateral shoulder, deltoid and upper arm  Pain severity: 3/10.  Pain quality: dull and sharp  Frequency of symptoms: occasionally  Associated symptoms: none  Treatment up to this point: multiple injections, most recently 7/2023.    Significant Orthopedic past medical history: right total hip arthroplasty 1/2011  Usual level of recreational activity: golf, pickle ball.  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    Other PMH:  has a past medical history of Acromioclavicular joint separation, type 1 (9/12), Allergic rhinitis, Arthritis, CKD (chronic kidney disease) stage 3, GFR 30-59 ml/min (H), Degenerative arthritis of hip - right (12/27/2010), Gout, Hip arthrosis - right (10/25/2010), Hyperlipidemia, Ischaemic vascular disease, Ischemic vascular disease (5/12), OA (osteoarthritis) of knee (10/25/2010), Paroxysmal SVT (supraventricular tachycardia) (H) (5/31/2022), Renal insufficiency, Sleep apnea, Type II or unspecified type diabetes mellitus without mention of complication, not stated as uncontrolled, and Unspecified essential hypertension.    He has no past medical history of Amblyopia, Bleeding disorder (H), Cancer (H), Cataract, Cerebral infarction (H), Chronic infection, Complication of anesthesia, Congestive heart failure (H), COPD (chronic obstructive pulmonary disease) (H), Depressive disorder, Diabetic retinopathy (H), Glaucoma (increased eye pressure), Inflammatory arthritis, Malignant hyperthermia, Retinal detachment, Senile macular degeneration, Strabismus, Stroke (H), Surgical complications, Thyroid disease, Uncomplicated asthma, Unspecified asthma(493.90), or Uveitis.  Patient Active Problem List    Diagnosis Date Noted     Paroxysmal SVT (supraventricular tachycardia) (H) 05/31/2022     Priority: Medium     Benign prostatic hyperplasia with nocturia 09/21/2019     Priority: Medium     S/P lumbar fusion 07/20/2018     Priority: Medium     Spinal stenosis of lumbar region with neurogenic claudication 07/10/2018     Priority: Medium     Type II diabetes mellitus with peripheral circulatory disorder (H) 01/31/2018     Priority: Medium     CKD (chronic kidney disease) stage 3, GFR 30-59 ml/min (H)      Priority: Medium     S/P coronary angiogram 09/23/2015     Priority: Medium     Insomnia 08/03/2015     Priority: Medium     Dermatochalasis 04/10/2015     Priority: Medium     Visual disturbance,  transient, right eye 02/09/2014     Priority: Medium     Essential hypertension, benign 05/14/2013     Priority: Medium     Ischemic vascular disease   one stent coronary artery 5/12 12/19/2012     Priority: Medium     Closed dislocation of acromioclavicular joint 12/17/2012     Priority: Medium     Problem list name updated by automated process. Provider to review       Impingement syndrome of right shoulder 12/17/2012     Priority: Medium     S/P hip replacement 12/13/2012     Priority: Medium     Urinary retention 01/21/2011     Priority: Medium     Degenerative arthritis of hip - right 12/27/2010     Priority: Medium     OA (osteoarthritis) of knee 10/25/2010     Priority: Medium     Hyperlipidemia LDL goal <100 10/14/2010     Priority: Medium     Low back pain 04/23/2010     Priority: Medium     Radiculopathy of leg 04/23/2010     Priority: Medium     Gout 11/27/2009     Priority: Medium     Sleep apnea      Priority: Medium     Cough 12/05/2007     Priority: Medium       Surgical Hx:  has a past surgical history that includes gastric bypass (1/03); TOTAL HIP ARTHROPLASTY (1/11/11); Stent (5/8/12); Endoscopic sinus surgery (12/14/15); Endoscopic sinus surgery (Bilateral, 12/14/2015); sinus surgery (Right, 12-14-15); Stent (01/2017); ABLATION SUPRAVENT ARRHYTHMOGENIC FOCUS (12/21/15); stent, coronary, shelia (01/2017); Optical tracking system fusion spine posterior lumbar two levels (N/A, 7/20/2018); and back surgery (7/20/2018).    Medications:   Current Outpatient Medications:      ACE NOT PRESCRIBED, INTENTIONAL,, ACE Inhibitor not prescribed due to Other: cough (Patient not taking: Reported on 12/20/2023), Disp: 0 each, Rfl: 0     albuterol (PROAIR HFA) 108 (90 Base) MCG/ACT inhaler, Inhale 2 puffs into the lungs every 4 hours as needed for shortness of breath / dyspnea or wheezing (Patient not taking: Reported on 7/31/2023), Disp: 18 g, Rfl: 1     Alcohol Swabs PADS, 1 pad daily, Disp: 100 each, Rfl: 3      aspirin 81 MG tablet, Take 1 tablet (81 mg) by mouth daily, Disp: , Rfl:      atorvastatin (LIPITOR) 20 MG tablet, Take 1 tablet (20 mg) by mouth daily, Disp: 90 tablet, Rfl: 3     blood glucose (ACCU-CHEK SMARTVIEW) test strip, ONCE DAILY TESTING AND AS NEEDED, Disp: 100 strip, Rfl: 4     blood glucose (NO BRAND SPECIFIED) test strip, Use to test blood sugar one time daily or as directed., Disp: 100 strip, Rfl: 3     blood glucose calibration (ONETOUCH ULTRA CONTROL) solution, Use to calibrate blood glucose monitor as needed as directed., Disp: 1 each, Rfl: 2     blood glucose monitoring (VIANNEY CONTOUR MONITOR) meter device kit, Use to test blood sugar  times daily or as directed., Disp: 1 kit, Rfl: 0     blood glucose monitoring (ONE TOUCH ULTRA 2) meter device kit, Use to test blood sugar one time daily or as directed., Disp: 1 kit, Rfl: 0     clindamycin (CLEOCIN) 300 MG capsule, Take 1 capsule (300 mg) by mouth 4 times daily (Patient not taking: Reported on 12/20/2023), Disp: 40 capsule, Rfl: 0     fluticasone (FLONASE) 50 MCG/ACT nasal spray, Spray 1 spray into both nostrils daily (Patient not taking: Reported on 12/20/2023), Disp: , Rfl:      gabapentin (NEURONTIN) 300 MG capsule, TAKE ONE CAPSULE DAILY AT NIGHT FOR 3 DAY(S) TAKE ONE CAPSULE IN THE AM AND AT BEDTIME FOR 3 DAY(S) THEN TAKE ONE CAPSULE IN THE AM, ONE IN THE AFTERNOON AND ONE AT BEDTIME THEREAFTER, Disp: 90 capsule, Rfl: 1     gabapentin (NEURONTIN) 600 MG tablet, Take 1 tablet (600 mg) by mouth 3 times daily (Patient not taking: Reported on 12/20/2023), Disp: 270 tablet, Rfl: 1     glimepiride (AMARYL) 4 MG tablet, 1 TAB ORAL TWICE A DAY WITH MEALS  DAYS, Disp: , Rfl:      JARDIANCE 25 MG TABS tablet, TAKE 1 TABLET BY MOUTH EVERY DAY, Disp: 90 tablet, Rfl: 1     losartan (COZAAR) 25 MG tablet, TAKE 1 TABLET BY MOUTH DAILY FOR BLOOD PRESSURE - REPLACES LISINOPRIL., Disp: 90 tablet, Rfl: 0     metFORMIN (GLUCOPHAGE) 500 MG tablet, TAKE 2  TABLETS BY MOUTH TWICE A DAY WITH MEALS, Disp: 360 tablet, Rfl: 3     metoprolol succinate ER (TOPROL-XL) 25 MG 24 hr tablet, Take 12.5 mg by mouth daily, Disp: , Rfl:      nitroGLYcerin (NITROSTAT) 0.4 MG sublingual tablet, Place 1 tablet (0.4 mg) under the tongue every 5 minutes as needed for chest pain (Patient not taking: Reported on 12/20/2023), Disp: 90 tablet, Rfl: 4     OneTouch Delica Lancets 33G MISC, 1 Device daily, Disp: 100 each, Rfl: 3     Semaglutide (RYBELSUS) 7 MG tablet, Take 7 mg by mouth, Disp: , Rfl:      tadalafil (CIALIS) 20 MG tablet, TAKE 1 TABLET BY MOUTH DAILY AS NEEDED (18 TABLETS AS A 54 DAY SUPPLY), Disp: 30 tablet, Rfl: 4    Allergies:   Allergies   Allergen Reactions     Benzonatate Anaphylaxis and Swelling     Lisinopril Cough     Zocor [Simvastatin - High Dose]      Poor memory       REVIEW OF SYSTEMS:   CONSTITUTIONAL:NEGATIVE for fever, chills, change in weight  INTEGUMENTARY/SKIN: NEGATIVE for worrisome rashes, moles or lesions  MUSCULOSKELETAL:See HPI above  NEURO: NEGATIVE for weakness, dizziness or paresthesias         PHYSICAL EXAM:  BP (!) 148/94   Pulse 77   Ht 1.829 m (6')   Wt 102.7 kg (226 lb 6.4 oz)   BMI 30.71 kg/m     GENERAL APPEARANCE: healthy, alert, no distress  SKIN: no suspicious lesions or rashes  NEURO: Normal strength and tone, mentation intact and speech normal  PSYCH:  mentation appears normal and affect normal, not anxious  RESPIRATORY: No increased work of breathing.      RIGHT UPPER EXTREMITY:  Sensation intact to light touch in median, radial, ulnar and axillary nerve distributions  Palpable 2+ radial pulse, brisk capillary refill to all fingers, wwp  Intact epl fpl fdp edc wrist flexion/extension biceps triceps deltoid    RIGHT SHOULDER:  Shoulder Inspection: no swelling, bruising, discoloration, there is notable AC prominence deformity and asymmetry, mild muscle atrophy supraspinatus and infraspinatus compared to left.    Tender:greater  tuberosity, upper trapezius,  nontender to palpation: AC joint, proximal bicep tendon, supraspinatus  Range of Motion:   Active:forward flexion 170 degrees, external rotation  60 degrees, internal rotation  T12   Passive: same  Strength: forward flexion 5-/5, External rotation 5-/5  Liftoff: Able  Impingement: all grade 2 positive  Special tests: Spurling's: Negative  Belly Press: Negative  Empty Can: Positive for pain.    LEFT UPPER EXTREMITY:  Sensation intact to light touch in median, radial, ulnar and axillary nerve distributions  Palpable 2+ radial pulse, brisk capillary refill to all fingers, wwp  Intact epl fpl fdp edc wrist flexion/extension biceps triceps deltoid    LEFT SHOULDER:  Shoulder Inspection: no swelling, bruising, discoloration, or obvious deformity or asymmetry  no atrophy  Tender: AC joint, greater tuberosity, anterior capsule, proximal biceps, deltoid  Non-tender: SC joint, proximal-mid clavicle, mid-distal clavicle, acromion, proximal humerus, supraspinatus , infraspinatus, upper trapezius muscle and rhomboids  Range of Motion:   Active:forward flexion 170 degrees, external rotation  60 degrees, internal rotation  T12   Passive: same  Strength: forward flexion 5/5, External rotation 5/5  Liftoff: Able  Impingement: all grade 2 positive      X-RAY:   No new shoulder xrays today.  3 views right  Shoulder, bilateral AC joints obtained 11/19/2012 -- there is elevation and widening of distal clavicle relative to acromion, 100%. Mild acromio-clavicular degenerative changes. Sclerosis at greater tuberosity. There is no increased in AC separation with weights compared to without weights. Moderate left acromio-clavicular degenerative changes.    3 views left shoulder 3/17/2014 reviewed, No obvious fracture or dislocation. No obvious bony abnormality or lesion. Moderate acromio-clavicular and mild gleno-humeral degenerative changes.       MRI right shoulder CDI 6/2/2015: moderate sized area of poorly  defined interstitial and deep fiber tearing of the distal supraspinatus tendon involves up to 50% of thickness. Moderate infraspinatus and subscapularis tendinosis. Chronic acromio-clavicular injury with CC sprain, mild narrowing of subacromial space with minimal bursitis. Long head biceps grossly intact. No significant gleno-humeral degenerative changes.    MRI left shoulder CDI 6/2/2015: mild supraspinatus tendinopathy with fraying involving bursal surface distally. Mild subscapularis tendinopathy. Moderate to marked acromio-clavicular degenerative changes with mild to  Moderate narrowing of the subacromial space. Mild bursitis. No significant gleno-humeral degenerative changes. Proximal biceps grossly intact.    MRI right shoulder 11/26/2012  IMPRESSION:  1. Extensive separation of the acromioclavicular joint including disruption of the acromioclavicular joint ligaments. The coracoclavicular ligaments are intact.  2. Mild tendinosis of the distal supraspinatous tendon. No actual rotator cuff tear is seen.      ASSESSMENT: Edwardo Dumont is a 74 year old male, right  -hand dominant with chronic right shoulder AC separation, pain, rotator cuff tendinosis and impingement with right partial thickness tear; left shoulder impingement and tendinosis.      PLAN:   * again discussed nonsurgical and surgical options. He states he will continue with injections as along as they help.  * again, could consider arthroscopic versus open surgery (for example: subacromial decompression, distal clavicle excision, rotator cuff repair versus debridement, biceps tenodesis versus tenotomy, etc.). Perioperative course discussed, length of recovery. Right side likely rotator cuff repair and decompression, left side likely decompression only. Risks and perceived benefits discussed.    * at this time, he'd like to proceed with bilateral cortisone injections. See procedure notes below.    In the meantime:    * Rest  * Activity modification -  avoid activities that aggravate symptoms or started symptoms at onset.  * NSAIDS - regular use for inflammation, with food, as long as no contra-indications. Please discuss with pcp if needed.  * Ice twice daily to three times daily, 15-20 minutes at a time  * heat may be beneficial prior to exercising  * home exercise program for strengthening, stretching and range of motion exercises of rotator cuff and periscapular stabilization.  * Tylenol as needed for pain  * Injections: cortisone injections may be beneficial to help decrease swelling and inflammation within the shoulder or bursa, and decrease pain. With decreased pain, Physical Therapy and exercises will be more effective and efficient. Patient elects to proceed with bilateral cortisone injections.  * Return to clinic as needed.    Skip Reyes M.D., M.S.  Dept. of Orthopaedic Surgery  [facility]    Large Joint Injection/Arthocentesis: bilateral subacromial bursa    Date/Time: 11/27/2024 3:30 PM    Performed by: Skip Reyes MD  Authorized by: Skip Reyes MD    Indications:  Pain  Needle Size:  22 G  Guidance: landmark guided    Approach:  Posterolateral  Location:  Shoulder  Laterality:  Bilateral      Site:  Bilateral subacromial bursa  Medications (Right):  80 mg triamcinolone 40 MG/ML; 4 mL BUPivacaine 0.25 %  Medications (Left):  80 mg triamcinolone 40 MG/ML; 4 mL BUPivacaine 0.25 %  Outcome:  Tolerated well, no immediate complications  Procedure discussed: discussed risks, benefits, and alternatives    Consent Given by:  Patient  Timeout: timeout called immediately prior to procedure    Prep: patient was prepped and draped in usual sterile fashion        Again, thank you for allowing me to participate in the care of your patient.        Sincerely,        Skip Reyes MD

## 2024-11-28 RX ORDER — BUPIVACAINE HYDROCHLORIDE 2.5 MG/ML
4 INJECTION, SOLUTION INFILTRATION; PERINEURAL
Status: COMPLETED | OUTPATIENT
Start: 2024-11-27 | End: 2024-11-27

## 2024-11-28 RX ORDER — TRIAMCINOLONE ACETONIDE 40 MG/ML
80 INJECTION, SUSPENSION INTRA-ARTICULAR; INTRAMUSCULAR
Status: COMPLETED | OUTPATIENT
Start: 2024-11-27 | End: 2024-11-27

## 2024-12-28 ENCOUNTER — HEALTH MAINTENANCE LETTER (OUTPATIENT)
Age: 74
End: 2024-12-28

## 2025-02-10 ENCOUNTER — PATIENT OUTREACH (OUTPATIENT)
Dept: FAMILY MEDICINE | Facility: CLINIC | Age: 75
End: 2025-02-10
Payer: MEDICARE

## 2025-04-30 NOTE — PROGRESS NOTES
Preoperative Evaluation  [facility]  [address] [facility] [address]  Phone: [phone]  Primary Provider: Christina Dias MD  Pre-op Performing Provider: Ford Uribe PA-C  May 2, 2025   {Provider  Link to PREOP SmartSet  REQUIRED to apply standard patient instructions and medication directions to the AVS :048953}  {ROOMER review and update patient entered surgical information if needed :406232}  { After Pre-op is completed, use lists to pull documentation into note Link to complete Pre-Op    :238417}  {Pull Surgical Information into note after flowsheet completed:329582}  Fax number for surgical facility: {:926037}    {Provider Charting Preference for Preop :864406}    Subjective   Erv is a 74 year old, presenting for the following:  Pre-Op Exam      {(!) Visit Details have not yet been documented.  Please enter Visit Details and then use this list to pull in documentation. (Optional):130448}  HPI: ***    {Pull Pre-Op Questionnaire into note after flowsheet completed:219130}  Advance Care Planning  {The storyboard will display whether the patient has ACP docs on file. Hover over the Code section in the storyboard to access the ACP documents. :002359}  {(AWV REQUIRED) Advance Care Planning Reviewed:206306}    Preoperative Review of   {Mnpmpreport:273906}  {Review MNPMP for all patients per [facility]CSI MNPMP Profile:545348}    {Chronic problem details (Optional) :123886}    Patient Active Problem List    Diagnosis Date Noted    Paroxysmal SVT (supraventricular tachycardia) 05/31/2022     Priority: Medium    Benign prostatic hyperplasia with nocturia 09/21/2019     Priority: Medium    S/P lumbar fusion 07/20/2018     Priority: Medium    Spinal stenosis of lumbar region with neurogenic claudication 07/10/2018     Priority: Medium    Type II diabetes mellitus with peripheral circulatory disorder (H) 01/31/2018     Priority: Medium    CKD (chronic kidney disease) stage 3, GFR 30-59  ml/min (H)      Priority: Medium    S/P coronary angiogram 09/23/2015     Priority: Medium    Insomnia 08/03/2015     Priority: Medium    Dermatochalasis 04/10/2015     Priority: Medium    Visual disturbance, transient, right eye 02/09/2014     Priority: Medium    Essential hypertension, benign 05/14/2013     Priority: Medium    Ischemic vascular disease   one stent coronary artery 5/12 12/19/2012     Priority: Medium    Closed dislocation of acromioclavicular joint 12/17/2012     Priority: Medium     Problem list name updated by automated process. Provider to review      Impingement syndrome of right shoulder 12/17/2012     Priority: Medium    S/P hip replacement 12/13/2012     Priority: Medium    Urinary retention 01/21/2011     Priority: Medium    Degenerative arthritis of hip - right 12/27/2010     Priority: Medium    OA (osteoarthritis) of knee 10/25/2010     Priority: Medium    Hyperlipidemia LDL goal <100 10/14/2010     Priority: Medium    Low back pain 04/23/2010     Priority: Medium    Radiculopathy of leg 04/23/2010     Priority: Medium    Gout 11/27/2009     Priority: Medium    Sleep apnea      Priority: Medium    Cough 12/05/2007     Priority: Medium      Past Medical History:   Diagnosis Date    Acromioclavicular joint separation, type 1 9/12    right    Allergic rhinitis     causes chronic  cough    Arthritis     CKD (chronic kidney disease) stage 3, GFR 30-59 ml/min (H)     Degenerative arthritis of hip - right 12/27/2010    Gout     Hip arthrosis - right 10/25/2010    Hyperlipidemia     Ischaemic vascular disease     Ischemic vascular disease 5/12    one stent    OA (osteoarthritis) of knee 10/25/2010    Paroxysmal SVT (supraventricular tachycardia) 5/31/2022    Renal insufficiency     Sleep apnea     Does Not use a CPAP-  Lost weight    Type II or unspecified type diabetes mellitus without mention of complication, not stated as uncontrolled     Unspecified essential hypertension      Past Surgical  History:   Procedure Laterality Date    BACK SURGERY  7/20/2018    ENDOSCOPIC SINUS SURGERY  12/14/15    ENDOSCOPIC SINUS SURGERY Bilateral 12/14/2015    Procedure: ENDOSCOPIC SINUS SURGERY;  Surgeon: Kei Cevallos MD;  Location: [facility] OR    GASTRIC BYPASS  1/03    lap band    OPTICAL TRACKING SYSTEM FUSION SPINE POSTERIOR LUMBAR TWO LEVELS N/A 7/20/2018    Procedure: OPTICAL TRACKING SYSTEM FUSION SPINE POSTERIOR LUMBAR TWO LEVELS;  L3-4 bilateral laminectomy with bilateral facetectomies and resection of bilateral synovial cyst  4-5 Transforaminal lumbar interbody fusion  Placement of Globus Creo pedicle screws from L3-5 with open reduction and internal fixation of the L4-5 spondylolisthesis  Posterior lateral fusion from C3-5;  Surgeon: Rio Magaña    SINUS SURGERY Right 12-14-15    Dr. Cevallos    STENT  5/8/12    OM2 cornary artery stent    STENT  01/2017    3 stents placed in coronary arteries while in [place]    STENT, CORONARY, HANG  01/2017    2 stents in [place].    [facility] SUPRAV[facility] ARRHYTHMOGENIC FOCUS  12/21/15    at [facility] TOTAL HIP ARTHROPLASTY  1/11/11    Rt YOGI     Current Outpatient Medications   Medication Sig Dispense Refill    ACE NOT PRESCRIBED, INTENTIONAL, ACE Inhibitor not prescribed due to Other: cough 0 each 0    albuterol (PROAIR HFA) 108 (90 Base) MCG/ACT inhaler Inhale 2 puffs into the lungs every 4 hours as needed for shortness of breath / dyspnea or wheezing (Patient not taking: Reported on 11/27/2024) 18 g 1    Alcohol Swabs PADS 1 pad daily 100 each 3    aspirin 81 MG tablet Take 1 tablet (81 mg) by mouth daily      atorvastatin (LIPITOR) 20 MG tablet Take 1 tablet (20 mg) by mouth daily 90 tablet 3    blood glucose (ACCU-CHEK SMARTVIEW) test strip ONCE DAILY TESTING AND AS NEEDED 100 strip 4    blood glucose (NO BRAND SPECIFIED) test strip Use to test blood sugar one time daily or as directed. 100 strip 3    blood glucose calibration (ONETOUCH ULTRA CONTROL) solution Use to  calibrate blood glucose monitor as needed as directed. 1 each 2    blood glucose monitoring (VIANNEY CONTOUR MONITOR) meter device kit Use to test blood sugar  times daily or as directed. 1 kit 0    blood glucose monitoring (ONE TOUCH ULTRA 2) meter device kit Use to test blood sugar one time daily or as directed. 1 kit 0    clindamycin (CLEOCIN) 300 MG capsule Take 1 capsule (300 mg) by mouth 4 times daily (Patient not taking: Reported on 11/27/2024) 40 capsule 0    fluticasone (FLONASE) 50 MCG/ACT nasal spray Spray 1 spray into both nostrils daily.      gabapentin (NEURONTIN) 300 MG capsule TAKE ONE CAPSULE DAILY AT NIGHT FOR 3 DAY(S) TAKE ONE CAPSULE IN THE AM AND AT BEDTIME FOR 3 DAY(S) THEN TAKE ONE CAPSULE IN THE AM, ONE IN THE AFTERNOON AND ONE AT BEDTIME THEREAFTER 90 capsule 1    gabapentin (NEURONTIN) 600 MG tablet Take 1 tablet (600 mg) by mouth 3 times daily (Patient not taking: Reported on 11/27/2024) 270 tablet 1    glimepiride (AMARYL) 4 MG tablet 1 TAB ORAL TWICE A DAY WITH MEALS  DAYS      JARDIANCE 25 MG TABS tablet TAKE 1 TABLET BY MOUTH EVERY DAY 90 tablet 1    losartan (COZAAR) 25 MG tablet TAKE 1 TABLET BY MOUTH DAILY FOR BLOOD PRESSURE - REPLACES LISINOPRIL. 90 tablet 0    metFORMIN (GLUCOPHAGE) 500 MG tablet TAKE 2 TABLETS BY MOUTH TWICE A DAY WITH MEALS 360 tablet 3    metoprolol succinate ER (TOPROL-XL) 25 MG 24 hr tablet Take 12.5 mg by mouth daily      nitroGLYcerin (NITROSTAT) 0.4 MG sublingual tablet Place 1 tablet (0.4 mg) under the tongue every 5 minutes as needed for chest pain (Patient not taking: Reported on 11/27/2024) 90 tablet 4    OneTouch Delica Lancets 33G MISC 1 Device daily 100 each 3    Semaglutide (RYBELSUS) 7 MG tablet Take 7 mg by mouth      tadalafil (CIALIS) 20 MG tablet TAKE 1 TABLET BY MOUTH DAILY AS NEEDED (18 TABLETS AS A 54 DAY SUPPLY) 30 tablet 4       Allergies   Allergen Reactions    Benzonatate Anaphylaxis and Swelling    Lisinopril Cough    Zocor  "[Simvastatin - High Dose]      Poor memory        Social History     Tobacco Use    Smoking status: Former     Current packs/day: 0.00     Average packs/day: 1 pack/day for 19.7 years (19.7 ttl pk-yrs)     Types: Cigarettes     Start date: 1970     Quit date: 1989     Years since quittin.6    Smokeless tobacco: Never    Tobacco comments:     former smoker   Substance Use Topics    Alcohol use: Yes     Comment: occasionally     {FAMILY HISTORY (Optional):524553059}  History   Drug Use No           {ROS Picklists (Optional):473414}    Objective    There were no vitals taken for this visit.   Estimated body mass index is 30.71 kg/m  as calculated from the following:    Height as of 24: 1.829 m (6').    Weight as of 24: 102.7 kg (226 lb 6.4 oz).  Physical Exam  {Exam List :824152}    No results for input(s): \"HGB\", \"PLT\", \"INR\", \"NA\", \"POTASSIUM\", \"CR\", \"A1C\" in the last 8760 hours.     Diagnostics  {LABS:775986}   {EK}    Revised Cardiac Risk Index (RCRI)  The patient has the following serious cardiovascular risks for perioperative complications:  {PREOP REVISED CARDIAC RISK INDEX (RCRI) :077587}     RCRI Interpretation: {REVISED CARDIAC RISK INTERPRETATION :875841}         Signed Electronically by: Ford Uribe PA-C  A copy of this evaluation report is provided to the requesting physician.    {Provider Resources  Preop [facility] Preop Guidelines  Revised Cardiac Risk Index :885994}   {Email feedback regarding this note to [email or web address]   :161137}  "

## 2025-04-30 NOTE — PATIENT INSTRUCTIONS
How to Take Your Medication Before Surgery  Preoperative Medication Instructions   {Medication HOLD Times for PREOP:888022}       Patient Education   Preparing for Your Surgery  For Adults  Getting started  In most cases, a nurse will call to review your health history and instructions. They will give you an arrival time based on your scheduled surgery time. Please be ready to share:  Your doctor's clinic name and phone number  Your medical, surgical, and anesthesia history  A list of allergies and sensitivities  A list of medicines, including herbal treatments and over-the-counter drugs  Whether the patient has a legal guardian (ask how to send us the papers in advance)  Note: You may not receive a call if you were seen at our PAC (Preoperative Assessment Center).  Please tell us if you're pregnant--or if there's any chance you might be pregnant. Some surgeries may injure a fetus (unborn baby), so they require a pregnancy test. Surgeries that are safe for a fetus don't always need a test, and you can choose whether to have one.   Preparing for surgery  Within 10 to 30 days of surgery: Have a pre-op exam (sometimes called an H&P, or History and Physical). This can be done at a clinic or pre-operative center.  If you're having a , you may not need this exam. Talk to your care team.  At your pre-op exam, talk to your care team about all medicines you take. (This includes CBD oil and any drugs, such as THC, marijuana, and other forms of cannabis.) If you need to stop any medicine before surgery, ask when to start taking it again.  This is for your safety. Many medicines and drugs can make you bleed too much during surgery. Some change how well surgery (anesthesia) drugs work.  Call your insurance company to let them know you're having surgery. (If you don't have insurance, call [phone].)  Call your clinic if there's any change in your health. This includes a scrape or scratch near the surgery site, or any  signs of a cold (sore throat, runny nose, cough, rash, fever).  Eating and drinking guidelines  For your safety: Unless your surgeon tells you otherwise, follow the guidelines below.  Eat and drink as normal until 8 hours before you arrive for surgery. After that, no food or milk. You can spit out gum when you arrive.  Drink clear liquids until 2 hours before you arrive. These are liquids you can see through, like water, Gatorade, and Propel Water. They also include plain black coffee and tea (no cream or milk).  No alcohol for 24 hours before you arrive. The night before surgery, stop any drinks that contain THC.  If your care team tells you to take medicine on the morning of surgery, it's okay to take it with a sip of water. No other medicines or drugs are allowed (including CBD oil)--follow your care team's instructions.  If you have questions the day of surgery, call your hospital or surgery center.   Preventing infection  Shower or bathe the night before and the morning of surgery. Follow the instructions your clinic gave you. (If no instructions, use regular soap.)  Don't shave or clip hair near your surgery site. We'll remove the hair if needed.  Don't smoke or vape the morning of surgery. No chewing tobacco for 6 hours before you arrive. A nicotine patch is okay. You may spit out nicotine gum when you arrive.  For some surgeries, the surgeon will tell you to fully quit smoking and nicotine.  We will make every effort to keep you safe from infection. We will:  Clean our hands often with soap and water (or an alcohol-based hand rub).  Clean the skin at your surgery site with a special soap that kills germs.  Give you a special gown to keep you warm. (Cold raises the risk of infection.)  Wear hair covers, masks, gowns, and gloves during surgery.  Give antibiotic medicine, if prescribed. Not all surgeries need this medicine.  What to bring on the day of surgery  Photo ID and insurance card  Copy of your health  care directive, if you have one  Glasses and hearing aids (bring cases)  You can't wear contacts during surgery  Inhaler and eye drops, if you use them (tell us about these when you arrive)  CPAP machine or breathing device, if you use them  A few personal items, if spending the night  If you have . . .  A pacemaker, ICD (cardiac defibrillator), or other implant: Bring the ID card.  An implanted stimulator: Bring the remote control.  A legal guardian: Bring a copy of the certified (court-stamped) guardianship papers.  Please remove any jewelry, including body piercings. Leave jewelry and other valuables at home.  If you're going home the day of surgery  You must have a responsible adult drive you home. They should stay with you overnight as well.  If you don't have someone to stay with you, and you aren't safe to go home alone, we may keep you overnight. Insurance often won't pay for this.  After surgery  If it's hard to control your pain or you need more pain medicine, please call your surgeon's office.  Questions?   If you have any questions for your care team, list them here:   ____________________________________________________________________________________________________________________________________________________________________________________________________________________________________________________________  For informational purposes only. Not to replace the advice of your health care provider. Copyright   2003, 2019 [facility]. All rights reserved. Clinically reviewed by Harris Youngblood MD. [employer] [id number] - REV 08/24.

## 2025-05-02 ENCOUNTER — OFFICE VISIT (OUTPATIENT)
Dept: FAMILY MEDICINE | Facility: CLINIC | Age: 75
End: 2025-05-02
Payer: MEDICARE

## 2025-05-02 DIAGNOSIS — N18.30 CKD (CHRONIC KIDNEY DISEASE) STAGE 3, GFR 30-59 ML/MIN (H): Primary | ICD-10-CM

## 2025-05-02 DIAGNOSIS — Z23 NEED FOR VACCINATION: ICD-10-CM

## 2025-05-02 DIAGNOSIS — Z01.818 PREOP GENERAL PHYSICAL EXAM: ICD-10-CM

## 2025-05-02 DIAGNOSIS — Z13.6 SCREENING FOR CARDIOVASCULAR CONDITION: ICD-10-CM

## 2025-05-02 DIAGNOSIS — E11.51 TYPE II DIABETES MELLITUS WITH PERIPHERAL CIRCULATORY DISORDER (H): ICD-10-CM

## 2025-05-21 ENCOUNTER — OFFICE VISIT (OUTPATIENT)
Dept: FAMILY MEDICINE | Facility: CLINIC | Age: 75
End: 2025-05-21
Payer: MEDICARE

## 2025-05-21 ENCOUNTER — ORDERS ONLY (AUTO-RELEASED) (OUTPATIENT)
Dept: FAMILY MEDICINE | Facility: CLINIC | Age: 75
End: 2025-05-21

## 2025-05-21 VITALS
WEIGHT: 223 LBS | TEMPERATURE: 98.5 F | HEART RATE: 50 BPM | HEIGHT: 72 IN | DIASTOLIC BLOOD PRESSURE: 83 MMHG | SYSTOLIC BLOOD PRESSURE: 129 MMHG | BODY MASS INDEX: 30.2 KG/M2 | OXYGEN SATURATION: 97 %

## 2025-05-21 DIAGNOSIS — I10 ESSENTIAL HYPERTENSION, BENIGN: ICD-10-CM

## 2025-05-21 DIAGNOSIS — Z12.11 SCREEN FOR COLON CANCER: ICD-10-CM

## 2025-05-21 DIAGNOSIS — E11.42 DIABETIC POLYNEUROPATHY ASSOCIATED WITH TYPE 2 DIABETES MELLITUS (H): ICD-10-CM

## 2025-05-21 DIAGNOSIS — E11.51 TYPE II DIABETES MELLITUS WITH PERIPHERAL CIRCULATORY DISORDER (H): Primary | ICD-10-CM

## 2025-05-21 DIAGNOSIS — I25.10 CORONARY ARTERY DISEASE INVOLVING NATIVE CORONARY ARTERY OF NATIVE HEART WITHOUT ANGINA PECTORIS: ICD-10-CM

## 2025-05-21 DIAGNOSIS — L57.0 ACTINIC KERATOSIS: ICD-10-CM

## 2025-05-21 DIAGNOSIS — E78.5 HYPERLIPIDEMIA LDL GOAL <100: ICD-10-CM

## 2025-05-21 DIAGNOSIS — Z12.5 SCREENING FOR PROSTATE CANCER: ICD-10-CM

## 2025-05-21 DIAGNOSIS — N18.31 STAGE 3A CHRONIC KIDNEY DISEASE (H): ICD-10-CM

## 2025-05-21 DIAGNOSIS — N52.9 ERECTILE DYSFUNCTION, UNSPECIFIED ERECTILE DYSFUNCTION TYPE: ICD-10-CM

## 2025-05-21 PROBLEM — Z98.84 HX OF LAPAROSCOPIC ADJUSTABLE GASTRIC BANDING: Status: ACTIVE | Noted: 2025-05-21

## 2025-05-21 PROBLEM — Z87.891 FORMER SMOKER: Status: ACTIVE | Noted: 2025-01-24

## 2025-05-21 LAB
ANION GAP SERPL CALCULATED.3IONS-SCNC: 12 MMOL/L (ref 7–15)
BUN SERPL-MCNC: 25.7 MG/DL (ref 8–23)
CALCIUM SERPL-MCNC: 9.5 MG/DL (ref 8.8–10.4)
CHLORIDE SERPL-SCNC: 101 MMOL/L (ref 98–107)
CHOLEST SERPL-MCNC: 136 MG/DL
CREAT SERPL-MCNC: 1.69 MG/DL (ref 0.67–1.17)
CREAT UR-MCNC: 99.4 MG/DL
EGFRCR SERPLBLD CKD-EPI 2021: 42 ML/MIN/1.73M2
EST. AVERAGE GLUCOSE BLD GHB EST-MCNC: 120 MG/DL
FASTING STATUS PATIENT QL REPORTED: NO
FASTING STATUS PATIENT QL REPORTED: NO
GLUCOSE SERPL-MCNC: 129 MG/DL (ref 70–99)
HBA1C MFR BLD: 5.8 % (ref 0–5.6)
HCO3 SERPL-SCNC: 24 MMOL/L (ref 22–29)
HDLC SERPL-MCNC: 42 MG/DL
HGB BLD-MCNC: 16.5 G/DL (ref 13.3–17.7)
LDLC SERPL CALC-MCNC: 71 MG/DL
MCV RBC AUTO: 97 FL (ref 78–100)
MICROALBUMIN UR-MCNC: 37.1 MG/L
MICROALBUMIN/CREAT UR: 37.32 MG/G CR (ref 0–17)
NONHDLC SERPL-MCNC: 94 MG/DL
POTASSIUM SERPL-SCNC: 4.7 MMOL/L (ref 3.4–5.3)
PSA SERPL DL<=0.01 NG/ML-MCNC: 0.33 NG/ML (ref 0–6.5)
SODIUM SERPL-SCNC: 137 MMOL/L (ref 135–145)
TRIGL SERPL-MCNC: 116 MG/DL

## 2025-05-21 PROCEDURE — 3074F SYST BP LT 130 MM HG: CPT | Performed by: NURSE PRACTITIONER

## 2025-05-21 PROCEDURE — G0103 PSA SCREENING: HCPCS | Performed by: NURSE PRACTITIONER

## 2025-05-21 PROCEDURE — 83036 HEMOGLOBIN GLYCOSYLATED A1C: CPT | Performed by: NURSE PRACTITIONER

## 2025-05-21 PROCEDURE — 3044F HG A1C LEVEL LT 7.0%: CPT | Performed by: NURSE PRACTITIONER

## 2025-05-21 PROCEDURE — 80061 LIPID PANEL: CPT | Performed by: NURSE PRACTITIONER

## 2025-05-21 PROCEDURE — 3079F DIAST BP 80-89 MM HG: CPT | Performed by: NURSE PRACTITIONER

## 2025-05-21 PROCEDURE — 85018 HEMOGLOBIN: CPT | Performed by: NURSE PRACTITIONER

## 2025-05-21 PROCEDURE — 99214 OFFICE O/P EST MOD 30 MIN: CPT | Performed by: NURSE PRACTITIONER

## 2025-05-21 PROCEDURE — 82043 UR ALBUMIN QUANTITATIVE: CPT | Performed by: NURSE PRACTITIONER

## 2025-05-21 PROCEDURE — 82570 ASSAY OF URINE CREATININE: CPT | Performed by: NURSE PRACTITIONER

## 2025-05-21 PROCEDURE — 1126F AMNT PAIN NOTED NONE PRSNT: CPT | Performed by: NURSE PRACTITIONER

## 2025-05-21 PROCEDURE — G2211 COMPLEX E/M VISIT ADD ON: HCPCS | Performed by: NURSE PRACTITIONER

## 2025-05-21 PROCEDURE — 80048 BASIC METABOLIC PNL TOTAL CA: CPT | Performed by: NURSE PRACTITIONER

## 2025-05-21 PROCEDURE — 36415 COLL VENOUS BLD VENIPUNCTURE: CPT | Performed by: NURSE PRACTITIONER

## 2025-05-21 RX ORDER — LOSARTAN POTASSIUM 25 MG/1
25 TABLET ORAL DAILY
Qty: 90 TABLET | Refills: 3 | Status: SHIPPED | OUTPATIENT
Start: 2025-05-21

## 2025-05-21 RX ORDER — GABAPENTIN 600 MG/1
300 TABLET ORAL 2 TIMES DAILY
Qty: 90 TABLET | Refills: 3 | COMMUNITY
Start: 2025-05-21

## 2025-05-21 RX ORDER — METOPROLOL SUCCINATE 50 MG/1
50 TABLET, EXTENDED RELEASE ORAL DAILY
Qty: 90 TABLET | Refills: 3 | Status: SHIPPED | OUTPATIENT
Start: 2025-05-21

## 2025-05-21 RX ORDER — ATORVASTATIN CALCIUM 20 MG/1
20 TABLET, FILM COATED ORAL DAILY
Qty: 90 TABLET | Refills: 3 | Status: SHIPPED | OUTPATIENT
Start: 2025-05-21

## 2025-05-21 RX ORDER — GLIMEPIRIDE 4 MG/1
4 TABLET ORAL
Qty: 90 TABLET | Refills: 3 | Status: SHIPPED | OUTPATIENT
Start: 2025-05-21

## 2025-05-21 RX ORDER — TADALAFIL 20 MG/1
20 TABLET ORAL DAILY PRN
Qty: 30 TABLET | Refills: 4 | Status: SHIPPED | OUTPATIENT
Start: 2025-05-21

## 2025-05-21 ASSESSMENT — PAIN SCALES - GENERAL: PAINLEVEL_OUTOF10: NO PAIN (0)

## 2025-05-21 NOTE — PROGRESS NOTES
Assessment & Plan     Type II diabetes mellitus with peripheral circulatory disorder (H)  Chronic, stable.  A1C 5.8.  He is going to try taking 1/2 pill of Rybelsus for the next two weeks to see if that helps with food aversion.  Otherwise we are not making any changes at this time. Could consider discontinuing glimepiride in the future- other meds likely enough to keep A1C at goal.  Discussed follow-up in 6 months, however will be in [place] at that time and has a check up with his provider there.  He can follow-up with me in the spring.   - empagliflozin (JARDIANCE) 25 MG TABS tablet; Take 0.5 tablets (12.5 mg) by mouth daily.  - metFORMIN (GLUCOPHAGE) 500 MG tablet; Take 1 tablet (500 mg) by mouth 2 times daily (with meals).  - Semaglutide (RYBELSUS) 14 MG tablet; Take 14 mg by mouth daily.  - glimepiride (AMARYL) 4 MG tablet; Take 1 tablet (4 mg) by mouth every morning (before breakfast).  - HEMOGLOBIN A1C  - Lipid panel reflex to direct LDL Non-fasting  - BASIC METABOLIC PANEL    Diabetic polyneuropathy associated with type 2 diabetes mellitus (H)  Chronic, stable.  Continue gabapentin.   - gabapentin (NEURONTIN) 600 MG tablet; Take 0.5 tablets (300 mg) by mouth 2 times daily.    Stage 3a chronic kidney disease (H)  Labs in process. Continue current meds.     - empagliflozin (JARDIANCE) 25 MG TABS tablet; Take 0.5 tablets (12.5 mg) by mouth daily.  - losartan (COZAAR) 25 MG tablet; Take 1 tablet (25 mg) by mouth daily.  - Albumin Random Urine Quantitative with Creat Ratio  - Hemoglobin  - BASIC METABOLIC PANEL    Essential hypertension, benign  Chronic, stable.  Continue current meds.   - losartan (COZAAR) 25 MG tablet; Take 1 tablet (25 mg) by mouth daily.  - metoprolol succinate ER (TOPROL XL) 50 MG 24 hr tablet; Take 1 tablet (50 mg) by mouth daily.  - BASIC METABOLIC PANEL    Hyperlipidemia LDL goal <100  Lipid panel in process.  Continue atorvastatin.    - atorvastatin (LIPITOR) 20 MG tablet; Take 1 tablet  (20 mg) by mouth daily.  - Lipid panel reflex to direct LDL Non-fasting    Coronary artery disease involving native coronary artery of native heart without angina pectoris  Denies symptoms.  Follows with cardiology.    - empagliflozin (JARDIANCE) 25 MG TABS tablet; Take 0.5 tablets (12.5 mg) by mouth daily.  - metoprolol succinate ER (TOPROL XL) 50 MG 24 hr tablet; Take 1 tablet (50 mg) by mouth daily.  - atorvastatin (LIPITOR) 20 MG tablet; Take 1 tablet (20 mg) by mouth daily.  - Lipid panel reflex to direct LDL Non-fasting    Erectile dysfunction, unspecified erectile dysfunction type  Continue tadalafil prn.     - tadalafil (CIALIS) 20 MG tablet; Take 1 tablet (20 mg) by mouth daily as needed (prior to sexual activity).    Actinic keratosis  Referral to dermatology.    - Adult Dermatology  Referral; Future    Screen for colon cancer  Due for screening, he'd prefer to do Cologuard.   - COLOGUARD(EXACT SCIENCES); Future    Screening for prostate cancer  Requests screening.    - PSA, screen          BMI  Estimated body mass index is 30.24 kg/m  as calculated from the following:    Height as of this encounter: 1.829 m (6').    Weight as of this encounter: 101.2 kg (223 lb).       Freddie Ramey is a 74 year old, presenting for the following health issues:  Diabetes    History of Present Illness       Diabetes:   He presents for follow up of diabetes.  He is checking home blood glucose a few times a week.   He checks blood glucose before meals.  Blood glucose is never over 200 and sometimes under 70. He is aware of hypoglycemia symptoms including shakiness and weakness.    He has no concerns regarding his diabetes at this time.  He is having burning in feet.  The patient has not had a diabetic eye exam in the last 12 months.          He eats 2-3 servings of fruits and vegetables daily.He consumes 1 sweetened beverage(s) daily.He exercises with enough effort to increase his heart rate 9 or less minutes per  day.  He exercises with enough effort to increase his heart rate 3 or less days per week.   He is taking medications regularly.        New patient to clinic, establish care visit.  Due for labs and needs some med refills.  Previously went to [facility] but provider no longer there. [place] in [place].  Has provider there as well.    Sees cardiology at [facility].     PMH includes CAD, Afib, HTN, HLD and DM2.     Diabetes for many years.  Very well controlled recently.  Last A1C 5.9.   States his meds have been working really well for him- metformin, glimepiride, Jardiance and Rybelsus.  Does noticed the Rybelsus has made him not really like any foods.   No CP, palpitations or SOB.   Will be having afib ablation next week, this will be his 2nd ablation.    Has some spots on his arms.  Reports spending a lot of time in th sun without sunscreen in his life.        Review of Systems  Constitutional, neuro, ENT, endocrine, pulmonary, cardiac, gastrointestinal, genitourinary, musculoskeletal, integument and psychiatric systems are negative, except as otherwise noted.      Objective    /83   Pulse 50   Temp 98.5  F (36.9  C)   Ht 1.829 m (6')   Wt 101.2 kg (223 lb)   SpO2 97%   BMI 30.24 kg/m    Body mass index is 30.24 kg/m .  Physical Exam   GENERAL: alert and no distress  EYES: Eyes grossly normal to inspection, PERRL and conjunctivae and sclerae normal  RESP: lungs clear to auscultation - no rales, rhonchi or wheezes  CV: regular rate and rhythm, normal S1 S2, no S3 or S4, no murmur, click or rub, no peripheral edema  MS: no gross musculoskeletal defects noted, no edema  SKIN: no suspicious lesions or rashes  NEURO: Normal strength and tone, mentation intact and speech normal  PSYCH: mentation appears normal, affect normal/bright    Outside labs reviewed in Care Everywhere.     Lab Results   Component Value Date    A1C 5.8 05/21/2025    A1C 7.3 04/24/2023    A1C 7.7 12/21/2022    A1C 7.4 05/23/2022    A1C 7.8  09/24/2021    A1C 7.4 04/24/2021    A1C 7.1 09/26/2020    A1C 7.1 06/15/2020    A1C 6.7 12/05/2019    A1C 8.4 09/13/2019     Remaining labs in process      The longitudinal plan of care for the diagnosis(es)/condition(s) as documented were addressed during this visit. Due to the added complexity in care, I will continue to support Erv in the subsequent management and with ongoing continuity of care.           Signed Electronically by: Marybeth Mac, CNP

## 2025-05-22 ENCOUNTER — RESULTS FOLLOW-UP (OUTPATIENT)
Dept: FAMILY MEDICINE | Facility: CLINIC | Age: 75
End: 2025-05-22

## 2025-05-22 ENCOUNTER — PATIENT OUTREACH (OUTPATIENT)
Dept: CARE COORDINATION | Facility: CLINIC | Age: 75
End: 2025-05-22
Payer: MEDICARE

## 2025-05-26 ENCOUNTER — PATIENT OUTREACH (OUTPATIENT)
Dept: CARE COORDINATION | Facility: CLINIC | Age: 75
End: 2025-05-26
Payer: MEDICARE

## 2025-06-16 ENCOUNTER — TELEPHONE (OUTPATIENT)
Dept: INTERNAL MEDICINE | Facility: CLINIC | Age: 75
End: 2025-06-16
Payer: MEDICARE

## 2025-06-16 DIAGNOSIS — E11.51 TYPE II DIABETES MELLITUS WITH PERIPHERAL CIRCULATORY DISORDER (H): ICD-10-CM

## 2025-06-16 DIAGNOSIS — E78.5 HYPERLIPIDEMIA LDL GOAL <100: ICD-10-CM

## 2025-06-16 DIAGNOSIS — I25.10 CORONARY ARTERY DISEASE INVOLVING NATIVE CORONARY ARTERY OF NATIVE HEART WITHOUT ANGINA PECTORIS: ICD-10-CM

## 2025-06-16 DIAGNOSIS — N18.31 STAGE 3A CHRONIC KIDNEY DISEASE (H): ICD-10-CM

## 2025-06-16 RX ORDER — ATORVASTATIN CALCIUM 20 MG/1
20 TABLET, FILM COATED ORAL DAILY
Qty: 90 TABLET | Refills: 3 | Status: SHIPPED | OUTPATIENT
Start: 2025-06-16

## 2025-06-16 NOTE — TELEPHONE ENCOUNTER
Pt calling stating that [facility] did not receive 3 prescription's (Jardiance, Metformin and Atorvastatin) that were sent on 5/21/25. Pt did not ask about other prescription's as he does not need them at this time. Pt called on Friday but TC team routed to refill pool who denied due to pt getting refills on 5/21/25.    Routing to PAL team: Can you please reach out to [facility] and verify that they received prescription's from 5/21/25? Specifically Jardiance, Metformin and Atorvastatin. Pt is on his last pill for those three and needs the medication today. Pt would like a call back after speaking with [facility], please.     Thank you - Shelli Love, LISAN, RN

## 2025-06-16 NOTE — TELEPHONE ENCOUNTER
Per providers office notes 5/21/25, the atorvastatin was ordered to the pharmacy and received. (This RN resent it).      The metformin and Jardiance I believe she meant to order them to pharmacy but listed them as historical.  This RN sent both of these to the pharmacy. Informed patient.     Routing to Marybeth Mac CNP to co-sign RN notes that the metformin and Jardiance were sent to pharmacy as indicated.     Sandra GONZALEZN, RN

## 2025-06-16 NOTE — TELEPHONE ENCOUNTER
Medications listed as historical.    Jesika Campbell, RN, BSN  [facility]

## 2025-07-17 ENCOUNTER — OFFICE VISIT (OUTPATIENT)
Dept: OPTOMETRY | Facility: CLINIC | Age: 75
End: 2025-07-17
Payer: MEDICARE

## 2025-07-17 DIAGNOSIS — H25.13 NUCLEAR SCLEROTIC CATARACT OF BOTH EYES: ICD-10-CM

## 2025-07-17 DIAGNOSIS — H52.223 REGULAR ASTIGMATISM OF BOTH EYES: ICD-10-CM

## 2025-07-17 DIAGNOSIS — E11.9 TYPE 2 DIABETES MELLITUS WITHOUT COMPLICATION, WITHOUT LONG-TERM CURRENT USE OF INSULIN (H): Primary | ICD-10-CM

## 2025-07-17 DIAGNOSIS — H52.4 PRESBYOPIA: ICD-10-CM

## 2025-07-17 ASSESSMENT — REFRACTION_WEARINGRX
SPECS_TYPE: PAL
OD_AXIS: 005
OD_CYLINDER: +0.75
OD_ADD: +2.50
OS_SPHERE: PLANO
OD_SPHERE: PLANO
OS_ADD: +2.50
OS_AXIS: 175
OS_CYLINDER: +0.50

## 2025-07-17 ASSESSMENT — REFRACTION_MANIFEST
OD_AXIS: 005
METHOD_AUTOREFRACTION: 1
OD_SPHERE: +0.25
OD_CYLINDER: +1.25
OS_ADD: +2.50
OS_CYLINDER: +1.00
OS_SPHERE: PLANO
OD_SPHERE: PLANO
OD_ADD: +2.50
OS_AXIS: 168
OD_CYLINDER: +0.75
OS_CYLINDER: +0.75
OS_AXIS: 180
OS_SPHERE: +0.50
OD_AXIS: 171

## 2025-07-17 ASSESSMENT — KERATOMETRY
OD_K1POWER_DIOPTERS: 43.75
OS_AXISANGLE_DEGREES: 082
OD_AXISANGLE_DEGREES: 082
OS_K2POWER_DIOPTERS: 42.50
OD_AXISANGLE2_DEGREES: 172
OS_K1POWER_DIOPTERS: 43.50
OD_K2POWER_DIOPTERS: 42.50
OS_AXISANGLE2_DEGREES: 172

## 2025-07-17 ASSESSMENT — VISUAL ACUITY
OD_CC+: -1
OS_CC+: -1
OD_CC: 20/20
CORRECTION_TYPE: GLASSES
OS_CC: 20/20
OS_CC: 20/20
OD_CC: 20/20 -2
METHOD: SNELLEN - LINEAR

## 2025-07-17 ASSESSMENT — CONF VISUAL FIELD
OD_NORMAL: 1
OS_NORMAL: 1
OS_INFERIOR_TEMPORAL_RESTRICTION: 0
OS_INFERIOR_NASAL_RESTRICTION: 0
OS_SUPERIOR_NASAL_RESTRICTION: 0
METHOD: COUNTING FINGERS
OD_SUPERIOR_NASAL_RESTRICTION: 0
OD_INFERIOR_NASAL_RESTRICTION: 0
OS_SUPERIOR_TEMPORAL_RESTRICTION: 0
OD_SUPERIOR_TEMPORAL_RESTRICTION: 0
OD_INFERIOR_TEMPORAL_RESTRICTION: 0

## 2025-07-17 ASSESSMENT — TONOMETRY
IOP_METHOD: APPLANATION
OD_IOP_MMHG: 14
OS_IOP_MMHG: 14

## 2025-07-17 ASSESSMENT — CUP TO DISC RATIO
OD_RATIO: 0.2
OS_RATIO: 0.2

## 2025-07-17 ASSESSMENT — SLIT LAMP EXAM - LIDS: COMMENTS: 2+ DERMATOCHALASIS

## 2025-07-17 NOTE — PROGRESS NOTES
Chief Complaint   Patient presents with    Diabetic Eye Exam        Chief Complaint(s) and History of Present Illness(es)       Diabetic Eye Exam              Diabetes Type: Type 2 and taking oral medications    Duration: years    Blood Sugars: is controlled                   Lab Results   Component Value Date    A1C 5.8 05/21/2025    A1C 7.3 04/24/2023    A1C 7.7 12/21/2022    A1C 7.4 05/23/2022    A1C 7.8 09/24/2021    A1C 7.4 04/24/2021    A1C 7.1 09/26/2020    A1C 7.1 06/15/2020    A1C 6.7 12/05/2019    A1C 8.4 09/13/2019            Last Eye Exam: 05/28/2024  Dilated Previously: Yes    What are you currently using to see?  Glasses PALs - wears full time    Distance Vision Acuity: Satisfied with vision    Near Vision Acuity:Satisfied     Eye Comfort: good, not bothered by upper eyelid position   Do you use eye drops? : No  Occupation or Hobbies: Retired    Heather Armstrong    Optometric Assistant       Medical, surgical and family histories reviewed and updated 7/17/2025.       OBJECTIVE: See Ophthalmology exam    ASSESSMENT:    ICD-10-CM    1. Type 2 diabetes mellitus without complication, without long-term current use of insulin (H)  E11.9 EYE EXAM (SIMPLE-NONBILLABLE)     REFRACTION      2. Nuclear sclerotic cataract of both eyes  H25.13 EYE EXAM (SIMPLE-NONBILLABLE)     REFRACTION      3. Regular astigmatism of both eyes  H52.223 EYE EXAM (SIMPLE-NONBILLABLE)     REFRACTION      4. Presbyopia  H52.4 EYE EXAM (SIMPLE-NONBILLABLE)     REFRACTION          PLAN:    Edwardo Dumont aware  eye exam results will be sent to Marybeth Mac.  Patient Instructions   Optional to fill new glasses prescription, minimal change  Mild cataracts   Keep blood sugar under good control  Return in 1 year for diabetic eye exam      Blood sugar and blood pressure control are very important in the prevention of ocular complications from diabetes.       Rosa Varela, OD  [phone]

## 2025-07-17 NOTE — PATIENT INSTRUCTIONS
Optional to fill new glasses prescription, minimal change  Mild cataracts   Keep blood sugar under good control  Return in 1 year for diabetic eye exam      Blood sugar and blood pressure control are very important in the prevention of ocular complications from diabetes.       Rosa Varela, OD  [phone]

## 2025-07-17 NOTE — LETTER
7/17/2025      Edwardo IGLESIAS Tim  [address]  [place] [address]      Dear Colleague,    Thank you for referring your patient, Edwardo Dumont, to the [facility]. No diabetic retinopathy was found at eye exam.  Please see a copy of my visit note below.    Chief Complaint   Patient presents with     Diabetic Eye Exam        Chief Complaint(s) and History of Present Illness(es)       Diabetic Eye Exam              Diabetes Type: Type 2 and taking oral medications    Duration: years    Blood Sugars: is controlled                   Lab Results   Component Value Date    A1C 5.8 05/21/2025    A1C 7.3 04/24/2023    A1C 7.7 12/21/2022    A1C 7.4 05/23/2022    A1C 7.8 09/24/2021    A1C 7.4 04/24/2021    A1C 7.1 09/26/2020    A1C 7.1 06/15/2020    A1C 6.7 12/05/2019    A1C 8.4 09/13/2019            Last Eye Exam: 05/28/2024  Dilated Previously: Yes    What are you currently using to see?  Glasses PALs - wears full time    Distance Vision Acuity: Satisfied with vision    Near Vision Acuity:Satisfied     Eye Comfort: good, not bothered by upper eyelid position   Do you use eye drops? : No  Occupation or Hobbies: Retired    Heather Armstrong    Optometric Assistant       Medical, surgical and family histories reviewed and updated 7/17/2025.       OBJECTIVE: See Ophthalmology exam    ASSESSMENT:    ICD-10-CM    1. Type 2 diabetes mellitus without complication, without long-term current use of insulin (H)  E11.9 EYE EXAM (SIMPLE-NONBILLABLE)     REFRACTION      2. Nuclear sclerotic cataract of both eyes  H25.13 EYE EXAM (SIMPLE-NONBILLABLE)     REFRACTION      3. Regular astigmatism of both eyes  H52.223 EYE EXAM (SIMPLE-NONBILLABLE)     REFRACTION      4. Presbyopia  H52.4 EYE EXAM (SIMPLE-NONBILLABLE)     REFRACTION          PLAN:    Edwardo Dumont aware  eye exam results will be sent to Marybeth Mac.  Patient Instructions   Optional to fill new glasses prescription, minimal change  Mild cataracts    Keep blood sugar under good control  Return in 1 year for diabetic eye exam      Blood sugar and blood pressure control are very important in the prevention of ocular complications from diabetes.       Rosa Varela, OD  [phone]                     Again, thank you for allowing me to participate in the care of your patient.        Sincerely,        Rosa Varela, OD    Electronically signed

## 2025-07-31 ENCOUNTER — VIRTUAL VISIT (OUTPATIENT)
Dept: FAMILY MEDICINE | Facility: CLINIC | Age: 75
End: 2025-07-31
Payer: MEDICARE

## 2025-07-31 ENCOUNTER — MYC REFILL (OUTPATIENT)
Dept: INTERNAL MEDICINE | Facility: CLINIC | Age: 75
End: 2025-07-31

## 2025-07-31 DIAGNOSIS — E11.42 DIABETIC POLYNEUROPATHY ASSOCIATED WITH TYPE 2 DIABETES MELLITUS (H): ICD-10-CM

## 2025-07-31 DIAGNOSIS — I25.10 CORONARY ARTERY DISEASE INVOLVING NATIVE CORONARY ARTERY OF NATIVE HEART WITHOUT ANGINA PECTORIS: ICD-10-CM

## 2025-07-31 DIAGNOSIS — E11.51 TYPE II DIABETES MELLITUS WITH PERIPHERAL CIRCULATORY DISORDER (H): ICD-10-CM

## 2025-07-31 DIAGNOSIS — N18.31 STAGE 3A CHRONIC KIDNEY DISEASE (H): ICD-10-CM

## 2025-07-31 RX ORDER — GABAPENTIN 600 MG/1
600 TABLET ORAL 3 TIMES DAILY
Status: SHIPPED
Start: 2025-07-31

## 2025-07-31 NOTE — PROGRESS NOTES
Tanvir is a 75 year old who is being evaluated via a billable video visit.      How would you like to obtain your AVS? MyChart    If the video visit is dropped, the invitation should be resent by: Text to cell phone: [phone]    Will anyone else be joining your video visit? No    Assessment & Plan     Type II diabetes mellitus with peripheral circulatory disorder (H)  He will continue on half dose of Rybelsus (7 mg) since 14 mg caused extreme food aversions to the point he didn't want to eat at all.  That has improved since he reduced the dose. I would like him to increase Jardiance back up to 25 mg because his sugars have been higher.  Will continue on metformin 500 mg for now.  Too soon to check A1C, discussed coming in for labs end of August.    - Semaglutide (RYBELSUS) 14 MG tablet; Take 7 mg by mouth daily.    Diabetic polyneuropathy associated with type 2 diabetes mellitus (H)  Increase gabapentin to 600 mg TID. Follow-up if not improving.   - gabapentin (NEURONTIN) 600 MG tablet; Take 1 tablet (600 mg) by mouth 3 times daily.        Subjective   Tanvir is a 75 year old, presenting for the following health issues:  Diabetes, NEUROPATHY, and Recheck Medication (Dosing for Metformin, Rybelsus, Jardiance )        7/31/2025    11:35 AM   Additional Questions   Roomed by Chuck LOPEZ LPN   Accompanied by Self       History of Present Illness       Diabetes:   He presents for follow up of diabetes.  He is checking home blood glucose a few times a week.   He checks blood glucose before meals and after meals.  Blood glucose is sometimes over 200 and never under 70. He is aware of hypoglycemia symptoms including shakiness, dizziness and lethargy.   He is concerned about other.   He is having numbness in feet.            He eats 2-3 servings of fruits and vegetables daily.He consumes 0 sweetened beverage(s) daily.He exercises with enough effort to increase his heart rate 20 to 29 minutes per day.  He exercises with enough  effort to increase his heart rate 4 days per week.   He is taking medications regularly.      Cut Rybelsus in half because he was having extreme food aversions. He also cut Jardiance in half.  Continues on metformin 500 mg BID.    His appetite is much better.   Since making changes, blood sugars have been worse.  Now AM fasting 140s. Previously 80-90s.   Also having more neuropathy in his feet.  Takes gabapentin 600 mg BID, was prescribed as TID.            Review of Systems  Constitutional, HEENT, cardiovascular, pulmonary, gi and gu systems are negative, except as otherwise noted.      Objective           Vitals:  No vitals were obtained today due to virtual visit.    Physical Exam   GENERAL: alert and no distress  EYES: Eyes grossly normal to inspection.  No discharge or erythema, or obvious scleral/conjunctival abnormalities.  RESP: No audible wheeze, cough, or visible cyanosis.    SKIN: Visible skin clear. No significant rash, abnormal pigmentation or lesions.  NEURO: Cranial nerves grossly intact.  Mentation and speech appropriate for age.  PSYCH: Appropriate affect, tone, and pace of words        Video-Visit Details    Type of service:  Video Visit   Originating Location (pt. Location): Home  Distant Location (provider location):  On-site  Platform used for Video Visit: Sarai    The longitudinal plan of care for the diagnosis(es)/condition(s) as documented were addressed during this visit. Due to the added complexity in care, I will continue to support Erv in the subsequent management and with ongoing continuity of care.     Signed Electronically by: Marybeth Mac, OSMIN

## (undated) DEVICE — GLOVE PROTEXIS W/NEU-THERA 8.5  2D73TE85

## (undated) DEVICE — MARKER SPHERES PASSIVE MEDT PACK 5 8801075

## (undated) DEVICE — CUSHION INSERT LG PRONE VIEW JACKSON TABLE

## (undated) DEVICE — ESU CLEANER TIP 31142717

## (undated) DEVICE — PACK SMALL SPINE RIDGES

## (undated) DEVICE — DRSG TELFA ISLAND 4X10"

## (undated) DEVICE — TUBING SUCTION 12"X1/4" N612

## (undated) DEVICE — Device

## (undated) DEVICE — SU VICRYL 2-0 CT-1 18' J739D

## (undated) DEVICE — DRSG GAUZE 4X4" 8044

## (undated) DEVICE — LINEN ORTHO ACL PACK 5447

## (undated) DEVICE — GOWN XLG DISP 9545

## (undated) DEVICE — ESU GROUND PAD ADULT W/CORD E7507

## (undated) DEVICE — SPONGE SURGIFOAM 100 1974

## (undated) DEVICE — CATH TRAY FOLEY COUDE SURESTEP 16FR W/DRN BAG LATEX A304416A

## (undated) DEVICE — SU VICRYL 0 CT-1 CR 8X18" J740D

## (undated) DEVICE — NDL BLUNT 18GA 1" W/O FILTER 305181

## (undated) DEVICE — CATH FOLEY COUDE TIEMAN 14FR 5ML LATEX

## (undated) DEVICE — RX SURGIFLO HEMOSTATIC MATRIX 8ML 2991

## (undated) DEVICE — GLOVE PROTEXIS W/NEU-THERA 7.5  2D73TE75

## (undated) DEVICE — SU DERMABOND ADVANCED .7ML DNX12

## (undated) DEVICE — DRSG KERLIX FLUFFS X5

## (undated) DEVICE — SUCTION FRAZIER 12FR W/OBTURATOR 33120

## (undated) DEVICE — DEVICE DUST COLLECTOR BONE BOX S-3500

## (undated) DEVICE — BLADE CLIPPER SGL USE 9680

## (undated) DEVICE — GOWN REINFORCED XXLG 9071

## (undated) DEVICE — LINEN DRAPE 54X72" 5467

## (undated) DEVICE — SUCTION MANIFOLD NEPTUNE 2 SYS 4 PORT 0702-020-000

## (undated) DEVICE — LINEN POUCH DBL 5427

## (undated) DEVICE — PACK SET-UP STD 9102

## (undated) DEVICE — PEN MARKING SKIN W/LABELS 31145884

## (undated) DEVICE — SU WND CLOSURE VLOC 180 ABS 3-0 12" P-14 VLOCL0114

## (undated) DEVICE — DECANTER BAG 2002S

## (undated) DEVICE — STPL SKIN PROXIMATE 35 WIDE PMW35

## (undated) DEVICE — TUBING ACP CQ FOR JACKSON TABLE SPINE 5996-35

## (undated) DEVICE — ESU BIPOLAR SEALER AQUAMANTYS 6MM 23-112-1

## (undated) DEVICE — DRAIN JACKSON PRATT RESERVOIR 100ML SU130-1305

## (undated) DEVICE — DRAIN JACKSON PRATT CHANNEL 10FR RND SIL W/TROCAR JP-2227

## (undated) DEVICE — GLOVE PROTEXIS BLUE W/NEU-THERA 8.5  2D73EB85

## (undated) DEVICE — SOL NACL 0.9% INJ 250ML BAG 2B1322Q

## (undated) DEVICE — MIDAS REX DISSECTING TOOL  14MH30

## (undated) DEVICE — DRAPE MICROSCOPE OPMI ZEISS 48X118" 306071-0000-000

## (undated) DEVICE — CATH FOLEY COUDE TIEMAN 18FR 30ML LATEX 0103SI18

## (undated) RX ORDER — HYDROMORPHONE HYDROCHLORIDE 1 MG/ML
INJECTION, SOLUTION INTRAMUSCULAR; INTRAVENOUS; SUBCUTANEOUS
Status: DISPENSED
Start: 2018-07-20

## (undated) RX ORDER — CEFAZOLIN SODIUM 1 G/3ML
INJECTION, POWDER, FOR SOLUTION INTRAMUSCULAR; INTRAVENOUS
Status: DISPENSED
Start: 2018-07-20

## (undated) RX ORDER — NEOSTIGMINE METHYLSULFATE 1 MG/ML
VIAL (ML) INJECTION
Status: DISPENSED
Start: 2018-07-20

## (undated) RX ORDER — PROPOFOL 10 MG/ML
INJECTION, EMULSION INTRAVENOUS
Status: DISPENSED
Start: 2018-07-20

## (undated) RX ORDER — BUPIVACAINE HYDROCHLORIDE AND EPINEPHRINE 5; 5 MG/ML; UG/ML
INJECTION, SOLUTION EPIDURAL; INTRACAUDAL; PERINEURAL
Status: DISPENSED
Start: 2018-07-20

## (undated) RX ORDER — DEXAMETHASONE SODIUM PHOSPHATE 4 MG/ML
INJECTION, SOLUTION INTRA-ARTICULAR; INTRALESIONAL; INTRAMUSCULAR; INTRAVENOUS; SOFT TISSUE
Status: DISPENSED
Start: 2018-07-20

## (undated) RX ORDER — FENTANYL CITRATE 50 UG/ML
INJECTION, SOLUTION INTRAMUSCULAR; INTRAVENOUS
Status: DISPENSED
Start: 2018-07-20

## (undated) RX ORDER — LIDOCAINE HYDROCHLORIDE 10 MG/ML
INJECTION, SOLUTION EPIDURAL; INFILTRATION; INTRACAUDAL; PERINEURAL
Status: DISPENSED
Start: 2018-07-20

## (undated) RX ORDER — CEFAZOLIN SODIUM 2 G/100ML
INJECTION, SOLUTION INTRAVENOUS
Status: DISPENSED
Start: 2018-07-20

## (undated) RX ORDER — PHENYLEPHRINE HCL IN 0.9% NACL 1 MG/10 ML
SYRINGE (ML) INTRAVENOUS
Status: DISPENSED
Start: 2018-07-20

## (undated) RX ORDER — GINSENG 100 MG
CAPSULE ORAL
Status: DISPENSED
Start: 2018-07-20

## (undated) RX ORDER — GLYCOPYRROLATE 0.2 MG/ML
INJECTION INTRAMUSCULAR; INTRAVENOUS
Status: DISPENSED
Start: 2018-07-20

## (undated) RX ORDER — EPHEDRINE SULFATE 50 MG/ML
INJECTION, SOLUTION INTRAMUSCULAR; INTRAVENOUS; SUBCUTANEOUS
Status: DISPENSED
Start: 2018-07-20